# Patient Record
Sex: FEMALE | Race: BLACK OR AFRICAN AMERICAN | NOT HISPANIC OR LATINO | Employment: FULL TIME | URBAN - METROPOLITAN AREA
[De-identification: names, ages, dates, MRNs, and addresses within clinical notes are randomized per-mention and may not be internally consistent; named-entity substitution may affect disease eponyms.]

---

## 2018-06-05 ENCOUNTER — HOSPITAL ENCOUNTER (EMERGENCY)
Facility: HOSPITAL | Age: 43
Discharge: HOME | End: 2018-06-05
Attending: EMERGENCY MEDICINE
Payer: COMMERCIAL

## 2018-06-05 ENCOUNTER — APPOINTMENT (EMERGENCY)
Dept: RADIOLOGY | Facility: HOSPITAL | Age: 43
End: 2018-06-05
Payer: COMMERCIAL

## 2018-06-05 VITALS
SYSTOLIC BLOOD PRESSURE: 110 MMHG | TEMPERATURE: 98 F | BODY MASS INDEX: 47.09 KG/M2 | DIASTOLIC BLOOD PRESSURE: 65 MMHG | HEART RATE: 67 BPM | OXYGEN SATURATION: 96 % | WEIGHT: 293 LBS | RESPIRATION RATE: 20 BRPM | HEIGHT: 66 IN

## 2018-06-05 DIAGNOSIS — N93.9 VAGINAL BLEEDING: Primary | ICD-10-CM

## 2018-06-05 LAB
ABO GROUP BLD: NORMAL
ALBUMIN SERPL BCP-MCNC: 3.3 G/DL (ref 3.5–5)
ALP SERPL-CCNC: 55 U/L (ref 46–116)
ALT SERPL W P-5'-P-CCNC: 24 U/L (ref 12–78)
ANION GAP SERPL CALCULATED.3IONS-SCNC: 7 MMOL/L (ref 4–13)
APTT PPP: 32 SECONDS (ref 24–36)
AST SERPL W P-5'-P-CCNC: 14 U/L (ref 5–45)
BACTERIA UR QL AUTO: ABNORMAL /HPF
BASOPHILS # BLD AUTO: 0.03 THOUSANDS/ΜL (ref 0–0.1)
BASOPHILS NFR BLD AUTO: 0 % (ref 0–1)
BILIRUB SERPL-MCNC: 0.3 MG/DL (ref 0.2–1)
BILIRUB UR QL STRIP: NEGATIVE
BLD GP AB SCN SERPL QL: NEGATIVE
BUN SERPL-MCNC: 10 MG/DL (ref 5–25)
CALCIUM SERPL-MCNC: 8.5 MG/DL (ref 8.3–10.1)
CHLORIDE SERPL-SCNC: 103 MMOL/L (ref 100–108)
CLARITY UR: ABNORMAL
CO2 SERPL-SCNC: 29 MMOL/L (ref 21–32)
COLOR UR: ABNORMAL
CREAT SERPL-MCNC: 0.78 MG/DL (ref 0.6–1.3)
DEPRECATED D DIMER PPP: 282 NG/ML (FEU) (ref 0–424)
EOSINOPHIL # BLD AUTO: 0.1 THOUSAND/ΜL (ref 0–0.61)
EOSINOPHIL NFR BLD AUTO: 1 % (ref 0–6)
ERYTHROCYTE [DISTWIDTH] IN BLOOD BY AUTOMATED COUNT: 13.1 % (ref 11.6–15.1)
EXT PREG TEST URINE: NEGATIVE
GFR SERPL CREATININE-BSD FRML MDRD: 109 ML/MIN/1.73SQ M
GLUCOSE SERPL-MCNC: 125 MG/DL (ref 65–140)
GLUCOSE UR STRIP-MCNC: NEGATIVE MG/DL
HCT VFR BLD AUTO: 39.2 % (ref 34.8–46.1)
HGB BLD-MCNC: 12.4 G/DL (ref 11.5–15.4)
HGB UR QL STRIP.AUTO: ABNORMAL
IMM GRANULOCYTES # BLD AUTO: 0.02 THOUSAND/UL (ref 0–0.2)
IMM GRANULOCYTES NFR BLD AUTO: 0 % (ref 0–2)
INR PPP: 1.11 (ref 0.86–1.17)
KETONES UR STRIP-MCNC: NEGATIVE MG/DL
LEUKOCYTE ESTERASE UR QL STRIP: ABNORMAL
LYMPHOCYTES # BLD AUTO: 2.63 THOUSANDS/ΜL (ref 0.6–4.47)
LYMPHOCYTES NFR BLD AUTO: 35 % (ref 14–44)
MCH RBC QN AUTO: 26.2 PG (ref 26.8–34.3)
MCHC RBC AUTO-ENTMCNC: 31.6 G/DL (ref 31.4–37.4)
MCV RBC AUTO: 83 FL (ref 82–98)
MONOCYTES # BLD AUTO: 0.84 THOUSAND/ΜL (ref 0.17–1.22)
MONOCYTES NFR BLD AUTO: 11 % (ref 4–12)
NEUTROPHILS # BLD AUTO: 3.93 THOUSANDS/ΜL (ref 1.85–7.62)
NEUTS SEG NFR BLD AUTO: 53 % (ref 43–75)
NITRITE UR QL STRIP: NEGATIVE
NON-SQ EPI CELLS URNS QL MICRO: ABNORMAL /HPF
NRBC BLD AUTO-RTO: 0 /100 WBCS
PH UR STRIP.AUTO: 6 [PH] (ref 4.5–8)
PLATELET # BLD AUTO: 252 THOUSANDS/UL (ref 149–390)
PMV BLD AUTO: 9.2 FL (ref 8.9–12.7)
POTASSIUM SERPL-SCNC: 3.8 MMOL/L (ref 3.5–5.3)
PROT SERPL-MCNC: 7.3 G/DL (ref 6.4–8.2)
PROT UR STRIP-MCNC: ABNORMAL MG/DL
PROTHROMBIN TIME: 14.2 SECONDS (ref 11.8–14.2)
RBC # BLD AUTO: 4.74 MILLION/UL (ref 3.81–5.12)
RBC #/AREA URNS AUTO: ABNORMAL /HPF
RH BLD: POSITIVE
SODIUM SERPL-SCNC: 139 MMOL/L (ref 136–145)
SP GR UR STRIP.AUTO: 1.01 (ref 1–1.03)
SPECIMEN EXPIRATION DATE: NORMAL
UROBILINOGEN UR QL STRIP.AUTO: 0.2 E.U./DL
WBC # BLD AUTO: 7.55 THOUSAND/UL (ref 4.31–10.16)
WBC #/AREA URNS AUTO: ABNORMAL /HPF

## 2018-06-05 PROCEDURE — 96361 HYDRATE IV INFUSION ADD-ON: CPT

## 2018-06-05 PROCEDURE — 86850 RBC ANTIBODY SCREEN: CPT | Performed by: EMERGENCY MEDICINE

## 2018-06-05 PROCEDURE — 86901 BLOOD TYPING SEROLOGIC RH(D): CPT | Performed by: EMERGENCY MEDICINE

## 2018-06-05 PROCEDURE — 85025 COMPLETE CBC W/AUTO DIFF WBC: CPT | Performed by: EMERGENCY MEDICINE

## 2018-06-05 PROCEDURE — 81025 URINE PREGNANCY TEST: CPT | Performed by: EMERGENCY MEDICINE

## 2018-06-05 PROCEDURE — 85379 FIBRIN DEGRADATION QUANT: CPT | Performed by: EMERGENCY MEDICINE

## 2018-06-05 PROCEDURE — 85730 THROMBOPLASTIN TIME PARTIAL: CPT | Performed by: EMERGENCY MEDICINE

## 2018-06-05 PROCEDURE — 99284 EMERGENCY DEPT VISIT MOD MDM: CPT

## 2018-06-05 PROCEDURE — 96360 HYDRATION IV INFUSION INIT: CPT

## 2018-06-05 PROCEDURE — 93005 ELECTROCARDIOGRAM TRACING: CPT

## 2018-06-05 PROCEDURE — 85610 PROTHROMBIN TIME: CPT | Performed by: EMERGENCY MEDICINE

## 2018-06-05 PROCEDURE — 36415 COLL VENOUS BLD VENIPUNCTURE: CPT | Performed by: EMERGENCY MEDICINE

## 2018-06-05 PROCEDURE — 86900 BLOOD TYPING SEROLOGIC ABO: CPT | Performed by: EMERGENCY MEDICINE

## 2018-06-05 PROCEDURE — 80053 COMPREHEN METABOLIC PANEL: CPT | Performed by: EMERGENCY MEDICINE

## 2018-06-05 PROCEDURE — 71046 X-RAY EXAM CHEST 2 VIEWS: CPT

## 2018-06-05 PROCEDURE — 81001 URINALYSIS AUTO W/SCOPE: CPT | Performed by: EMERGENCY MEDICINE

## 2018-06-05 RX ORDER — MEDROXYPROGESTERONE ACETATE 10 MG/1
10 TABLET ORAL DAILY
Qty: 10 TABLET | Refills: 0 | Status: SHIPPED | OUTPATIENT
Start: 2018-06-05 | End: 2020-04-10 | Stop reason: ALTCHOICE

## 2018-06-05 RX ADMIN — SODIUM CHLORIDE 1000 ML: 0.9 INJECTION, SOLUTION INTRAVENOUS at 09:37

## 2018-06-05 NOTE — ED PROVIDER NOTES
History  Chief Complaint   Patient presents with    Vaginal Bleeding     cystoscopy on friday, increasing bleeding since     39 y/o female presents to the ED for vaginal bleeding x 5 days  Patient states that she had a hysteroscopy done 5 days ago by Dr Chele Dudley for a uterine fibroid  She states that the fibroid was in the muscle and not removed  However, a biopsy was taken  She states that  was there were not any complications with the procedure and she was sent home later that day  She states that later that night she developed mild vaginal bleeding that has been progressively worsening over the last 5 days  She states that yesterday she went through 8- large, 10 our pads and has gone through an additional 3 since 5am this morning  Has also passed multiple dime sized clots today  She states that she also feels fatigued, lightheaded, and slightly SOB  Denies any CP, f/c, N/V, abd pain, or urinary symptoms  She states that she does menstrual cycles still that are irregular and typically heavy  However, she states that this is heavier than a typical menstrual cycle  No other complaints  History provided by:  Patient  Vaginal Bleeding   Quality:  Bright red  Severity:  Moderate  Onset quality:  Gradual  Duration:  5 days  Timing:  Constant  Progression:  Worsening  Chronicity:  New  Menstrual history:  Irregular  Number of pads used:  8 yesterday,3 today   Context: spontaneously    Relieved by:  None tried  Worsened by:  Nothing  Ineffective treatments:  None tried  Associated symptoms: dizziness    Associated symptoms: no abdominal pain, no dysuria, no fever and no nausea    Risk factors: gynecological surgery and ovarian cysts    Risk factors: no bleeding disorder        None       History reviewed  No pertinent past medical history  Past Surgical History:   Procedure Laterality Date    ECTOPIC PREGNANCY SURGERY         History reviewed  No pertinent family history    I have reviewed and agree with the history as documented  Social History   Substance Use Topics    Smoking status: Never Smoker    Smokeless tobacco: Never Used    Alcohol use No        Review of Systems   Constitutional: Negative for chills and fever  HENT: Negative for congestion, ear pain and sore throat  Eyes: Negative for pain and visual disturbance  Respiratory: Negative for cough, shortness of breath and wheezing  Cardiovascular: Negative for chest pain and leg swelling  Gastrointestinal: Negative for abdominal pain, diarrhea, nausea and vomiting  Genitourinary: Positive for vaginal bleeding  Negative for dysuria, frequency, hematuria and urgency  Musculoskeletal: Negative for neck pain and neck stiffness  Skin: Negative for rash and wound  Neurological: Positive for dizziness  Negative for weakness, numbness and headaches  Psychiatric/Behavioral: Negative for agitation and confusion  All other systems reviewed and are negative  Physical Exam  Physical Exam   Constitutional: She is oriented to person, place, and time  She appears well-developed and well-nourished  HENT:   Head: Normocephalic and atraumatic  Mouth/Throat: Oropharynx is clear and moist    Eyes: EOM are normal  Pupils are equal, round, and reactive to light  Neck: Normal range of motion  Neck supple  Cardiovascular: Normal rate and regular rhythm  Pulmonary/Chest: Effort normal and breath sounds normal  No respiratory distress  She has no wheezes  She has no rales  She exhibits no tenderness  Abdominal: Soft  Bowel sounds are normal  She exhibits no distension  There is no tenderness  Genitourinary: There is bleeding in the vagina  Genitourinary Comments: No blood clots  Mild bleeding in the vaginal vault- bleeding from the cervix  Musculoskeletal: Normal range of motion  Neurological: She is alert and oriented to person, place, and time  No focal deficits   Skin: Skin is warm and dry     Nursing note and vitals reviewed        Vital Signs  ED Triage Vitals   Temperature Pulse Respirations Blood Pressure SpO2   06/05/18 0909 06/05/18 0909 06/05/18 0909 06/05/18 0909 06/05/18 0909   98 °F (36 7 °C) 58 18 139/81 96 %      Temp src Heart Rate Source Patient Position - Orthostatic VS BP Location FiO2 (%)   -- 06/05/18 1113 06/05/18 1113 06/05/18 1113 --    Monitor Lying Right arm       Pain Score       06/05/18 0909       No Pain           Vitals:    06/05/18 0909 06/05/18 1113   BP: 139/81 110/65   Pulse: 58 67   Patient Position - Orthostatic VS:  Lying       Visual Acuity      ED Medications  Medications   sodium chloride 0 9 % bolus 1,000 mL (0 mL Intravenous Stopped 6/5/18 1301)       Diagnostic Studies  Results Reviewed     Procedure Component Value Units Date/Time    Urine Microscopic [[id number]]  (Abnormal) Collected:  06/05/18 1030    Lab Status:  Final result Specimen:  Urine from Urine, Clean Catch Updated:  06/05/18 1101     RBC, UA Innumerable (A) /hpf      WBC, UA 0-1 (A) /hpf      Epithelial Cells Occasional /hpf      Bacteria, UA None Seen /hpf     UA (URINE) with reflex to Microscopic [[id number]]  (Abnormal) Collected:  06/05/18 1030    Lab Status:  Final result Specimen:  Urine from Urine, Clean Catch Updated:  06/05/18 1051     Color, UA Red     Clarity, UA Cloudy     Specific Gravity, UA 1 015     pH, UA 6 0     Leukocytes, UA Trace (A)     Nitrite, UA Negative     Protein, UA 30 (1+) (A) mg/dl      Glucose, UA Negative mg/dl      Ketones, UA Negative mg/dl      Urobilinogen, UA 0 2 E U /dl      Bilirubin, UA Negative     Blood, UA Large (A)    POCT pregnancy, urine [[id number]]  (Normal) Resulted:  06/05/18 1039    Lab Status:  Final result Updated:  06/05/18 1039     EXT PREG TEST UR (Ref: Negative) negative    D-dimer, quantitative [[id number]]  (Normal) Collected:  06/05/18 0936    Lab Status:  Final result Specimen:  Blood from Arm, Right Updated:  06/05/18 1037     D-Dimer, Quant 282 ng/ml (FEU) Comprehensive metabolic panel [[id number]]  (Abnormal) Collected:  06/05/18 0936    Lab Status:  Final result Specimen:  Blood from Arm, Right Updated:  06/05/18 0959     Sodium 139 mmol/L      Potassium 3 8 mmol/L      Chloride 103 mmol/L      CO2 29 mmol/L      Anion Gap 7 mmol/L      BUN 10 mg/dL      Creatinine 0 78 mg/dL      Glucose 125 mg/dL      Calcium 8 5 mg/dL      AST 14 U/L      ALT 24 U/L      Alkaline Phosphatase 55 U/L      Total Protein 7 3 g/dL      Albumin 3 3 (L) g/dL      Total Bilirubin 0 30 mg/dL      eGFR 109 ml/min/1 73sq m     Narrative:         National Kidney Disease Education Program recommendations are as follows:  GFR calculation is accurate only with a steady state creatinine  Chronic Kidney disease less than 60 ml/min/1 73 sq  meters  Kidney failure less than 15 ml/min/1 73 sq  meters      Protime-INR [[id number]]  (Normal) Collected:  06/05/18 0936    Lab Status:  Final result Specimen:  Blood from Arm, Right Updated:  06/05/18 0954     Protime 14 2 seconds      INR 1 11    APTT [[id number]]  (Normal) Collected:  06/05/18 0936    Lab Status:  Final result Specimen:  Blood from Arm, Right Updated:  06/05/18 0954     PTT 32 seconds     CBC and differential [[id number]]  (Abnormal) Collected:  06/05/18 0936    Lab Status:  Final result Specimen:  Blood from Arm, Right Updated:  06/05/18 0943     WBC 7 55 Thousand/uL      RBC 4 74 Million/uL      Hemoglobin 12 4 g/dL      Hematocrit 39 2 %      MCV 83 fL      MCH 26 2 (L) pg      MCHC 31 6 g/dL      RDW 13 1 %      MPV 9 2 fL      Platelets 489 Thousands/uL      nRBC 0 /100 WBCs      Neutrophils Relative 53 %      Immat GRANS % 0 %      Lymphocytes Relative 35 %      Monocytes Relative 11 %      Eosinophils Relative 1 %      Basophils Relative 0 %      Neutrophils Absolute 3 93 Thousands/µL      Immature Grans Absolute 0 02 Thousand/uL      Lymphocytes Absolute 2 63 Thousands/µL      Monocytes Absolute 0 84 Thousand/µL      Eosinophils Absolute 0 10 Thousand/µL      Basophils Absolute 0 03 Thousands/µL                  XR chest 2 views   ED Interpretation by John Bentley DO (06/05 1236)   NAP      Final Result by Alexandrea Mckeon MD (06/05 1226)      No acute cardiopulmonary disease  Workstation performed: [id number]                    Procedures  ECG 12 Lead Documentation  Date/Time: 6/5/2018 9:56 AM  Performed by: Maurisio Rodriguez  Authorized by: Maurisio Rodriguez     Indications / Diagnosis:  Lightheadedness  Patient location:  ED  Previous ECG:     Previous ECG:  Unavailable  Rate:     ECG rate:  64    ECG rate assessment: normal    Rhythm:     Rhythm: sinus rhythm    Ectopy:     Ectopy: none    QRS:     QRS axis:  Normal    QRS intervals:  Normal  ST segments:     ST segments:  Normal  T waves:     T waves: normal               Phone Contacts  ED Phone Contact    ED Course  ED Course as of Jun 06 2205 Tue Jun 05, 2018   1009 Hemoglobin: 12 4   1047 D-DIMER QUANTITATIVE: 282   1219 Spoke with Dr Mee Larson from [facility] [place], who is covering for Dr Anjelica Valdez  He recommends placing patient on Provera 10mg QD- if vaginal bleeding is heavy than instruct patient to take BID  Also recommends following up with Dr Anjelica Valdez in 3 days  [phone] Patient re-evaluated and feels improved  She was updated on conversation with Dr Mee Larson and agrees with plan  She was instructed to call Dr Anjelica Valdez office today to schedule appointment in 3 days  MDM  Number of Diagnoses or Management Options  Vaginal bleeding: new and requires workup  Diagnosis management comments: Patient with vaginal bleeding s/p hysteroscopy- will get labs, ekg, cxr, UA/ urine preg and consult patient's obgyn for further recommendations  Patient reevaluated and feels improved  Patient updated on results of tests  Discharge instructions given including medications, follow-up, and return precautions   Patient demonstrates verbal understanding and agrees with plan  Amount and/or Complexity of Data Reviewed  Clinical lab tests: ordered and reviewed  Tests in the radiology section of CPT®: ordered and reviewed  Tests in the medicine section of CPT®: ordered and reviewed  Discussion of test results with the performing providers: yes  Decide to obtain previous medical records or to obtain history from someone other than the patient: yes  Obtain history from someone other than the patient: yes  Review and summarize past medical records: yes  Discuss the patient with other providers: yes  Independent visualization of images, tracings, or specimens: yes    Patient Progress  Patient progress: improved    CritCare Time    Disposition  Final diagnoses:   Vaginal bleeding     Time reflects when diagnosis was documented in both MDM as applicable and the Disposition within this note     Time User Action Codes Description Comment    6/5/2018 12:33 PM Israel Mahmood Add [N93 9] Vaginal bleeding       ED Disposition     ED Disposition Condition Comment    Discharge  Roshni Paula discharge to home/self care  Condition at discharge: Good        Follow-up Information     Follow up With Specialties Details Why Contact Info Additional Adela Benavides MD Obstetrics and Gynecology Call today To schedule an appointment in 3 days  [id number] Emergency Department Emergency Medicine Go to Immediately for any new or worsening symptoms   [facility] [address]  [address] [place] [address] ED, [address] [place] [address], [id number]          Discharge Medication List as of 6/5/2018 12:36 PM      START taking these medications    Details   medroxyPROGESTERone (PROVERA) 10 mg tablet Take 1 tablet (10 mg total) by mouth daily for 5 days Take BID for heavy vaginal bleeding, Starting Tue 6/5/2018, Until Sun 6/10/2018, Print           No discharge procedures on file      ED Provider  Electronically Signed by           Antonio Hill DO  06/06/18 9490

## 2018-06-06 LAB
ATRIAL RATE: 64 BPM
P AXIS: 24 DEGREES
PR INTERVAL: 110 MS
QRS AXIS: 56 DEGREES
QRSD INTERVAL: 86 MS
QT INTERVAL: 418 MS
QTC INTERVAL: 431 MS
T WAVE AXIS: 40 DEGREES
VENTRICULAR RATE: 64 BPM

## 2018-06-06 PROCEDURE — 93010 ELECTROCARDIOGRAM REPORT: CPT | Performed by: INTERNAL MEDICINE

## 2020-04-10 ENCOUNTER — TELEMEDICINE (OUTPATIENT)
Dept: FAMILY MEDICINE CLINIC | Facility: CLINIC | Age: 45
End: 2020-04-10
Payer: COMMERCIAL

## 2020-04-10 DIAGNOSIS — N83.209 CYST OF OVARY, UNSPECIFIED LATERALITY: Primary | ICD-10-CM

## 2020-04-10 DIAGNOSIS — J01.10 ACUTE NON-RECURRENT FRONTAL SINUSITIS: ICD-10-CM

## 2020-04-10 DIAGNOSIS — D17.1 LIPOMA OF TORSO: ICD-10-CM

## 2020-04-10 DIAGNOSIS — E28.2 PCOS (POLYCYSTIC OVARIAN SYNDROME): ICD-10-CM

## 2020-04-10 DIAGNOSIS — R19.02 LEFT UPPER QUADRANT ABDOMINAL MASS: ICD-10-CM

## 2020-04-10 PROBLEM — E55.9 VITAMIN D DEFICIENCY: Status: ACTIVE | Noted: 2017-11-27

## 2020-04-10 PROBLEM — Z98.84 S/P LAPAROSCOPIC SLEEVE GASTRECTOMY: Status: ACTIVE | Noted: 2018-10-17

## 2020-04-10 PROBLEM — E78.5 HYPERLIPIDEMIA: Status: ACTIVE | Noted: 2017-11-27

## 2020-04-10 PROBLEM — N92.6 IRREGULAR BLEEDING: Status: ACTIVE | Noted: 2017-11-27

## 2020-04-10 PROBLEM — Z30.41 ENCOUNTER FOR SURVEILLANCE OF CONTRACEPTIVE PILLS: Status: ACTIVE | Noted: 2019-09-23

## 2020-04-10 PROBLEM — Z87.42 HISTORY OF PCOS: Status: ACTIVE | Noted: 2020-04-10

## 2020-04-10 PROCEDURE — 99214 OFFICE O/P EST MOD 30 MIN: CPT | Performed by: NURSE PRACTITIONER

## 2020-04-10 RX ORDER — POLYETHYLENE GLYCOL 3350 17 G/17G
POWDER, FOR SOLUTION ORAL
COMMUNITY
Start: 2012-08-29 | End: 2021-05-04 | Stop reason: ALTCHOICE

## 2020-04-10 RX ORDER — CHOLECALCIFEROL (VITAMIN D3) 125 MCG
CAPSULE ORAL
COMMUNITY
End: 2021-05-09 | Stop reason: SDUPTHER

## 2020-04-10 RX ORDER — NORGESTIMATE AND ETHINYL ESTRADIOL 7DAYSX3 LO
1 KIT ORAL DAILY
COMMUNITY
Start: 2020-03-03 | End: 2021-05-04 | Stop reason: ALTCHOICE

## 2020-04-10 RX ORDER — BACILLUS COAGULANS/INULIN 1B-250 MG
1 CAPSULE ORAL
COMMUNITY

## 2020-04-10 RX ORDER — DOXYCYCLINE HYCLATE 100 MG
100 TABLET ORAL 2 TIMES DAILY
Qty: 14 TABLET | Refills: 0 | Status: SHIPPED | OUTPATIENT
Start: 2020-04-10 | End: 2020-04-17

## 2020-04-10 RX ORDER — NORETHINDRONE AND ETHINYL ESTRADIOL 0.4-0.035
1 KIT ORAL DAILY
COMMUNITY
Start: 2020-01-13 | End: 2020-04-10 | Stop reason: ALTCHOICE

## 2020-04-10 RX ORDER — PANTOPRAZOLE SODIUM 40 MG/1
40 TABLET, DELAYED RELEASE ORAL DAILY
COMMUNITY
End: 2020-04-14 | Stop reason: SDUPTHER

## 2020-04-10 RX ORDER — LEVONORGESTREL AND ETHINYL ESTRADIOL 0.1-0.02MG
1 KIT ORAL DAILY
COMMUNITY
End: 2020-04-10 | Stop reason: ALTCHOICE

## 2020-04-14 DIAGNOSIS — E78.49 OTHER HYPERLIPIDEMIA: Primary | ICD-10-CM

## 2020-04-14 RX ORDER — PANTOPRAZOLE SODIUM 40 MG/1
40 TABLET, DELAYED RELEASE ORAL DAILY
Qty: 90 TABLET | Refills: 3 | Status: SHIPPED | OUTPATIENT
Start: 2020-04-14 | End: 2021-01-25

## 2020-06-12 ENCOUNTER — TELEPHONE (OUTPATIENT)
Dept: FAMILY MEDICINE CLINIC | Facility: CLINIC | Age: 45
End: 2020-06-12

## 2020-06-12 ENCOUNTER — OFFICE VISIT (OUTPATIENT)
Dept: FAMILY MEDICINE CLINIC | Facility: CLINIC | Age: 45
End: 2020-06-12
Payer: COMMERCIAL

## 2020-06-12 VITALS
WEIGHT: 219 LBS | HEART RATE: 80 BPM | DIASTOLIC BLOOD PRESSURE: 80 MMHG | SYSTOLIC BLOOD PRESSURE: 134 MMHG | BODY MASS INDEX: 35.2 KG/M2 | HEIGHT: 66 IN | TEMPERATURE: 99.2 F

## 2020-06-12 DIAGNOSIS — M54.42 ACUTE BILATERAL LOW BACK PAIN WITH BILATERAL SCIATICA: Primary | ICD-10-CM

## 2020-06-12 DIAGNOSIS — R73.01 ELEVATED FASTING GLUCOSE: ICD-10-CM

## 2020-06-12 DIAGNOSIS — M54.41 ACUTE BILATERAL LOW BACK PAIN WITH BILATERAL SCIATICA: Primary | ICD-10-CM

## 2020-06-12 DIAGNOSIS — N83.209 CYST OF OVARY, UNSPECIFIED LATERALITY: ICD-10-CM

## 2020-06-12 DIAGNOSIS — R10.2 PELVIC PAIN: ICD-10-CM

## 2020-06-12 DIAGNOSIS — E55.9 VITAMIN D DEFICIENCY: ICD-10-CM

## 2020-06-12 DIAGNOSIS — Z98.84 S/P LAPAROSCOPIC SLEEVE GASTRECTOMY: ICD-10-CM

## 2020-06-12 DIAGNOSIS — E78.49 OTHER HYPERLIPIDEMIA: ICD-10-CM

## 2020-06-12 DIAGNOSIS — N92.6 IRREGULAR BLEEDING: ICD-10-CM

## 2020-06-12 DIAGNOSIS — J30.2 SEASONAL ALLERGIES: ICD-10-CM

## 2020-06-12 DIAGNOSIS — J45.20 MILD INTERMITTENT ASTHMATIC BRONCHITIS WITHOUT COMPLICATION: ICD-10-CM

## 2020-06-12 PROCEDURE — 3008F BODY MASS INDEX DOCD: CPT | Performed by: NURSE PRACTITIONER

## 2020-06-12 PROCEDURE — 99214 OFFICE O/P EST MOD 30 MIN: CPT | Performed by: NURSE PRACTITIONER

## 2020-06-12 PROCEDURE — 1036F TOBACCO NON-USER: CPT | Performed by: NURSE PRACTITIONER

## 2020-06-12 RX ORDER — TIZANIDINE HYDROCHLORIDE 4 MG/1
4 CAPSULE, GELATIN COATED ORAL 3 TIMES DAILY PRN
Qty: 30 CAPSULE | Refills: 0 | Status: SHIPPED | OUTPATIENT
Start: 2020-06-12 | End: 2020-09-11 | Stop reason: ALTCHOICE

## 2020-06-12 RX ORDER — PANTOPRAZOLE SODIUM 40 MG/1
TABLET, DELAYED RELEASE ORAL
COMMUNITY
Start: 2020-04-14 | End: 2020-06-12 | Stop reason: SDUPTHER

## 2020-06-12 RX ORDER — ALBUTEROL SULFATE 90 UG/1
2 AEROSOL, METERED RESPIRATORY (INHALATION) EVERY 6 HOURS PRN
COMMUNITY
End: 2020-06-12 | Stop reason: SDUPTHER

## 2020-06-12 RX ORDER — MONTELUKAST SODIUM 10 MG/1
10 TABLET ORAL
COMMUNITY
End: 2020-06-12 | Stop reason: SDUPTHER

## 2020-06-12 RX ORDER — ALBUTEROL SULFATE 90 UG/1
2 AEROSOL, METERED RESPIRATORY (INHALATION) EVERY 6 HOURS PRN
Qty: 1 INHALER | Refills: 3 | Status: SHIPPED | OUTPATIENT
Start: 2020-06-12 | End: 2020-07-12

## 2020-06-12 RX ORDER — NORGESTIMATE AND ETHINYL ESTRADIOL 7DAYSX3 LO
KIT ORAL
COMMUNITY
Start: 2020-05-26 | End: 2020-06-12 | Stop reason: ALTCHOICE

## 2020-06-12 RX ORDER — MONTELUKAST SODIUM 10 MG/1
10 TABLET ORAL
Qty: 30 TABLET | Refills: 1 | Status: SHIPPED | OUTPATIENT
Start: 2020-06-12 | End: 2021-04-28 | Stop reason: ALTCHOICE

## 2020-09-01 ENCOUNTER — TELEMEDICINE (OUTPATIENT)
Dept: FAMILY MEDICINE CLINIC | Facility: CLINIC | Age: 45
End: 2020-09-01
Payer: COMMERCIAL

## 2020-09-01 DIAGNOSIS — R30.0 DYSURIA: Primary | ICD-10-CM

## 2020-09-01 PROCEDURE — 99214 OFFICE O/P EST MOD 30 MIN: CPT | Performed by: NURSE PRACTITIONER

## 2020-09-01 NOTE — PROGRESS NOTES
I will order urine test   Follow up after results to treat       Virtual Brief Visit    Assessment/Plan:    Patient contacted me on call   C/o dysuria and frequency and pelvic pain   She has tried OT stuff no improvement   I ordered a urinalysis initially via phone   Positive for UTI   Called back and discussed results and supportive measures and ordered Macrobid  Twice a day   Adequate hydration and proper hygiene and do not hold urine   Avoid sweets and soda please     Problem List Items Addressed This Visit     None      Visit Diagnoses     Dysuria    -  Primary                Reason for visit is   Chief Complaint   Patient presents with    Difficulty Urinating     for few days  OTC stuff didnt work     Virtual Brief Visit        Encounter provider Argelia Nelson, [address]    Provider located at [address] [place] [address] [facility]  1200 [facility] 1100 [facility] [address] [phone]    Recent Visits  Date Type Provider Dept   09/01/20 3200 [facility] 112 recent visits within past 7 days and meeting all other requirements     Future Appointments  No visits were found meeting these conditions  Showing future appointments within next 150 days and meeting all other requirements        After connecting through telephone, the patient was identified by name and date of birth  Marcelo Morales was informed that this is a telemedicine visit and that the visit is being conducted through telephone  My office door was closed  No one else was in the room  She acknowledged consent and understanding of privacy and security of the platform  The patient has agreed to participate and understands she can discontinue the visit at any time  Patient is aware this is a billable service       Subjective    Marcelo Morales is a 38 y o  female   Patient Active Problem List   Diagnosis    Cyst of ovary    Hyperlipidemia    Encounter for surveillance of contraceptive pills    Irregular bleeding    S/P laparoscopic sleeve gastrectomy    Vitamin D deficiency    History of PCOS     C/o urinary issues burning frequency and pelvic pressure for few days   Called to discuss via phone initially   Urine was ordered to be tested   Urinalysis positive for UTI - will treat        Past Medical History:   Diagnosis Date    Diabetes mellitus ([address] )     Hypercholesterolemia     Sleep apnea     Vitamin deficiency        Past Surgical History:   Procedure Laterality Date    ECTOPIC PREGNANCY SURGERY      EYE SURGERY      GASTRECTOMY SLEEVE LAPAROSCOPIC         Current Outpatient Medications   Medication Sig Dispense Refill    Bacillus Coagulans-Inulin (PROBIOTIC) 1-250 BILLION-MG CAPS Take 1 capsule by mouth      Cholecalciferol (VITAMIN D) 125 MCG (5000 UT) CAPS Take by mouth      montelukast (SINGULAIR) 10 mg tablet Take 1 tablet (10 mg total) by mouth daily at bedtime 30 tablet 1    nitrofurantoin (MACROBID) 100 mg capsule Take 1 capsule (100 mg total) by mouth 2 (two) times a day for 5 days 10 capsule 0    norgestimate-ethinyl estradiol (ORTHO TRI-CYCLEN LO) 0 18/0 215/0 25 MG-25 MCG per tablet Take 1 tablet by mouth daily      pantoprazole (PROTONIX) 40 mg tablet Take 1 tablet (40 mg total) by mouth daily 90 tablet 3    polyethylene glycol (MiraLax) powder Take by mouth      TiZANidine (ZANAFLEX) 4 MG capsule Take 1 capsule (4 mg total) by mouth 3 (three) times a day as needed for muscle spasms for up to 10 days 30 capsule 0     No current facility-administered medications for this visit  Allergies   Allergen Reactions    Penicillin G Other (See Comments)    Pollen Extract Sneezing       Review of Systems   Constitutional: Negative for chills, fatigue and fever  Respiratory: Negative for cough and shortness of breath  Genitourinary: Positive for difficulty urinating, dysuria and frequency  There were no vitals filed for this visit        I spent 15 minutes directly with the patient during this visit    VIRTUAL VISIT DISCLAIMER    Frank Kaitlynn acknowledges that she has consented to an online visit or consultation  She understands that the online visit is based solely on information provided by her, and that, in the absence of a face-to-face physical evaluation by the physician, the diagnosis she receives is both limited and provisional in terms of accuracy and completeness  This is not intended to replace a full medical face-to-face evaluation by the physician  Frank Calderón understands and accepts these terms

## 2020-09-03 ENCOUNTER — APPOINTMENT (OUTPATIENT)
Dept: LAB | Facility: HOSPITAL | Age: 45
End: 2020-09-03
Payer: COMMERCIAL

## 2020-09-03 DIAGNOSIS — N30.01 ACUTE CYSTITIS WITH HEMATURIA: Primary | ICD-10-CM

## 2020-09-03 LAB
AMORPH PHOS CRY URNS QL MICRO: ABNORMAL /HPF
BACTERIA UR QL AUTO: ABNORMAL /HPF
BILIRUB UR QL STRIP: NEGATIVE
CLARITY UR: ABNORMAL
COLOR UR: YELLOW
GLUCOSE UR STRIP-MCNC: NEGATIVE MG/DL
HGB UR QL STRIP.AUTO: ABNORMAL
KETONES UR STRIP-MCNC: NEGATIVE MG/DL
LEUKOCYTE ESTERASE UR QL STRIP: ABNORMAL
MUCOUS THREADS UR QL AUTO: ABNORMAL
NITRITE UR QL STRIP: NEGATIVE
NON-SQ EPI CELLS URNS QL MICRO: ABNORMAL /HPF
PH UR STRIP.AUTO: 7.5 [PH]
PROT UR STRIP-MCNC: ABNORMAL MG/DL
RBC #/AREA URNS AUTO: ABNORMAL /HPF
SP GR UR STRIP.AUTO: 1.02 (ref 1–1.03)
UROBILINOGEN UR QL STRIP.AUTO: 0.2 E.U./DL
WBC #/AREA URNS AUTO: ABNORMAL /HPF

## 2020-09-03 PROCEDURE — 81001 URINALYSIS AUTO W/SCOPE: CPT | Performed by: NURSE PRACTITIONER

## 2020-09-03 RX ORDER — NITROFURANTOIN 25; 75 MG/1; MG/1
100 CAPSULE ORAL 2 TIMES DAILY
Qty: 10 CAPSULE | Refills: 0 | Status: SHIPPED | OUTPATIENT
Start: 2020-09-03 | End: 2020-09-08

## 2020-09-06 NOTE — PATIENT INSTRUCTIONS

## 2020-09-11 ENCOUNTER — TELEMEDICINE (OUTPATIENT)
Dept: FAMILY MEDICINE CLINIC | Facility: CLINIC | Age: 45
End: 2020-09-11
Payer: COMMERCIAL

## 2020-09-11 DIAGNOSIS — R30.0 DYSURIA: Primary | ICD-10-CM

## 2020-09-11 DIAGNOSIS — N39.0 RECURRENT UTI: ICD-10-CM

## 2020-09-11 PROCEDURE — 1036F TOBACCO NON-USER: CPT | Performed by: NURSE PRACTITIONER

## 2020-09-11 PROCEDURE — 99214 OFFICE O/P EST MOD 30 MIN: CPT | Performed by: NURSE PRACTITIONER

## 2020-09-11 RX ORDER — CIPROFLOXACIN 500 MG/1
500 TABLET, FILM COATED ORAL EVERY 12 HOURS SCHEDULED
Qty: 10 TABLET | Refills: 0 | Status: SHIPPED | OUTPATIENT
Start: 2020-09-11 | End: 2020-09-16

## 2020-09-11 NOTE — PROGRESS NOTES
Virtual Regular Visit    Patient was recently treated for UTI   Finished antibiotics was feeling better however symptoms are reoccurring again as soon as she was done   She continues to hold her urine - after discussed multiple times not   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   Drives a lot and not always to use restroom in timely fashion   We will re-treat and discussed follow up with urology if issue continues   I will order US of kidneys and bladder to make sure she is not retaining urine   She must drink lots of fluids avoid sweets avoif sodas and caffinated drinks       Assessment/Plan:    Problem List Items Addressed This Visit     None      Visit Diagnoses     Dysuria    -  Primary    Relevant Medications    ciprofloxacin (CIPRO) 500 mg tablet    Other Relevant Orders    US kidney and bladder    Recurrent UTI        Relevant Medications    ciprofloxacin (CIPRO) 500 mg tablet               Reason for visit is   Chief Complaint   Patient presents with    Virtual Regular Visit        Encounter provider DAVE Moralez    Provider located at [address] [place] [id number] [phone]      Recent Visits  No visits were found meeting these conditions  Showing recent visits within past 7 days and meeting all other requirements     Future Appointments  No visits were found meeting these conditions  Showing future appointments within next 150 days and meeting all other requirements        The patient was identified by name and date of birth  Marcelo Morales was informed that this is a telemedicine visit and that the visit is being conducted through [facility] and patient was informed that this is not a secure, HIPAA-complaint platform  She agrees to proceed     My office door was closed  No one else was in the room  She acknowledged consent and understanding of privacy and security of the video platform   The patient has agreed to participate and understands they can discontinue the visit at any time  Patient is aware this is a billable service  Subjective  Kevin Yeager is a 38 y o  female   Patient Active Problem List   Diagnosis    Cyst of ovary    Hyperlipidemia    Encounter for surveillance of contraceptive pills    Irregular bleeding    S/P laparoscopic sleeve gastrectomy    Vitamin D deficiency    History of PCOS         Patient was recently treated for UTI   Finished antibiotics was feeling better however symptoms are reoccurring again as soon as she was done   She continues to hold her urine - after discussed multiple times not   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[employer]   Drives a lot and not always to use restroom in timely fashion          Past Medical History:   Diagnosis Date    Diabetes mellitus ([address] )     Hypercholesterolemia     Sleep apnea     Vitamin deficiency        Past Surgical History:   Procedure Laterality Date    ECTOPIC PREGNANCY SURGERY      EYE SURGERY      GASTRECTOMY SLEEVE LAPAROSCOPIC         Current Outpatient Medications   Medication Sig Dispense Refill    Bacillus Coagulans-Inulin (PROBIOTIC) 1-250 BILLION-MG CAPS Take 1 capsule by mouth      Cholecalciferol (VITAMIN D) 125 MCG (5000 UT) CAPS Take by mouth      ciprofloxacin (CIPRO) 500 mg tablet Take 1 tablet (500 mg total) by mouth every 12 (twelve) hours for 5 days 10 tablet 0    montelukast (SINGULAIR) 10 mg tablet Take 1 tablet (10 mg total) by mouth daily at bedtime 30 tablet 1    norgestimate-ethinyl estradiol (ORTHO TRI-CYCLEN LO) 0 18/0 215/0 25 MG-25 MCG per tablet Take 1 tablet by mouth daily      pantoprazole (PROTONIX) 40 mg tablet Take 1 tablet (40 mg total) by mouth daily 90 tablet 3    polyethylene glycol (MiraLax) powder Take by mouth       No current facility-administered medications for this visit           Allergies   Allergen Reactions    Penicillin G Other (See Comments)    Pollen Extract Sneezing       Review of Systems Constitutional: Negative for chills, fatigue and fever  HENT: Negative for congestion, sinus pain and sore throat  Respiratory: Negative for cough and shortness of breath  Gastrointestinal: Positive for abdominal pain (pelvic pressure )  Negative for abdominal distention  Genitourinary: Positive for difficulty urinating, dysuria and frequency  Allergic/Immunologic: Positive for environmental allergies  Neurological: Negative for dizziness and headaches  Hematological: Negative for adenopathy  Psychiatric/Behavioral: Negative for sleep disturbance and suicidal ideas  The patient is not nervous/anxious  Video Exam    There were no vitals filed for this visit  Physical Exam  Vitals signs and nursing note reviewed  Constitutional:       Appearance: Normal appearance  HENT:      Head: Atraumatic  Eyes:      Extraocular Movements: Extraocular movements intact  Neck:      Musculoskeletal: Normal range of motion  Pulmonary:      Effort: Pulmonary effort is normal    Musculoskeletal: Normal range of motion  Neurological:      Mental Status: She is alert and oriented to person, place, and time  Psychiatric:         Mood and Affect: Mood normal          Behavior: Behavior normal           I spent 20 minutes directly with the patient during this visit      VIRTUAL VISIT DISCLAIMER    Mili Yu acknowledges that she has consented to an online visit or consultation  She understands that the online visit is based solely on information provided by her, and that, in the absence of a face-to-face physical evaluation by the physician, the diagnosis she receives is both limited and provisional in terms of accuracy and completeness  This is not intended to replace a full medical face-to-face evaluation by the physician  Mili Yu understands and accepts these terms

## 2020-09-15 NOTE — PATIENT INSTRUCTIONS
Urinary Traction Infection in Older Adults   WHAT YOU NEED TO KNOW:   A urinary tract infection (UTI) is caused by bacteria that get inside your urinary tract  Your urinary tract includes your kidneys, ureters, bladder, and urethra  Urine is made in your kidneys, and it flows from the ureters to the bladder  Urine leaves the bladder through the urethra  A UTI is more common in your lower urinary tract, which includes your bladder and urethra  DISCHARGE INSTRUCTIONS:   Return to the emergency department if:   · You are urinating very little or not at all  · You are vomiting  · You have a high fever with shaking chills  · You have side or back pain that gets worse  Contact your healthcare provider if:   · You have a fever  · You are a woman and you have increased white or yellow discharge from your vagina  · You do not feel better after 2 days of taking antibiotics  · You have questions or concerns about your condition or care  Medicines:   · Medicines  help treat the bacterial infection or decrease pain and burning when you urinate  You may also need medicines to decrease the urge to urinate often  Your healthcare provider may recommend cranberry juice or cranberry supplements to help decrease your symptoms  · Take your medicine as directed  Contact your healthcare provider if you think your medicine is not helping or if you have side effects  Tell him or her if you are allergic to any medicine  Keep a list of the medicines, vitamins, and herbs you take  Include the amounts, and when and why you take them  Bring the list or the pill bottles to follow-up visits  Carry your medicine list with you in case of an emergency  Self-care:   · Urinate when you feel the urge  Do not hold your urine because bacteria can grow in the bladder if urine stays in the bladder too long  It may be helpful to urinate at least every 3 to 4 hours  · Drink liquids as directed    Liquids can help flush bacteria from your urinary tract  Ask how much liquid to drink each day and which liquids are best for you  You may need to drink more liquids than usual to help flush out the bacteria  Do not drink alcohol, caffeine, and citrus juices  These can irritate your bladder and increase your symptoms  · Apply heat  on your abdomen for 20 to 30 minutes every 2 hours for as many days as directed  Heat helps decrease discomfort and pressure in your bladder  Prevent a UTI:   · Women should wipe front to back  after urinating or having a bowel movement  This may prevent germs from getting into the urinary tract  · Urinate after you have sex  to flush away bacteria that can enter your urinary tract during sex  · Wear cotton underwear and clothes that fit loose  Tight pants and nylon underwear can trap moisture and cause bacteria to grow  Follow up with your healthcare provider as directed:  Write down your questions so you remember to ask them during your visits  © 2017 [address] Information is for End User's use only and may not be sold, redistributed or otherwise used for commercial purposes  All illustrations and images included in CareNotes® are the copyrighted property of [employer] D [employer] M , Inc  or [employer]  The above information is an  only  It is not intended as medical advice for individual conditions or treatments  Talk to your doctor, nurse or pharmacist before following any medical regimen to see if it is safe and effective for you

## 2021-01-24 DIAGNOSIS — E78.49 OTHER HYPERLIPIDEMIA: ICD-10-CM

## 2021-01-25 RX ORDER — PANTOPRAZOLE SODIUM 40 MG/1
TABLET, DELAYED RELEASE ORAL
Qty: 90 TABLET | Refills: 3 | Status: SHIPPED | OUTPATIENT
Start: 2021-01-25 | End: 2021-04-28 | Stop reason: ALTCHOICE

## 2021-02-10 ENCOUNTER — LAB (OUTPATIENT)
Dept: LAB | Facility: HOSPITAL | Age: 46
End: 2021-02-10
Payer: COMMERCIAL

## 2021-02-10 ENCOUNTER — TELEMEDICINE (OUTPATIENT)
Dept: FAMILY MEDICINE CLINIC | Facility: CLINIC | Age: 46
End: 2021-02-10
Payer: COMMERCIAL

## 2021-02-10 DIAGNOSIS — R30.0 DYSURIA: Primary | ICD-10-CM

## 2021-02-10 DIAGNOSIS — R35.0 URINARY FREQUENCY: ICD-10-CM

## 2021-02-10 DIAGNOSIS — Z87.440 HISTORY OF RECURRENT UTIS: ICD-10-CM

## 2021-02-10 LAB
BILIRUB UR QL STRIP: NEGATIVE
CLARITY UR: CLEAR
COLOR UR: YELLOW
GLUCOSE UR STRIP-MCNC: NEGATIVE MG/DL
HGB UR QL STRIP.AUTO: NEGATIVE
KETONES UR STRIP-MCNC: NEGATIVE MG/DL
LEUKOCYTE ESTERASE UR QL STRIP: NEGATIVE
NITRITE UR QL STRIP: NEGATIVE
PH UR STRIP.AUTO: 7 [PH]
PROT UR STRIP-MCNC: NEGATIVE MG/DL
SP GR UR STRIP.AUTO: 1.02 (ref 1–1.03)
UROBILINOGEN UR QL STRIP.AUTO: 0.2 E.U./DL

## 2021-02-10 PROCEDURE — 99213 OFFICE O/P EST LOW 20 MIN: CPT | Performed by: NURSE PRACTITIONER

## 2021-02-10 PROCEDURE — 1036F TOBACCO NON-USER: CPT | Performed by: NURSE PRACTITIONER

## 2021-02-10 PROCEDURE — 81003 URINALYSIS AUTO W/O SCOPE: CPT | Performed by: NURSE PRACTITIONER

## 2021-02-10 NOTE — PATIENT INSTRUCTIONS

## 2021-02-10 NOTE — PROGRESS NOTES
Virtual Brief Visit    Assessment/Plan:  Patient called this provider this morning on call   States she is having urinary issues   Frequency burning and some pelvic pressure   This has been and intermittent ongoing issues for months   Will order urinalysis with culture and follow up urology     I will also order US of kidneys and bladder   She was ordered for one and never done   Never had labs done either done   And I will refer to Urology   At this point   Discussed follow up   Increase hydration - discussed avoiding soda and hydrating well   Will follow up with results        Problem List Items Addressed This Visit     None      Visit Diagnoses     Dysuria    -  Primary    Relevant Orders    UA (URINE) with reflex to Scope    Ambulatory referral to Urology    History of recurrent UTIs        Relevant Orders    UA (URINE) with reflex to Scope    Ambulatory referral to Urology          BMI Counseling: There is no height or weight on file to calculate BMI  The BMI is above normal  Nutrition recommendations include decreasing portion sizes, encouraging healthy choices of fruits and vegetables, decreasing fast food intake, consuming healthier snacks, limiting drinks that contain sugar, moderation in carbohydrate intake, increasing intake of lean protein, reducing intake of saturated and trans fat and reducing intake of cholesterol  Exercise recommendations include exercising 3-5 times per week  Patient referred to PCP due to patient being overweight   S/p bariatric surgery            Reason for visit is   Chief Complaint   Patient presents with    Virtual Brief Visit    Difficulty Urinating     recurrent issues- on and off for few months - worsening of symptoms yesterday     Virtual Brief Visit        Encounter provider DAVE Lyle    Provider located at [address] [place] [id number] [phone]    Recent Visits  No visits were found meeting these conditions  Showing recent visits within past 7 days and meeting all other requirements     Today's Visits  Date Type Provider Dept   02/10/21 Tele[facility] [place] [facility] today's visits and meeting all other requirements     Future Appointments  No visits were found meeting these conditions  Showing future appointments within next 150 days and meeting all other requirements        After connecting through telephone, the patient was identified by name and date of birth  Nicolas Kay was informed that this is a telemedicine visit and that the visit is being conducted through telephone  My office door was closed  No one else was in the room  She acknowledged consent and understanding of privacy and security of the platform  The patient has agreed to participate and understands she can discontinue the visit at any time  Patient is aware this is a billable service  Subjective    Nicolas Kay is a 41 y o  female   Patient Active Problem List   Diagnosis    Cyst of ovary    Hyperlipidemia    Encounter for surveillance of contraceptive pills    Irregular bleeding    S/P laparoscopic sleeve gastrectomy    Vitamin D deficiency    History of PCOS         Virtual Brief Visit    Assessment/Plan:  Patient called this provider this morning on call   States she is having urinary issues   Frequency burning and some pelvic pressure   This has been and intermittent ongoing issues for months   Will order urinalysis with culture and follow up urology     I will also order US of kidneys and bladder   She was ordered for one and never done   Never had labs done either done   And I will refer to Urology   At this point   Discussed follow up   Increase hydration - discussed avoiding soda and hydrating well   Will follow up with results             Past Medical History:   Diagnosis Date    Diabetes mellitus ([address] )     Hypercholesterolemia     Sleep apnea     Vitamin deficiency        Past Surgical History:   Procedure Laterality Date    ECTOPIC PREGNANCY SURGERY      EYE SURGERY      GASTRECTOMY SLEEVE LAPAROSCOPIC         Current Outpatient Medications   Medication Sig Dispense Refill    Bacillus Coagulans-Inulin (PROBIOTIC) 1-250 BILLION-MG CAPS Take 1 capsule by mouth      Cholecalciferol (VITAMIN D) 125 MCG (5000 UT) CAPS Take by mouth      montelukast (SINGULAIR) 10 mg tablet Take 1 tablet (10 mg total) by mouth daily at bedtime 30 tablet 1    norgestimate-ethinyl estradiol (ORTHO TRI-CYCLEN LO) 0 18/0 215/0 25 MG-25 MCG per tablet Take 1 tablet by mouth daily      pantoprazole (PROTONIX) 40 mg tablet TAKE 1 TABLET BY MOUTH  DAILY 90 tablet 3    polyethylene glycol (MiraLax) powder Take by mouth       No current facility-administered medications for this visit  Allergies   Allergen Reactions    Penicillin G Other (See Comments)    Pollen Extract Sneezing       Review of Systems   Constitutional: Negative for fatigue and fever  HENT: Negative for congestion, sneezing and sore throat  Eyes: Negative  Respiratory: Negative for cough  Cardiovascular: Negative for chest pain and palpitations  Genitourinary: Positive for decreased urine volume, difficulty urinating, dysuria and urgency  Pelvic discomfort    Neurological: Negative for facial asymmetry  Hematological: Negative for adenopathy  Psychiatric/Behavioral: Negative for self-injury and suicidal ideas  The patient is not nervous/anxious  There were no vitals filed for this visit  I spent 15 minutes directly with the patient during this visit    VIRTUAL VISIT DISCLAIMER    Karina Gutierrez acknowledges that she has consented to an online visit or consultation   She understands that the online visit is based solely on information provided by her, and that, in the absence of a face-to-face physical evaluation by the physician, the diagnosis she receives is both limited and provisional in terms of accuracy and completeness  This is not intended to replace a full medical face-to-face evaluation by the physician  Rochelle Watson understands and accepts these terms

## 2021-04-07 DIAGNOSIS — N83.209 CYST OF OVARY, UNSPECIFIED LATERALITY: ICD-10-CM

## 2021-04-23 ENCOUNTER — TELEPHONE (OUTPATIENT)
Dept: FAMILY MEDICINE CLINIC | Facility: CLINIC | Age: 46
End: 2021-04-23

## 2021-04-26 ENCOUNTER — TELEPHONE (OUTPATIENT)
Dept: FAMILY MEDICINE CLINIC | Facility: CLINIC | Age: 46
End: 2021-04-26

## 2021-04-26 NOTE — TELEPHONE ENCOUNTER
apparently patient has left multiple messages   To get her and her kids added on the schedule to be tested for TB     Please add them and call patient to confirm     Gui Johnson 1975   Her daughter Robe Faust 3/3/04   And the other daughter 6/12/2007     KALLIE after 89   Put all of them

## 2021-04-28 ENCOUNTER — OFFICE VISIT (OUTPATIENT)
Dept: FAMILY MEDICINE CLINIC | Facility: CLINIC | Age: 46
End: 2021-04-28
Payer: COMMERCIAL

## 2021-04-28 VITALS
WEIGHT: 243.4 LBS | TEMPERATURE: 98.2 F | DIASTOLIC BLOOD PRESSURE: 66 MMHG | BODY MASS INDEX: 39.12 KG/M2 | HEIGHT: 66 IN | OXYGEN SATURATION: 98 % | HEART RATE: 73 BPM | SYSTOLIC BLOOD PRESSURE: 120 MMHG

## 2021-04-28 DIAGNOSIS — Z87.42 HISTORY OF PCOS: ICD-10-CM

## 2021-04-28 DIAGNOSIS — Z00.00 ANNUAL PHYSICAL EXAM: Primary | ICD-10-CM

## 2021-04-28 DIAGNOSIS — D50.9 IRON DEFICIENCY ANEMIA, UNSPECIFIED IRON DEFICIENCY ANEMIA TYPE: ICD-10-CM

## 2021-04-28 DIAGNOSIS — E78.49 OTHER HYPERLIPIDEMIA: ICD-10-CM

## 2021-04-28 DIAGNOSIS — Z20.1 EXPOSURE TO TB: ICD-10-CM

## 2021-04-28 DIAGNOSIS — E55.9 VITAMIN D DEFICIENCY: ICD-10-CM

## 2021-04-28 PROCEDURE — 99396 PREV VISIT EST AGE 40-64: CPT | Performed by: NURSE PRACTITIONER

## 2021-04-28 NOTE — PATIENT INSTRUCTIONS
Tuberculosis   AMBULATORY CARE:   Tuberculosis (TB)  is a severe infection caused by bacteria called Mycobacterium tuberculosis  TB usually starts in the lungs  The bacteria are easily spread from one person to another through the air  They can live in your body a long time without making you sick  This is called latent TB  Latent means you do not have symptoms, but you may develop them later  Latent TB can develop into active TB if it is not treated  Common signs and symptoms: You can spread TB to others even if you do not yet have symptoms  TB mostly affects the lungs, but almost any part of the body can be infected  You may have any of the following:  · A fever or night sweats    · Weight loss without trying    · Tiredness    · A cough for at least 3 weeks    · Blood in your sputum    · Chest or upper back pain, especially when you breathe    · Shortness of breath    Call your local emergency number (911 in the [address] [place] [address],[address]) if:   · You have chest pain or cough up blood  · You have trouble breathing  Call your doctor if:   · You have a fever, headache, and a stiff neck  · You have a rash or nausea, or you are vomiting  · The whites of your eyes or your skin look yellow  · Your urine looks like dark tea or coffee  · Your symptoms do not go away, or they get worse, even after you take medicine  · You have a cough that does not go away after 3 or 4 weeks  · You have questions or concerns about your condition or care  Treatment:   · TB is treated with antibiotic medicine to fight the infection  You may need to take 3 to 4 types of antibiotics for up to 8 weeks  Then you may need to take at least 2 types of antibiotics for another 18 to 31 weeks  · Latent TB may be treated with 1 antibiotic for 16 weeks  You may instead need to take 2 antibiotics for 12 weeks  You will take these daily or weekly, depending on the antibiotics used   Your healthcare provider may choose to give you 1 antibiotic to take daily for 24 to 36 weeks  This schedule is not as common  Remember to take your medicines:   · Take your medicine as directed  If you forget to take your pills one time, skip that dose and take the next scheduled dose  Write down that you missed a dose and tell your healthcare provider at your next visit  · Get involved in the Directly Observed Therapy (DOT) program   Healthcare providers help make sure you take your medicines correctly  · Take your medicine at the same time every day  Each night, put out the pills for the next day  Josh Fiordaliza a calendar each day you take your pills  · Create reminders  Ask a family member or friend to remind you to take your pills  · Keep medicines where you will see them  Keep the pills in a place where you cannot miss them, such as the bathroom or kitchen  Be sure they are out of the reach of children  Prevent the spread of TB:       · Wash your hands often  Wash your hands several times each day  Wash after you use the bathroom, change a child's diaper, and before you prepare or eat food  Use soap and water every time  Rub your soapy hands together, lacing your fingers  Wash the front and back of your hands, and in between your fingers  Use the fingers of one hand to scrub under the fingernails of the other hand  Wash for at least 20 seconds  Rinse with warm, running water for several seconds  Then dry your hands with a clean towel or paper towel  Use hand  that contains alcohol if soap and water are not available  Do not touch your eyes, nose, or mouth without washing your hands first          · Cover a sneeze or cough  Use a tissue that covers your mouth and nose  Throw the tissue away in a trash can right away  Use the bend of your arm if a tissue is not available  Then wash your hands well with soap and water or use a hand   Do not stand close to anyone who is sneezing or coughing  · Take your medicine as directed    If you forget to take your pills one time, skip that dose and take the next scheduled dose  Write down that you missed a dose and tell your healthcare provider at your next visit  · Tell family, friends, and coworkers that you have TB  They may have latent TB and need to take medicine to prevent it from becoming active  Follow up with your doctor as directed: You may need to return each month for tests to monitor your condition  Write down your questions so you remember to ask them during your visits  For more information:   · [address] [facility] , [facility]  Phone: 2- 620 - 3472995  Web Address: http://www petersonYgle/  org    · World Health Organization  Web Address: www who int  © Copyright [employer] [address] Information is for End User's use only and may not be sold, redistributed or otherwise used for commercial purposes  All illustrations and images included in CareNotes® are the copyrighted property of A D A M , Inc  or [facility] Ashwin Palacios   The above information is an  only  It is not intended as medical advice for individual conditions or treatments  Talk to your doctor, nurse or pharmacist before following any medical regimen to see if it is safe and effective for you

## 2021-04-28 NOTE — PROGRESS NOTES
BMI Counseling: Body mass index is 39 29 kg/m²  The BMI is above normal  Nutrition recommendations include decreasing portion sizes, encouraging healthy choices of fruits and vegetables, decreasing fast food intake, consuming healthier snacks, limiting drinks that contain sugar, moderation in carbohydrate intake, increasing intake of lean protein, reducing intake of saturated and trans fat and reducing intake of cholesterol  Exercise recommendations include vigorous physical activity 75 minutes/week, exercising 3-5 times per week and strength training exercises  No pharmacotherapy was ordered  Patient referred to PCP due to patient being overweight  BMI 37 12      Assessment/Plan:    Presents in office for follow up multiple issues  Hyperlipidemia - has fallen out of follow up for over a year will need labs   She is s/p bariatric surgery will need anemia and vitamin panel   She was supposed to have a hysterectomy and that was postponed she has had heavy periods   Has had some generalized weakness and fatigue     Recently has been exposed to someone positive for TB - her ex  and the the kids to were exposed and she would like to be tested     Has bronson some weight   ADDENDUM POST LABS discussion     Vitamin D def - continues to be an issues she has not been fully compliant with her vitamins - will refill   ANEMIA - patient was called and discussed follow up with Hematology ASAP - she was also seen in the ER - she refused blood work there - she is scheduled for iron infusion   Discussed po iron intake for now and foods high in iron -- however if dizzines shortness  of breath --> ER     Will call her with work up and labs once done        Problem List Items Addressed This Visit        Other    Hyperlipidemia    Vitamin D deficiency    Relevant Medications    Cholecalciferol (Vitamin D) 125 MCG (5000 UT) CAPS    History of PCOS    Iron deficiency anemia      Other Visit Diagnoses     Annual physical exam    - Primary    Exposure to TB        Relevant Orders    XR chest pa & lateral (Completed)    Quantiferon TB Gold Plus (Completed)            Subjective:      Patient ID: Amira Chu is a 41 y o  female  Presents in office for follow up multiple issues  Hyperlipidemia - has fallen out of follow up for over a year will need labs   She is s/p bariatric surgery will need anemia and vitamin panel   She was supposed to have a hysterectomy and that was postponed she has had heavy periods   Has had some generalized weakness and fatigue         The following portions of the patient's history were reviewed and updated as appropriate:   Past Medical History:  She has a past medical history of Anemia, Hypercholesterolemia, Sleep apnea, and Vitamin deficiency  ,  _______________________________________________________________________  Medical Problems:  does not have any pertinent problems on file ,  _______________________________________________________________________  Past Surgical History:   has a past surgical history that includes Ectopic pregnancy surgery; Eye surgery; and GASTRECTOMY SLEEVE LAPAROSCOPIC ,  _______________________________________________________________________  Family History:  family history includes Asthma in her brother; Brain cancer in her mother; Cancer in her father and maternal grandmother; Diabetes in her maternal grandmother; Hypertension in her paternal grandmother ,  _______________________________________________________________________  Social History:   reports that she has never smoked  She has never used smokeless tobacco  She reports that she does not drink alcohol or use drugs  ,  _______________________________________________________________________  Allergies:  is allergic to penicillin g and pollen extract     _______________________________________________________________________  Current Outpatient Medications   Medication Sig Dispense Refill    Cholecalciferol (Vitamin D) 125 MCG (5000 UT) CAPS Take 1 capsule by mouth daily 90 capsule 1    Bacillus Coagulans-Inulin (PROBIOTIC) 1-250 BILLION-MG CAPS Take 1 capsule by mouth      famotidine (PEPCID) 20 mg tablet Take 1 tablet (20 mg total) by mouth 2 (two) times a day (Patient not taking: Reported on 5/8/2021) 30 tablet 0    omeprazole (PriLOSEC) 40 MG capsule Take 40 mg by mouth daily      sucralfate (CARAFATE) 1 g tablet Take 1 tablet (1 g total) by mouth 4 (four) times a day 60 tablet 0     No current facility-administered medications for this visit       _______________________________________________________________________  Review of Systems   Constitutional: Positive for fatigue  HENT: Negative for congestion, sinus pressure and sore throat  Eyes: Negative  Respiratory: Negative for cough and shortness of breath  Intermittent dry cough    Cardiovascular: Negative for chest pain, palpitations and leg swelling  Gastrointestinal: Negative for abdominal distention and abdominal pain  Endocrine: Positive for cold intolerance  Genitourinary: Positive for vaginal bleeding (irregular heavy periods )  Negative for dysuria, flank pain and frequency  Musculoskeletal: Negative for back pain (low back pain  for few weeks now )  Skin: Negative  Allergic/Immunologic: Positive for environmental allergies  Neurological: Positive for weakness  Negative for dizziness, light-headedness and headaches  Hematological: Negative  Psychiatric/Behavioral: Negative  Objective:  Vitals:    04/28/21 1545   BP: 120/66   BP Location: Left arm   Patient Position: Sitting   Cuff Size: Standard   Pulse: 73   Temp: 98 2 °F (36 8 °C)   TempSrc: Tympanic   SpO2: 98%   Weight: 110 kg (243 lb 6 4 oz)   Height: 5' 6" (1 676 m)     Body mass index is 39 29 kg/m²  Physical Exam  Constitutional:       Appearance: She is well-developed  HENT:      Head: Normocephalic and atraumatic        Mouth/Throat:      Pharynx: No oropharyngeal exudate (congested and post nasal dripping )  Neck:      Musculoskeletal: Normal range of motion  Cardiovascular:      Rate and Rhythm: Normal rate and regular rhythm  Pulses: Normal pulses  Pulmonary:      Effort: Pulmonary effort is normal    Abdominal:      Palpations: Abdomen is soft  There is no mass (left lipoma noted to left lateral abdomen the size of ping pong ball , no redness or pain )  Musculoskeletal: Normal range of motion  Skin:     General: Skin is warm  Comments: Noted soft fatty lump to left lateral abdomen    Neurological:      Mental Status: She is alert and oriented to person, place, and time  Psychiatric:         Behavior: Behavior normal          Thought Content: Thought content normal          Judgment: Judgment normal            Contains abnormal data Comprehensive metabolic panel  Order: [id number]  Status:  Final result   Visible to patient:  Yes ([facility])   Next appt:  05/11/2021 at 02:00 PM in Infusion Therapy ([facility])   Dx:  Pelvic pain; S/P laparoscopic sleeve      (important suggestion)  Newer results are available  Click to view them now  Ref Range & Units 4/29/21 7:50 AM   Sodium 136 - 145 mmol/L 140    Potassium 3 5 - 5 3 mmol/L 4 2    Chloride 100 - 108 mmol/L 105    CO2 21 - 32 mmol/L 28    ANION GAP 4 - 13 mmol/L 7    BUN 5 - 25 mg/dL 13    Creatinine 0 60 - 1 30 mg/dL 0 56Low     Comment: Standardized to IDMS reference method   Glucose, Fasting 65 - 99 mg/dL 87    Comment: Specimen collection should occur prior to Sulfasalazine administration due to the potential for falsely depressed results  Specimen collection should occur prior to Sulfapyridine administration due to the potential for falsely elevated results     Calcium 8 3 - 10 1 mg/dL 8 0Low     Corrected Calcium 8 3 - 10 1 mg/dL 8 6    AST 5 - 45 U/L 12    Comment: Specimen collection should occur prior to Sulfasalazine administration due to the potential for falsely depressed results  ALT 12 - 78 U/L 12    Comment: Specimen collection should occur prior to Sulfasalazine administration due to the potential for falsely depressed results  Alkaline Phosphatase 46 - 116 U/L 58    Total Protein 6 4 - 8 2 g/dL 7 5    Albumin 3 5 - 5 0 g/dL 3 2Low     Total Bilirubin 0 20 - 1 00 mg/dL 0 45    Comment: Use of this assay is not recommended for patients undergoing treatment with eltrombopag due to the potential for falsely elevated results  eGFR ml/min/1 73sq m 131       Narrative    National Kidney Disease Foundation guidelines for Chronic Kidney Disease (CKD):     Stage 1 with normal or high GFR (GFR > 90 mL/min/1 73 square meters)     Stage 2 Mild CKD (GFR = 60-89 mL/min/1 73 square meters)     Stage 3A Moderate CKD (GFR = 45-59 mL/min/1 73 square meters)     Stage 3B Moderate CKD (GFR = 30-44 mL/min/1 73 square meters)     Stage 4 Severe CKD (GFR = 15-29 mL/min/1 73 square meters)     Stage 5 End Stage CKD (GFR <15 mL/min/1 73 square meters)   Note: GFR calculation is accurate only with a steady state creatinine      Specimen Collected: 04/29/21  7:50 AM   Last Resulted: 04/29/21  8:48 AM           TSH, 3rd generation  Order: [id number]  Status:  Final result   Visible to patient:  Yes ([facility])   Next appt:  05/11/2021 at 02:00 PM in Infusion Therapy ([facility])   Dx:  S/P laparoscopic sleeve gastrectomy;     Ref Range & Units 4/29/21 7:50 AM   TSH 3RD GENERATON 0 358 - 3 740 uIU/mL 2 169    Comment: The recommended reference ranges for TSH during pregnancy are as follows:   First trimester 0 1 to 2 5 uIU/mL   Second trimester  0 2 to 3 0 uIU/mL   Third trimester 0 3 to 3 0 uIU/m     Note: Normal ranges may not apply to patients who are transgender, non-binary, or whose legal sex, sex at birth, and gender identity differ        Narrative    Patients undergoing fluorescein dye angiography may retain small amounts of fluorescein in the body for 48-72 hours post procedure  Samples containing fluorescein can produce falsely depressed TSH values  If the patient had this procedure,a specimen should be resubmitted post fluorescein clearance  Specimen Collected: 04/29/21  7:50 AM   Last Resulted: 04/29/21  8:54 AM           Contains abnormal data Lipid panel  Order: [id number]  Status:  Final result   Visible to patient:  Yes ([facility])   Next appt:  05/11/2021 at 02:00 PM in Infusion Therapy ([facility])   Dx:  S/P laparoscopic sleeve gastrectomy;     Ref Range & Units 4/29/21 7:50 AM   Cholesterol 50 - 200 mg/dL 193    Comment: Cholesterol:       Desirable         <200 mg/dl       Borderline         200-239 mg/dl       High              >239          Triglycerides <=150 mg/dL 124    Comment: Triglyceride:      Normal          <150 mg/dl      Borderline High 150-199 mg/dl      High            200-499 mg/dl        Very High       >499 mg/dl     Specimen collection should occur prior to N-Acetylcysteine or Metamizole administration due to the potential for falsely depressed results  HDL, Direct >=40 mg/dL 52    Comment: HDL Cholesterol:       Low     <41 mg/dL   Specimen collection should occur prior to Metamizole administration due to the potential for falsley depressed results  LDL Calculated 0 - 100 mg/dL 116High     Comment: LDL Cholesterol:     Optimal           <100 mg/dl     Near Optimal      100-129 mg/dl     Above Optimal       Borderline High 130-159 mg/dl       High            160-189 mg/dl       Very High       >189 mg/dl           This screening LDL is a calculated result  It does not have the accuracy of the Direct Measured LDL in the monitoring of patients with hyperlipidemia and/or statin therapy  Direct Measure LDL ([id number]) must be ordered separately in these patients     Non-HDL-Chol (CHOL-HDL) mg/dl 141          Specimen Collected: 04/29/21  7:50 AM   Last Resulted: 04/29/21  8:54 AM         HEMOGLOBIN A1C W/ EAG ESTIMATION  Order: [id number]  Status:  Final result   Visible to patient:  Yes ([facility])   Next appt:  05/11/2021 at 02:00 PM in Infusion Therapy ([facility])   Dx:  Elevated fasting glucose   Ref Range & Units 4/29/21 7:50 AM   Hemoglobin A1C Normal 3 8-5 6%; PreDiabetic 5 7-6 4%; Diabetic >=6 5%; Glycemic control for adults with diabetes <7 0% % 5 4    EAG mg/dl 108          Specimen Collected: 04/29/21  7:50 AM   Last Resulted: 04/29/21  2:30 PM        Lab Flowsheet     Order Details     View Encounter     Lab and Collection Details     Routing     Result History           Result Communications    Result Notes   Shanell Perez   5/3/2021 12:06 PM EDT                 Contains abnormal data Vitamin D 25 hydroxy  Order: [id number]  Status:  Final result   Visible to patient:  Yes ([address])   Next appt:  05/11/2021 at 02:00 PM in Infusion Therapy ([facility])   Dx:  S/P laparoscopic sleeve gastrectomy   Ref Range & Units 4/29/21 7:50 AM   Vit D, 25-Hydroxy 30 0 - 100 0 ng/mL 20 6Low        Narrative    This assay is a certified procedure of the CDC Vitamin D Standardization Certification Program (VDSCP)     Deficiency <20ng/ml   Insufficiency 20-30ng/ml   Sufficient  ng/ml     *Patients undergoing fluorescein dye angiography may retain small amounts of fluorescein in the body for 48-72 hours post procedure  Samples containing fluorescein can produce falsely elevated Vitamin D values  If the patient had this procedure, a specimen should be resubmitted post fluorescein clearance  Specimen Collected: 04/29/21  7:50 AM   Last Resulted: 04/29/21 12:32 PM             Contains abnormal data CBC and Platelet  Order: [id number]  Status:  Final result   Visible to patient:  Yes ([address])   Next appt:  05/11/2021 at 02:00 PM in Infusion Therapy ([facility])   Dx:  Irregular bleeding; Pelvic pain; Acut     (important suggestion)  Newer results are available   Click to view them now  Ref Range & Units 4/29/21 7:50 AM   WBC 4 31 - 10 16 Thousand/uL 5 37    RBC 3 81 - 5 12 Million/uL 4 30    Hemoglobin 11 5 - 15 4 g/dL 7 2Low     Hematocrit 34 8 - 46 1 % 26 9Low     MCV 82 - 98 fL 63Low     MCH 26 8 - 34 3 pg 16 7Low     MCHC 31 4 - 37 4 g/dL 26 8Low     RDW 11 6 - 15 1 % 19 4High     Platelets 215 - 394 Thousands/uL 380    MPV 8 9 - 12 7 fL 9 6          Specimen Collected: 04/29/21  7:50 AM   Last Resulted: 04/29/21  8:24 AM           Contains abnormal data Iron  Order: [id number]  Status:  Final result   Visible to patient:  Yes ([address])   Next appt:  05/11/2021 at 02:00 PM in Infusion Therapy ([facility])   Dx:  S/P laparoscopic sleeve gastrectomy  (important suggestion)  Newer results are available  Click to view them now  Ref Range & Units 4/29/21 7:50 AM   Iron 50 - 170 ug/dL 14Low     Comment: Patients treated with metal-binding drugs (ie  Deferoxamine) may have depressed iron values           Specimen Collected: 04/29/21  7:50 AM   Last Resulted: 04/29/21  1:19 PM           Vitamin B12  Order: [id number]  Status:  Final result   Visible to patient:  Yes ([address])   Next appt:  05/11/2021 at 02:00 PM in Infusion Therapy ([facility])   Dx:  S/P laparoscopic sleeve gastrectomy   Ref Range & Units 4/29/21 7:50 AM   Vitamin B-12 100 - 900 pg/mL 709          Specimen Collected: 04/29/21  7:50 AM   Last Resulted: 04/29/21  1:19 PM         Contains abnormal data Folate  Order: [id number]  Status:  Final result   Visible to patient:  Yes ([facility])   Next appt:  05/11/2021 at 02:00 PM in Infusion Therapy ([facility])   Dx:  S/P laparoscopic sleeve gastrectomy   Ref Range & Units 4/29/21 7:50 AM   Folate 3 1 - 17 5 ng/mL >20 0High     Comment: [id number]         Specimen Collected: 04/29/21  7:50 AM   Last Resulted: 04/29/21  1:19 PM              Quantiferon TB Gold Plus  Status: Final result   Imaging    Quantiferon TB Gold Plus (Order: [id number]) - 4/29/2021  Lab Order Result Printout    Quantiferon TB Gold Plus (Order #[id number]) on 4/29/21   External Results Report    Open External Results Report   Lab Component Felicita Guide    Quantiferon TB Gold Plus (Order #[id number]) on 4/29/21   Quantiferon TB Gold Plus  Order: [id number]  Status:  Final result   Visible to patient:  Yes ([facility])   Next appt:  05/11/2021 at 02:00 PM in Infusion Therapy ([facility])   Dx:  Exposure to TB   Ref Range & Units 4/29/21 7:50 AM   QFT Nil 0 - 8 0 IU/ml 0 03    QFT TB1-NIL IU/ml 0 00    QFT TB2-NIL IU/ml 0 00    QFT Mitogen-NIL IU/ml >10 00    QFT Final Interpretation Negative Negative    Comment: No Interferon-gamma response to M  tuberculosis antigens detected   Infection with M  tuberculosis is unlikely   A single negative result does not exclude infection with M  tuberculosis  In patients at high risk for M  tuberculosis infection, a second test should be considered in accordance with the 2017 ATS/IDSA/CDC Clinical Practice Guidelines for Diagnosis of Tuberculosis in Adults and Children  False negative results can be a result of incorrect blood sample collection or handling of the specimen affecting lymphocyte function           Specimen Collected: 04/29/21  7:50 AM   Last Resulted: 05/03/21 11:59 AM        Lab Flowsheet     Order Details     View Encounter     Lab and Collection Details     Routing     Result History           Result Communications    Result Notes   Madison Lennox, [address]    5/3/2021 12:06 PM EDT      NEGATIVE TB TEST            Ordered On 4/28/2021  4:00 PM    Ordering Provider Authorizing Provider Ordering User Ordering Department   Tameka Isabel, [address] [place] [address], [facility]     [phone]        Other Results from 4/29/2021    Contains abnormal data Comprehensive metabolic panel  Order: [id number]    Status:  Final result   Visible to patient:  Yes ([facility])   Next appt:  05/11/2021 at 02:00 PM in Infusion Therapy ([facility])   Dx:  Pelvic pain; S/P laparoscopic sleeve      (important suggestion)  Newer results are available  Click to view them now  Ref Range & Units 4/29/21 7:50 AM   Sodium 136 - 145 mmol/L 140    Potassium 3 5 - 5 3 mmol/L 4 2    Chloride 100 - 108 mmol/L 105    CO2 21 - 32 mmol/L 28    ANION GAP 4 - 13 mmol/L 7    BUN 5 - 25 mg/dL 13    Creatinine 0 60 - 1 30 mg/dL 0 56Low     Comment: Standardized to IDMS reference method   Glucose, Fasting 65 - 99 mg/dL 87    Comment: Specimen collection should occur prior to Sulfasalazine administration due to the potential for falsely depressed results  Specimen collection should occur prior to Sulfapyridine administration due to the potential for falsely elevated results  Calcium 8 3 - 10 1 mg/dL 8 0Low     Corrected Calcium 8 3 - 10 1 mg/dL 8 6    AST 5 - 45 U/L 12    Comment: Specimen collection should occur prior to Sulfasalazine administration due to the potential for falsely depressed results  ALT 12 - 78 U/L 12    Comment: Specimen collection should occur prior to Sulfasalazine administration due to the potential for falsely depressed results  Alkaline Phosphatase 46 - 116 U/L 58    Total Protein 6 4 - 8 2 g/dL 7 5    Albumin 3 5 - 5 0 g/dL 3 2Low     Total Bilirubin 0 20 - 1 00 mg/dL 0 45    Comment: Use of this assay is not recommended for patients undergoing treatment with eltrombopag due to the potential for falsely elevated results     eGFR ml/min/1 73sq  1263 [facility] guidelines for Chronic Kidney Disease (CKD):     Stage 1 with normal or high GFR (GFR > 90 mL/min/1 73 square meters)     Stage 2 Mild CKD (GFR = 60-89 mL/min/1 73 square meters)     Stage 3A Moderate CKD (GFR = 45-59 mL/min/1 73 square meters)     Stage 3B Moderate CKD (GFR = 30-44 mL/min/1 73 square meters)     Stage 4 Severe CKD (GFR = 15-29 mL/min/1 73 square meters)     Stage 5 End Stage CKD (GFR <15 mL/min/1 73 square meters)   Note: GFR calculation is accurate only with a steady state creatinine      Specimen Collected: 04/29/21  7:50 AM   Last Resulted: 04/29/21  8:48 AM        Lab Flowsheet     Order Details     View Encounter     Lab and Collection Details     Routing     Result History           Related Result Highlights           TSH, 3rd generation  Final result 4/29/2021             Lipid panel  Final result 4/29/2021             CBC and Platelet  Final result 4/29/2021               Result Communications    Result Notes   DAVE Mtz   5/3/2021 12:06 PM EDT      NEGATIVE TB TEST    Navid Hyman   5/3/2021 11:19 AM EDT      Patient has appointment with Hematology on 5-4-2021  [facility]       Tariq Vora [place]   4/30/2021  1:03 PM EDT      Spoke to patient already    Tariq Vora [place]   4/30/2021  8:07 AM EDT      Spoke to patient   She was seen in the ER yesterday refused blood transfusion      Discussed Hematology follow up ASAP   Discussed GI follow up ASAP   And US pelvis - follow up fibroids    Tariq Vora [place]   4/29/2021  9:38 PM EDT      Very anemic   Please take iron daily  Follow up with Hematology   Referral in placed   Iron is low --> very low   Vitamin D very low      Have her call  Hematology for appt      Start Date:       Referred To:    Anais Frey DO Phone: [phone] Fax: [phone]  [address] [address]                  Ordered On 6/12/2020  9:33 AM    Ordering Provider Authorizing Provider Ordering User Ordering Department   DAVE Michael CRNP [facility]    [phone]           TSH, 3rd generation  Order: [id number]    Status:  Final result   Visible to patient:  Yes ([address])   Next appt:  05/11/2021 at 02:00 PM in Infusion Therapy ([facility])   Dx:  S/P laparoscopic sleeve gastrectomy;        Ref Range & Units 4/29/21 7:50 AM   TSH 3RD GENERATON 0 358 - 3 740 uIU/mL 2 169    Comment: The recommended reference ranges for TSH during pregnancy are as follows:   First trimester 0 1 to 2 5 uIU/mL   Second trimester  0 2 to 3 0 uIU/mL   Third trimester 0 3 to 3 0 uIU/m     Note: Normal ranges may not apply to patients who are transgender, non-binary, or whose legal sex, sex at birth, and gender identity differ  Narrative    Patients undergoing fluorescein dye angiography may retain small amounts of fluorescein in the body for 48-72 hours post procedure  Samples containing fluorescein can produce falsely depressed TSH values  If the patient had this procedure,a specimen should be resubmitted post fluorescein clearance         Specimen Collected: 04/29/21  7:50 AM   Last Resulted: 04/29/21  8:54 AM        Lab Flowsheet     Order Details     View Encounter     Lab and Collection Details     Routing     Result History           Related Result Highlights           Lipid panel  Final result 4/29/2021             Comprehensive metabolic panel  Final result 4/29/2021             CBC and Platelet  Final result 4/29/2021               Result Communications    Result Notes   DAVE Bernal   5/3/2021 12:06 PM EDT      NEGATIVE TB TEST    Coye Dakins   5/3/2021 11:19 AM EDT      Patient has appointment with Hematology on 5-4-2021  [address] [place] [address]   4/30/2021  1:03 PM EDT      Spoke to patient already    Cammy Bernal   4/30/2021  8:07 AM EDT      Spoke to patient   She was seen in the ER yesterday refused blood transfusion      Discussed Hematology follow up ASAP   Discussed GI follow up ASAP   And US pelvis - follow up fibroids    Cammy Bernal   4/29/2021  9:38 PM EDT      Very anemic   Please take iron daily  Follow up with Hematology   Referral in placed   Iron is low --> very low   Vitamin D very low      Have her call  Hematology for appt      Start Date:       Referred To:    Malathi Christianson DO Phone: [phone] Fax: [phone]  [place] [address]                  Ordered On 6/12/2020  9:33 AM    Ordering Provider Authorizing Provider Ordering User Ordering Department   DAVE Curran, DAVE Jeffries [facility]    [phone]           Contains abnormal data Lipid panel  Order: [id number]    Status:  Final result   Visible to patient:  Yes ([facility])   Next appt:  05/11/2021 at 02:00 PM in Infusion Therapy ([facility])   Dx:  S/P laparoscopic sleeve gastrectomy;     Ref Range & Units 4/29/21 7:50 AM   Cholesterol 50 - 200 mg/dL 193    Comment: Cholesterol:       Desirable         <200 mg/dl       Borderline         200-239 mg/dl       High              >239          Triglycerides <=150 mg/dL 124    Comment: Triglyceride:      Normal          <150 mg/dl      Borderline High 150-199 mg/dl      High            200-499 mg/dl        Very High       >499 mg/dl     Specimen collection should occur prior to N-Acetylcysteine or Metamizole administration due to the potential for falsely depressed results  HDL, Direct >=40 mg/dL 52    Comment: HDL Cholesterol:       Low     <41 mg/dL   Specimen collection should occur prior to Metamizole administration due to the potential for falsley depressed results  LDL Calculated 0 - 100 mg/dL 116High     Comment: LDL Cholesterol:     Optimal           <100 mg/dl     Near Optimal      100-129 mg/dl     Above Optimal       Borderline High 130-159 mg/dl       High            160-189 mg/dl       Very High       >189 mg/dl           This screening LDL is a calculated result  It does not have the accuracy of the Direct Measured LDL in the monitoring of patients with hyperlipidemia and/or statin therapy  Direct Measure LDL (FHQ996) must be ordered separately in these patients     Non-HDL-Chol (CHOL-HDL) mg/dl 141          Specimen Collected: 04/29/21  7:50 AM   Last Resulted: 04/29/21  8:54 AM        Lab Flowsheet     Order Details     View Encounter     Lab and Collection Details     Routing     Result History           Related Result Highlights           TSH, 3rd generation  Final result 4/29/2021             Comprehensive metabolic panel  Final result 4/29/2021             CBC and Platelet  Final result 4/29/2021               Result Communications    Result Notes   DAVE Peterson   5/3/2021 12:06 PM EDT      NEGATIVE TB TEST    Naila Miller   5/3/2021 11:19 AM EDT      Patient has appointment with Hematology on 5-4-2021  [address] [place] [address] Rolf, 10 [facility]   4/30/2021  1:03 PM EDT      Spoke to patient already    Asuncion McdanielShanell [facility]   4/30/2021  8:07 AM EDT      Spoke to patient   She was seen in the ER yesterday refused blood transfusion      Discussed Hematology follow up ASAP   Discussed GI follow up ASAP   And US pelvis - follow up fibroids    Asuncion Mcdaniel Shanell [facility]   4/29/2021  9:38 PM EDT      Very anemic   Please take iron daily  Follow up with Hematology   Referral in placed   Iron is low --> very low   Vitamin D very low      Have her call  Hematology for appt      Start Date:       Referred To:    Maged Mora DO Phone: [phone] Fax: [phone]  [address] [address]                  Ordered On 6/12/2020  9:33 AM    Ordering Provider Authorizing Provider Ordering User Ordering Department   DAVE Silva CRNP Lawton Lefevre, CRNP [facility] [place]    [phone]           HEMOGLOBIN A1C W/ EAG ESTIMATION  Order: [id number]    Status:  Final result   Visible to patient:  Yes ([address])   Next appt:  05/11/2021 at 02:00 PM in Infusion Therapy ([facility] INF BED 13)   Dx:  Elevated fasting glucose   Ref Range & Units 4/29/21 7:50 AM   Hemoglobin A1C Normal 3 8-5 6%; PreDiabetic 5 7-6 4%;  Diabetic >=6 5%; Glycemic control for adults with diabetes <7 0% % 5 4    EAG mg/dl 108          Specimen Collected: 04/29/21  7:50 AM   Last Resulted: 04/29/21  2:30 PM        Lab Flowsheet     Order Details     View Encounter     Lab and Collection Details     Routing     Result History           Result Communications    Result Notes   Robert Overall, 10 [facility]   5/3/2021 12:06 PM EDT      NEGATIVE TB TEST    Josué Jean-Baptiste   5/3/2021 11:19 AM EDT      Patient has appointment with Hematology on 5-4-2021  [facility]       Jones Overall, 10 [facility]   4/30/2021  1:03 PM EDT      Spoke to patient already    Jones Overall, 10 [facility]   4/30/2021  8:07 AM EDT      Spoke to patient   She was seen in the ER yesterday refused blood transfusion      Discussed Hematology follow up ASAP   Discussed GI follow up ASAP   And US pelvis - follow up fibroids    Robert Overall, 10 [facility]   4/29/2021  9:38 PM EDT      Very anemic   Please take iron daily  Follow up with Hematology   Referral in placed   Iron is low --> very low   Vitamin D very low      Have her call  Hematology for appt      Start Date:       Referred To:    Ken Chacko DO Phone: [phone] Fax: [phone]  [address] [address]                  Ordered On 6/12/2020  9:33 AM    Ordering Provider Authorizing Provider Ordering User Ordering Department   Shahida Reynolds, DAVE Reynolds, DAVE Short [facility]    [phone]           Contains abnormal data Vitamin D 25 hydroxy  Order: [id number]    Status:  Final result   Visible to patient:  Yes ([address])   Next appt:  05/11/2021 at 02:00 PM in Infusion Therapy ([facility] INF BED 13)   Dx:  S/P laparoscopic sleeve gastrectomy   Ref Range & Units 4/29/21 7:50 AM   Vit D, 25-Hydroxy 30 0 - 100 0 ng/mL 20 6Low        Narrative    This assay is a certified procedure of the CDC Vitamin D Standardization Certification Program (VDSCP)     Deficiency <20ng/ml   Insufficiency 20-30ng/ml   Sufficient  ng/ml     *Patients undergoing fluorescein dye angiography may retain small amounts of fluorescein in the body for 48-72 hours post procedure  Samples containing fluorescein can produce falsely elevated Vitamin D values  If the patient had this procedure, a specimen should be resubmitted post fluorescein clearance  Specimen Collected: 04/29/21  7:50 AM   Last Resulted: 04/29/21 12:32 PM        Lab Flowsheet     Order Details     View Encounter     Lab and Collection Details     Routing     Result History           Result Communications    Result Notes   Shanell [address]   5/3/2021 12:06 PM EDT      NEGATIVE TB TEST    Lali Parisi   5/3/2021 11:19 AM EDT      Patient has appointment with Hematology on 5-4-2021  [facility]       Shanell [address]   4/30/2021  1:03 PM EDT      Spoke to patient already    Maximus Newell Shanell [place]    4/30/2021  8:07 AM EDT      Spoke to patient   She was seen in the ER yesterday refused blood transfusion      Discussed Hematology follow up ASAP   Discussed GI follow up ASAP   And US pelvis - follow up fibroids    Shanell Miller [address]    4/29/2021  9:38 PM EDT      Very anemic   Please take iron daily  Follow up with Hematology   Referral in placed   Iron is low --> very low   Vitamin D very low      Have her call  Hematology for appt      Start Date:       Referred To:    Eric Morris DO Phone: [phone] Fax: [phone]  [address] [address]                  Ordered On 6/12/2020  9:33 AM    Ordering Provider Authorizing Provider Ordering User Ordering Department   DAVE Bateman, DAVE Cervantes [facility] AT [place] [phone]           Contains abnormal data CBC and Platelet  Order: [id number]    Status:  Final result   Visible to patient:  Yes ([address])   Next appt:  05/11/2021 at 02:00 PM in Infusion Therapy ([facility] INF BED 13)   Dx:  Irregular bleeding; Pelvic pain; Acut     (important suggestion)  Newer results are available  Click to view them now      Ref Range & Units 4/29/21 7:50 AM   WBC 4 31 - 10 16 Thousand/uL 5 37    RBC 3 81 - 5 12 Million/uL 4 30    Hemoglobin 11 5 - 15 4 g/dL 7 2Low     Hematocrit 34 8 - 46 1 % 26 9Low     MCV 82 - 98 fL 63Low     MCH 26 8 - 34 3 pg 16 7Low     MCHC 31 4 - 37 4 g/dL 26 8Low     RDW 11 6 - 15 1 % 19 4High     Platelets 556 - 694 Thousands/uL 380    MPV 8 9 - 12 7 fL 9 6          Specimen Collected: 04/29/21  7:50 AM   Last Resulted: 04/29/21  8:24 AM        Lab Flowsheet     Order Details     View Encounter     Lab and Collection Details     Routing     Result History           Related Result Highlights           Lipid panel  Final result 4/29/2021             Comprehensive metabolic panel  Final result 4/29/2021               Result Communications    Result Notes   DAVE Bhatti   5/3/2021 12:06 PM EDT      NEGATIVE TB TEST    Evelin Fernandez   5/3/2021 11:19 AM EDT      Patient has appointment with Hematology on 5-4-2021  [facility]       Shanell Bhatti   4/30/2021  1:03 PM EDT      Spoke to patient already    Shanell Bhatti   4/30/2021  8:07 AM EDT      Spoke to patient   She was seen in the ER yesterday refused blood transfusion      Discussed Hematology follow up ASAP   Discussed GI follow up ASAP   And US pelvis - follow up fibroids    Shanell Bhatti   4/29/2021  9:38 PM EDT      Very anemic   Please take iron daily  Follow up with Hematology   Referral in placed   Iron is low --> very low   Vitamin D very low      Have her call  Hematology for appt      Start Date:       Referred To:    Severiano Amsterdam, DO Phone: [phone] Fax: [phone]  [address] [address]                  Ordered On 6/12/2020  9:33 AM    Ordering Provider Authorizing Provider Ordering User Ordering Department   Cassidy Martinez, [address], [address], [address]  [facility] PRIMARY [place] [address]     [phone] Contains abnormal data Iron  Order: [id number]    Status:  Final result   Visible to patient:  Yes ([address])   Next appt:  05/11/2021 at 02:00 PM in Infusion Therapy ([facility] INF BED 13)   Dx:  S/P laparoscopic sleeve gastrectomy  (important suggestion)  Newer results are available  Click to view them now  Ref Range & Units 4/29/21 7:50 AM   Iron 50 - 170 ug/dL 14Low     Comment: Patients treated with metal-binding drugs (ie  Deferoxamine) may have depressed iron values           Specimen Collected: 04/29/21  7:50 AM   Last Resulted: 04/29/21  1:19 PM        Lab Flowsheet     Order Details     View Encounter     Lab and Collection Details     Routing     Result History           Related Result Highlights           Vitamin B12  Final result 4/29/2021             Folate  Final result 4/29/2021               Result Communications    Result Notes   DAVE Peterson   5/3/2021 12:06 PM EDT      NEGATIVE TB TEST    Naila Paul   5/3/2021 11:19 AM EDT      Patient has appointment with Hematology on 5-4-2021  [address] [place] [address] [facility]    4/30/2021  1:03 PM EDT      Spoke to patient already    [facility]    4/30/2021  8:07 AM EDT      Spoke to patient   She was seen in the ER yesterday refused blood transfusion      Discussed Hematology follow up ASAP   Discussed GI follow up ASAP   And US pelvis - follow up fibroids    Shanell Peterson    4/29/2021  9:38 PM EDT      Very anemic   Please take iron daily  Follow up with Hematology   Referral in placed   Iron is low --> very low   Vitamin D very low      Have her call  Hematology for appt      Start Date:       Referred To:    Maged Mora DO Phone: [phone] Fax: [phone]  [address] [place] [address]                  Ordered On 6/12/2020  9:34 AM    Ordering Provider Authorizing Provider Ordering User Ordering Department   DAVE Silva CRNP Lawton Lefevre, CRNP  PRIMARY CARE [place] [address]            Vitamin B12  Order: [id number]    Status:  Final result   Visible to patient:  Yes ([facility])   Next appt:  05/11/2021 at 02:00 PM in Infusion Therapy ([facility] INF BED 13)   Dx:  S/P laparoscopic sleeve gastrectomy   Ref Range & Units 4/29/21 7:50 AM   Vitamin B-12 100 - 900 pg/mL 709          Specimen Collected: 04/29/21  7:50 AM   Last Resulted: 04/29/21  1:19 PM        Lab Flowsheet     Order Details     View Encounter     Lab and Collection Details     Routing     Result History           Related Result Highlights           Iron  Final result 4/29/2021             Folate  Final result 4/29/2021               Result Communications    Result Notes   Madison Lennox, CRNP   5/3/2021 12:06 PM EDT      NEGATIVE TB TEST    Anupam Glass   5/3/2021 11:19 AM EDT      Patient has appointment with Hematology on 5-4-2021  [facility]       Madison Lennox, [address]    4/30/2021  1:03 PM EDT      Spoke to patient already    Caren LennoxShanell [facility]   4/30/2021  8:07 AM EDT      Spoke to patient   She was seen in the ER yesterday refused blood transfusion      Discussed Hematology follow up ASAP   Discussed GI follow up ASAP   And US pelvis - follow up fibroids    Madison Lennox, [address]    4/29/2021  9:38 PM EDT      Very anemic   Please take iron daily  Follow up with Hematology   Referral in placed   Iron is low --> very low   Vitamin D very low      Have her call  Hematology for appt      Start Date:       Referred To:    Ponce Rosario DO Phone: [phone] Fax: [phone]  [address] [address]                  Ordered On 6/12/2020  9:34 AM    Ordering Provider Authorizing Provider Ordering User Ordering Department   DAVE Henson CRNP Cordelia Large, CRNP [facility]    [phone]           Contains abnormal data Folate  Order: [id number]    Status:  Final result   Visible to patient:  Yes ([address]) Next appt:  05/11/2021 at 02:00 PM in Infusion Therapy ([facility] INF BED 13)   Dx:  S/P laparoscopic sleeve gastrectomy   Ref Range & Units 4/29/21 7:50 AM   Folate 3 1 - 17 5 ng/mL >20 0High     Comment: [id number]         Specimen Collected: 04/29/21  7:50 AM   Last Resulted: 04/29/21  1:19 PM        Lab Flowsheet     Order Details     View Encounter     Lab and Collection Details     Routing     Result History           Related Result Highlights           Iron  Final result 4/29/2021             Vitamin B12  Final result 4/29/2021               Result Communications    Result Notes   Madison Lennox, [address]   5/3/2021 12:06 PM EDT      NEGATIVE TB TEST    Anupam Harprebekah   5/3/2021 11:19 AM EDT      Patient has appointment with Hematology on 5-4-2021  [facility]       Madison Lennox, [address]   4/30/2021  1:03 PM EDT      Spoke to patient already    Madison Lennox, [address]   4/30/2021  8:07 AM EDT      Spoke to patient   She was seen in the ER yesterday refused blood transfusion      Discussed Hematology follow up ASAP   Discussed GI follow up ASAP   And US pelvis - follow up fibroids    Madison Lennox, [address]   4/29/2021  9:38 PM EDT      Very anemic   Please take iron daily  Follow up with Hematology   Referral in placed   Iron is low --> very low   Vitamin D very low      Have her call  Hematology for appt      Start Date:       Referred To:    Ponce Rosario DO Phone: [phone] Fax: [phone]  [address] [address]                  Ordered On 6/12/2020  9:34 AM    Ordering Provider Authorizing Provider Ordering User Ordering Department   Tameka Isabel, DAVE Isabel, DAVE Tillman [facility]    [phone]        Specimen Description:      5/3/2021 11:59 AM    Component Value Flag Ref Range Units Status   QFT Nil 0 03   0 - 8 0 IU/ml Final   QFT TB1-NIL 0 00    IU/ml Final   QFT TB2-NIL 0 00    IU/ml Final   QFT Mitogen-NIL >10 00    IU/ml Final   QFT Final Interpretation Negative   Negative  Final   Comment:   No Interferon-gamma response to M  tuberculosis antigens detected   Infection with M  tuberculosis is unlikely   A single negative result does not exclude infection with M  tuberculosis  In patients at high risk for M  tuberculosis infection, a second test should be considered in accordance with the 2017 ATS/IDSA/CDC Clinical Practice Guidelines for Diagnosis of Tuberculosis in Adults and Children  False negative results can be a result of incorrect blood sample collection or handling of the specimen affecting lymphocyte function  All Reviewers List    DAVE Sampson on 5/3/2021 12:06 PM   Lab Information    Lab   BE [address] [place] [address]  [facility] [address]   Tel: [phone]   : Harshil Manuel MD-Lab Medical Director              Additional Information    Specimen ID Bill Type Client ID   [id number] Client           Specimen Date Taken Specimen Time Taken Specimen Received Date Specimen Received Time Result Date Result Time   Apr 29, 2021  7:50 AM Apr 29, 2021 11:22 AM May 3, 2021 11:59 AM   Routing History    Priority Sent On From To Message Type    4/30/2021  1:03 PM DAVE Sampson P [facility] Result Notes    4/29/2021  9:38 PM DAVE Sampson CRNP Result Notes    P [facility]     4/29/2021  8:24 AM Lab, Background User DAVE Sampson Results   Results Routing Details    Order ID: [id number]   Result contact date: 4/29/21   Result status: Final result   Outcome: Routed using routing scheme   Routing Scheme Used: SL SYSTEM DEFINITION RESULTS ROUTING [de-identified]   Routing Scheme Line: Default   Resulting User: Lab, Background User [facility]   Routing Instant:  Mon May 3, 2021 11:59 AM   Current Status: Routing Complete   Status History: Result contact created Thu Apr 29, 2021  7:51 AM     Routing Complete Thu Apr 29, 2021  7:51 AM     Routing started Srini Alejandre May 3, 2021 11:59 AM     Routing Complete Mon May 3, 2021 11:59 AM   In Basket Sent: Message ID: [id number]  Recipients:     DAVE Miller  [[id number]]              Responsible: Yes   Other Results from 4/29/2021     Comprehensive metabolic panel  Final result 4/29/2021    TSH, 3rd generation  Final result 4/29/2021    Lipid panel  Final result 4/29/2021    HEMOGLOBIN A1C W/ EAG ESTIMATION  Final result 4/29/2021    Vitamin D 25 hydroxy  Final result 4/29/2021    CBC and Platelet  Final result 4/29/2021    Iron  Final result 4/29/2021    Vitamin B12  Final result 4/29/2021    Folate  Final result 4/29/2021

## 2021-04-29 ENCOUNTER — HOSPITAL ENCOUNTER (OUTPATIENT)
Dept: RADIOLOGY | Facility: HOSPITAL | Age: 46
Discharge: HOME | End: 2021-04-29
Payer: COMMERCIAL

## 2021-04-29 ENCOUNTER — APPOINTMENT (OUTPATIENT)
Dept: LAB | Facility: HOSPITAL | Age: 46
End: 2021-04-29
Payer: COMMERCIAL

## 2021-04-29 DIAGNOSIS — Z20.1 EXPOSURE TO TB: ICD-10-CM

## 2021-04-29 DIAGNOSIS — R73.01 ELEVATED FASTING GLUCOSE: ICD-10-CM

## 2021-04-29 DIAGNOSIS — R10.2 PELVIC PAIN: ICD-10-CM

## 2021-04-29 DIAGNOSIS — E78.49 OTHER HYPERLIPIDEMIA: ICD-10-CM

## 2021-04-29 DIAGNOSIS — M54.42 ACUTE BILATERAL LOW BACK PAIN WITH BILATERAL SCIATICA: ICD-10-CM

## 2021-04-29 DIAGNOSIS — M54.41 ACUTE BILATERAL LOW BACK PAIN WITH BILATERAL SCIATICA: ICD-10-CM

## 2021-04-29 DIAGNOSIS — D50.9 IRON DEFICIENCY ANEMIA, UNSPECIFIED IRON DEFICIENCY ANEMIA TYPE: Primary | ICD-10-CM

## 2021-04-29 DIAGNOSIS — N92.6 IRREGULAR BLEEDING: ICD-10-CM

## 2021-04-29 DIAGNOSIS — Z98.84 S/P LAPAROSCOPIC SLEEVE GASTRECTOMY: ICD-10-CM

## 2021-04-29 LAB
25(OH)D3 SERPL-MCNC: 20.6 NG/ML (ref 30–100)
ALBUMIN SERPL BCP-MCNC: 3.2 G/DL (ref 3.5–5)
ALP SERPL-CCNC: 58 U/L (ref 46–116)
ALT SERPL W P-5'-P-CCNC: 12 U/L (ref 12–78)
ANION GAP SERPL CALCULATED.3IONS-SCNC: 7 MMOL/L (ref 4–13)
AST SERPL W P-5'-P-CCNC: 12 U/L (ref 5–45)
BILIRUB SERPL-MCNC: 0.45 MG/DL (ref 0.2–1)
BUN SERPL-MCNC: 13 MG/DL (ref 5–25)
CALCIUM ALBUM COR SERPL-MCNC: 8.6 MG/DL (ref 8.3–10.1)
CALCIUM SERPL-MCNC: 8 MG/DL (ref 8.3–10.1)
CHLORIDE SERPL-SCNC: 105 MMOL/L (ref 100–108)
CHOLEST SERPL-MCNC: 193 MG/DL (ref 50–200)
CO2 SERPL-SCNC: 28 MMOL/L (ref 21–32)
CREAT SERPL-MCNC: 0.56 MG/DL (ref 0.6–1.3)
ERYTHROCYTE [DISTWIDTH] IN BLOOD BY AUTOMATED COUNT: 19.4 % (ref 11.6–15.1)
EST. AVERAGE GLUCOSE BLD GHB EST-MCNC: 108 MG/DL
FOLATE SERPL-MCNC: >20 NG/ML (ref 3.1–17.5)
GFR SERPL CREATININE-BSD FRML MDRD: 131 ML/MIN/1.73SQ M
GLUCOSE P FAST SERPL-MCNC: 87 MG/DL (ref 65–99)
HBA1C MFR BLD: 5.4 %
HCT VFR BLD AUTO: 26.9 % (ref 34.8–46.1)
HDLC SERPL-MCNC: 52 MG/DL
HGB BLD-MCNC: 7.2 G/DL (ref 11.5–15.4)
IRON SERPL-MCNC: 14 UG/DL (ref 50–170)
LDLC SERPL CALC-MCNC: 116 MG/DL (ref 0–100)
MCH RBC QN AUTO: 16.7 PG (ref 26.8–34.3)
MCHC RBC AUTO-ENTMCNC: 26.8 G/DL (ref 31.4–37.4)
MCV RBC AUTO: 63 FL (ref 82–98)
NONHDLC SERPL-MCNC: 141 MG/DL
PLATELET # BLD AUTO: 380 THOUSANDS/UL (ref 149–390)
PMV BLD AUTO: 9.6 FL (ref 8.9–12.7)
POTASSIUM SERPL-SCNC: 4.2 MMOL/L (ref 3.5–5.3)
PROT SERPL-MCNC: 7.5 G/DL (ref 6.4–8.2)
RBC # BLD AUTO: 4.3 MILLION/UL (ref 3.81–5.12)
SODIUM SERPL-SCNC: 140 MMOL/L (ref 136–145)
TRIGL SERPL-MCNC: 124 MG/DL
TSH SERPL DL<=0.05 MIU/L-ACNC: 2.17 UIU/ML (ref 0.36–3.74)
VIT B12 SERPL-MCNC: 709 PG/ML (ref 100–900)
WBC # BLD AUTO: 5.37 THOUSAND/UL (ref 4.31–10.16)

## 2021-04-29 PROCEDURE — 71046 X-RAY EXAM CHEST 2 VIEWS: CPT

## 2021-04-29 PROCEDURE — 85027 COMPLETE CBC AUTOMATED: CPT

## 2021-04-29 PROCEDURE — 80061 LIPID PANEL: CPT

## 2021-04-29 PROCEDURE — 83036 HEMOGLOBIN GLYCOSYLATED A1C: CPT

## 2021-04-29 PROCEDURE — 83540 ASSAY OF IRON: CPT

## 2021-04-29 PROCEDURE — 80053 COMPREHEN METABOLIC PANEL: CPT

## 2021-04-29 PROCEDURE — 36415 COLL VENOUS BLD VENIPUNCTURE: CPT

## 2021-04-29 PROCEDURE — 82746 ASSAY OF FOLIC ACID SERUM: CPT

## 2021-04-29 PROCEDURE — 84443 ASSAY THYROID STIM HORMONE: CPT

## 2021-04-29 PROCEDURE — 86480 TB TEST CELL IMMUN MEASURE: CPT

## 2021-04-29 PROCEDURE — 82306 VITAMIN D 25 HYDROXY: CPT

## 2021-04-29 PROCEDURE — 82607 VITAMIN B-12: CPT

## 2021-04-29 PROCEDURE — 99284 EMERGENCY DEPT VISIT MOD MDM: CPT

## 2021-04-30 ENCOUNTER — TELEPHONE (OUTPATIENT)
Dept: SURGICAL ONCOLOGY | Facility: CLINIC | Age: 46
End: 2021-04-30

## 2021-04-30 ENCOUNTER — APPOINTMENT (EMERGENCY)
Dept: CT IMAGING | Facility: HOSPITAL | Age: 46
End: 2021-04-30
Payer: COMMERCIAL

## 2021-04-30 ENCOUNTER — HOSPITAL ENCOUNTER (EMERGENCY)
Facility: HOSPITAL | Age: 46
Discharge: HOME | End: 2021-04-30
Attending: EMERGENCY MEDICINE | Admitting: EMERGENCY MEDICINE
Payer: COMMERCIAL

## 2021-04-30 VITALS
DIASTOLIC BLOOD PRESSURE: 59 MMHG | OXYGEN SATURATION: 94 % | SYSTOLIC BLOOD PRESSURE: 117 MMHG | RESPIRATION RATE: 18 BRPM | TEMPERATURE: 97.7 F | HEART RATE: 57 BPM

## 2021-04-30 DIAGNOSIS — D21.9 FIBROID: ICD-10-CM

## 2021-04-30 DIAGNOSIS — D64.9 ANEMIA: ICD-10-CM

## 2021-04-30 DIAGNOSIS — D50.9 IRON DEFICIENCY ANEMIA, UNSPECIFIED IRON DEFICIENCY ANEMIA TYPE: Primary | ICD-10-CM

## 2021-04-30 DIAGNOSIS — R10.9 ABDOMINAL PAIN: Primary | ICD-10-CM

## 2021-04-30 DIAGNOSIS — D21.9 FIBROIDS: ICD-10-CM

## 2021-04-30 LAB
ALBUMIN SERPL BCP-MCNC: 3.3 G/DL (ref 3.5–5)
ALP SERPL-CCNC: 56 U/L (ref 46–116)
ALT SERPL W P-5'-P-CCNC: 13 U/L (ref 12–78)
ANION GAP SERPL CALCULATED.3IONS-SCNC: 6 MMOL/L (ref 4–13)
AST SERPL W P-5'-P-CCNC: 12 U/L (ref 5–45)
BASOPHILS # BLD AUTO: 0.06 THOUSANDS/ΜL (ref 0–0.1)
BASOPHILS NFR BLD AUTO: 1 % (ref 0–1)
BILIRUB SERPL-MCNC: 0.5 MG/DL (ref 0.2–1)
BUN SERPL-MCNC: 12 MG/DL (ref 5–25)
CALCIUM ALBUM COR SERPL-MCNC: 8.6 MG/DL (ref 8.3–10.1)
CALCIUM SERPL-MCNC: 8 MG/DL (ref 8.3–10.1)
CHLORIDE SERPL-SCNC: 104 MMOL/L (ref 100–108)
CO2 SERPL-SCNC: 28 MMOL/L (ref 21–32)
CREAT SERPL-MCNC: 0.7 MG/DL (ref 0.6–1.3)
EOSINOPHIL # BLD AUTO: 0.2 THOUSAND/ΜL (ref 0–0.61)
EOSINOPHIL NFR BLD AUTO: 3 % (ref 0–6)
ERYTHROCYTE [DISTWIDTH] IN BLOOD BY AUTOMATED COUNT: 19.1 % (ref 11.6–15.1)
GFR SERPL CREATININE-BSD FRML MDRD: 122 ML/MIN/1.73SQ M
GLUCOSE SERPL-MCNC: 104 MG/DL (ref 65–140)
HCT VFR BLD AUTO: 26.1 % (ref 34.8–46.1)
HGB BLD-MCNC: 7.1 G/DL (ref 11.5–15.4)
IMM GRANULOCYTES # BLD AUTO: 0.02 THOUSAND/UL (ref 0–0.2)
IMM GRANULOCYTES NFR BLD AUTO: 0 % (ref 0–2)
LIPASE SERPL-CCNC: 78 U/L (ref 73–393)
LYMPHOCYTES # BLD AUTO: 2.32 THOUSANDS/ΜL (ref 0.6–4.47)
LYMPHOCYTES NFR BLD AUTO: 34 % (ref 14–44)
MCH RBC QN AUTO: 16.9 PG (ref 26.8–34.3)
MCHC RBC AUTO-ENTMCNC: 27.2 G/DL (ref 31.4–37.4)
MCV RBC AUTO: 62 FL (ref 82–98)
MONOCYTES # BLD AUTO: 0.94 THOUSAND/ΜL (ref 0.17–1.22)
MONOCYTES NFR BLD AUTO: 14 % (ref 4–12)
NEUTROPHILS # BLD AUTO: 3.33 THOUSANDS/ΜL (ref 1.85–7.62)
NEUTS SEG NFR BLD AUTO: 48 % (ref 43–75)
NRBC BLD AUTO-RTO: 0 /100 WBCS
PLATELET # BLD AUTO: 370 THOUSANDS/UL (ref 149–390)
PMV BLD AUTO: 9.2 FL (ref 8.9–12.7)
POTASSIUM SERPL-SCNC: 3.4 MMOL/L (ref 3.5–5.3)
PROT SERPL-MCNC: 7.5 G/DL (ref 6.4–8.2)
RBC # BLD AUTO: 4.19 MILLION/UL (ref 3.81–5.12)
SODIUM SERPL-SCNC: 138 MMOL/L (ref 136–145)
WBC # BLD AUTO: 6.87 THOUSAND/UL (ref 4.31–10.16)

## 2021-04-30 PROCEDURE — 74177 CT ABD & PELVIS W/CONTRAST: CPT

## 2021-04-30 PROCEDURE — 85025 COMPLETE CBC W/AUTO DIFF WBC: CPT | Performed by: EMERGENCY MEDICINE

## 2021-04-30 PROCEDURE — 83690 ASSAY OF LIPASE: CPT | Performed by: EMERGENCY MEDICINE

## 2021-04-30 PROCEDURE — 96375 TX/PRO/DX INJ NEW DRUG ADDON: CPT

## 2021-04-30 PROCEDURE — 96374 THER/PROPH/DIAG INJ IV PUSH: CPT

## 2021-04-30 PROCEDURE — 99284 EMERGENCY DEPT VISIT MOD MDM: CPT | Performed by: EMERGENCY MEDICINE

## 2021-04-30 PROCEDURE — 80053 COMPREHEN METABOLIC PANEL: CPT | Performed by: EMERGENCY MEDICINE

## 2021-04-30 PROCEDURE — 36415 COLL VENOUS BLD VENIPUNCTURE: CPT | Performed by: EMERGENCY MEDICINE

## 2021-04-30 RX ORDER — HYDROMORPHONE HCL/PF 1 MG/ML
0.5 SYRINGE (ML) INJECTION ONCE
Status: COMPLETED | OUTPATIENT
Start: 2021-04-30 | End: 2021-04-30

## 2021-04-30 RX ORDER — FAMOTIDINE 20 MG/1
20 TABLET, FILM COATED ORAL 2 TIMES DAILY
Qty: 30 TABLET | Refills: 0 | Status: SHIPPED | OUTPATIENT
Start: 2021-04-30 | End: 2021-06-04 | Stop reason: ALTCHOICE

## 2021-04-30 RX ORDER — OMEPRAZOLE 40 MG/1
40 CAPSULE, DELAYED RELEASE ORAL DAILY
COMMUNITY
End: 2021-07-07 | Stop reason: SDUPTHER

## 2021-04-30 RX ORDER — ONDANSETRON 2 MG/ML
4 INJECTION INTRAMUSCULAR; INTRAVENOUS ONCE
Status: COMPLETED | OUTPATIENT
Start: 2021-04-30 | End: 2021-04-30

## 2021-04-30 RX ORDER — SUCRALFATE 1 G/1
1 TABLET ORAL 4 TIMES DAILY
Qty: 60 TABLET | Refills: 0 | Status: SHIPPED | OUTPATIENT
Start: 2021-04-30

## 2021-04-30 RX ADMIN — IOHEXOL 25 ML: 240 INJECTION, SOLUTION INTRATHECAL; INTRAVASCULAR; INTRAVENOUS; ORAL at 01:56

## 2021-04-30 RX ADMIN — ONDANSETRON 4 MG: 2 INJECTION INTRAMUSCULAR; INTRAVENOUS at 01:52

## 2021-04-30 RX ADMIN — IOHEXOL 100 ML: 350 INJECTION, SOLUTION INTRAVENOUS at 02:17

## 2021-04-30 RX ADMIN — IOHEXOL 25 ML: 240 INJECTION, SOLUTION INTRATHECAL; INTRAVASCULAR; INTRAVENOUS; ORAL at 02:17

## 2021-04-30 RX ADMIN — FAMOTIDINE 20 MG: 10 INJECTION, SOLUTION INTRAVENOUS at 01:54

## 2021-04-30 RX ADMIN — HYDROMORPHONE HYDROCHLORIDE 0.5 MG: 1 INJECTION, SOLUTION INTRAMUSCULAR; INTRAVENOUS; SUBCUTANEOUS at 01:59

## 2021-04-30 NOTE — Clinical Note
Luann Samll was seen and treated in our emergency department on 4/29/2021  Diagnosis: abdominal pain    Corinne Dorantes  may return to work on return date  She may return on this date: 05/03/2021         If you have any questions or concerns, please don't hesitate to call        Pool Fu MD    ______________________________           _______________          _______________  Hospital Representative                              Date                                Time

## 2021-04-30 NOTE — Clinical Note
Shaniqua Spenecr was seen and treated in our emergency department on 4/29/2021  Diagnosis: abdominal pain    Maryann Engle  may return to work on return date  She may return on this date: 05/03/2021         If you have any questions or concerns, please don't hesitate to call        Mariposa Ortega MD    ______________________________           _______________          _______________  Hospital Representative                              Date                                Time

## 2021-04-30 NOTE — TELEPHONE ENCOUNTER
New Patient Encounter    New Patient Intake Form   Patient Details:  Luann Session  1975  [id number]    Background Information:  [address] starts by opening a telephone encounter and gathering the following information   Who is calling to schedule? If not self, relationship to patient? self   Referring Provider Dr Casey Donaldson   What is the diagnosis? anemia   Is this diagnosis confirmed? Yes   When was the diagnosis? 4/2021   Is there a confirmed diagnosis from a biopsy/tissue reviewed by pathology? Were outside slides requested? NA   Is patient aware of diagnosis? Yes   Is there a personal history and what kind? no   Is there a family history and what kind? No   Reason for visit? New Diagnosis   Have you had any imaging or labs done? If so: when, where? yes  [facility]   Are records in [place]? yes   If patient has a prior history of breast cancer were old records obtained? NA   Was the patient told to bring a disk? No   Does the patient smoke or Vape? No   If yes, how many packs or cartridges per day? Scheduling Information:   Preferred [place]: [place]     Are there any dates/time the patient cannot be seen? Miscellaneous:    After completing the above information, please route to Financial Counselor and the appropriate Nurse Navigator for review

## 2021-04-30 NOTE — DISCHARGE INSTRUCTIONS
As we discussed, keep  close eye out  for further symptoms of anemia including chest pain or shortness of breath  If you change your mind about a blood transfusion, please return to the emergency department immediately, we are always open

## 2021-04-30 NOTE — ED PROVIDER NOTES
Pt Name: Echo Valdez  MRN: [id number]  [place] 1975  Age/Sex: 41 y o  female  Date of evaluation: 4/29/2021  PCP: Cassidy Martinez, [address] [place] [address]    Chief Complaint   Patient presents with    Abdominal Pain     Pt reports abd burning when eating for the past month, worsening today  Pt reports hx of gastric sleeve 2 years ago  Pt also reports n/v and headache today  HPI    41 y o  female presenting with nausea, vomiting, headache, abdominal pain per patient states she has had burning pain in her abdomen over the past month , moderate intensity, radiating throughout the abdomen, worse with eating or drinking and better at rest   Today, the pain became worse and she also developed nausea, vomiting, and a dull headache  She states that her pain is triggered by some food sometimes another things at other times without a clear pattern  Patient had a gastric sleeve 2 years ago, states she has been having difficulty taking her vitamins and supplements due to the stomach issues over the past month  She denies fever, chest pain, shortness of breath, trauma, other symptoms        HPI      Past Medical and Surgical History    Past Medical History:   Diagnosis Date    Hypercholesterolemia     Sleep apnea     Vitamin deficiency        Past Surgical History:   Procedure Laterality Date    ECTOPIC PREGNANCY SURGERY      EYE SURGERY      GASTRECTOMY SLEEVE LAPAROSCOPIC         Family History   Problem Relation Age of Onset    Brain cancer Mother     Cancer Father     Asthma Brother     Diabetes Maternal Grandmother     Cancer Maternal Grandmother     Hypertension Paternal Grandmother        Social History     Tobacco Use    Smoking status: Never Smoker    Smokeless tobacco: Never Used   Substance Use Topics    Alcohol use: No    Drug use: No           Allergies    Allergies   Allergen Reactions    Penicillin G Other (See Comments)    Pollen Extract Sneezing       Home Medications    Prior to Admission medications    Medication Sig Start Date End Date Taking? Authorizing Provider   Bacillus Coagulans-Inulin (PROBIOTIC) 1-250 BILLION-MG CAPS Take 1 capsule by mouth    Historical Provider, MD   Cholecalciferol (VITAMIN D) 125 MCG (5000 UT) CAPS Take by mouth    Historical Provider, MD   norgestimate-ethinyl estradiol (ORTHO TRI-CYCLEN LO) 0 18/0 215/0 25 MG-25 MCG per tablet Take 1 tablet by mouth daily 3/3/20 3/3/21  Historical Provider, MD   polyethylene glycol (MiraLax) powder Take by mouth 8/29/12   Historical Provider, MD           Review of Systems    Review of Systems   Constitutional: Negative for activity change, chills and fever  HENT: Negative for drooling and facial swelling  Eyes: Negative for pain, discharge and visual disturbance  Respiratory: Negative for apnea, cough, chest tightness, shortness of breath and wheezing  Cardiovascular: Negative for chest pain and leg swelling  Gastrointestinal: Positive for abdominal pain, nausea and vomiting  Negative for constipation and diarrhea  Genitourinary: Negative for difficulty urinating, dysuria and urgency  Musculoskeletal: Negative for arthralgias, back pain and gait problem  Skin: Negative for color change and rash  Neurological: Positive for headaches  Negative for dizziness, speech difficulty and weakness  Psychiatric/Behavioral: Negative for agitation, behavioral problems and confusion  All other systems reviewed and negative      Physical Exam      ED Triage Vitals   Temperature Pulse Respirations Blood Pressure SpO2   04/30/21 0012 04/30/21 0012 04/30/21 0012 04/30/21 0012 04/30/21 0012   97 7 °F (36 5 °C) 66 18 140/61 98 %      Temp Source Heart Rate Source Patient Position - Orthostatic VS BP Location FiO2 (%)   04/30/21 0012 04/30/21 0012 04/30/21 0012 04/30/21 0012 --   Oral Monitor Sitting Left arm       Pain Score       04/30/21 0159       6               Physical Exam  Vitals signs and nursing note reviewed  Constitutional:       Appearance: She is well-developed  HENT:      Head: Normocephalic and atraumatic  Eyes:      Conjunctiva/sclera: Conjunctivae normal       Pupils: Pupils are equal, round, and reactive to light  Neck:      Musculoskeletal: Normal range of motion and neck supple  Cardiovascular:      Rate and Rhythm: Normal rate and regular rhythm  Heart sounds: Normal heart sounds  Pulmonary:      Effort: Pulmonary effort is normal  No respiratory distress  Breath sounds: Normal breath sounds  No wheezing or rales  Abdominal:      General: There is no distension  Palpations: Abdomen is soft  Tenderness: There is abdominal tenderness  There is no guarding or rebound  Comments: Mild epigastric tenderness to palpation, no rebound or guarding  Musculoskeletal: Normal range of motion  General: No deformity  Skin:     General: Skin is warm and dry  Findings: No erythema or rash  Neurological:      Mental Status: She is alert and oriented to person, place, and time  Psychiatric:         Behavior: Behavior normal          Thought Content:  Thought content normal          Judgment: Judgment normal               Diagnostic Results      Labs:    Results Reviewed     Procedure Component Value Units Date/Time    Lipase [[id number]]  (Normal) Collected: 04/30/21 0128    Lab Status: Final result Specimen: Blood from Arm, Right Updated: 04/30/21 0153     Lipase 78 u/L     Comprehensive metabolic panel [[id number]]  (Abnormal) Collected: 04/30/21 0128    Lab Status: Final result Specimen: Blood from Arm, Right Updated: 04/30/21 0153     Sodium 138 mmol/L      Potassium 3 4 mmol/L      Chloride 104 mmol/L      CO2 28 mmol/L      ANION GAP 6 mmol/L      BUN 12 mg/dL      Creatinine 0 70 mg/dL      Glucose 104 mg/dL      Calcium 8 0 mg/dL      Corrected Calcium 8 6 mg/dL      AST 12 U/L      ALT 13 U/L      Alkaline Phosphatase 56 U/L Total Protein 7 5 g/dL      Albumin 3 3 g/dL      Total Bilirubin 0 50 mg/dL      eGFR 122 ml/min/1 73sq m     Narrative:      [place] guidelines for Chronic Kidney Disease (CKD):     Stage 1 with normal or high GFR (GFR > 90 mL/min/1 73 square meters)    Stage 2 Mild CKD (GFR = 60-89 mL/min/1 73 square meters)    Stage 3A Moderate CKD (GFR = 45-59 mL/min/1 73 square meters)    Stage 3B Moderate CKD (GFR = 30-44 mL/min/1 73 square meters)    Stage 4 Severe CKD (GFR = 15-29 mL/min/1 73 square meters)    Stage 5 End Stage CKD (GFR <15 mL/min/1 73 square meters)  Note: GFR calculation is accurate only with a steady state creatinine    CBC and differential [[id number]]  (Abnormal) Collected: 04/30/21 0128    Lab Status: Final result Specimen: Blood from Arm, Right Updated: 04/30/21 0136     WBC 6 87 Thousand/uL      RBC 4 19 Million/uL      Hemoglobin 7 1 g/dL      Hematocrit 26 1 %      MCV 62 fL      MCH 16 9 pg      MCHC 27 2 g/dL      RDW 19 1 %      MPV 9 2 fL      Platelets 055 Thousands/uL      nRBC 0 /100 WBCs      Neutrophils Relative 48 %      Immat GRANS % 0 %      Lymphocytes Relative 34 %      Monocytes Relative 14 %      Eosinophils Relative 3 %      Basophils Relative 1 %      Neutrophils Absolute 3 33 Thousands/µL      Immature Grans Absolute 0 02 Thousand/uL      Lymphocytes Absolute 2 32 Thousands/µL      Monocytes Absolute 0 94 Thousand/µL      Eosinophils Absolute 0 20 Thousand/µL      Basophils Absolute 0 06 Thousands/µL           All labs reviewed and utilized in the medical decision making process    Radiology:    CT abdomen pelvis with contrast   Final Result      No acute intra-abdominal abnormality  No free air or free fluid  Postoperative changes of prior gastric bypass surgery  No evidence of large or small bowel obstruction  Fibroid uterus        Tiny amount of oral contrast material noted within the visualized distal esophagus suggestive of gastroesophageal reflux  Workstation performed: [id number]             All radiology studies independently viewed by me and interpreted by the radiologist     Procedure    Procedures        ED Course of Care and Re-Assessments      Symptoms improved substantially with treatment as below  After hemoglobin returned low, discussed with patient, she notes not taking her supplements regularly due to stomach issues  She also notes frequent and heavy periods, possibly correlated with her fibroid noted on CT scan  I offered the patient of blood transfusion at this time but after thorough discussion of risks and benefits, she declined transfusion in favor plan of resuming supplementation and closely following her bariatric surgeon  She understands the risk of need to return or having worsening symptoms as well as increased risk with less reserved blood if she were to have further bleeding  Medications   famotidine (PEPCID) injection 20 mg (20 mg Intravenous Given 4/30/21 0154)   HYDROmorphone (DILAUDID) injection 0 5 mg (0 5 mg Intravenous Given 4/30/21 0159)   ondansetron (ZOFRAN) injection 4 mg (4 mg Intravenous Given 4/30/21 0152)   iohexol (OMNIPAQUE) 240 MG/ML solution 25 mL (25 mL Oral Given 4/30/21 0156)   iohexol (OMNIPAQUE) 350 MG/ML injection (SINGLE-DOSE) 100 mL (100 mL Intravenous Given 4/30/21 0217)   iohexol (OMNIPAQUE) 240 MG/ML solution 25 mL (25 mL Oral Given 4/30/21 0217)           FINAL IMPRESSION    Final diagnoses:   Abdominal pain   Anemia   Fibroid         DISPOSITION/PLAN    Abdominal pain felt likely secondary to reflux or gastritis based on chronicity in character of pain as well as CT findings  Anemia noted incidentally, with no significant symptoms of anemia time of this visit  Overall, I suspect that her microcytic anemia is due to blood loss from menometrorrhagia complicated by lack of substrate from noncompliance with supplements in the setting of a gastric bypass  Patient offered transfusion ER but declined as above, favoring close outpatient follow up with bariatric surgeon and resumption of supplementation, possibly with B12 and iron infusions  Responded well to symptomatic treatment emergency department, started on antacid regimen, discharged strict return precautions and follow-up with primary care doctor and bariatric surgeon  Time reflects when diagnosis was documented in both MDM as applicable and the Disposition within this note     Time User Action Codes Description Comment    4/30/2021  3:08 AM Jay Frisk T Add [R10 9] Abdominal pain     4/30/2021  3:08 AM Jay Frisk T Add [D64 9] Anemia     4/30/2021  3:13 AM Camillia Flick Add [D21 9] Fibroid       ED Disposition     ED Disposition Condition Date/Time Comment    Discharge Stable Fri Apr 30, 2021  3:08 AM Frank Calderón discharge to home/self care  Follow-up Information     Follow up With Specialties Details Why Contact Info Additional [address] [facility] Emergency Department Emergency Medicine Go to  If symptoms worsen 34 [facility] [id number] [facility] Emergency Department, [address], [place], [address] [facility], [address] [place] [address] Nurse Practitioner, Family Medicine Call today To discuss this visit schedule close follow-up  Discuss vitamin supplementation for your anemia [phone]al[address] Villagomez PA-C Physician Assistant Call today To discuss this visit and schedule close follow-up  Discuss the concern for reflux as well as your anemia   [phone]               PATIENT REFERRED TO:    [facility] Emergency Department  [address] [facility] [address] [phone]  Go to   If symptoms worsen    DAVE Martinez  [phone] [facility] [address]  [phone]    Call today  To discuss this visit schedule close follow-up  Discuss vitamin supplementation for your anemia    Key Li PA-C  [address] [address] [address] [place]     Call today  To discuss this visit and schedule close follow-up  Discuss the concern for reflux as well as your anemia  DISCHARGE MEDICATIONS:    Discharge Medication List as of 4/30/2021  3:19 AM      START taking these medications    Details   famotidine (PEPCID) 20 mg tablet Take 1 tablet (20 mg total) by mouth 2 (two) times a day, Starting Fri 4/30/2021, Print      sucralfate (CARAFATE) 1 g tablet Take 1 tablet (1 g total) by mouth 4 (four) times a day, Starting Fri 4/30/2021, Print         CONTINUE these medications which have NOT CHANGED    Details   Bacillus Coagulans-Inulin (PROBIOTIC) 1-250 BILLION-MG CAPS Take 1 capsule by mouth, Historical Med      Cholecalciferol (VITAMIN D) 125 MCG (5000 UT) CAPS Take by mouth, Historical Med      norgestimate-ethinyl estradiol (ORTHO TRI-CYCLEN LO) 0 18/0 215/0 25 MG-25 MCG per tablet Take 1 tablet by mouth daily, Starting Tue 3/3/2020, Until Wed 3/3/2021, Historical Med      omeprazole (PriLOSEC) 40 MG capsule Take 40 mg by mouth daily, Historical Med      polyethylene glycol (MiraLax) powder Take by mouth, Starting Wed 8/29/2012, Historical Med             No discharge procedures on file           MD Pool Copeland MD  04/30/21 1579

## 2021-04-30 NOTE — ED NOTES
Patient transported to [address] [place] [address], [address] [place] [address]  04/30/21 0903

## 2021-05-03 LAB
GAMMA INTERFERON BACKGROUND BLD IA-ACNC: 0.03 IU/ML
M TB IFN-G BLD-IMP: NEGATIVE
M TB IFN-G CD4+ BCKGRND COR BLD-ACNC: 0 IU/ML
M TB IFN-G CD4+ BCKGRND COR BLD-ACNC: 0 IU/ML
MITOGEN IGNF BCKGRD COR BLD-ACNC: >10 IU/ML

## 2021-05-04 ENCOUNTER — CONSULT (OUTPATIENT)
Dept: HEMATOLOGY ONCOLOGY | Facility: CLINIC | Age: 46
End: 2021-05-04
Payer: COMMERCIAL

## 2021-05-04 ENCOUNTER — APPOINTMENT (OUTPATIENT)
Dept: LAB | Facility: CLINIC | Age: 46
End: 2021-05-04
Payer: COMMERCIAL

## 2021-05-04 VITALS
OXYGEN SATURATION: 98 % | SYSTOLIC BLOOD PRESSURE: 118 MMHG | RESPIRATION RATE: 14 BRPM | TEMPERATURE: 98.6 F | BODY MASS INDEX: 36.96 KG/M2 | WEIGHT: 230 LBS | HEIGHT: 66 IN | DIASTOLIC BLOOD PRESSURE: 76 MMHG | HEART RATE: 70 BPM

## 2021-05-04 DIAGNOSIS — D50.9 IRON DEFICIENCY ANEMIA, UNSPECIFIED IRON DEFICIENCY ANEMIA TYPE: ICD-10-CM

## 2021-05-04 DIAGNOSIS — N92.1 MENORRHAGIA WITH IRREGULAR CYCLE: ICD-10-CM

## 2021-05-04 DIAGNOSIS — D50.9 IRON DEFICIENCY ANEMIA, UNSPECIFIED IRON DEFICIENCY ANEMIA TYPE: Primary | ICD-10-CM

## 2021-05-04 LAB
ABO GROUP BLD: NORMAL
BASOPHILS # BLD AUTO: 0.05 THOUSANDS/ΜL (ref 0–0.1)
BASOPHILS NFR BLD AUTO: 1 % (ref 0–1)
EOSINOPHIL # BLD AUTO: 0.17 THOUSAND/ΜL (ref 0–0.61)
EOSINOPHIL NFR BLD AUTO: 3 % (ref 0–6)
ERYTHROCYTE [DISTWIDTH] IN BLOOD BY AUTOMATED COUNT: 19.1 % (ref 11.6–15.1)
FERRITIN SERPL-MCNC: 3 NG/ML (ref 8–388)
HCT VFR BLD AUTO: 27.8 % (ref 34.8–46.1)
HGB BLD-MCNC: 7.5 G/DL (ref 11.5–15.4)
IMM GRANULOCYTES # BLD AUTO: 0.03 THOUSAND/UL (ref 0–0.2)
IMM GRANULOCYTES NFR BLD AUTO: 1 % (ref 0–2)
IRON SATN MFR SERPL: 3 %
IRON SERPL-MCNC: 16 UG/DL (ref 50–170)
LYMPHOCYTES # BLD AUTO: 2.43 THOUSANDS/ΜL (ref 0.6–4.47)
LYMPHOCYTES NFR BLD AUTO: 43 % (ref 14–44)
MCH RBC QN AUTO: 16.9 PG (ref 26.8–34.3)
MCHC RBC AUTO-ENTMCNC: 27 G/DL (ref 31.4–37.4)
MCV RBC AUTO: 63 FL (ref 82–98)
MONOCYTES # BLD AUTO: 0.82 THOUSAND/ΜL (ref 0.17–1.22)
MONOCYTES NFR BLD AUTO: 15 % (ref 4–12)
NEUTROPHILS # BLD AUTO: 2.02 THOUSANDS/ΜL (ref 1.85–7.62)
NEUTS SEG NFR BLD AUTO: 37 % (ref 43–75)
NRBC BLD AUTO-RTO: 0 /100 WBCS
PLATELET # BLD AUTO: 468 THOUSANDS/UL (ref 149–390)
PMV BLD AUTO: 9.3 FL (ref 8.9–12.7)
RBC # BLD AUTO: 4.43 MILLION/UL (ref 3.81–5.12)
RH BLD: POSITIVE
TIBC SERPL-MCNC: 491 UG/DL (ref 250–450)
WBC # BLD AUTO: 5.52 THOUSAND/UL (ref 4.31–10.16)

## 2021-05-04 PROCEDURE — 99203 OFFICE O/P NEW LOW 30 MIN: CPT | Performed by: INTERNAL MEDICINE

## 2021-05-04 PROCEDURE — 83550 IRON BINDING TEST: CPT

## 2021-05-04 PROCEDURE — 86900 BLOOD TYPING SEROLOGIC ABO: CPT

## 2021-05-04 PROCEDURE — 83540 ASSAY OF IRON: CPT

## 2021-05-04 PROCEDURE — 82728 ASSAY OF FERRITIN: CPT

## 2021-05-04 PROCEDURE — 1036F TOBACCO NON-USER: CPT | Performed by: INTERNAL MEDICINE

## 2021-05-04 PROCEDURE — 86901 BLOOD TYPING SEROLOGIC RH(D): CPT

## 2021-05-04 PROCEDURE — 36415 COLL VENOUS BLD VENIPUNCTURE: CPT

## 2021-05-04 PROCEDURE — 3008F BODY MASS INDEX DOCD: CPT | Performed by: INTERNAL MEDICINE

## 2021-05-04 PROCEDURE — 85025 COMPLETE CBC W/AUTO DIFF WBC: CPT

## 2021-05-04 RX ORDER — SODIUM CHLORIDE 9 MG/ML
20 INJECTION, SOLUTION INTRAVENOUS ONCE
Status: CANCELLED | OUTPATIENT
Start: 2021-05-10

## 2021-05-04 NOTE — PROGRESS NOTES
Hematology/Oncology Outpatient Consult  Mirtha Guerra 41 y o  female MRN: @ Encounter: [id number]    Date:  5/3/2021      Assessment and Plan:    1  Iron deficiency anemia, unspecified iron deficiency anemia type  2  Menorrhagia with irregular cycle     She reported to ED on 04/30/2021 where hemoglobin of 7 1 grams/deciliter, MCV 69 was found  Serum iron was 14  Patient has heavy menstrual bleeding as below, primary cause of KRISSY  Also has history of gastric sleeve in 2019  Patient was offered a blood transfusion in ED, declined this due to possible side effects, infections from blood transfusion  She still would rather have natural approaches to the fixing anemia  The patient understands she should see OBGYN to treat menorrhagia  I will place referral  She would like to know her blood type, will place order  She will have CBC, full iron panel drawn today  Will schedule for Venofer 300mg x 4 starting early next week  Will recheck CBC, iron panel end of June, F/U then  - CBC and differential; Future  - Ambulatory referral to Hematology / Oncology  - ABO/Rh; Future  - Ambulatory referral to Gynecology; Future  - Iron Panel (Includes Ferritin, Iron Sat%, Iron, and TIBC); Future  - CBC and differential; Future  - Iron Panel (Includes Ferritin, Iron Sat%, Iron, and TIBC); Future       Patient voiced understanding and agreement to the above  The patient knows to call the office with any questions or concerns regarding the above  I have spent 40 minutes with Patient  today in which greater than 50% of this time was spent in counseling/coordination of care regarding Diagnostic results, Risks and benefits of tx options, Intructions for management, Patient and family education, Importance of tx compliance, Risk factor reductions and Impressions      HPI:   This is a 41-year-old female with a past medical history of ovarian cyst, hyperlipidemia, vitamin-D deficiency, PCOS, status post laparoscopic sleeve gastrectomy presenting for consultation regarding anemia  1  Iron Deficiency Anemia; Menorrhagia; PICA  - 04/30/2021:  She was seen in the ED due to GI distress which was thought to be secondary to gastric reflux, gastritis  - found to have a hemoglobin of 7 1 grams/deciliter, MCV 69  She declined blood transfusion as she would like to try more natural approaches 1st, she had concern of side effects   - CT a/p showed uterine fibroid  Patient reports feeling fatigued, slight bilateral posterior headache, mild dizziness  She reports PICA for ice  Has never received IV iron, blood transfusion before  Started taking once daily oral iron few days ago  Not on blood thinners  She reports her periods last 7-9 days with midcycle being the heaviest for 3 days  She changes had every hour during these days  Her periods are once a month, sometimes twice a month  Due for period next week  Patient consuming well-balanced diet, some difficulty consuming meat due to gastritis  Patient denies any bleeding in to stool, urine  No history of malabsorption disorders; However, does have history of gastric sleeve  2  History Gastric Sleeve   - approximately 2019 per pt  She does not smoke or drink  Works in post office  Family history of brain cancer in mother, cancer in father but unsure which type  Last mammogram in 2019, she will have OBGYN schedule this for her  ROS: Review of Systems   Constitutional: Positive for fatigue  Negative for activity change, appetite change, chills, diaphoresis, fever and unexpected weight change  Mild dizziness, lightheadedness   HENT: Negative for nosebleeds  Eyes: Negative for visual disturbance  Respiratory: Negative for apnea, cough, choking, chest tightness, shortness of breath, wheezing and stridor  Cardiovascular: Negative for chest pain, palpitations and leg swelling     Gastrointestinal: Negative for abdominal pain, anal bleeding, blood in stool, constipation, diarrhea, nausea and vomiting  Endocrine: Negative for cold intolerance  Genitourinary: Positive for menstrual problem (As written in HPI)  Negative for hematuria and vaginal bleeding  Musculoskeletal: Negative for arthralgias  Skin: Negative for color change, pallor and rash  Neurological: Negative for dizziness, syncope, light-headedness and headaches  Hematological: Negative for adenopathy  Does not bruise/bleed easily  Psychiatric/Behavioral: Negative for sleep disturbance         Past Medical History:   Diagnosis Date    Hypercholesterolemia     Sleep apnea     Vitamin deficiency        Past Surgical History:   Procedure Laterality Date    ECTOPIC PREGNANCY SURGERY      EYE SURGERY      GASTRECTOMY SLEEVE LAPAROSCOPIC         Social History     Socioeconomic History    Marital status: /Civil Union     Spouse name: Not on file    Number of children: Not on file    Years of education: Not on file    Highest education level: Not on file   Occupational History    Not on file   Social Needs    Financial resource strain: Not on file    Food insecurity     Worry: Not on file     Inability: Not on file   [employer] needs     Medical: Not on file     Non-medical: Not on file   Tobacco Use    Smoking status: Never Smoker    Smokeless tobacco: Never Used   Substance and Sexual Activity    Alcohol use: No    Drug use: No    Sexual activity: Not on file   Lifestyle    Physical activity     Days per week: Not on file     Minutes per session: Not on file    Stress: Not on file   Relationships    Social connections     Talks on phone: Not on file     Gets together: Not on file     Attends Scientologist service: Not on file     Active member of club or organization: Not on file     Attends meetings of clubs or organizations: Not on file     Relationship status: Not on file    Intimate partner violence     Fear of current or ex partner: Not on file     Emotionally abused: Not on file     Physically abused: Not on file     Forced sexual activity: Not on file   Other Topics Concern    Not on file   Social History Narrative    Most recent tobacco use screenin2019    Do you currently or have you served in the [employer] 58: No       Family History   Problem Relation Age of Onset    Brain cancer Mother     Cancer Father     Asthma Brother     Diabetes Maternal Grandmother     Cancer Maternal Grandmother     Hypertension Paternal Grandmother        Allergies   Allergen Reactions    Penicillin G Other (See Comments)    Pollen Extract Sneezing         Current Outpatient Medications:     Bacillus Coagulans-Inulin (PROBIOTIC) 1-250 BILLION-MG CAPS, Take 1 capsule by mouth, Disp: , Rfl:     Cholecalciferol (VITAMIN D) 125 MCG (5000 UT) CAPS, Take by mouth, Disp: , Rfl:     famotidine (PEPCID) 20 mg tablet, Take 1 tablet (20 mg total) by mouth 2 (two) times a day, Disp: 30 tablet, Rfl: 0    norgestimate-ethinyl estradiol (ORTHO TRI-CYCLEN LO) 0 18/0 215/0 25 MG-25 MCG per tablet, Take 1 tablet by mouth daily, Disp: , Rfl:     omeprazole (PriLOSEC) 40 MG capsule, Take 40 mg by mouth daily, Disp: , Rfl:     polyethylene glycol (MiraLax) powder, Take by mouth, Disp: , Rfl:     sucralfate (CARAFATE) 1 g tablet, Take 1 tablet (1 g total) by mouth 4 (four) times a day, Disp: 60 tablet, Rfl: 0    (Not in a hospital admission)        Physical Exam:  There were no vitals taken for this visit  Physical Exam  Constitutional:       General: She is not in acute distress  Appearance: Normal appearance  She is not ill-appearing, toxic-appearing or diaphoretic  HENT:      Head: Normocephalic and atraumatic  Eyes:      General: No scleral icterus  Extraocular Movements: Extraocular movements intact  Conjunctiva/sclera: Conjunctivae normal       Pupils: Pupils are equal, round, and reactive to light     Neck:      Musculoskeletal: Normal range of motion and neck supple  Cardiovascular:      Rate and Rhythm: Normal rate and regular rhythm  Heart sounds: Normal heart sounds  No murmur  Pulmonary:      Effort: Pulmonary effort is normal  No respiratory distress  Breath sounds: Normal breath sounds  No stridor  No wheezing, rhonchi or rales  Abdominal:      Palpations: Abdomen is soft  Tenderness: There is no abdominal tenderness  Musculoskeletal: Normal range of motion  General: No tenderness  Right lower leg: No edema  Left lower leg: No edema  Lymphadenopathy:      Cervical: No cervical adenopathy  Skin:     General: Skin is warm and dry  Coloration: Skin is not jaundiced or pale  Findings: No erythema or rash  Neurological:      General: No focal deficit present  Mental Status: She is alert and oriented to person, place, and time  Mental status is at baseline  Cranial Nerves: No cranial nerve deficit  Motor: No weakness  Psychiatric:         Mood and Affect: Mood normal          Behavior: Behavior normal        Labs:  Lab Results   Component Value Date    WBC 6 87 04/30/2021    HGB 7 1 (L) 04/30/2021    HCT 26 1 (L) 04/30/2021    MCV 62 (L) 04/30/2021     04/30/2021     Lab Results   Component Value Date    K 3 4 (L) 04/30/2021     04/30/2021    CO2 28 04/30/2021    BUN 12 04/30/2021    CREATININE 0 70 04/30/2021    GLUF 87 04/29/2021    CALCIUM 8 0 (L) 04/30/2021    CORRECTEDCA 8 6 04/30/2021    AST 12 04/30/2021    ALT 13 04/30/2021    ALKPHOS 56 04/30/2021    EGFR 122 04/30/2021       Patient voiced understanding and agreement in the above discussion  Aware to contact our office with questions/symptoms in the interim

## 2021-05-07 ENCOUNTER — TELEPHONE (OUTPATIENT)
Dept: FAMILY MEDICINE CLINIC | Facility: CLINIC | Age: 46
End: 2021-05-07

## 2021-05-07 ENCOUNTER — HOSPITAL ENCOUNTER (EMERGENCY)
Facility: HOSPITAL | Age: 46
Discharge: HOME | End: 2021-05-08
Attending: EMERGENCY MEDICINE
Payer: COMMERCIAL

## 2021-05-07 DIAGNOSIS — D64.9 SYMPTOMATIC ANEMIA: Primary | ICD-10-CM

## 2021-05-07 LAB
ABO GROUP BLD: NORMAL
ANION GAP SERPL CALCULATED.3IONS-SCNC: 8 MMOL/L (ref 4–13)
BASOPHILS # BLD AUTO: 0.03 THOUSANDS/ΜL (ref 0–0.1)
BASOPHILS NFR BLD AUTO: 0 % (ref 0–1)
BLD GP AB SCN SERPL QL: NEGATIVE
BUN SERPL-MCNC: 13 MG/DL (ref 5–25)
CALCIUM SERPL-MCNC: 8.2 MG/DL (ref 8.3–10.1)
CHLORIDE SERPL-SCNC: 106 MMOL/L (ref 100–108)
CO2 SERPL-SCNC: 28 MMOL/L (ref 21–32)
CREAT SERPL-MCNC: 0.68 MG/DL (ref 0.6–1.3)
EOSINOPHIL # BLD AUTO: 0.27 THOUSAND/ΜL (ref 0–0.61)
EOSINOPHIL NFR BLD AUTO: 4 % (ref 0–6)
ERYTHROCYTE [DISTWIDTH] IN BLOOD BY AUTOMATED COUNT: 20.2 % (ref 11.6–15.1)
EXT PREG TEST URINE: NEGATIVE
EXT. CONTROL ED NAV: NORMAL
GFR SERPL CREATININE-BSD FRML MDRD: 123 ML/MIN/1.73SQ M
GLUCOSE SERPL-MCNC: 90 MG/DL (ref 65–140)
HCT VFR BLD AUTO: 25.7 % (ref 34.8–46.1)
HGB BLD-MCNC: 7.1 G/DL (ref 11.5–15.4)
LYMPHOCYTES # BLD AUTO: 2.78 THOUSANDS/ΜL (ref 0.6–4.47)
LYMPHOCYTES NFR BLD AUTO: 41 % (ref 14–44)
MCH RBC QN AUTO: 16.8 PG (ref 26.8–34.3)
MCHC RBC AUTO-ENTMCNC: 27.6 G/DL (ref 31.4–37.4)
MCV RBC AUTO: 61 FL (ref 82–98)
MONOCYTES # BLD AUTO: 1.13 THOUSAND/ΜL (ref 0.17–1.22)
MONOCYTES NFR BLD AUTO: 17 % (ref 4–12)
NEUTROPHILS # BLD AUTO: 2.59 THOUSANDS/ΜL (ref 1.85–7.62)
NEUTS SEG NFR BLD AUTO: 38 % (ref 43–75)
PLATELET # BLD AUTO: 422 THOUSANDS/UL (ref 149–390)
PMV BLD AUTO: 8.9 FL (ref 8.9–12.7)
POTASSIUM SERPL-SCNC: 4 MMOL/L (ref 3.5–5.3)
RBC # BLD AUTO: 4.23 MILLION/UL (ref 3.81–5.12)
RH BLD: POSITIVE
SODIUM SERPL-SCNC: 142 MMOL/L (ref 136–145)
SPECIMEN EXPIRATION DATE: NORMAL
WBC # BLD AUTO: 6.8 THOUSAND/UL (ref 4.31–10.16)

## 2021-05-07 PROCEDURE — 86900 BLOOD TYPING SEROLOGIC ABO: CPT | Performed by: PHYSICIAN ASSISTANT

## 2021-05-07 PROCEDURE — 99284 EMERGENCY DEPT VISIT MOD MDM: CPT | Performed by: PHYSICIAN ASSISTANT

## 2021-05-07 PROCEDURE — 86920 COMPATIBILITY TEST SPIN: CPT

## 2021-05-07 PROCEDURE — 36430 TRANSFUSION BLD/BLD COMPNT: CPT

## 2021-05-07 PROCEDURE — P9016 RBC LEUKOCYTES REDUCED: HCPCS

## 2021-05-07 PROCEDURE — 86850 RBC ANTIBODY SCREEN: CPT | Performed by: PHYSICIAN ASSISTANT

## 2021-05-07 PROCEDURE — 36415 COLL VENOUS BLD VENIPUNCTURE: CPT | Performed by: PHYSICIAN ASSISTANT

## 2021-05-07 PROCEDURE — 85025 COMPLETE CBC W/AUTO DIFF WBC: CPT | Performed by: PHYSICIAN ASSISTANT

## 2021-05-07 PROCEDURE — 86901 BLOOD TYPING SEROLOGIC RH(D): CPT | Performed by: PHYSICIAN ASSISTANT

## 2021-05-07 PROCEDURE — 99283 EMERGENCY DEPT VISIT LOW MDM: CPT

## 2021-05-07 PROCEDURE — 81025 URINE PREGNANCY TEST: CPT | Performed by: PHYSICIAN ASSISTANT

## 2021-05-07 PROCEDURE — 80048 BASIC METABOLIC PNL TOTAL CA: CPT | Performed by: PHYSICIAN ASSISTANT

## 2021-05-08 VITALS
WEIGHT: 230 LBS | TEMPERATURE: 98.1 F | BODY MASS INDEX: 37.12 KG/M2 | HEART RATE: 63 BPM | OXYGEN SATURATION: 98 % | SYSTOLIC BLOOD PRESSURE: 114 MMHG | DIASTOLIC BLOOD PRESSURE: 74 MMHG | RESPIRATION RATE: 18 BRPM

## 2021-05-08 LAB
ABO GROUP BLD BPU: NORMAL
BPU ID: NORMAL
CROSSMATCH: NORMAL
UNIT DISPENSE STATUS: NORMAL
UNIT PRODUCT CODE: NORMAL
UNIT RH: NORMAL

## 2021-05-08 NOTE — DISCHARGE INSTRUCTIONS
Go to your scheduled iron infusion next week and follow-up with your family doctor and hematologist  Return to the ER with any worsening symptoms

## 2021-05-08 NOTE — ED PROVIDER NOTES
History  Chief Complaint   Patient presents with    Abnormal Lab     Patient reports her hemoglobin was low and physician suggested to come to hospital for transfusion  38yo female with a history of anemia presenting for evaluation of fatigue  Patient was seen in the ED on 4/30/21 for abdominal pain and nausea  Patient was found to have a hemoglobin of 7 1  Her anemia was thought to be multifactorial in setting of heavy menstrual periods and a previous gastric sleeve procedure  She was offered blood transfusion at that time and ultimately declined  Patient then followed up with hematology as an outpatient 3 days ago who diagnosed her with iron deficiency anemia  Patient is scheduled to start Venofer infusions in the next few days  She was also told to follow-up with OBGYN regarding her menometrorrhagia  Patient has no new complaints at this time but continues to feel very fatigued and lightheaded primarily with position changes  Patient states she would like to proceed with blood transfusion at this time  She denies any active bleeding, hematochezia, melena  No syncope, chest pain, shortness of breath  History provided by:  Patient and medical records   used: No    Medical Problem  Severity:  Moderate  Onset quality:  Gradual  Timing:  Constant  Progression:  Unchanged  Chronicity:  Chronic  Context:  History of iron deficiency anemia  Hemoglobin 7 1 last week and declined blood transfusion  Now requesting transfusion for ongoing fatigue  Relieved by:  Nothing  Worsened by:  Nothing  Ineffective treatments:  None tried  Associated symptoms: fatigue    Associated symptoms: no abdominal pain, no chest pain, no diarrhea, no fever, no loss of consciousness, no nausea, no rash, no shortness of breath, no sore throat, no vomiting and no wheezing        Prior to Admission Medications   Prescriptions Last Dose Informant Patient Reported? Taking?    Bacillus Coagulans-Inulin (PROBIOTIC) 4-396 BILLION-MG CAPS 5/8/2021 at Unknown time  Yes Yes   Sig: Take 1 capsule by mouth   famotidine (PEPCID) 20 mg tablet Not Taking at Unknown time  No No   Sig: Take 1 tablet (20 mg total) by mouth 2 (two) times a day   Patient not taking: Reported on 5/8/2021   omeprazole (PriLOSEC) 40 MG capsule 5/8/2021 at Unknown time  Yes Yes   Sig: Take 40 mg by mouth daily   sucralfate (CARAFATE) 1 g tablet 5/8/2021 at Unknown time  No Yes   Sig: Take 1 tablet (1 g total) by mouth 4 (four) times a day      Facility-Administered Medications: None       Past Medical History:   Diagnosis Date    Anemia     Hypercholesterolemia     Sleep apnea     Vitamin deficiency        Past Surgical History:   Procedure Laterality Date    ECTOPIC PREGNANCY SURGERY      EYE SURGERY      GASTRECTOMY SLEEVE LAPAROSCOPIC         Family History   Problem Relation Age of Onset    Brain cancer Mother     Cancer Father     Asthma Brother     Diabetes Maternal Grandmother     Cancer Maternal Grandmother     Hypertension Paternal Grandmother      I have reviewed and agree with the history as documented  E-Cigarette/Vaping    E-Cigarette Use Never User      E-Cigarette/Vaping Substances    Nicotine No     THC No     CBD No     Flavoring No     Other No     Unknown No      Social History     Tobacco Use    Smoking status: Never Smoker    Smokeless tobacco: Never Used   Substance Use Topics    Alcohol use: No    Drug use: No       Review of Systems   Constitutional: Positive for fatigue  Negative for chills and fever  HENT: Negative for drooling, sore throat and voice change  Eyes: Negative for discharge and redness  Respiratory: Negative for shortness of breath, wheezing and stridor  Cardiovascular: Negative for chest pain and leg swelling  Gastrointestinal: Negative for abdominal pain, blood in stool, diarrhea, nausea and vomiting  Genitourinary: Positive for menstrual problem  Negative for vaginal bleeding  Musculoskeletal: Negative for neck pain and neck stiffness  Skin: Negative for color change and rash  Neurological: Positive for light-headedness  Negative for seizures, loss of consciousness and syncope  Psychiatric/Behavioral: Negative for confusion  The patient is not nervous/anxious  All other systems reviewed and are negative  Physical Exam  Physical Exam  Vitals signs and nursing note reviewed  Constitutional:       General: She is not in acute distress  Appearance: She is well-developed  She is not diaphoretic  HENT:      Head: Normocephalic and atraumatic  Right Ear: External ear normal       Left Ear: External ear normal       Nose: Nose normal    Eyes:      General: No scleral icterus  Right eye: No discharge  Left eye: No discharge  Conjunctiva/sclera: Conjunctivae normal    Neck:      Musculoskeletal: Normal range of motion and neck supple  Cardiovascular:      Rate and Rhythm: Normal rate and regular rhythm  Heart sounds: Normal heart sounds  No murmur  Pulmonary:      Effort: Pulmonary effort is normal  No respiratory distress  Breath sounds: Normal breath sounds  No stridor  No wheezing or rales  Abdominal:      General: Bowel sounds are normal  There is no distension  Palpations: Abdomen is soft  Tenderness: There is no abdominal tenderness  There is no guarding  Musculoskeletal: Normal range of motion  General: No deformity  Lymphadenopathy:      Cervical: No cervical adenopathy  Skin:     General: Skin is warm and dry  Neurological:      Mental Status: She is alert  She is not disoriented  GCS: GCS eye subscore is 4  GCS verbal subscore is 5  GCS motor subscore is 6     Psychiatric:         Behavior: Behavior normal          Vital Signs  ED Triage Vitals [05/07/21 2204]   Temperature Pulse Respirations Blood Pressure SpO2   (!) 97 °F (36 1 °C) 64 18 146/69 100 %      Temp Source Heart Rate Source Patient Position - Orthostatic VS BP Location FiO2 (%)   Temporal Monitor Sitting Left arm --      Pain Score       3           Vitals:    05/08/21 0035 05/08/21 0045 05/08/21 0100 05/08/21 0245   BP: 105/62 113/74 113/58 114/74   Pulse: 62 73 75 63   Patient Position - Orthostatic VS:    Lying         Visual Acuity      ED Medications  Medications - No data to display    Diagnostic Studies  Results Reviewed     Procedure Component Value Units Date/Time    Basic metabolic panel [[id number]]  (Abnormal) Collected: 05/07/21 2245    Lab Status: Final result Specimen: Blood from Arm, Right Updated: 05/07/21 2317     Sodium 142 mmol/L      Potassium 4 0 mmol/L      Chloride 106 mmol/L      CO2 28 mmol/L      ANION GAP 8 mmol/L      BUN 13 mg/dL      Creatinine 0 68 mg/dL      Glucose 90 mg/dL      Calcium 8 2 mg/dL      eGFR 123 ml/min/1 73sq m     Narrative:      [place] guidelines for Chronic Kidney Disease (CKD):     Stage 1 with normal or high GFR (GFR > 90 mL/min/1 73 square meters)    Stage 2 Mild CKD (GFR = 60-89 mL/min/1 73 square meters)    Stage 3A Moderate CKD (GFR = 45-59 mL/min/1 73 square meters)    Stage 3B Moderate CKD (GFR = 30-44 mL/min/1 73 square meters)    Stage 4 Severe CKD (GFR = 15-29 mL/min/1 73 square meters)    Stage 5 End Stage CKD (GFR <15 mL/min/1 73 square meters)  Note: GFR calculation is accurate only with a steady state creatinine    CBC and differential [[id number]]  (Abnormal) Collected: 05/07/21 2244    Lab Status: Final result Specimen: Blood from Arm, Right Updated: 05/07/21 2304     WBC 6 80 Thousand/uL      RBC 4 23 Million/uL      Hemoglobin 7 1 g/dL      Hematocrit 25 7 %      MCV 61 fL      MCH 16 8 pg      MCHC 27 6 g/dL      RDW 20 2 %      MPV 8 9 fL      Platelets 645 Thousands/uL      Neutrophils Relative 38 %      Lymphocytes Relative 41 %      Monocytes Relative 17 %      Eosinophils Relative 4 %      Basophils Relative 0 %      Neutrophils Absolute 2 59 Thousands/µL      Lymphocytes Absolute 2 78 Thousands/µL      Monocytes Absolute 1 13 Thousand/µL      Eosinophils Absolute 0 27 Thousand/µL      Basophils Absolute 0 03 Thousands/µL     POCT pregnancy, urine [[id number]]  (Normal) Resulted: 05/07/21 2304    Lab Status: Final result Updated: 05/07/21 2304     EXT PREG TEST UR (Ref: Negative) negative     Control valid                 No orders to display              Procedures  Procedures         ED Course                         MDM  Number of Diagnoses or Management Options  Symptomatic anemia: new and requires workup  Diagnosis management comments: 40yo female presenting for anemia  She had a hemoglobin of 7 1 last week at her ED visit  Patient was offered blood transfusion but declined  She is following with hematology and has a Venofer infusion scheduled for next week  Patient is now requesting a blood transfusion for ongoing fatigue and lightheadedness  She is afebrile and hemodynamically stable  Exam is benign  Initial ED plan: Check CBC, BMP, and type and screen  Final assessment: Labs reveal a hemoglobin of 7 1 today which is stable from her previous level  Discussed risks vs benefits of blood transfusion with patient and discussed that there is no absolute indication for transfusion unless hemoglobin in <7  Patient wishes to proceed with transfusion at this time and consent was obtained  1 unit PRBC ordered for transfusion  Patient signed out to Alexi Villalba PA-C prior to blood transfusion finishing  Once transfusion is complete, will plan on discharge as anemia is chronic in nature and she has no active bleeding  Patient agreeable to plan and has iron transfusion scheduled for next week          Amount and/or Complexity of Data Reviewed  Clinical lab tests: ordered and reviewed  Decide to obtain previous medical records or to obtain history from someone other than the patient: yes  Review and summarize past medical records: yes    Risk of Complications, Morbidity, and/or Mortality  Presenting problems: moderate  Diagnostic procedures: moderate  Management options: moderate        Disposition  Final diagnoses:   Symptomatic anemia     Time reflects when diagnosis was documented in both MDM as applicable and the Disposition within this note     Time User Action Codes Description Comment    5/7/2021 11:36 PM [facility] Richardy, [place] [D64 [id number]] Symptomatic anemia       ED Disposition     ED Disposition Condition Date/Time Comment    Discharge Stable Fri May 7, 2021 11:36 PM Gabriela Tommy discharge to home/self care  Follow-up Information     Follow up With Specialties Details Why Contact Info Additional [address], CRNP Nurse Practitioner, Family Medicine Schedule an appointment as soon as possible for a visit   Ansuha Sanchez [address] Emergency Department Emergency Medicine  If symptoms worsen [address] Emergency Department, [address] [place] [address], [facility]          Discharge Medication List as of 5/7/2021 11:38 PM      CONTINUE these medications which have NOT CHANGED    Details   Cholecalciferol (VITAMIN D) 125 MCG (5000 UT) CAPS Take by mouth, Historical Med      Bacillus Coagulans-Inulin (PROBIOTIC) 1-250 BILLION-MG CAPS Take 1 capsule by mouth, Historical Med      famotidine (PEPCID) 20 mg tablet Take 1 tablet (20 mg total) by mouth 2 (two) times a day, Starting Fri 4/30/2021, Print      omeprazole (PriLOSEC) 40 MG capsule Take 40 mg by mouth daily, Historical Med      sucralfate (CARAFATE) 1 g tablet Take 1 tablet (1 g total) by mouth 4 (four) times a day, Starting Fri 4/30/2021, Print           No discharge procedures on file      PDMP Review     None          ED Provider  Electronically Signed by           Maggie Prince PA-C  05/09/21 0451

## 2021-05-09 RX ORDER — CHOLECALCIFEROL (VITAMIN D3) 125 MCG
1 CAPSULE ORAL DAILY
Qty: 90 CAPSULE | Refills: 1 | Status: SHIPPED | OUTPATIENT
Start: 2021-05-09 | End: 2021-08-07

## 2021-05-11 ENCOUNTER — HOSPITAL ENCOUNTER (OUTPATIENT)
Dept: INFUSION CENTER | Facility: CLINIC | Age: 46
Discharge: HOME | End: 2021-05-11
Payer: COMMERCIAL

## 2021-05-11 VITALS
TEMPERATURE: 96.5 F | RESPIRATION RATE: 16 BRPM | HEART RATE: 65 BPM | DIASTOLIC BLOOD PRESSURE: 71 MMHG | SYSTOLIC BLOOD PRESSURE: 123 MMHG

## 2021-05-11 DIAGNOSIS — D50.9 IRON DEFICIENCY ANEMIA, UNSPECIFIED IRON DEFICIENCY ANEMIA TYPE: Primary | ICD-10-CM

## 2021-05-11 PROCEDURE — 96365 THER/PROPH/DIAG IV INF INIT: CPT

## 2021-05-11 PROCEDURE — 96366 THER/PROPH/DIAG IV INF ADDON: CPT

## 2021-05-11 RX ORDER — SODIUM CHLORIDE 9 MG/ML
20 INJECTION, SOLUTION INTRAVENOUS ONCE
Status: COMPLETED | OUTPATIENT
Start: 2021-05-11 | End: 2021-05-11

## 2021-05-11 RX ORDER — SODIUM CHLORIDE 9 MG/ML
20 INJECTION, SOLUTION INTRAVENOUS ONCE
Status: CANCELLED | OUTPATIENT
Start: 2021-05-18

## 2021-05-11 RX ADMIN — SODIUM CHLORIDE 20 ML/HR: 9 INJECTION, SOLUTION INTRAVENOUS at 15:00

## 2021-05-11 RX ADMIN — IRON SUCROSE 300 MG: 20 INJECTION, SOLUTION INTRAVENOUS at 15:06

## 2021-05-11 NOTE — PROGRESS NOTES
Pt presents for IV Venofer  Offers no complaints  Tolerated tx well  Aware of next appt   AVS declined

## 2021-05-11 NOTE — PLAN OF CARE
Problem: SAFETY ADULT  Goal: Patient will remain free of falls  Description: INTERVENTIONS:  - Assess patient frequently for physical needs  -  Identify cognitive and physical deficits and behaviors that affect risk of falls    -  [place] fall precautions as indicated by assessment   - Educate patient/family on patient safety including physical limitations  - Instruct patient to call for assistance with activity based on assessment  - Modify environment to reduce risk of injury  - Consider OT/PT consult to assist with strengthening/mobility  Outcome: Progressing  Goal: Maintain or return to baseline ADL function  Description: INTERVENTIONS:  -  Assess patient's ability to carry out ADLs; assess patient's baseline for ADL function and identify physical deficits which impact ability to perform ADLs (bathing, care of mouth/teeth, toileting, grooming, dressing, etc )  - Assess/evaluate cause of self-care deficits   - Assess range of motion  - Assess patient's mobility; develop plan if impaired  - Assess patient's need for assistive devices and provide as appropriate  - Encourage maximum independence but intervene and supervise when necessary  - Involve family in performance of ADLs  - Assess for home care needs following discharge   - Consider OT consult to assist with ADL evaluation and planning for discharge  - Provide patient education as appropriate  Outcome: Progressing  Goal: Maintain or return mobility status to optimal level  Description: INTERVENTIONS:  - Assess patient's baseline mobility status (ambulation, transfers, stairs, etc )    - Identify cognitive and physical deficits and behaviors that affect mobility  - Identify mobility aids required to assist with transfers and/or ambulation (gait belt, sit-to-stand, lift, walker, cane, etc )  - [place] fall precautions as indicated by assessment  - Record patient progress and toleration of activity level on Mobility SBAR; progress patient to next Phase/Stage  - Instruct patient to call for assistance with activity based on assessment  - Consider rehabilitation consult to assist with strengthening/weightbearing, etc   Outcome: Progressing     Problem: Knowledge Deficit  Goal: Patient/family/caregiver demonstrates understanding of disease process, treatment plan, medications, and discharge instructions  Description: Complete learning assessment and assess knowledge base    Interventions:  - Provide teaching at level of understanding  - Provide teaching via preferred learning methods  Outcome: Progressing

## 2021-05-13 ENCOUNTER — NURSE TRIAGE (OUTPATIENT)
Dept: HEMATOLOGY ONCOLOGY | Facility: CLINIC | Age: 46
End: 2021-05-13

## 2021-05-13 NOTE — TELEPHONE ENCOUNTER
Patient is calling in indicating that her period is very heavy and is unsure of what to do, she can be reached back at [phone]

## 2021-05-13 NOTE — TELEPHONE ENCOUNTER
Patient called back to assess  Patient stated that she started with her menses 4 days ago  Patient stated that for the last 2-3 days her period has been very heavy  She stated that she is going through 1 pad per hour  Pad is saturated with blood and clots  Patient reports blood clots are a quarter size  + severe fatigue  Denies SOB, chest pain, dizziness, or lightheadedness  5/11/21 Venofer infusion  5/7/21 1 unit PRBC in ER  Patient stated that she has a consult with a new GYN on 5/20/21  Will forward to JAILYN Fabian RNs to review with PA or MD for further recommendations  Patient advised to call back or go to ER if symptoms worsen before triage RN calls back with recommendations  Patient verbalized understanding of above  OK to retask Triage Nurse to contact patient              Reason for Disposition   Nursing judgment or information in reference    Protocols used: NO GUIDELINE AVAILABLE-ADULT-AH

## 2021-05-13 NOTE — TELEPHONE ENCOUNTER
Patient called back and advised to go to ER for evaluation  Patient verbalized understanding of above and agrees with plan  Patient will be going to [facility] ER  Patient will also call GYN after she has been evaluated at ER to have them move up her appointment  [facility] ER notified through [address] [place] [address] Rd  Dr Marion Garcia RNs notified as an Marylou Farr      Reason for Disposition   Nursing judgment or information in reference    Protocols used: NO GUIDELINE AVAILABLE-ADULT-AH

## 2021-05-13 NOTE — TELEPHONE ENCOUNTER
Patient should proceed to ER to assess if blood transfusion is needed as weekend is approaching  Outpatient transfusion can be set up, but may not be till next week pending infusion center availability and obtaining type and screen  Patient needs to call GYN at this time and possibly get appointment moved up if possible

## 2021-05-18 ENCOUNTER — HOSPITAL ENCOUNTER (OUTPATIENT)
Dept: INFUSION CENTER | Facility: CLINIC | Age: 46
Discharge: HOME | End: 2021-05-18
Payer: COMMERCIAL

## 2021-05-18 ENCOUNTER — HOSPITAL ENCOUNTER (OUTPATIENT)
Dept: INFUSION CENTER | Facility: CLINIC | Age: 46
Discharge: HOME | End: 2021-05-18

## 2021-05-18 VITALS
RESPIRATION RATE: 18 BRPM | DIASTOLIC BLOOD PRESSURE: 67 MMHG | SYSTOLIC BLOOD PRESSURE: 135 MMHG | HEART RATE: 60 BPM | TEMPERATURE: 97.9 F

## 2021-05-18 DIAGNOSIS — D50.9 IRON DEFICIENCY ANEMIA, UNSPECIFIED IRON DEFICIENCY ANEMIA TYPE: Primary | ICD-10-CM

## 2021-05-18 PROCEDURE — 96365 THER/PROPH/DIAG IV INF INIT: CPT

## 2021-05-18 RX ORDER — SODIUM CHLORIDE 9 MG/ML
20 INJECTION, SOLUTION INTRAVENOUS ONCE
Status: COMPLETED | OUTPATIENT
Start: 2021-05-18 | End: 2021-05-18

## 2021-05-18 RX ORDER — SODIUM CHLORIDE 9 MG/ML
20 INJECTION, SOLUTION INTRAVENOUS ONCE
Status: CANCELLED | OUTPATIENT
Start: 2021-05-25

## 2021-05-18 RX ADMIN — SODIUM CHLORIDE 20 ML/HR: 9 INJECTION, SOLUTION INTRAVENOUS at 14:57

## 2021-05-18 RX ADMIN — IRON SUCROSE 300 MG: 20 INJECTION, SOLUTION INTRAVENOUS at 14:57

## 2021-05-18 NOTE — PROGRESS NOTES
Pt here for venofer infusion, offering no complaints at present time  Left hand IV placed with positive blood return noted  Tolerated infusion without incident  PIV removed  AVS declined  Walked out in stable condition

## 2021-05-25 ENCOUNTER — HOSPITAL ENCOUNTER (OUTPATIENT)
Dept: INFUSION CENTER | Facility: CLINIC | Age: 46
Discharge: HOME | End: 2021-05-25

## 2021-06-01 DIAGNOSIS — D50.9 IRON DEFICIENCY ANEMIA, UNSPECIFIED IRON DEFICIENCY ANEMIA TYPE: Primary | ICD-10-CM

## 2021-06-01 RX ORDER — SODIUM CHLORIDE 9 MG/ML
20 INJECTION, SOLUTION INTRAVENOUS ONCE
Status: CANCELLED | OUTPATIENT
Start: 2021-06-02

## 2021-06-02 ENCOUNTER — HOSPITAL ENCOUNTER (OUTPATIENT)
Dept: INFUSION CENTER | Facility: CLINIC | Age: 46
Discharge: HOME | End: 2021-06-02
Payer: COMMERCIAL

## 2021-06-02 VITALS
DIASTOLIC BLOOD PRESSURE: 53 MMHG | RESPIRATION RATE: 18 BRPM | TEMPERATURE: 97.5 F | HEART RATE: 71 BPM | SYSTOLIC BLOOD PRESSURE: 117 MMHG

## 2021-06-02 DIAGNOSIS — D50.9 IRON DEFICIENCY ANEMIA, UNSPECIFIED IRON DEFICIENCY ANEMIA TYPE: Primary | ICD-10-CM

## 2021-06-02 PROCEDURE — 96365 THER/PROPH/DIAG IV INF INIT: CPT

## 2021-06-02 RX ORDER — SODIUM CHLORIDE 9 MG/ML
20 INJECTION, SOLUTION INTRAVENOUS ONCE
Status: COMPLETED | OUTPATIENT
Start: 2021-06-02 | End: 2021-06-02

## 2021-06-02 RX ORDER — SODIUM CHLORIDE 9 MG/ML
20 INJECTION, SOLUTION INTRAVENOUS ONCE
Status: CANCELLED | OUTPATIENT
Start: 2021-06-08

## 2021-06-02 RX ADMIN — SODIUM CHLORIDE 20 ML/HR: 0.9 INJECTION, SOLUTION INTRAVENOUS at 14:20

## 2021-06-02 RX ADMIN — IRON SUCROSE 300 MG: 20 INJECTION, SOLUTION INTRAVENOUS at 14:22

## 2021-06-03 NOTE — PROGRESS NOTES
Pt presents for venofer infusion offering no complaints  Tx tolerated without incident  PIV removed  AVS declined  Pt discharged in stable condition

## 2021-06-04 ENCOUNTER — HOSPITAL ENCOUNTER (EMERGENCY)
Facility: HOSPITAL | Age: 46
Discharge: HOME | End: 2021-06-04
Attending: EMERGENCY MEDICINE | Admitting: EMERGENCY MEDICINE
Payer: COMMERCIAL

## 2021-06-04 VITALS
DIASTOLIC BLOOD PRESSURE: 65 MMHG | RESPIRATION RATE: 18 BRPM | HEART RATE: 52 BPM | WEIGHT: 239.86 LBS | SYSTOLIC BLOOD PRESSURE: 107 MMHG | TEMPERATURE: 98.3 F | OXYGEN SATURATION: 99 % | BODY MASS INDEX: 38.71 KG/M2

## 2021-06-04 DIAGNOSIS — R53.83 FATIGUE: Primary | ICD-10-CM

## 2021-06-04 LAB
ABO GROUP BLD: NORMAL
ALBUMIN SERPL BCP-MCNC: 3.4 G/DL (ref 3.5–5)
ALP SERPL-CCNC: 65 U/L (ref 46–116)
ALT SERPL W P-5'-P-CCNC: 17 U/L (ref 12–78)
ANION GAP SERPL CALCULATED.3IONS-SCNC: 6 MMOL/L (ref 4–13)
AST SERPL W P-5'-P-CCNC: 13 U/L (ref 5–45)
ATRIAL RATE: 57 BPM
BASOPHILS # BLD AUTO: 0.06 THOUSANDS/ΜL (ref 0–0.1)
BASOPHILS NFR BLD AUTO: 1 % (ref 0–1)
BILIRUB SERPL-MCNC: 0.43 MG/DL (ref 0.2–1)
BLD GP AB SCN SERPL QL: NEGATIVE
BUN SERPL-MCNC: 9 MG/DL (ref 5–25)
CALCIUM ALBUM COR SERPL-MCNC: 8.8 MG/DL (ref 8.3–10.1)
CALCIUM SERPL-MCNC: 8.3 MG/DL (ref 8.3–10.1)
CHLORIDE SERPL-SCNC: 107 MMOL/L (ref 100–108)
CO2 SERPL-SCNC: 29 MMOL/L (ref 21–32)
CREAT SERPL-MCNC: 0.65 MG/DL (ref 0.6–1.3)
EOSINOPHIL # BLD AUTO: 0.1 THOUSAND/ΜL (ref 0–0.61)
EOSINOPHIL NFR BLD AUTO: 2 % (ref 0–6)
ERYTHROCYTE [DISTWIDTH] IN BLOOD BY AUTOMATED COUNT: 28.6 % (ref 11.6–15.1)
GFR SERPL CREATININE-BSD FRML MDRD: 125 ML/MIN/1.73SQ M
GLUCOSE SERPL-MCNC: 99 MG/DL (ref 65–140)
HCT VFR BLD AUTO: 37.4 % (ref 34.8–46.1)
HGB BLD-MCNC: 10.5 G/DL (ref 11.5–15.4)
IMM GRANULOCYTES # BLD AUTO: 0.02 THOUSAND/UL (ref 0–0.2)
IMM GRANULOCYTES NFR BLD AUTO: 0 % (ref 0–2)
INR PPP: 1.17 (ref 0.84–1.19)
LYMPHOCYTES # BLD AUTO: 1.82 THOUSANDS/ΜL (ref 0.6–4.47)
LYMPHOCYTES NFR BLD AUTO: 38 % (ref 14–44)
MCH RBC QN AUTO: 19.9 PG (ref 26.8–34.3)
MCHC RBC AUTO-ENTMCNC: 28.1 G/DL (ref 31.4–37.4)
MCV RBC AUTO: 71 FL (ref 82–98)
MONOCYTES # BLD AUTO: 0.65 THOUSAND/ΜL (ref 0.17–1.22)
MONOCYTES NFR BLD AUTO: 14 % (ref 4–12)
NEUTROPHILS # BLD AUTO: 2.09 THOUSANDS/ΜL (ref 1.85–7.62)
NEUTS SEG NFR BLD AUTO: 45 % (ref 43–75)
NRBC BLD AUTO-RTO: 0 /100 WBCS
P AXIS: 21 DEGREES
PLATELET # BLD AUTO: 422 THOUSANDS/UL (ref 149–390)
PMV BLD AUTO: 9.5 FL (ref 8.9–12.7)
POTASSIUM SERPL-SCNC: 3.8 MMOL/L (ref 3.5–5.3)
PR INTERVAL: 100 MS
PROT SERPL-MCNC: 7.6 G/DL (ref 6.4–8.2)
PROTHROMBIN TIME: 14.3 SECONDS (ref 11.6–14.5)
QRS AXIS: 64 DEGREES
QRSD INTERVAL: 82 MS
QT INTERVAL: 444 MS
QTC INTERVAL: 432 MS
RBC # BLD AUTO: 5.28 MILLION/UL (ref 3.81–5.12)
RH BLD: POSITIVE
SODIUM SERPL-SCNC: 142 MMOL/L (ref 136–145)
SPECIMEN EXPIRATION DATE: NORMAL
T WAVE AXIS: 54 DEGREES
TROPONIN I SERPL-MCNC: <0.02 NG/ML
TSH SERPL DL<=0.05 MIU/L-ACNC: 1.6 UIU/ML (ref 0.36–3.74)
VENTRICULAR RATE: 57 BPM
WBC # BLD AUTO: 4.74 THOUSAND/UL (ref 4.31–10.16)

## 2021-06-04 PROCEDURE — 86900 BLOOD TYPING SEROLOGIC ABO: CPT | Performed by: EMERGENCY MEDICINE

## 2021-06-04 PROCEDURE — 80053 COMPREHEN METABOLIC PANEL: CPT | Performed by: EMERGENCY MEDICINE

## 2021-06-04 PROCEDURE — 96360 HYDRATION IV INFUSION INIT: CPT

## 2021-06-04 PROCEDURE — 99285 EMERGENCY DEPT VISIT HI MDM: CPT

## 2021-06-04 PROCEDURE — 93005 ELECTROCARDIOGRAM TRACING: CPT

## 2021-06-04 PROCEDURE — 85025 COMPLETE CBC W/AUTO DIFF WBC: CPT | Performed by: EMERGENCY MEDICINE

## 2021-06-04 PROCEDURE — 93010 ELECTROCARDIOGRAM REPORT: CPT | Performed by: INTERNAL MEDICINE

## 2021-06-04 PROCEDURE — 84484 ASSAY OF TROPONIN QUANT: CPT | Performed by: EMERGENCY MEDICINE

## 2021-06-04 PROCEDURE — 36415 COLL VENOUS BLD VENIPUNCTURE: CPT | Performed by: EMERGENCY MEDICINE

## 2021-06-04 PROCEDURE — 86850 RBC ANTIBODY SCREEN: CPT | Performed by: EMERGENCY MEDICINE

## 2021-06-04 PROCEDURE — 85610 PROTHROMBIN TIME: CPT | Performed by: EMERGENCY MEDICINE

## 2021-06-04 PROCEDURE — 86901 BLOOD TYPING SEROLOGIC RH(D): CPT | Performed by: EMERGENCY MEDICINE

## 2021-06-04 PROCEDURE — 84443 ASSAY THYROID STIM HORMONE: CPT | Performed by: EMERGENCY MEDICINE

## 2021-06-04 PROCEDURE — 96361 HYDRATE IV INFUSION ADD-ON: CPT

## 2021-06-04 PROCEDURE — 99284 EMERGENCY DEPT VISIT MOD MDM: CPT | Performed by: EMERGENCY MEDICINE

## 2021-06-04 RX ADMIN — SODIUM CHLORIDE 1000 ML: 0.9 INJECTION, SOLUTION INTRAVENOUS at 19:56

## 2021-06-04 NOTE — ED PROVIDER NOTES
History  Chief Complaint   Patient presents with    Weakness - Generalized     Patient has history of anemia, recent iron transfusion and has been feeling weak and tired  Patient had a blood transfusion in May with last menses  41year old female patient presents emergency department for evaluation of dysfunctional uterine bleeding from unknown uterine fibroid  The patient has been anemic to the point where she has required blood transfusion, the most recent blood transfusion being this past May  Patient is due to discuss her condition with a new OBGYN next week  Currently the patient's overall complains of generalized malaise and fatigue, the feeling that she cannot get up and go, and general lack of energy  Plan will be for evaluation for possible anemia secondary to uterine fibroid  History provided by:  Patient   used: No    Fatigue  Severity:  Mild  Onset quality:  Gradual  Timing:  Constant  Progression:  Worsening  Chronicity:  New  Relieved by:  Nothing  Worsened by:  Nothing  Ineffective treatments:  None tried  Associated symptoms: no chest pain, no dysphagia, no numbness in extremities and no loss of consciousness        Prior to Admission Medications   Prescriptions Last Dose Informant Patient Reported? Taking?    Bacillus Coagulans-Inulin (PROBIOTIC) 1-250 BILLION-MG CAPS Past Week at Unknown time  Yes Yes   Sig: Take 1 capsule by mouth   Cholecalciferol (Vitamin D) 125 MCG (5000 UT) CAPS Past Month at Unknown time  No Yes   Sig: Take 1 capsule by mouth daily   omeprazole (PriLOSEC) 40 MG capsule   Yes No   Sig: Take 40 mg by mouth daily   sucralfate (CARAFATE) 1 g tablet Not Taking at Unknown time  No No   Sig: Take 1 tablet (1 g total) by mouth 4 (four) times a day   Patient not taking: Reported on 6/4/2021      Facility-Administered Medications: None       Past Medical History:   Diagnosis Date    Anemia     Hypercholesterolemia     Sleep apnea     Vitamin deficiency        Past Surgical History:   Procedure Laterality Date    ECTOPIC PREGNANCY SURGERY      EYE SURGERY      GASTRECTOMY SLEEVE LAPAROSCOPIC         Family History   Problem Relation Age of Onset    Brain cancer Mother     Cancer Father     Asthma Brother     Diabetes Maternal Grandmother     Cancer Maternal Grandmother     Hypertension Paternal Grandmother      I have reviewed and agree with the history as documented  E-Cigarette/Vaping    E-Cigarette Use Never User      E-Cigarette/Vaping Substances    Nicotine No     THC No     CBD No     Flavoring No     Other No     Unknown No      Social History     Tobacco Use    Smoking status: Never Smoker    Smokeless tobacco: Never Used   Substance Use Topics    Alcohol use: No    Drug use: No       Review of Systems   Constitutional: Positive for fatigue  Cardiovascular: Negative for chest pain  Gastrointestinal: Negative for dysphagia  Neurological: Negative for loss of consciousness  All other systems reviewed and are negative  Physical Exam  Physical Exam  Vitals signs and nursing note reviewed  Constitutional:       Appearance: She is well-developed  HENT:      Head: Normocephalic and atraumatic  Right Ear: External ear normal       Left Ear: External ear normal    Eyes:      Conjunctiva/sclera: Conjunctivae normal    Neck:      Thyroid: No thyromegaly  Vascular: No JVD  Trachea: No tracheal deviation  Cardiovascular:      Rate and Rhythm: Normal rate  Pulmonary:      Effort: Pulmonary effort is normal       Breath sounds: Normal breath sounds  No stridor  Abdominal:      General: There is no distension  Palpations: Abdomen is soft  There is no mass  Tenderness: There is no abdominal tenderness  There is no guarding  Hernia: No hernia is present  Musculoskeletal: Normal range of motion  General: No tenderness or deformity     Lymphadenopathy:      Cervical: No cervical adenopathy  Skin:     General: Skin is warm  Coloration: Skin is not pale  Findings: No erythema or rash  Neurological:      Mental Status: She is alert and oriented to person, place, and time  Psychiatric:         Behavior: Behavior normal          Vital Signs  ED Triage Vitals   Temperature Pulse Respirations Blood Pressure SpO2   06/04/21 1929 06/04/21 1929 06/04/21 1929 06/04/21 1929 06/04/21 2133   98 3 °F (36 8 °C) 58 18 118/74 98 %      Temp Source Heart Rate Source Patient Position - Orthostatic VS BP Location FiO2 (%)   06/04/21 1929 06/04/21 1929 06/04/21 1929 06/04/21 1929 --   Oral Monitor Sitting Right arm       Pain Score       06/04/21 1929       No Pain           Vitals:    06/04/21 1929 06/04/21 2133 06/04/21 2145   BP: 118/74 108/67 107/65   Pulse: 58 (!) 53 (!) 52   Patient Position - Orthostatic VS: Sitting Lying Lying         Visual Acuity      ED Medications  Medications   sodium chloride 0 9 % bolus 1,000 mL (0 mL Intravenous Stopped 6/4/21 2137)       Diagnostic Studies  Results Reviewed     Procedure Component Value Units Date/Time    TSH, 3rd generation with Free T4 reflex [[id number]]  (Normal) Collected: 06/04/21 1952    Lab Status: Final result Specimen: Blood from Arm, Right Updated: 06/04/21 2035     TSH 3RD GENERATON 1 605 uIU/mL     Narrative:      Patients undergoing fluorescein dye angiography may retain small amounts of fluorescein in the body for 48-72 hours post procedure  Samples containing fluorescein can produce falsely depressed TSH values  If the patient had this procedure,a specimen should be resubmitted post fluorescein clearance        Comprehensive metabolic panel [[id number]]  (Abnormal) Collected: 06/04/21 1952    Lab Status: Final result Specimen: Blood from Arm, Right Updated: 06/04/21 2026     Sodium 142 mmol/L      Potassium 3 8 mmol/L      Chloride 107 mmol/L      CO2 29 mmol/L      ANION GAP 6 mmol/L      BUN 9 mg/dL      Creatinine 0 65 mg/dL Glucose 99 mg/dL      Calcium 8 3 mg/dL      Corrected Calcium 8 8 mg/dL      AST 13 U/L      ALT 17 U/L      Alkaline Phosphatase 65 U/L      Total Protein 7 6 g/dL      Albumin 3 4 g/dL      Total Bilirubin 0 43 mg/dL      eGFR 125 ml/min/1 73sq m     Narrative:      National Kidney Disease Foundation guidelines for Chronic Kidney Disease (CKD):     Stage 1 with normal or high GFR (GFR > 90 mL/min/1 73 square meters)    Stage 2 Mild CKD (GFR = 60-89 mL/min/1 73 square meters)    Stage 3A Moderate CKD (GFR = 45-59 mL/min/1 73 square meters)    Stage 3B Moderate CKD (GFR = 30-44 mL/min/1 73 square meters)    Stage 4 Severe CKD (GFR = 15-29 mL/min/1 73 square meters)    Stage 5 End Stage CKD (GFR <15 mL/min/1 73 square meters)  Note: GFR calculation is accurate only with a steady state creatinine    Troponin I [[id number]]  (Normal) Collected: 06/04/21 1952    Lab Status: Final result Specimen: Blood from Arm, Right Updated: 06/04/21 2025     Troponin I <0 02 ng/mL     Protime-INR [[id number]]  (Normal) Collected: 06/04/21 1952    Lab Status: Final result Specimen: Blood from Arm, Right Updated: 06/04/21 2017     Protime 14 3 seconds      INR 1 17    CBC and differential [[id number]]  (Abnormal) Collected: 06/04/21 1952    Lab Status: Final result Specimen: Blood from Arm, Right Updated: 06/04/21 2007     WBC 4 74 Thousand/uL      RBC 5 28 Million/uL      Hemoglobin 10 5 g/dL      Hematocrit 37 4 %      MCV 71 fL      MCH 19 9 pg      MCHC 28 1 g/dL      RDW 28 6 %      MPV 9 5 fL      Platelets 754 Thousands/uL      nRBC 0 /100 WBCs      Neutrophils Relative 45 %      Immat GRANS % 0 %      Lymphocytes Relative 38 %      Monocytes Relative 14 %      Eosinophils Relative 2 %      Basophils Relative 1 %      Neutrophils Absolute 2 09 Thousands/µL      Immature Grans Absolute 0 02 Thousand/uL      Lymphocytes Absolute 1 82 Thousands/µL      Monocytes Absolute 0 65 Thousand/µL      Eosinophils Absolute 0 10 Thousand/µL      Basophils Absolute 0 06 Thousands/µL                  No orders to display              Procedures  Procedures         ED Course                             SBIRT 22yo+      Most Recent Value   SBIRT (22 yo +)   In order to provide better care to our patients, we are screening all of our patients for alcohol and drug use  Would it be okay to ask you these screening questions? Yes Filed at: 06/04/2021 2030   Initial Alcohol Screen: US AUDIT-C    1  How often do you have a drink containing alcohol?  0 Filed at: 06/04/2021 2030   2  How many drinks containing alcohol do you have on a typical day you are drinking? 0 Filed at: 06/04/2021 2030   3b  FEMALE Any Age, or MALE 65+: How often do you have 4 or more drinks on one occassion? 0 Filed at: 06/04/2021 2030   Audit-C Score  0 Filed at: 06/04/2021 2030   JERMAINE: How many times in the past year have you    Used an illegal drug or used a prescription medication for non-medical reasons? Never Filed at: 06/04/2021 2030                    MDM  Number of Diagnoses or Management Options  Fatigue: new and requires workup     Amount and/or Complexity of Data Reviewed  Clinical lab tests: ordered and reviewed  Decide to obtain previous medical records or to obtain history from someone other than the patient: yes  Review and summarize past medical records: yes    Patient Progress  Patient progress: stable      Disposition  Final diagnoses:   Fatigue     Time reflects when diagnosis was documented in both MDM as applicable and the Disposition within this note     Time User Action Codes Description Comment    6/4/2021  9:51 PM Tila Pearce Add [R53 83] Fatigue       ED Disposition     ED Disposition Condition Date/Time Comment    Discharge Stable Fri Jun 4, 2021  9:51 PM Damon Pinto discharge to home/self care              Follow-up Information     Follow up With Specialties Details Why Contact Info    Ariana Mora, Shanell Etienne Nurse Practitioner, Family Medicine [address] [facility]            Discharge Medication List as of 6/4/2021  9:51 PM      CONTINUE these medications which have NOT CHANGED    Details   Bacillus Coagulans-Inulin (PROBIOTIC) 1-250 BILLION-MG CAPS Take 1 capsule by mouth, Historical Med      Cholecalciferol (Vitamin D) 125 MCG (5000 UT) CAPS Take 1 capsule by mouth daily, Starting Sun 5/9/2021, Until Sat 8/7/2021, Normal      omeprazole (PriLOSEC) 40 MG capsule Take 40 mg by mouth daily, Historical Med      sucralfate (CARAFATE) 1 g tablet Take 1 tablet (1 g total) by mouth 4 (four) times a day, Starting Fri 4/30/2021, Print           No discharge procedures on file      PDMP Review     None          ED Provider  Electronically Signed by           Brandin Hoang DO  06/06/21 [id number]

## 2021-06-04 NOTE — Clinical Note
Shaniqua Spencer was seen and treated in our emergency department on 6/4/2021  Diagnosis:     Maryann Engle  may return to work on return date  She may return on this date: 06/07/2021         If you have any questions or concerns, please don't hesitate to call        Curt Manley DO    ______________________________           _______________          _______________  Hospital Representative                              Date                                Time

## 2021-06-08 ENCOUNTER — HOSPITAL ENCOUNTER (OUTPATIENT)
Dept: INFUSION CENTER | Facility: CLINIC | Age: 46
Discharge: HOME | End: 2021-06-08
Payer: COMMERCIAL

## 2021-06-08 VITALS
SYSTOLIC BLOOD PRESSURE: 131 MMHG | TEMPERATURE: 97.5 F | DIASTOLIC BLOOD PRESSURE: 62 MMHG | HEART RATE: 60 BPM | RESPIRATION RATE: 20 BRPM

## 2021-06-08 DIAGNOSIS — D50.9 IRON DEFICIENCY ANEMIA, UNSPECIFIED IRON DEFICIENCY ANEMIA TYPE: Primary | ICD-10-CM

## 2021-06-08 PROCEDURE — 96365 THER/PROPH/DIAG IV INF INIT: CPT

## 2021-06-08 RX ORDER — SODIUM CHLORIDE 9 MG/ML
20 INJECTION, SOLUTION INTRAVENOUS ONCE
Status: COMPLETED | OUTPATIENT
Start: 2021-06-08 | End: 2021-06-08

## 2021-06-08 RX ORDER — SODIUM CHLORIDE 9 MG/ML
20 INJECTION, SOLUTION INTRAVENOUS ONCE
Status: CANCELLED | OUTPATIENT
Start: 2021-06-09

## 2021-06-08 RX ADMIN — SODIUM CHLORIDE 20 ML/HR: 0.9 INJECTION, SOLUTION INTRAVENOUS at 14:29

## 2021-06-08 RX ADMIN — IRON SUCROSE 300 MG: 20 INJECTION, SOLUTION INTRAVENOUS at 14:30

## 2021-06-08 NOTE — PROGRESS NOTES
Pt presents for venofer infusion offering no complaints  Pt tolerated treatment without incident  AVS declined, therapy plan completed, no future appointment scheduled

## 2021-06-14 ENCOUNTER — APPOINTMENT (OUTPATIENT)
Dept: LAB | Facility: HOSPITAL | Age: 46
End: 2021-06-14
Payer: COMMERCIAL

## 2021-06-14 DIAGNOSIS — D50.9 IRON DEFICIENCY ANEMIA, UNSPECIFIED IRON DEFICIENCY ANEMIA TYPE: ICD-10-CM

## 2021-06-14 LAB
BASOPHILS # BLD AUTO: 0.04 THOUSANDS/ΜL (ref 0–0.1)
BASOPHILS NFR BLD AUTO: 1 % (ref 0–1)
EOSINOPHIL # BLD AUTO: 0.21 THOUSAND/ΜL (ref 0–0.61)
EOSINOPHIL NFR BLD AUTO: 4 % (ref 0–6)
ERYTHROCYTE [DISTWIDTH] IN BLOOD BY AUTOMATED COUNT: 29.6 % (ref 11.6–15.1)
FERRITIN SERPL-MCNC: 141 NG/ML (ref 8–388)
HCT VFR BLD AUTO: 34 % (ref 34.8–46.1)
HGB BLD-MCNC: 9.8 G/DL (ref 11.5–15.4)
IMM GRANULOCYTES # BLD AUTO: 0.01 THOUSAND/UL (ref 0–0.2)
IMM GRANULOCYTES NFR BLD AUTO: 0 % (ref 0–2)
IRON SATN MFR SERPL: 13 %
IRON SERPL-MCNC: 43 UG/DL (ref 50–170)
LYMPHOCYTES # BLD AUTO: 1.98 THOUSANDS/ΜL (ref 0.6–4.47)
LYMPHOCYTES NFR BLD AUTO: 36 % (ref 14–44)
MCH RBC QN AUTO: 21.4 PG (ref 26.8–34.3)
MCHC RBC AUTO-ENTMCNC: 28.8 G/DL (ref 31.4–37.4)
MCV RBC AUTO: 74 FL (ref 82–98)
MONOCYTES # BLD AUTO: 0.65 THOUSAND/ΜL (ref 0.17–1.22)
MONOCYTES NFR BLD AUTO: 12 % (ref 4–12)
NEUTROPHILS # BLD AUTO: 2.63 THOUSANDS/ΜL (ref 1.85–7.62)
NEUTS SEG NFR BLD AUTO: 47 % (ref 43–75)
NRBC BLD AUTO-RTO: 0 /100 WBCS
PLATELET # BLD AUTO: 281 THOUSANDS/UL (ref 149–390)
PMV BLD AUTO: 10.4 FL (ref 8.9–12.7)
RBC # BLD AUTO: 4.58 MILLION/UL (ref 3.81–5.12)
TIBC SERPL-MCNC: 339 UG/DL (ref 250–450)
WBC # BLD AUTO: 5.52 THOUSAND/UL (ref 4.31–10.16)

## 2021-06-14 PROCEDURE — 36415 COLL VENOUS BLD VENIPUNCTURE: CPT

## 2021-06-14 PROCEDURE — 83550 IRON BINDING TEST: CPT

## 2021-06-14 PROCEDURE — 85025 COMPLETE CBC W/AUTO DIFF WBC: CPT

## 2021-06-14 PROCEDURE — 83540 ASSAY OF IRON: CPT

## 2021-06-14 PROCEDURE — 82728 ASSAY OF FERRITIN: CPT

## 2021-06-17 ENCOUNTER — TELEPHONE (OUTPATIENT)
Dept: OBGYN CLINIC | Facility: CLINIC | Age: 46
End: 2021-06-17

## 2021-06-17 NOTE — TELEPHONE ENCOUNTER
----- Message from Jason Mckeon sent at 6/17/2021  7:02 AM EDT -----  Regarding: Visit Follow-Up Question  Contact: [phone]  I need to reschedule   When is the next available date you have

## 2021-06-28 ENCOUNTER — OFFICE VISIT (OUTPATIENT)
Dept: HEMATOLOGY ONCOLOGY | Facility: CLINIC | Age: 46
End: 2021-06-28
Payer: COMMERCIAL

## 2021-06-28 ENCOUNTER — OFFICE VISIT (OUTPATIENT)
Dept: OBGYN CLINIC | Facility: CLINIC | Age: 46
End: 2021-06-28
Payer: COMMERCIAL

## 2021-06-28 VITALS
BODY MASS INDEX: 37.77 KG/M2 | DIASTOLIC BLOOD PRESSURE: 80 MMHG | WEIGHT: 235 LBS | HEIGHT: 66 IN | HEART RATE: 54 BPM | OXYGEN SATURATION: 99 % | RESPIRATION RATE: 16 BRPM | SYSTOLIC BLOOD PRESSURE: 112 MMHG | TEMPERATURE: 98.1 F

## 2021-06-28 VITALS
SYSTOLIC BLOOD PRESSURE: 120 MMHG | WEIGHT: 234 LBS | BODY MASS INDEX: 37.61 KG/M2 | DIASTOLIC BLOOD PRESSURE: 80 MMHG | HEIGHT: 66 IN

## 2021-06-28 DIAGNOSIS — N92.1 MENORRHAGIA WITH IRREGULAR CYCLE: Primary | ICD-10-CM

## 2021-06-28 DIAGNOSIS — D50.0 IRON DEFICIENCY ANEMIA DUE TO CHRONIC BLOOD LOSS: Primary | ICD-10-CM

## 2021-06-28 DIAGNOSIS — F50.89 PICA: ICD-10-CM

## 2021-06-28 DIAGNOSIS — D50.9 IRON DEFICIENCY ANEMIA, UNSPECIFIED IRON DEFICIENCY ANEMIA TYPE: ICD-10-CM

## 2021-06-28 DIAGNOSIS — D25.1 INTRAMURAL LEIOMYOMA OF UTERUS: ICD-10-CM

## 2021-06-28 DIAGNOSIS — N92.1 MENORRHAGIA WITH IRREGULAR CYCLE: ICD-10-CM

## 2021-06-28 DIAGNOSIS — Z98.84 S/P LAPAROSCOPIC SLEEVE GASTRECTOMY: ICD-10-CM

## 2021-06-28 DIAGNOSIS — D50.9 IRON DEFICIENCY ANEMIA, UNSPECIFIED IRON DEFICIENCY ANEMIA TYPE: Primary | ICD-10-CM

## 2021-06-28 PROCEDURE — 99205 OFFICE O/P NEW HI 60 MIN: CPT | Performed by: OBSTETRICS & GYNECOLOGY

## 2021-06-28 PROCEDURE — 1036F TOBACCO NON-USER: CPT | Performed by: OBSTETRICS & GYNECOLOGY

## 2021-06-28 PROCEDURE — 99214 OFFICE O/P EST MOD 30 MIN: CPT | Performed by: INTERNAL MEDICINE

## 2021-06-28 RX ORDER — SODIUM CHLORIDE 9 MG/ML
20 INJECTION, SOLUTION INTRAVENOUS ONCE
Status: CANCELLED | OUTPATIENT
Start: 2021-07-12

## 2021-06-28 RX ORDER — SODIUM CHLORIDE 9 MG/ML
20 INJECTION, SOLUTION INTRAVENOUS ONCE
Status: CANCELLED | OUTPATIENT
Start: 2021-07-05

## 2021-06-28 NOTE — PROGRESS NOTES
The patient is here because she has a problem  She is new to our office  The patient has heavy periods  Sometimes she has bleeding two-three times a month  She has a fibroid that they found on an U/S  The patient has had the fibroid for thirteen- fourteen years  The patient last went to [facility] on 2/13/20

## 2021-06-28 NOTE — PROGRESS NOTES
HEMATOLOGY CLINIC NOTE    Primary Care Provider: DAVE Doshi  Referring Provider:    MRN: [id number]  : 1975    Assessment / Plan:   1  Iron deficiency anemia due to chronic blood loss    Patient has iron deficiency anemia due to menorrhagia from uterine fibroid and gastric sleeve  She has PICA for ice which has not resolved yet  She is s/p 1 unit PRBC and Venofer 300 mg x 4 with last infusion 21  Labs 21: Hgb 9 8g/dL, MCV 74  Ferritin 141, Iron 43, TIBC 330, Iron sat 13%  Patient's labs were drawn too soon, it is likely ferritin falsely elevated  We will plan for venofer 300mg x2  After patient is done, she will take oral iron once daily  She has an OBGYN appt today to address menorrhagia, uterine fibroid  F/U end of September with repeat CBC, iron panel      - CBC and differential; Future  - Iron Panel (Includes Ferritin, Iron Sat%, Iron, and TIBC); Future    2  Menorrhagia with irregular cycle  - Has a uterine fibroid  Cycle is up to 10 days currently  Has appt with OBGYN today  3  S/P laparoscopic sleeve gastrectomy  - done in   Likely contributing to KRISSY  4  Pica  - has yet to resolve  Patient voiced understanding and agreement to the above  The patient knows to call the office with any questions or concerns regarding the above  I have spent 30 minutes with Patient  today in which greater than 50% of this time was spent in counseling/coordination of care regarding Diagnostic results, Risks and benefits of tx options, Intructions for management, Patient and family education, Importance of tx compliance, Risk factor reductions and Impressions  Reason for visit:       Chief Complaint   Patient presents with    Follow-up       History of Hematology Illness:     Victorino Segovia is a 41 y o  female who came in for follow up  1  Iron Deficiency Anemia  2  Menorrhagia with irregular cycle, Uterine fibroid  3   PICA - ice  - 2021: Hemoglobin of 7 1 grams/deciliter, MCV 69    - CT a/p showed uterine fibroid  - 5/2021: s/p 1 unit PRBC  S/p Venofer 300mg x4  Hgb 9 8g/dL, MCV 74  Ferritin 141, Iron 43, TIBC 330, Iron sat 13%  4  History Gastric Sleeve   - done in 2019 per pt  Interval History:   5/4/21: This is a 41year old female with PMH of ovarian cyst, hyperlipidemia, vitamin D deficiency, PCOS, s/p laparoscopic sleeve gastrectomy presenting for consult  Patient reports feeling fatigued, slight bilateral posterior headache, mild dizziness  She reports PICA for ice  Started taking once daily oral iron few days ago  Not on blood thinners  She reports her periods last 7-9 days with midcycle being the heaviest for 3 days  She changes had every hour during these days  Her periods are sometimes twice a month      Patient consuming well-balanced diet, some difficulty consuming meat due to gastritis  Patient denies any bleeding in to stool, urine  No history of malabsorption disorders; However, does have history of gastric sleeve  She does not smoke or drink  Works in post office  Family history of brain cancer in mother, cancer in father but unsure which type  Last mammogram in 2019, she will have OBGYN schedule this for her      6/28/2021:  Patient came in for follow-up  Patient s/p Venofer 300mg x4 & 1 unit PRBC in 5-6/2021 6/14/21 labs: Hgb 9 8g/dL, MCV 74  Ferritin 141, Iron 43, TIBC 330, Iron sat 13%  Patient notes that her menstrual bleeding remains heavy at 10 days  Patient has an OBGYN appointment today did discuss this  She tolerated IV iron as well as transfusion well  She does feel better; however, she remains fatigued  PICA did not resolve     Problem list:       Patient Active Problem List   Diagnosis    Cyst of ovary    Hyperlipidemia    Encounter for surveillance of contraceptive pills    Irregular bleeding    S/P laparoscopic sleeve gastrectomy    Vitamin D deficiency    History of PCOS    Iron deficiency anemia       REVIEW OF SYMPTOMS:   Review of Systems   Constitutional: Positive for fatigue  Negative for activity change, appetite change, chills, diaphoresis, fever and unexpected weight change  PICA still present   HENT: Negative for mouth sores and nosebleeds  Eyes: Negative for visual disturbance  Respiratory: Negative for apnea, cough, choking, chest tightness and shortness of breath  Cardiovascular: Negative for chest pain, palpitations and leg swelling  Gastrointestinal: Negative for abdominal pain, anal bleeding, blood in stool, constipation, diarrhea, nausea and vomiting  Endocrine: Negative for cold intolerance  Genitourinary: Negative for hematuria, menstrual problem and vaginal bleeding  Musculoskeletal: Negative for arthralgias  Skin: Negative for color change, pallor and rash  Neurological: Negative for dizziness, syncope, light-headedness and headaches  Hematological: Negative for adenopathy  Does not bruise/bleed easily  Psychiatric/Behavioral: Negative for sleep disturbance  PHYSICAL EXAMINATION:     Vital Signs:   /80 (BP Location: Left arm, Patient Position: Sitting, Cuff Size: Adult)   Pulse (!) 54   Temp 98 1 °F (36 7 °C)   Resp 16   Ht 5' 6" (1 676 m)   Wt 107 kg (235 lb)   LMP 06/03/2021   SpO2 99%   BMI 37 93 kg/m²   Body surface area is 2 15 meters squared  Ht Readings from Last 8 Encounters:   06/28/21 5' 6" (1 676 m)   05/04/21 5' 6" (1 676 m)   04/28/21 5' 6" (1 676 m)   06/12/20 5' 6" (1 676 m)   06/05/18 5' 6" (1 676 m)       Wt Readings from Last 8 Encounters:   06/28/21 107 kg (235 lb)   06/04/21 109 kg (239 lb 13 8 oz)   05/07/21 104 kg (230 lb)   05/04/21 104 kg (230 lb)   04/28/21 110 kg (243 lb 6 4 oz)   06/12/20 99 3 kg (219 lb)   06/05/18 (!) 140 kg (308 lb)          Physical Exam  Constitutional:       General: She is not in acute distress  Appearance: Normal appearance   She is not ill-appearing, toxic-appearing or diaphoretic  HENT:      Head: Normocephalic and atraumatic  Eyes:      General: No scleral icterus  Extraocular Movements: Extraocular movements intact  Conjunctiva/sclera: Conjunctivae normal       Pupils: Pupils are equal, round, and reactive to light  Cardiovascular:      Rate and Rhythm: Normal rate and regular rhythm  Heart sounds: Normal heart sounds  No murmur heard  Pulmonary:      Effort: Pulmonary effort is normal  No respiratory distress  Breath sounds: Normal breath sounds  No stridor  No wheezing, rhonchi or rales  Abdominal:      Palpations: Abdomen is soft  Tenderness: There is no abdominal tenderness  Musculoskeletal:         General: No tenderness  Normal range of motion  Cervical back: Normal range of motion and neck supple  Right lower leg: No edema  Left lower leg: No edema  Lymphadenopathy:      Cervical: No cervical adenopathy  Skin:     General: Skin is warm and dry  Coloration: Skin is not jaundiced or pale  Findings: No erythema or rash  Neurological:      General: No focal deficit present  Mental Status: She is alert and oriented to person, place, and time  Mental status is at baseline  Cranial Nerves: No cranial nerve deficit  Motor: No weakness  Psychiatric:         Mood and Affect: Mood normal          Behavior: Behavior normal          Thought Content: Thought content normal          Judgment: Judgment normal        Reviewed historical information        PAST MEDICAL HISTORY:    Past Medical History:   Diagnosis Date    Anemia     Hypercholesterolemia     Sleep apnea     Vitamin deficiency        PAST SURGICAL HISTORY:    Past Surgical History:   Procedure Laterality Date    ECTOPIC PREGNANCY SURGERY      EYE SURGERY      GASTRECTOMY SLEEVE LAPAROSCOPIC           CURRENT MEDICATIONS:     Current Outpatient Medications:     Bacillus Coagulans-Inulin (PROBIOTIC) 1-250 BILLION-MG CAPS, Take 1 capsule by mouth, Disp: , Rfl:     Cholecalciferol (Vitamin D) 125 MCG (5000 UT) CAPS, Take 1 capsule by mouth daily, Disp: 90 capsule, Rfl: 1    omeprazole (PriLOSEC) 40 MG capsule, Take 40 mg by mouth daily, Disp: , Rfl:     sucralfate (CARAFATE) 1 g tablet, Take 1 tablet (1 g total) by mouth 4 (four) times a day (Patient not taking: Reported on 6/4/2021), Disp: 60 tablet, Rfl: 0    SOCIAL HISTORY:    Social History     Tobacco Use    Smoking status: Never Smoker    Smokeless tobacco: Never Used   Vaping Use    Vaping Use: Never used   Substance Use Topics    Alcohol use: No    Drug use: No       FAMILY HISTORY:    Family History   Problem Relation Age of Onset    Brain cancer Mother     Cancer Father     Asthma Brother     Diabetes Maternal Grandmother     Cancer Maternal Grandmother     Hypertension Paternal Grandmother        ALLERGIES:    Allergies   Allergen Reactions    Penicillin G Other (See Comments)    Pollen Extract Sneezing         LAB:    Lab Results   Component Value Date    WBC 5 52 06/14/2021    HGB 9 8 (L) 06/14/2021    HCT 34 0 (L) 06/14/2021    MCV 74 (L) 06/14/2021     06/14/2021       Lab Results   Component Value Date    SODIUM 142 06/04/2021    K 3 8 06/04/2021     06/04/2021    CO2 29 06/04/2021    AGAP 6 06/04/2021    BUN 9 06/04/2021    CREATININE 0 65 06/04/2021    GLUC 99 06/04/2021    GLUF 87 04/29/2021    CALCIUM 8 3 06/04/2021    AST 13 06/04/2021    ALT 17 06/04/2021    ALKPHOS 65 06/04/2021    TP 7 6 06/04/2021    TBILI 0 43 06/04/2021    EGFR 125 06/04/2021       IMAGING:  XR chest pa & lateral  Narrative: CHEST     INDICATION:   Z20 1: Contact with and (suspected) exposure to tuberculosis  COMPARISON:  Chest x-ray dated June 5, 2018  EXAM PERFORMED/VIEWS:  XR CHEST PA & LATERAL    FINDINGS:    Cardiomediastinal silhouette appears unremarkable  The lungs are clear  No pneumothorax or pleural effusion      Osseous structures appear within normal limits for patient age  Impression: No acute cardiopulmonary disease      Workstation performed: [id number]

## 2021-06-28 NOTE — PROGRESS NOTES
67-year-old  3 para 3 presents to the office as a new patient complaining of heavy menstrual cycles with anemia  Patient's hemoglobin down to 7  She was given several iron transfusion which brought her hemoglobin up to over 10  She is now back to 9  Patient has pressure on her bladder  Patient presently not sexually active  Patient had an ultrasound  which showed the uterus to be 13 x 6 2 x 9 cm with a 5 x 4 5 cm fibroid  She has not had an ultrasound since  Patient had been on birth control pills but does not want to go back on them  Spontaneous vaginal delivery x3  One ectopic pregnancy  Physical exam:  Abdomen soft nontender  External genitalia normal   Vagina clear  Cervix no lesions, mobile nontender  Uterus enlarged with large fundal anterior fibroid  Uterus is mobile and nontender  Adnexa not palpable  Impression:  Enlarged fibroid uterus  Menorrhagia with anemia  Plan:  Recommend LAV  Information on medication to to control heavy bleeding with fibroids given  Information on hysterectomy given  Information on fibroids given

## 2021-07-03 ENCOUNTER — HOSPITAL ENCOUNTER (EMERGENCY)
Facility: HOSPITAL | Age: 46
Discharge: HOME | End: 2021-07-03
Attending: EMERGENCY MEDICINE | Admitting: EMERGENCY MEDICINE
Payer: COMMERCIAL

## 2021-07-03 VITALS
DIASTOLIC BLOOD PRESSURE: 67 MMHG | WEIGHT: 234 LBS | HEIGHT: 66 IN | OXYGEN SATURATION: 99 % | HEART RATE: 55 BPM | BODY MASS INDEX: 37.61 KG/M2 | SYSTOLIC BLOOD PRESSURE: 115 MMHG | TEMPERATURE: 98.2 F | RESPIRATION RATE: 18 BRPM

## 2021-07-03 DIAGNOSIS — N93.8 DYSFUNCTIONAL UTERINE BLEEDING: Primary | ICD-10-CM

## 2021-07-03 LAB
ABO GROUP BLD: NORMAL
ANION GAP SERPL CALCULATED.3IONS-SCNC: 9 MMOL/L (ref 4–13)
APTT PPP: 33 SECONDS (ref 23–37)
BASOPHILS # BLD AUTO: 0.02 THOUSANDS/ΜL (ref 0–0.1)
BASOPHILS NFR BLD AUTO: 0 % (ref 0–1)
BLD GP AB SCN SERPL QL: NEGATIVE
BUN SERPL-MCNC: 12 MG/DL (ref 5–25)
CALCIUM SERPL-MCNC: 8.4 MG/DL (ref 8.3–10.1)
CHLORIDE SERPL-SCNC: 104 MMOL/L (ref 100–108)
CO2 SERPL-SCNC: 28 MMOL/L (ref 21–32)
CREAT SERPL-MCNC: 0.62 MG/DL (ref 0.6–1.3)
EOSINOPHIL # BLD AUTO: 0.16 THOUSAND/ΜL (ref 0–0.61)
EOSINOPHIL NFR BLD AUTO: 3 % (ref 0–6)
ERYTHROCYTE [DISTWIDTH] IN BLOOD BY AUTOMATED COUNT: 26.9 % (ref 11.6–15.1)
EXT PREG TEST URINE: NEGATIVE
EXT. CONTROL ED NAV: NORMAL
GFR SERPL CREATININE-BSD FRML MDRD: 127 ML/MIN/1.73SQ M
GLUCOSE SERPL-MCNC: 100 MG/DL (ref 65–140)
HCT VFR BLD AUTO: 40.1 % (ref 34.8–46.1)
HGB BLD-MCNC: 11.8 G/DL (ref 11.5–15.4)
IMM GRANULOCYTES # BLD AUTO: 0.01 THOUSAND/UL (ref 0–0.2)
IMM GRANULOCYTES NFR BLD AUTO: 0 % (ref 0–2)
INR PPP: 1.19 (ref 0.84–1.19)
LYMPHOCYTES # BLD AUTO: 2.04 THOUSANDS/ΜL (ref 0.6–4.47)
LYMPHOCYTES NFR BLD AUTO: 41 % (ref 14–44)
MCH RBC QN AUTO: 22.5 PG (ref 26.8–34.3)
MCHC RBC AUTO-ENTMCNC: 29.4 G/DL (ref 31.4–37.4)
MCV RBC AUTO: 76 FL (ref 82–98)
MONOCYTES # BLD AUTO: 0.55 THOUSAND/ΜL (ref 0.17–1.22)
MONOCYTES NFR BLD AUTO: 11 % (ref 4–12)
NEUTROPHILS # BLD AUTO: 2.19 THOUSANDS/ΜL (ref 1.85–7.62)
NEUTS SEG NFR BLD AUTO: 45 % (ref 43–75)
NRBC BLD AUTO-RTO: 0 /100 WBCS
PLATELET # BLD AUTO: 238 THOUSANDS/UL (ref 149–390)
PMV BLD AUTO: 9.8 FL (ref 8.9–12.7)
POTASSIUM SERPL-SCNC: 3.8 MMOL/L (ref 3.5–5.3)
PROTHROMBIN TIME: 14.6 SECONDS (ref 11.6–14.5)
RBC # BLD AUTO: 5.25 MILLION/UL (ref 3.81–5.12)
RH BLD: POSITIVE
SODIUM SERPL-SCNC: 141 MMOL/L (ref 136–145)
SPECIMEN EXPIRATION DATE: NORMAL
WBC # BLD AUTO: 4.97 THOUSAND/UL (ref 4.31–10.16)

## 2021-07-03 PROCEDURE — 85610 PROTHROMBIN TIME: CPT | Performed by: PHYSICIAN ASSISTANT

## 2021-07-03 PROCEDURE — 86900 BLOOD TYPING SEROLOGIC ABO: CPT | Performed by: PHYSICIAN ASSISTANT

## 2021-07-03 PROCEDURE — 36415 COLL VENOUS BLD VENIPUNCTURE: CPT | Performed by: PHYSICIAN ASSISTANT

## 2021-07-03 PROCEDURE — 86850 RBC ANTIBODY SCREEN: CPT | Performed by: PHYSICIAN ASSISTANT

## 2021-07-03 PROCEDURE — 99284 EMERGENCY DEPT VISIT MOD MDM: CPT | Performed by: PHYSICIAN ASSISTANT

## 2021-07-03 PROCEDURE — 86901 BLOOD TYPING SEROLOGIC RH(D): CPT | Performed by: PHYSICIAN ASSISTANT

## 2021-07-03 PROCEDURE — 85025 COMPLETE CBC W/AUTO DIFF WBC: CPT | Performed by: PHYSICIAN ASSISTANT

## 2021-07-03 PROCEDURE — 80048 BASIC METABOLIC PNL TOTAL CA: CPT | Performed by: PHYSICIAN ASSISTANT

## 2021-07-03 PROCEDURE — 99284 EMERGENCY DEPT VISIT MOD MDM: CPT

## 2021-07-03 PROCEDURE — 81025 URINE PREGNANCY TEST: CPT | Performed by: PHYSICIAN ASSISTANT

## 2021-07-03 PROCEDURE — 93005 ELECTROCARDIOGRAM TRACING: CPT

## 2021-07-03 PROCEDURE — 85730 THROMBOPLASTIN TIME PARTIAL: CPT | Performed by: PHYSICIAN ASSISTANT

## 2021-07-03 NOTE — ED PROVIDER NOTES
History  Chief Complaint   Patient presents with    Vaginal Bleeding     Pt c/o vaginal bleeding for about 7 days, states she got her period twice in june, states she is here for hgb check  42 yo with vaginal bleeding  History of DUB and fibroid uterus  Currently awaiting to have hysterectomy performed for this exact reason  Has been going through about 4-5 pads/tampons a day for the past 7 days  Felt fatigued and run down in the past 3 days  PCP recommended checking her hgb  Mild abd cramping  No chest pain  No syncope  No pregnancy  Not on exogenous extrogen/progesterone  History provided by:  Patient   used: No    Vaginal Bleeding  Quality:  Dark red  Severity:  Moderate  Onset quality:  Gradual  Duration:  1 week  Timing:  Intermittent  Progression:  Unchanged  Chronicity:  Chronic  Menstrual history:  Irregular  Possible pregnancy: no    Context: spontaneously    Context: not after intercourse, not after urination, not at rest, not during intercourse, not during urination, not foreign body and not genital trauma    Relieved by:  Nothing  Worsened by:  Nothing  Ineffective treatments:  None tried  Associated symptoms: abdominal pain    Associated symptoms: no back pain, no dysuria and no fever        Prior to Admission Medications   Prescriptions Last Dose Informant Patient Reported? Taking?    Bacillus Coagulans-Inulin (PROBIOTIC) 1-250 BILLION-MG CAPS   Yes No   Sig: Take 1 capsule by mouth   Cholecalciferol (Vitamin D) 125 MCG (5000 UT) CAPS   No No   Sig: Take 1 capsule by mouth daily   omeprazole (PriLOSEC) 40 MG capsule   Yes No   Sig: Take 40 mg by mouth daily   sucralfate (CARAFATE) 1 g tablet   No No   Sig: Take 1 tablet (1 g total) by mouth 4 (four) times a day   Patient not taking: Reported on 6/4/2021      Facility-Administered Medications: None       Past Medical History:   Diagnosis Date    Anemia     Hypercholesterolemia     Sleep apnea     Vitamin deficiency Past Surgical History:   Procedure Laterality Date    ECTOPIC PREGNANCY SURGERY      EYE SURGERY      GASTRECTOMY SLEEVE LAPAROSCOPIC         Family History   Problem Relation Age of Onset    Brain cancer Mother     Cancer Father     Asthma Brother     Diabetes Maternal Grandmother     Cancer Maternal Grandmother     Hypertension Paternal Grandmother      I have reviewed and agree with the history as documented  E-Cigarette/Vaping    E-Cigarette Use Never User      E-Cigarette/Vaping Substances    Nicotine No     THC No     CBD No     Flavoring No     Other No     Unknown No      Social History     Tobacco Use    Smoking status: Never Smoker    Smokeless tobacco: Never Used   Vaping Use    Vaping Use: Never used   Substance Use Topics    Alcohol use: No    Drug use: No       Review of Systems   Constitutional: Negative for chills and fever  HENT: Negative for ear pain and sore throat  Eyes: Negative for pain and visual disturbance  Respiratory: Negative for cough and shortness of breath  Cardiovascular: Negative for chest pain and palpitations  Gastrointestinal: Positive for abdominal pain  Negative for vomiting  Genitourinary: Positive for vaginal bleeding  Negative for dysuria and hematuria  Musculoskeletal: Negative for arthralgias and back pain  Skin: Negative for color change and rash  Neurological: Negative for seizures and syncope  All other systems reviewed and are negative  Physical Exam  Physical Exam  Vitals and nursing note reviewed  Constitutional:       General: She is not in acute distress  Appearance: She is well-developed  HENT:      Head: Normocephalic and atraumatic  Eyes:      Conjunctiva/sclera: Conjunctivae normal    Cardiovascular:      Rate and Rhythm: Normal rate and regular rhythm  Heart sounds: No murmur heard  Pulmonary:      Effort: Pulmonary effort is normal  No respiratory distress        Breath sounds: Normal breath sounds  Abdominal:      Palpations: Abdomen is soft  Tenderness: There is no abdominal tenderness  Musculoskeletal:      Cervical back: Neck supple  Skin:     General: Skin is warm and dry  Neurological:      Mental Status: She is alert           Vital Signs  ED Triage Vitals [07/03/21 1540]   Temperature Pulse Respirations Blood Pressure SpO2   98 2 °F (36 8 °C) 56 18 168/74 100 %      Temp Source Heart Rate Source Patient Position - Orthostatic VS BP Location FiO2 (%)   Oral Monitor Sitting Left arm --      Pain Score       --           Vitals:    07/03/21 1540 07/03/21 1600   BP: 168/74 115/67   Pulse: 56 55   Patient Position - Orthostatic VS: Sitting Lying         Visual Acuity      ED Medications  Medications - No data to display    Diagnostic Studies  Results Reviewed     Procedure Component Value Units Date/Time    Basic metabolic panel [[id number]] Collected: 07/03/21 1602    Lab Status: Final result Specimen: Blood from Arm, Right Updated: 07/03/21 1636     Sodium 141 mmol/L      Potassium 3 8 mmol/L      Chloride 104 mmol/L      CO2 28 mmol/L      ANION GAP 9 mmol/L      BUN 12 mg/dL      Creatinine 0 62 mg/dL      Glucose 100 mg/dL      Calcium 8 4 mg/dL      eGFR 127 ml/min/1 73sq m     Narrative:      [place] guidelines for Chronic Kidney Disease (CKD):     Stage 1 with normal or high GFR (GFR > 90 mL/min/1 73 square meters)    Stage 2 Mild CKD (GFR = 60-89 mL/min/1 73 square meters)    Stage 3A Moderate CKD (GFR = 45-59 mL/min/1 73 square meters)    Stage 3B Moderate CKD (GFR = 30-44 mL/min/1 73 square meters)    Stage 4 Severe CKD (GFR = 15-29 mL/min/1 73 square meters)    Stage 5 End Stage CKD (GFR <15 mL/min/1 73 square meters)  Note: GFR calculation is accurate only with a steady state creatinine    Protime-INR [[id number]]  (Abnormal) Collected: 07/03/21 1602    Lab Status: Final result Specimen: Blood from Arm, Right Updated: 07/03/21 0556 Protime 14 6 seconds      INR 1 19    APTT [[id number]]  (Normal) Collected: 21 160    Lab Status: Final result Specimen: Blood from Arm, Right Updated: 21 1627     PTT 33 seconds     CBC and differential [[id number]]  (Abnormal) Collected: 21    Lab Status: Final result Specimen: Blood from Arm, Right Updated: 21 1615     WBC 4 97 Thousand/uL      RBC 5 25 Million/uL      Hemoglobin 11 8 g/dL      Hematocrit 40 1 %      MCV 76 fL      MCH 22 5 pg      MCHC 29 4 g/dL      RDW 26 9 %      MPV 9 8 fL      Platelets 963 Thousands/uL      nRBC 0 /100 WBCs      Neutrophils Relative 45 %      Immat GRANS % 0 %      Lymphocytes Relative 41 %      Monocytes Relative 11 %      Eosinophils Relative 3 %      Basophils Relative 0 %      Neutrophils Absolute 2 19 Thousands/µL      Immature Grans Absolute 0 01 Thousand/uL      Lymphocytes Absolute 2 04 Thousands/µL      Monocytes Absolute 0 55 Thousand/µL      Eosinophils Absolute 0 16 Thousand/µL      Basophils Absolute 0 02 Thousands/µL     POCT pregnancy, urine [[id number]]  (Normal) Resulted: 21    Lab Status: Final result Updated: 21     EXT PREG TEST UR (Ref: Negative) negative     Control valid                 No orders to display              Procedures  Procedures         ED Course                             SBIRT 20yo+      Most Recent Value   SBIRT (24 yo +)   In order to provide better care to our patients, we are screening all of our patients for alcohol and drug use  Would it be okay to ask you these screening questions?   No Filed at: 2021 1559                    MDM  Number of Diagnoses or Management Options  Dysfunctional uterine bleeding  Diagnosis management comments: DDx including but not limited to: ectopic pregnancy, threatened , missed , incomplete , anemia, coagulopathy, DUB, tumor, retained products of conception, PCOS; doubt ovarian torsion or ruptured ovarian cyst  Plan: preg  Hgb  Dispo pending  Amount and/or Complexity of Data Reviewed  Clinical lab tests: ordered and reviewed    Risk of Complications, Morbidity, and/or Mortality  Presenting problems: moderate  Management options: low  General comments: 42 yo with DUB  Hgb stable  Vitals normal  Suspect chronic anemia in setting of DUB  Transfusion not indicated  Recommended OBGYN f/u  Return parameters provided  Pt understands and agrees with plan  Patient Progress  Patient progress: stable      Disposition  Final diagnoses:   Dysfunctional uterine bleeding     Time reflects when diagnosis was documented in both MDM as applicable and the Disposition within this note     Time User Action Codes Description Comment    7/3/2021  4:49 PM Dianne Arauz [N93 8] Dysfunctional uterine bleeding       ED Disposition     ED Disposition Condition Date/Time Comment    Discharge Stable Sat Jul 3, 2021  4:49 PM Hoa Henley discharge to home/self care              Follow-up Information     Follow up With Specialties Details Why Contact Info Additional [address]  Obstetrics and Gynecology Call   [address] [place] [id number] [address] [facility] Gynecology [address], [place], [address] [facility]   [phone]          Discharge Medication List as of 7/3/2021  4:49 PM      CONTINUE these medications which have NOT CHANGED    Details   Bacillus Coagulans-Inulin (PROBIOTIC) 1-250 BILLION-MG CAPS Take 1 capsule by mouth, Historical Med      Cholecalciferol (Vitamin D) 125 MCG (5000 UT) CAPS Take 1 capsule by mouth daily, Starting Sun 5/9/2021, Until Sat 8/7/2021, Normal      omeprazole (PriLOSEC) 40 MG capsule Take 40 mg by mouth daily, Historical Med      sucralfate (CARAFATE) 1 g tablet Take 1 tablet (1 g total) by mouth 4 (four) times a day, Starting Fri 4/30/2021, Print           No discharge procedures on file     PDMP Review     None          ED Provider  Electronically Signed by           Conchis Viera PA-C  07/03/21 0759

## 2021-07-03 NOTE — Clinical Note
Kendell Foot was seen and treated in our emergency department on 7/3/2021  Diagnosis:     Martínez Late  may return to work on return date  She may return on this date: 07/05/2021         If you have any questions or concerns, please don't hesitate to call        Myrtle Peterson PA-C    ______________________________           _______________          _______________  Hospital Representative                              Date                                Time

## 2021-07-07 ENCOUNTER — OFFICE VISIT (OUTPATIENT)
Dept: FAMILY MEDICINE CLINIC | Facility: CLINIC | Age: 46
End: 2021-07-07
Payer: COMMERCIAL

## 2021-07-07 VITALS
TEMPERATURE: 97.9 F | OXYGEN SATURATION: 98 % | WEIGHT: 234.4 LBS | BODY MASS INDEX: 37.67 KG/M2 | RESPIRATION RATE: 16 BRPM | HEIGHT: 66 IN | SYSTOLIC BLOOD PRESSURE: 118 MMHG | HEART RATE: 60 BPM | DIASTOLIC BLOOD PRESSURE: 68 MMHG

## 2021-07-07 DIAGNOSIS — G89.29 CHRONIC BILATERAL LOW BACK PAIN WITHOUT SCIATICA: ICD-10-CM

## 2021-07-07 DIAGNOSIS — K21.9 GASTROESOPHAGEAL REFLUX DISEASE, UNSPECIFIED WHETHER ESOPHAGITIS PRESENT: Primary | ICD-10-CM

## 2021-07-07 DIAGNOSIS — D50.0 IRON DEFICIENCY ANEMIA DUE TO CHRONIC BLOOD LOSS: ICD-10-CM

## 2021-07-07 DIAGNOSIS — M54.50 CHRONIC BILATERAL LOW BACK PAIN WITHOUT SCIATICA: ICD-10-CM

## 2021-07-07 DIAGNOSIS — R00.1 BRADYCARDIA: ICD-10-CM

## 2021-07-07 LAB
SL AMB  POCT GLUCOSE, UA: NEGATIVE
SL AMB LEUKOCYTE ESTERASE,UA: NEGATIVE
SL AMB POCT BILIRUBIN,UA: POSITIVE
SL AMB POCT BLOOD,UA: NEGATIVE
SL AMB POCT CLARITY,UA: CLEAR
SL AMB POCT COLOR,UA: YELLOW
SL AMB POCT KETONES,UA: NORMAL
SL AMB POCT NITRITE,UA: NEGATIVE
SL AMB POCT PH,UA: 7
SL AMB POCT SPECIFIC GRAVITY,UA: 1
SL AMB POCT URINE PROTEIN: NORMAL
SL AMB POCT UROBILINOGEN: 0.02

## 2021-07-07 PROCEDURE — 99214 OFFICE O/P EST MOD 30 MIN: CPT | Performed by: NURSE PRACTITIONER

## 2021-07-07 PROCEDURE — 81002 URINALYSIS NONAUTO W/O SCOPE: CPT | Performed by: NURSE PRACTITIONER

## 2021-07-07 RX ORDER — NAPROXEN 500 MG/1
500 TABLET ORAL 2 TIMES DAILY WITH MEALS
Qty: 20 TABLET | Refills: 1 | Status: SHIPPED | OUTPATIENT
Start: 2021-07-07 | End: 2021-09-21

## 2021-07-07 RX ORDER — CYCLOBENZAPRINE HCL 5 MG
5 TABLET ORAL 3 TIMES DAILY PRN
Qty: 15 TABLET | Refills: 1 | Status: SHIPPED | OUTPATIENT
Start: 2021-07-07 | End: 2021-09-21

## 2021-07-07 RX ORDER — OMEPRAZOLE 40 MG/1
40 CAPSULE, DELAYED RELEASE ORAL DAILY
Qty: 30 CAPSULE | Refills: 0 | Status: SHIPPED | OUTPATIENT
Start: 2021-07-07 | End: 2022-04-25

## 2021-07-07 NOTE — PROGRESS NOTES
BMI Counseling: Body mass index is 37 83 kg/m²  The BMI is above normal  Nutrition recommendations include decreasing portion sizes, encouraging healthy choices of fruits and vegetables, decreasing fast food intake, consuming healthier snacks, limiting drinks that contain sugar, moderation in carbohydrate intake, increasing intake of lean protein, reducing intake of saturated and trans fat and reducing intake of cholesterol  Exercise recommendations include vigorous physical activity 75 minutes/week, exercising 3-5 times per week and strength training exercises  No pharmacotherapy was ordered  Patient referred to PCP due to patient being overweight  BMI 37 61 post bariatric surgery       Depression Screening and Follow-up Plan: Patient's depression screening was positive with a PHQ-2 score of 0  Clincally patient does not have depression  No treatment is required       Assessment/Plan:    Presents in office for follow up   Anemia - under care of Hematology - H/H improved   She has had episodes of fatigue and losing energy- referred to Cardiology - Holter monitor ordered - HR noted to be low and I am worried that she may have pauses- she does have family history of pacemaker  GERD has been an issues for her --> will drake further work and she need to follow up with GI due to anemia and GERD issues   She has history of bariatric surgery   At the same time she has had low back pain issues   She has been using muscle relaxer - recommended to keep away and we will try physical therapy for low back pain issues       Will follow up in 4-6 weeks     Problem List Items Addressed This Visit        Other    Iron deficiency anemia    Relevant Orders    Thyroid Antibodies Panel    NOY Screen w/ Reflex to Titer/Pattern    Anti-microsomal antibody    Vitamin D 25 hydroxy    CBC and differential    Sedimentation rate, automated    Comprehensive metabolic panel    Iron Panel (Includes Ferritin, Iron Sat%, Iron, and TIBC)    Ambulatory referral to Gastroenterology      Other Visit Diagnoses     Gastroesophageal reflux disease, unspecified whether esophagitis present    -  Primary    Relevant Medications    omeprazole (PriLOSEC) 40 MG capsule    Other Relevant Orders    Thyroid Antibodies Panel    NOY Screen w/ Reflex to Titer/Pattern    Anti-microsomal antibody    Vitamin D 25 hydroxy    CBC and differential    Sedimentation rate, automated    Comprehensive metabolic panel    Iron Panel (Includes Ferritin, Iron Sat%, Iron, and TIBC)    Ambulatory referral to Gastroenterology    Chronic bilateral low back pain without sciatica        Relevant Orders    Ambulatory referral to Physical Therapy    Thyroid Antibodies Panel    NOY Screen w/ Reflex to Titer/Pattern    Anti-microsomal antibody    Vitamin D 25 hydroxy    CBC and differential    Sedimentation rate, automated    Comprehensive metabolic panel    Iron Panel (Includes Ferritin, Iron Sat%, Iron, and TIBC)    Ambulatory referral to Gastroenterology    Bradycardia        Relevant Orders    Ambulatory referral to Cardiology    Holter monitor - 48 hour            Subjective:      Patient ID: Anjelica Hernandez is a 41 y o  female      Presents in office for follow up   Anemia - under care of Hematology - H/H improved-> she has had multiple ER visit   She has had episodes of fatigue and losing energy- referred to Cardiology - Holter monitor ordered - HR noted to be low and I am worried that she may have pauses- she does have family history of pacemaker  GERD has been an issues for her --> will drake further work and she need to follow up with GI due to anemia and GERD issues   She has history of bariatric surgery   At the same time she has had low back pain issues   She has been using muscle relaxer - recommended to keep away and we will try physical therapy for low back pain issues         The following portions of the patient's history were reviewed and updated as appropriate:   Past Medical History:  She has a past medical history of Anemia, Hypercholesterolemia, Sleep apnea, and Vitamin deficiency  ,  _______________________________________________________________________  Medical Problems:  does not have any pertinent problems on file ,  _______________________________________________________________________  Past Surgical History:   has a past surgical history that includes Ectopic pregnancy surgery; Eye surgery; and GASTRECTOMY SLEEVE LAPAROSCOPIC ,  _______________________________________________________________________  Family History:  family history includes Asthma in her brother; Brain cancer in her mother; Cancer in her father and maternal grandmother; Diabetes in her maternal grandmother; Hypertension in her paternal grandmother ,  _______________________________________________________________________  Social History:   reports that she has never smoked  She has never used smokeless tobacco  She reports that she does not drink alcohol and does not use drugs  ,  _______________________________________________________________________  Allergies:  is allergic to penicillin g and pollen extract     _______________________________________________________________________  Current Outpatient Medications   Medication Sig Dispense Refill    Bacillus Coagulans-Inulin (PROBIOTIC) 1-250 BILLION-MG CAPS Take 1 capsule by mouth      Cholecalciferol (Vitamin D) 125 MCG (5000 UT) CAPS Take 1 capsule by mouth daily 90 capsule 1    omeprazole (PriLOSEC) 40 MG capsule Take 1 capsule (40 mg total) by mouth daily 30 capsule 0    sucralfate (CARAFATE) 1 g tablet Take 1 tablet (1 g total) by mouth 4 (four) times a day (Patient not taking: Reported on 6/4/2021) 60 tablet 0     No current facility-administered medications for this visit      _______________________________________________________________________  Review of Systems   Constitutional: Positive for fatigue     HENT: Negative for congestion, sinus pressure and sore throat  Eyes: Negative  Respiratory: Negative for cough and shortness of breath  Intermittent dry cough    Cardiovascular: Negative for chest pain, palpitations and leg swelling  Gastrointestinal: Negative for abdominal distention and abdominal pain  Endocrine: Positive for cold intolerance  Genitourinary: Positive for vaginal bleeding (irregular heavy periods )  Negative for dysuria, flank pain and frequency  Musculoskeletal: Positive for back pain (low back pain  for few weeks now )  Low back pain    Skin: Negative  Allergic/Immunologic: Positive for environmental allergies  Neurological: Positive for weakness  Negative for dizziness, light-headedness and headaches  Hematological: Negative  Psychiatric/Behavioral: Positive for sleep disturbance  Negative for suicidal ideas  The patient is nervous/anxious  Objective:  Vitals:    07/07/21 1350   BP: 118/68   BP Location: Left arm   Patient Position: Sitting   Cuff Size: Standard   Pulse: 60   Resp: 16   Temp: 97 9 °F (36 6 °C)   TempSrc: Temporal   SpO2: 98%   Weight: 106 kg (234 lb 6 4 oz)   Height: 5' 6" (1 676 m)     Body mass index is 37 83 kg/m²  Physical Exam  Vitals and nursing note reviewed  Constitutional:       Appearance: Normal appearance  Comments: BMI 37 61   HENT:      Right Ear: Tympanic membrane normal       Left Ear: Tympanic membrane normal       Mouth/Throat:      Mouth: Mucous membranes are moist    Eyes:      Pupils: Pupils are equal, round, and reactive to light  Cardiovascular:      Rate and Rhythm: Regular rhythm  Bradycardia present  Heart sounds: Normal heart sounds  Pulmonary:      Effort: Pulmonary effort is normal       Breath sounds: Normal breath sounds  Abdominal:      Palpations: Abdomen is soft  Musculoskeletal:         General: Normal range of motion  Cervical back: Normal range of motion  Skin:     General: Skin is warm     Neurological:      Mental Status: She is alert and oriented to person, place, and time     Psychiatric:         Mood and Affect: Mood normal          Behavior: Behavior normal

## 2021-07-07 NOTE — LETTER
July 7, 2021     Patient: Charmayne Lien   YOB: 1975   Date of Visit: 7/7/2021       To Whom it May Concern:    Charmayne Lien is under my professional care  She was seen in my office on 7/7/2021  She may return to work on 07/09/2021  If you have any questions or concerns, please don't hesitate to call           Sincerely,          DAVE Lopez        CC: No Recipients

## 2021-07-08 ENCOUNTER — HOSPITAL ENCOUNTER (OUTPATIENT)
Dept: ULTRASOUND IMAGING | Facility: HOSPITAL | Age: 46
Discharge: HOME | End: 2021-07-08
Payer: COMMERCIAL

## 2021-07-08 DIAGNOSIS — N92.1 MENORRHAGIA WITH IRREGULAR CYCLE: ICD-10-CM

## 2021-07-08 DIAGNOSIS — D25.1 INTRAMURAL LEIOMYOMA OF UTERUS: ICD-10-CM

## 2021-07-08 PROCEDURE — 76830 TRANSVAGINAL US NON-OB: CPT

## 2021-07-08 PROCEDURE — 76856 US EXAM PELVIC COMPLETE: CPT

## 2021-07-12 ENCOUNTER — HOSPITAL ENCOUNTER (OUTPATIENT)
Dept: INFUSION CENTER | Facility: CLINIC | Age: 46
Discharge: HOME | End: 2021-07-12
Payer: COMMERCIAL

## 2021-07-12 ENCOUNTER — HOSPITAL ENCOUNTER (OUTPATIENT)
Dept: NON INVASIVE DIAGNOSTICS | Facility: CLINIC | Age: 46
Discharge: HOME | End: 2021-07-12
Payer: COMMERCIAL

## 2021-07-12 VITALS
SYSTOLIC BLOOD PRESSURE: 126 MMHG | HEART RATE: 56 BPM | DIASTOLIC BLOOD PRESSURE: 64 MMHG | TEMPERATURE: 97.3 F | RESPIRATION RATE: 18 BRPM

## 2021-07-12 DIAGNOSIS — D50.9 IRON DEFICIENCY ANEMIA, UNSPECIFIED IRON DEFICIENCY ANEMIA TYPE: Primary | ICD-10-CM

## 2021-07-12 DIAGNOSIS — R00.1 BRADYCARDIA: ICD-10-CM

## 2021-07-12 PROCEDURE — 96365 THER/PROPH/DIAG IV INF INIT: CPT

## 2021-07-12 PROCEDURE — 93226 XTRNL ECG REC<48 HR SCAN A/R: CPT

## 2021-07-12 PROCEDURE — 93225 XTRNL ECG REC<48 HRS REC: CPT

## 2021-07-12 RX ORDER — SODIUM CHLORIDE 9 MG/ML
20 INJECTION, SOLUTION INTRAVENOUS ONCE
Status: COMPLETED | OUTPATIENT
Start: 2021-07-12 | End: 2021-07-12

## 2021-07-12 RX ORDER — SODIUM CHLORIDE 9 MG/ML
20 INJECTION, SOLUTION INTRAVENOUS ONCE
Status: CANCELLED | OUTPATIENT
Start: 2021-07-19

## 2021-07-12 RX ADMIN — IRON SUCROSE 300 MG: 20 INJECTION, SOLUTION INTRAVENOUS at 13:55

## 2021-07-12 RX ADMIN — SODIUM CHLORIDE 20 ML/HR: 9 INJECTION, SOLUTION INTRAVENOUS at 13:55

## 2021-07-12 NOTE — PROGRESS NOTES
Pt here for venofer, offering no complaints  Right arm IV placed with positive blood return noted  Tolerated infusion without incident  PIV removed  AVS declined  Walked out in stable condition

## 2021-07-13 ENCOUNTER — TELEPHONE (OUTPATIENT)
Dept: FAMILY MEDICINE CLINIC | Facility: CLINIC | Age: 46
End: 2021-07-13

## 2021-07-13 DIAGNOSIS — T78.40XA ALLERGIC REACTION, INITIAL ENCOUNTER: ICD-10-CM

## 2021-07-13 DIAGNOSIS — R21 RASH: Primary | ICD-10-CM

## 2021-07-13 RX ORDER — HYDROCORTISONE 25 MG/ML
LOTION TOPICAL 2 TIMES DAILY
Qty: 60 ML | Refills: 1 | Status: SHIPPED | OUTPATIENT
Start: 2021-07-13 | End: 2021-07-23

## 2021-07-13 NOTE — TELEPHONE ENCOUNTER
She wore the monitor for less then 24 hours   She developed rash around the stickers     Returned to lab     We will wait result of that and will decide further     I am sending hydrocortisone  And Bactroban ointment

## 2021-07-14 PROCEDURE — 93227 XTRNL ECG REC<48 HR R&I: CPT | Performed by: INTERNAL MEDICINE

## 2021-07-15 ENCOUNTER — TELEPHONE (OUTPATIENT)
Dept: CARDIOLOGY CLINIC | Facility: CLINIC | Age: 46
End: 2021-07-15

## 2021-07-15 ENCOUNTER — CONSULT (OUTPATIENT)
Dept: CARDIOLOGY CLINIC | Facility: CLINIC | Age: 46
End: 2021-07-15
Payer: COMMERCIAL

## 2021-07-15 VITALS
HEIGHT: 66 IN | OXYGEN SATURATION: 98 % | HEART RATE: 64 BPM | WEIGHT: 235 LBS | SYSTOLIC BLOOD PRESSURE: 138 MMHG | DIASTOLIC BLOOD PRESSURE: 90 MMHG | BODY MASS INDEX: 37.77 KG/M2

## 2021-07-15 DIAGNOSIS — E78.49 OTHER HYPERLIPIDEMIA: Primary | ICD-10-CM

## 2021-07-15 DIAGNOSIS — R00.1 BRADYCARDIA: ICD-10-CM

## 2021-07-15 PROCEDURE — 99204 OFFICE O/P NEW MOD 45 MIN: CPT | Performed by: INTERNAL MEDICINE

## 2021-07-15 PROCEDURE — 93000 ELECTROCARDIOGRAM COMPLETE: CPT | Performed by: INTERNAL MEDICINE

## 2021-07-15 PROCEDURE — 3008F BODY MASS INDEX DOCD: CPT | Performed by: INTERNAL MEDICINE

## 2021-07-15 NOTE — PROGRESS NOTES
OP Consultation - Cardiology    Chelsea Dense 41 y o  female MRN: [id number]    Physician Requesting Consult: DAVE Gregg    Reason for Consult / Chief Complaint: Bradycardia    HPI:     41year old woman with past medical history of iron deficiency anemia, uterine fibroids who presents for evaluation of bradycardia  She was in the ER on 7/3/2021 for uterine bleeding/anemia  She noted that her HR would drop on the monitor to 47 bpm    She was seen by PCP  She had a holter monitor placed  She has felt heavy headed yesterday  Better today  Denies presyncope or syncope  Denies falls  Denies chest pain, or shortness of breath  Holter Monitor 7/12/2021:  1  The patient had predominantly sinus rhythm  2  Heart rate varied from 45  bpm to 132 bpm   The patients average heart rate was 69 bpm     3  The patient had a holter monitor tracing done for 13 hours and 30 minutes  4  The patient had 1 PVC and 3 ventricular triplets  5  The patient had 12 PACs, 8 late beats and 1 brief episode of likely 2:1 AV block at 5 20PM   Patient was asymptomatic  6  No cardiovascular symptoms reported by the patient      FAMILY HISTORY:  Family History   Problem Relation Age of Onset    Brain cancer Mother     Cancer Father     Asthma Brother     Diabetes Maternal Grandmother     Cancer Maternal Grandmother     Hypertension Paternal Grandmother         MEDS & ALLERGIES:  Allergies   Allergen Reactions    Penicillin G Other (See Comments)    Pollen Extract Sneezing         Current Outpatient Medications:     Bacillus Coagulans-Inulin (PROBIOTIC) 1-250 BILLION-MG CAPS, Take 1 capsule by mouth, Disp: , Rfl:     Cholecalciferol (Vitamin D) 125 MCG (5000 UT) CAPS, Take 1 capsule by mouth daily, Disp: 90 capsule, Rfl: 1    cyclobenzaprine (FLEXERIL) 5 mg tablet, Take 1 tablet (5 mg total) by mouth 3 (three) times a day as needed for muscle spasms for up to 5 days, Disp: 15 tablet, Rfl: 1    hydrocortisone 2 5 % lotion, Apply topically 2 (two) times a day for 10 days, Disp: 60 mL, Rfl: 1    mupirocin (BACTROBAN) 2 % ointment, Apply topically 3 (three) times a day, Disp: 22 g, Rfl: 0    naproxen (NAPROSYN) 500 mg tablet, Take 1 tablet (500 mg total) by mouth 2 (two) times a day with meals for 10 days, Disp: 20 tablet, Rfl: 1    omeprazole (PriLOSEC) 40 MG capsule, Take 1 capsule (40 mg total) by mouth daily, Disp: 30 capsule, Rfl: 0    sucralfate (CARAFATE) 1 g tablet, Take 1 tablet (1 g total) by mouth 4 (four) times a day, Disp: 60 tablet, Rfl: 0    Past Medical History:   Diagnosis Date    Anemia     Hypercholesterolemia     Sleep apnea     Vitamin deficiency          REVIEW OF SYSTEMS:    Constitutional: Denies fever or chills  Eyes: Denies eye discharge or change in visual acuity   HENT: Denies sneezing, nasal congestion or sore throat   Respiratory: Denies cough, expectoration or shortness of breath   Cardiovascular: Denies chest pain, palpitations or lower extremity swelling   GI: Denies abdominal pain, nausea, vomiting, bloody stools or diarrhea   : Denies dysuria, frequency, difficulty in micturition or nocturia  Musculoskeletal: Denies back pain or joint pain   Neurologic: Denies lightheadedness, syncope, headache, focal weakness or sensory changes   Endocrine: Denies polyuria or polydipsia   Psychiatric: Denies depression, anxiety or panic       PHYSICAL EXAM:  Vitals:   Vitals:    07/15/21 1025   BP: 138/90   BP Location: Left arm   Patient Position: Sitting   Cuff Size: Standard   Pulse: 64   SpO2: 98%   Weight: 107 kg (235 lb)   Height: 5' 6" (1 676 m)        General: Patient is not in acute distress  Awake, alert, responding to commands  Head: Normocephalic  Atraumatic  HEENT: Both pupils normal sized, round and reactive to light  Neck: JVP not elevated  Respiratory: Bilateral bronchovascular breath sounds with no crackles or rhonchi  Cardiovascular: RRR   S1 and S2 normal  No murmur, rub or gallop  GI: Abdomen soft, nontender  No guarding or rigidity  Neurologic: Patient is awake, alert, responding to commands  Well-oriented to time, place and person  Moving all extremities  Extremities: No clubbing, cyanosis or edema  Integument: No skin rashes or ulceration    LABORATORY RESULTS:  Hgb 11 8 on 7/3/2021  Creatinine 0 62    Lipid Profile:   No results found for: CHOL  Lab Results   Component Value Date    HDL 52 04/29/2021     Lab Results   Component Value Date    LDLCALC 116 (H) 04/29/2021     Lab Results   Component Value Date    TRIG 124 04/29/2021       EKG reviewed personally: Sinus bradycardia, HR 54 bpm    Assessment and Plan:    Tony Escobar was seen today for consult and dizziness  Diagnoses and all orders for this visit:    Other hyperlipidemia    Bradycardia  -     Ambulatory referral to Cardiology      Patient presents for evaluation of bradycardia  This was noted on telemetry while she was in the ER for DUB/anemia  Lowest HR was 47 bpm   She had a holter monitor placed recently  Unfortunately had a reaction to either the stick on/or cleaning solution  I donot have the rhythm strips available for review  Report mentions brief episode of ? possible 2:1 AV block - unclear  No specific symptoms  She appears to have symptoms related to aneia  Will get rhythm strips for the holter     Will check a 14 day cardiac event monitor  TSH in 6/4/2021 was normal    Return to clinic in 1 month or PRNEETA Anderson MD  7/15/2021,10:47 AM

## 2021-07-16 LAB
ATRIAL RATE: 106 BPM
P AXIS: 72 DEGREES
PR INTERVAL: 124 MS
QRS AXIS: -50 DEGREES
QRSD INTERVAL: 92 MS
QT INTERVAL: 318 MS
QTC INTERVAL: 422 MS
T WAVE AXIS: 64 DEGREES
VENTRICULAR RATE: 106 BPM

## 2021-07-16 PROCEDURE — 93010 ELECTROCARDIOGRAM REPORT: CPT | Performed by: INTERNAL MEDICINE

## 2021-07-19 ENCOUNTER — HOSPITAL ENCOUNTER (EMERGENCY)
Facility: HOSPITAL | Age: 46
Discharge: HOME | End: 2021-07-19
Attending: EMERGENCY MEDICINE | Admitting: EMERGENCY MEDICINE
Payer: COMMERCIAL

## 2021-07-19 ENCOUNTER — HOSPITAL ENCOUNTER (OUTPATIENT)
Dept: INFUSION CENTER | Facility: CLINIC | Age: 46
Discharge: HOME | End: 2021-07-19
Payer: COMMERCIAL

## 2021-07-19 VITALS
DIASTOLIC BLOOD PRESSURE: 76 MMHG | SYSTOLIC BLOOD PRESSURE: 144 MMHG | RESPIRATION RATE: 16 BRPM | TEMPERATURE: 98 F | HEART RATE: 52 BPM

## 2021-07-19 VITALS
RESPIRATION RATE: 18 BRPM | HEART RATE: 52 BPM | TEMPERATURE: 98.3 F | OXYGEN SATURATION: 99 % | SYSTOLIC BLOOD PRESSURE: 128 MMHG | DIASTOLIC BLOOD PRESSURE: 79 MMHG

## 2021-07-19 DIAGNOSIS — D50.9 IRON DEFICIENCY ANEMIA, UNSPECIFIED IRON DEFICIENCY ANEMIA TYPE: Primary | ICD-10-CM

## 2021-07-19 DIAGNOSIS — R42 LIGHTHEADEDNESS: Primary | ICD-10-CM

## 2021-07-19 LAB
ALBUMIN SERPL BCP-MCNC: 3.5 G/DL (ref 3.5–5)
ALP SERPL-CCNC: 51 U/L (ref 46–116)
ALT SERPL W P-5'-P-CCNC: 15 U/L (ref 12–78)
ANION GAP SERPL CALCULATED.3IONS-SCNC: 8 MMOL/L (ref 4–13)
AST SERPL W P-5'-P-CCNC: 11 U/L (ref 5–45)
BASOPHILS # BLD AUTO: 0.03 THOUSANDS/ΜL (ref 0–0.1)
BASOPHILS NFR BLD AUTO: 1 % (ref 0–1)
BILIRUB SERPL-MCNC: 0.27 MG/DL (ref 0.2–1)
BUN SERPL-MCNC: 11 MG/DL (ref 5–25)
CALCIUM SERPL-MCNC: 8 MG/DL (ref 8.3–10.1)
CHLORIDE SERPL-SCNC: 102 MMOL/L (ref 100–108)
CO2 SERPL-SCNC: 29 MMOL/L (ref 21–32)
CREAT SERPL-MCNC: 0.77 MG/DL (ref 0.6–1.3)
EOSINOPHIL # BLD AUTO: 0.18 THOUSAND/ΜL (ref 0–0.61)
EOSINOPHIL NFR BLD AUTO: 3 % (ref 0–6)
ERYTHROCYTE [DISTWIDTH] IN BLOOD BY AUTOMATED COUNT: 23.9 % (ref 11.6–15.1)
GFR SERPL CREATININE-BSD FRML MDRD: 109 ML/MIN/1.73SQ M
GLUCOSE SERPL-MCNC: 95 MG/DL (ref 65–140)
HCT VFR BLD AUTO: 37.2 % (ref 34.8–46.1)
HGB BLD-MCNC: 11.2 G/DL (ref 11.5–15.4)
IMM GRANULOCYTES # BLD AUTO: 0.01 THOUSAND/UL (ref 0–0.2)
IMM GRANULOCYTES NFR BLD AUTO: 0 % (ref 0–2)
LYMPHOCYTES # BLD AUTO: 2.13 THOUSANDS/ΜL (ref 0.6–4.47)
LYMPHOCYTES NFR BLD AUTO: 38 % (ref 14–44)
MCH RBC QN AUTO: 23.6 PG (ref 26.8–34.3)
MCHC RBC AUTO-ENTMCNC: 30.1 G/DL (ref 31.4–37.4)
MCV RBC AUTO: 79 FL (ref 82–98)
MONOCYTES # BLD AUTO: 0.75 THOUSAND/ΜL (ref 0.17–1.22)
MONOCYTES NFR BLD AUTO: 13 % (ref 4–12)
NEUTROPHILS # BLD AUTO: 2.51 THOUSANDS/ΜL (ref 1.85–7.62)
NEUTS SEG NFR BLD AUTO: 45 % (ref 43–75)
NRBC BLD AUTO-RTO: 0 /100 WBCS
PLATELET # BLD AUTO: 228 THOUSANDS/UL (ref 149–390)
PMV BLD AUTO: 9.5 FL (ref 8.9–12.7)
POTASSIUM SERPL-SCNC: 3.8 MMOL/L (ref 3.5–5.3)
PROT SERPL-MCNC: 7.1 G/DL (ref 6.4–8.2)
RBC # BLD AUTO: 4.74 MILLION/UL (ref 3.81–5.12)
SODIUM SERPL-SCNC: 139 MMOL/L (ref 136–145)
TROPONIN I SERPL-MCNC: <0.02 NG/ML
WBC # BLD AUTO: 5.61 THOUSAND/UL (ref 4.31–10.16)

## 2021-07-19 PROCEDURE — 36415 COLL VENOUS BLD VENIPUNCTURE: CPT

## 2021-07-19 PROCEDURE — 96366 THER/PROPH/DIAG IV INF ADDON: CPT

## 2021-07-19 PROCEDURE — 96365 THER/PROPH/DIAG IV INF INIT: CPT

## 2021-07-19 PROCEDURE — 99285 EMERGENCY DEPT VISIT HI MDM: CPT | Performed by: EMERGENCY MEDICINE

## 2021-07-19 PROCEDURE — 93005 ELECTROCARDIOGRAM TRACING: CPT

## 2021-07-19 PROCEDURE — 80053 COMPREHEN METABOLIC PANEL: CPT | Performed by: EMERGENCY MEDICINE

## 2021-07-19 PROCEDURE — 84484 ASSAY OF TROPONIN QUANT: CPT | Performed by: EMERGENCY MEDICINE

## 2021-07-19 PROCEDURE — 99284 EMERGENCY DEPT VISIT MOD MDM: CPT

## 2021-07-19 PROCEDURE — 85025 COMPLETE CBC W/AUTO DIFF WBC: CPT | Performed by: EMERGENCY MEDICINE

## 2021-07-19 RX ORDER — SODIUM CHLORIDE 9 MG/ML
20 INJECTION, SOLUTION INTRAVENOUS ONCE
Status: CANCELLED | OUTPATIENT
Start: 2021-07-19

## 2021-07-19 RX ORDER — SODIUM CHLORIDE 9 MG/ML
20 INJECTION, SOLUTION INTRAVENOUS ONCE
Status: COMPLETED | OUTPATIENT
Start: 2021-07-19 | End: 2021-07-19

## 2021-07-19 RX ADMIN — SODIUM CHLORIDE 20 ML/HR: 0.9 INJECTION, SOLUTION INTRAVENOUS at 10:34

## 2021-07-19 RX ADMIN — IRON SUCROSE 300 MG: 20 INJECTION, SOLUTION INTRAVENOUS at 10:35

## 2021-07-19 NOTE — Clinical Note
Roshni Chai was seen and treated in our emergency department on 7/19/2021  Diagnosis:     Cleopatra Joaquin  may return to work on return date  She may return on this date: 07/21/2021         If you have any questions or concerns, please don't hesitate to call        Karel Morgan RN    ______________________________           _______________          _______________  Hospital Representative                              Date                                Time

## 2021-07-19 NOTE — PROGRESS NOTES
Pt offers no complaints  Tolerated venofer infusion well without incident  Discharged in stable condition and no further infusion appointments scheduled at this time  AVS provided

## 2021-07-20 ENCOUNTER — TELEPHONE (OUTPATIENT)
Dept: CARDIOLOGY CLINIC | Facility: CLINIC | Age: 46
End: 2021-07-20

## 2021-07-20 NOTE — TELEPHONE ENCOUNTER
Pt called & stated that she was in the hospital yesterday & was told to contact office to discuss her most recent EKG with Dr. Vazquez. Pt is requesting a call back.

## 2021-07-22 ENCOUNTER — OFFICE VISIT (OUTPATIENT)
Dept: OBGYN CLINIC | Facility: CLINIC | Age: 46
End: 2021-07-22
Payer: COMMERCIAL

## 2021-07-22 VITALS
HEIGHT: 66 IN | BODY MASS INDEX: 37.45 KG/M2 | SYSTOLIC BLOOD PRESSURE: 120 MMHG | DIASTOLIC BLOOD PRESSURE: 80 MMHG | WEIGHT: 233 LBS

## 2021-07-22 DIAGNOSIS — N92.0 MENORRHAGIA WITH REGULAR CYCLE: Primary | ICD-10-CM

## 2021-07-22 DIAGNOSIS — D25.1 INTRAMURAL LEIOMYOMA OF UTERUS: ICD-10-CM

## 2021-07-22 PROCEDURE — 1036F TOBACCO NON-USER: CPT | Performed by: OBSTETRICS & GYNECOLOGY

## 2021-07-22 PROCEDURE — 3008F BODY MASS INDEX DOCD: CPT | Performed by: OBSTETRICS & GYNECOLOGY

## 2021-07-22 PROCEDURE — 99213 OFFICE O/P EST LOW 20 MIN: CPT | Performed by: OBSTETRICS & GYNECOLOGY

## 2021-07-22 NOTE — PROGRESS NOTES
The patient is here to discuss treatment  She had a pelvic U/S on 7/8/21  The patient has no new concerns  The patient did have her period twice in 6/2021

## 2021-07-22 NOTE — PROGRESS NOTES
Patient returns to the office to discuss scheduling hysterectomy  Patient had her iron infusion this week with a hemoglobin 11 3  Patient read information on hysterectomy  Patient had an ectopic pregnancy resulting in removal of the to  Patient states that they found adhesions at the time of surgery  Patient had 3 vaginal deliveries  Her repeat ultrasound showed the uterus to be 11 5 x 8 8 x 7 6 cm with a 5 9 x 5 0 x 5 1 cm fibroid  ovaries were normal    Consent for surgery was signed today  Impression:  Menorrhagia with anemia, fibroid uterus, pelvic pain      Plan:  LAVH bilateral salpingectomy

## 2021-07-26 NOTE — PATIENT INSTRUCTIONS
Bradycardia   WHAT YOU NEED TO KNOW:   Bradycardia is a slow heart rate, usually fewer than 60 beats per minute  A slow heart rate is normal for some people, such as athletes, and needs no treatment  Bradycardia may also be caused by health conditions that do need treatment  Your healthcare provider will tell you what heart rate is too low for you  DISCHARGE INSTRUCTIONS:   Call your local emergency number (911 in the [address] [facility]) or have someone call if:   · You have new or worsening dizziness, shortness of breath, chest pain, or confusion  Call your doctor or cardiologist if:   · You feel lightheaded or faint  · Your pulse rate is lower than healthcare providers say it should be, even with treatment  · You are more tired than usual, even with treatment  · You have questions or concerns about your condition or care  Medicines:   · Medicines  may be given to increase your heart rate and help your symptoms  You may also need medicine to treat a condition that is causing your symptoms  · Take your medicine as directed  Contact your healthcare provider if you think your medicine is not helping or if you have side effects  Tell him or her if you are allergic to any medicine  Keep a list of the medicines, vitamins, and herbs you take  Include the amounts, and when and why you take them  Bring the list or the pill bottles to follow-up visits  Carry your medicine list with you in case of an emergency  Heart monitoring at home: You may need to use a heart monitor at home to provide more information about your condition  This device is also called a Holter monitor, event monitor, or mobile telemetry  Bring your monitor with you to follow-up visits with your healthcare provider or cardiologist  Ask for more information about the Holter monitor  Manage or prevent bradycardia:   · Talk to your healthcare provider about all your current medicines    He or she may change a medicine if it is causing your slow heart rate  Do not  stop taking any medicine unless directed by your provider  · Keep a record of your symptoms  Include when you have bradycardia, and what you were doing when it started  Also include anything that made your symptoms better or worse  Bring the record to follow-up visits with your healthcare providers  · Do not smoke  Nicotine and other chemicals in cigarettes and cigars can cause heart and lung damage  Ask your healthcare provider for information if you currently smoke and need help to quit  E-cigarettes or smokeless tobacco still contain nicotine  Talk to your healthcare provider before you use these products  · Reach or maintain a healthy weight  Your healthcare provider can tell you what a healthy weight is for you  He or she can help you create a weight loss plan if needed  Weight loss can help strengthen your heartbeat  If you have sleep apnea, weight loss may help you breathe more regularly while you sleep  · Exercise as directed  Exercise can help strengthen your heart  It can also help you manage your weight and improve your sleep  Your healthcare provider can help you create an exercise plan  He or she may recommend 30 to 40 minutes of exercise most days of the week  · Eat a variety of healthy foods  Healthy foods include fruits, vegetables, whole-grain breads and cereals, low-fat dairy products, lean meats, fish, and cooked beans  Depending on the cause of your bradycardia, your healthcare provider may recommend a heart healthy diet  This diet is low in sodium (salt) and unhealthy fats  A dietitian or your healthcare provider can help you create a heart healthy diet  Follow up with your doctor or cardiologist as directed:  Write down your questions so you remember to ask them during your visits  You may need to see specialists for more treatment  If you have a pacemaker, your cardiologist needs to make sure that it is working as it should    For more information:   · [address]  [place] , [place]   Phone: Pxwrdppjp 6321  Web Address: https://[employer]/ [facility] 2021 Information is for End User's use only and may not be sold, redistributed or otherwise used for commercial purposes  All illustrations and images included in CareNotes® are the copyrighted property of A D A M , Inc  or [facility] [place]   The above information is an  only  It is not intended as medical advice for individual conditions or treatments  Talk to your doctor, nurse or pharmacist before following any medical regimen to see if it is safe and effective for you  Anemia   WHAT YOU NEED TO KNOW:   Anemia is a low number of red blood cells or a low amount of hemoglobin in your red blood cells  Hemoglobin is a protein that helps carry oxygen throughout your body  Red blood cells use iron to create hemoglobin  Anemia may develop if your body does not have enough iron  It may also develop if your body does not make enough red blood cells or they die faster than your body can make them  DISCHARGE INSTRUCTIONS:   Call 911 or have someone call 911 for any of the following:   · You lose consciousness  · You have severe chest pain  Return to the emergency department if:   · You have dark or bloody bowel movements  Contact your healthcare provider if:   · Your symptoms are worse, even after treatment  · You have questions or concerns about your condition or care  Medicines:   · Iron or folic acid supplements  help increase your red blood cell and hemoglobin levels  · Vitamin B12 injections  may help boost your red blood cell level and decrease your symptoms  Ask your healthcare provider how to inject B12  · Take your medicine as directed  Contact your healthcare provider if you think your medicine is not helping or if you have side effects  Tell him of her if you are allergic to any medicine  Keep a list of the medicines, vitamins, and herbs you take  Include the amounts, and when and why you take them  Bring the list or the pill bottles to follow-up visits  Carry your medicine list with you in case of an emergency  Prevent anemia:  Eat healthy foods rich in iron and vitamin C  Nuts, meat, dark leafy green vegetables, and beans are high in iron and protein  Vitamin C helps your body absorb iron  Foods rich in vitamin C include oranges and other citrus fruits  Ask your healthcare provider for a list of other foods that are high in iron or vitamin C  Ask if you need to be on a special diet  Follow up with your healthcare provider as directed:  Write down your questions so you remember to ask them during your visits  © Copyright [employer] 2021 Information is for End User's use only and may not be sold, redistributed or otherwise used for commercial purposes  All illustrations and images included in CareNotes® are the copyrighted property of A D A M , Inc  or [employer]   The above information is an  only  It is not intended as medical advice for individual conditions or treatments  Talk to your doctor, nurse or pharmacist before following any medical regimen to see if it is safe and effective for you  GERD (Gastroesophageal Reflux Disease)   WHAT YOU NEED TO KNOW:   Gastroesophageal reflux disease (GERD) is reflux that occurs more than twice a week for a few weeks  Reflux means acid and food in the stomach back up into the esophagus  It usually causes heartburn and other symptoms  GERD can cause other health problems over time if it is not treated  DISCHARGE INSTRUCTIONS:   Call your local emergency number (911 in the [address] [place] [address],[address]) if:   · You have severe chest pain and sudden trouble breathing  Return to the emergency department if:   · You have trouble breathing after you vomit  · You have trouble swallowing, or pain with swallowing      · Your bowel movements are black, bloody, or tarry-looking  · Your vomit looks like coffee grounds or has blood in it  Call your doctor or gastroenterologist if:   · You feel full and cannot burp or vomit  · You vomit large amounts, or you vomit often  · You are losing weight without trying  · Your symptoms get worse or do not improve with treatment  · You have questions or concerns about your condition or care  Medicines:   · Medicines  are used to decrease stomach acid  Medicine may also be used to help your lower esophageal sphincter and stomach contract (tighten) more  · Take your medicine as directed  Contact your healthcare provider if you think your medicine is not helping or if you have side effects  Tell him of her if you are allergic to any medicine  Keep a list of the medicines, vitamins, and herbs you take  Include the amounts, and when and why you take them  Bring the list or the pill bottles to follow-up visits  Carry your medicine list with you in case of an emergency  Manage GERD:       · Do not have foods or drinks that may increase heartburn  These include chocolate, peppermint, fried or fatty foods, drinks that contain caffeine, or carbonated drinks (soda)  Other foods include spicy foods, onions, tomatoes, and tomato-based foods  Do not have foods or drinks that can irritate your esophagus, such as citrus fruits, juices, and alcohol  · Do not eat large meals  When you eat a lot of food at one time, your stomach needs more acid to digest it  Eat 6 small meals each day instead of 3 large ones, and eat slowly  Do not eat meals 2 to 3 hours before bedtime  · Elevate the head of your bed  Place 6-inch blocks under the head of your bed frame  You may also use more than one pillow under your head and shoulders while you sleep  · Maintain a healthy weight  If you are overweight, weight loss may help relieve symptoms of GERD  · Do not smoke    Smoking weakens the lower esophageal sphincter and increases the risk of GERD  Ask your healthcare provider for information if you currently smoke and need help to quit  E-cigarettes or smokeless tobacco still contain nicotine  Talk to your healthcare provider before you use these products  · Do not wear clothing that is tight around your waist   Tight clothing can put pressure on your stomach and cause or worsen GERD symptoms  Follow up with your doctor or gastroenterologist as directed:  Write down your questions so you remember to ask them during your visits  © Copyright [employer] 2021 Information is for End User's use only and may not be sold, redistributed or otherwise used for commercial purposes  All illustrations and images included in CareNotes® are the copyrighted property of A D A M , Inc  or [facility]   The above information is an  only  It is not intended as medical advice for individual conditions or treatments  Talk to your doctor, nurse or pharmacist before following any medical regimen to see if it is safe and effective for you

## 2021-07-27 LAB
ATRIAL RATE: 56 BPM
P AXIS: 28 DEGREES
PR INTERVAL: 106 MS
QRS AXIS: 74 DEGREES
QRSD INTERVAL: 74 MS
QT INTERVAL: 448 MS
QTC INTERVAL: 432 MS
T WAVE AXIS: 69 DEGREES
VENTRICULAR RATE: 56 BPM

## 2021-07-27 PROCEDURE — 93010 ELECTROCARDIOGRAM REPORT: CPT | Performed by: INTERNAL MEDICINE

## 2021-08-02 NOTE — TELEPHONE ENCOUNTER
Patient called office again.  Requesting call back from Dr Vazquez regarding discussing EKG she had done in the hospital.

## 2021-08-02 NOTE — TELEPHONE ENCOUNTER
Patient awaiting Zio patch/event monitor, but wanted to make us aware that she had an allergic reaction to contact pads for prior Holter monitor

## 2021-08-03 ENCOUNTER — PREP FOR PROCEDURE (OUTPATIENT)
Dept: OBGYN CLINIC | Facility: CLINIC | Age: 46
End: 2021-08-03

## 2021-08-03 DIAGNOSIS — D64.9 ANEMIA: ICD-10-CM

## 2021-08-03 DIAGNOSIS — D25.9 FIBROID UTERUS: ICD-10-CM

## 2021-08-03 DIAGNOSIS — N92.1 MENORRHAGIA WITH IRREGULAR CYCLE: Primary | ICD-10-CM

## 2021-08-03 DIAGNOSIS — R10.2 PELVIC PAIN IN FEMALE: ICD-10-CM

## 2021-08-05 NOTE — TELEPHONE ENCOUNTER
Called and discussed EKG and further steps with patient. She got her Ziopatch and will put It on today.

## 2021-08-24 ENCOUNTER — CLINICAL SUPPORT (OUTPATIENT)
Dept: CARDIOLOGY CLINIC | Facility: CLINIC | Age: 46
End: 2021-08-24
Payer: COMMERCIAL

## 2021-08-24 DIAGNOSIS — R00.1 BRADYCARDIA: ICD-10-CM

## 2021-08-24 PROCEDURE — 93228 REMOTE 30 DAY ECG REV/REPORT: CPT | Performed by: INTERNAL MEDICINE

## 2021-08-26 ENCOUNTER — TELEPHONE (OUTPATIENT)
Dept: CARDIOLOGY CLINIC | Facility: CLINIC | Age: 46
End: 2021-08-26

## 2021-08-26 NOTE — TELEPHONE ENCOUNTER
----- Message from Nancy Harrison MD sent at 8/26/2021  1:21 PM EDT -----  Please let patient know that cardiac test is unremarkable  No significant heart block  Lowest heart rate was 50 bpm  Will discuss more at next visit     Thanks  AQ

## 2021-09-09 ENCOUNTER — APPOINTMENT (OUTPATIENT)
Dept: LAB | Facility: HOSPITAL | Age: 46
End: 2021-09-09
Payer: COMMERCIAL

## 2021-09-09 DIAGNOSIS — K21.9 GASTROESOPHAGEAL REFLUX DISEASE, UNSPECIFIED WHETHER ESOPHAGITIS PRESENT: ICD-10-CM

## 2021-09-09 DIAGNOSIS — N92.1 MENORRHAGIA WITH IRREGULAR CYCLE: ICD-10-CM

## 2021-09-09 DIAGNOSIS — D64.9 ANEMIA: ICD-10-CM

## 2021-09-09 DIAGNOSIS — Z01.818 OTHER SPECIFIED PRE-OPERATIVE EXAMINATION: ICD-10-CM

## 2021-09-09 DIAGNOSIS — D25.9 FIBROID UTERUS: ICD-10-CM

## 2021-09-09 DIAGNOSIS — G89.29 CHRONIC BILATERAL LOW BACK PAIN WITHOUT SCIATICA: ICD-10-CM

## 2021-09-09 DIAGNOSIS — D50.0 IRON DEFICIENCY ANEMIA DUE TO CHRONIC BLOOD LOSS: ICD-10-CM

## 2021-09-09 DIAGNOSIS — R10.2 PELVIC PAIN IN FEMALE: ICD-10-CM

## 2021-09-09 DIAGNOSIS — R00.1 BRADYCARDIA: ICD-10-CM

## 2021-09-09 DIAGNOSIS — M54.50 CHRONIC BILATERAL LOW BACK PAIN WITHOUT SCIATICA: ICD-10-CM

## 2021-09-09 LAB
25(OH)D3 SERPL-MCNC: 26.1 NG/ML (ref 30–100)
ABO GROUP BLD: NORMAL
ALBUMIN SERPL BCP-MCNC: 3.5 G/DL (ref 3.5–5)
ALP SERPL-CCNC: 48 U/L (ref 46–116)
ALT SERPL W P-5'-P-CCNC: 17 U/L (ref 12–78)
ANION GAP SERPL CALCULATED.3IONS-SCNC: 6 MMOL/L (ref 4–13)
AST SERPL W P-5'-P-CCNC: 11 U/L (ref 5–45)
BASOPHILS # BLD AUTO: 0.03 THOUSANDS/ΜL (ref 0–0.1)
BASOPHILS NFR BLD AUTO: 1 % (ref 0–1)
BILIRUB SERPL-MCNC: 0.31 MG/DL (ref 0.2–1)
BLD GP AB SCN SERPL QL: NEGATIVE
BUN SERPL-MCNC: 17 MG/DL (ref 5–25)
CALCIUM ALBUM COR SERPL-MCNC: 8.8 MG/DL (ref 8.3–10.1)
CALCIUM SERPL-MCNC: 8.4 MG/DL (ref 8.3–10.1)
CHLORIDE SERPL-SCNC: 104 MMOL/L (ref 100–108)
CO2 SERPL-SCNC: 29 MMOL/L (ref 21–32)
CREAT SERPL-MCNC: 0.69 MG/DL (ref 0.6–1.3)
EOSINOPHIL # BLD AUTO: 0.14 THOUSAND/ΜL (ref 0–0.61)
EOSINOPHIL NFR BLD AUTO: 3 % (ref 0–6)
ERYTHROCYTE [DISTWIDTH] IN BLOOD BY AUTOMATED COUNT: 15 % (ref 11.6–15.1)
ERYTHROCYTE [SEDIMENTATION RATE] IN BLOOD: 17 MM/HOUR (ref 0–19)
EST. AVERAGE GLUCOSE BLD GHB EST-MCNC: 105 MG/DL
FERRITIN SERPL-MCNC: 21 NG/ML (ref 8–388)
GFR SERPL CREATININE-BSD FRML MDRD: 122 ML/MIN/1.73SQ M
GLUCOSE P FAST SERPL-MCNC: 65 MG/DL (ref 65–99)
HBA1C MFR BLD: 5.3 %
HCT VFR BLD AUTO: 42 % (ref 34.8–46.1)
HGB BLD-MCNC: 13.2 G/DL (ref 11.5–15.4)
IMM GRANULOCYTES # BLD AUTO: 0.02 THOUSAND/UL (ref 0–0.2)
IMM GRANULOCYTES NFR BLD AUTO: 0 % (ref 0–2)
IRON SATN MFR SERPL: 15 % (ref 15–50)
IRON SERPL-MCNC: 53 UG/DL (ref 50–170)
LYMPHOCYTES # BLD AUTO: 1.58 THOUSANDS/ΜL (ref 0.6–4.47)
LYMPHOCYTES NFR BLD AUTO: 35 % (ref 14–44)
MAGNESIUM SERPL-MCNC: 2 MG/DL (ref 1.6–2.6)
MCH RBC QN AUTO: 25.9 PG (ref 26.8–34.3)
MCHC RBC AUTO-ENTMCNC: 31.4 G/DL (ref 31.4–37.4)
MCV RBC AUTO: 82 FL (ref 82–98)
MONOCYTES # BLD AUTO: 0.61 THOUSAND/ΜL (ref 0.17–1.22)
MONOCYTES NFR BLD AUTO: 14 % (ref 4–12)
NEUTROPHILS # BLD AUTO: 2.12 THOUSANDS/ΜL (ref 1.85–7.62)
NEUTS SEG NFR BLD AUTO: 47 % (ref 43–75)
NRBC BLD AUTO-RTO: 0 /100 WBCS
PLATELET # BLD AUTO: 238 THOUSANDS/UL (ref 149–390)
PMV BLD AUTO: 9.7 FL (ref 8.9–12.7)
POTASSIUM SERPL-SCNC: 4.2 MMOL/L (ref 3.5–5.3)
PROT SERPL-MCNC: 7.4 G/DL (ref 6.4–8.2)
RBC # BLD AUTO: 5.1 MILLION/UL (ref 3.81–5.12)
RH BLD: POSITIVE
SODIUM SERPL-SCNC: 139 MMOL/L (ref 136–145)
SPECIMEN EXPIRATION DATE: NORMAL
TIBC SERPL-MCNC: 357 UG/DL (ref 250–450)
WBC # BLD AUTO: 4.5 THOUSAND/UL (ref 4.31–10.16)

## 2021-09-09 PROCEDURE — 85025 COMPLETE CBC W/AUTO DIFF WBC: CPT

## 2021-09-09 PROCEDURE — 86901 BLOOD TYPING SEROLOGIC RH(D): CPT

## 2021-09-09 PROCEDURE — 83550 IRON BINDING TEST: CPT

## 2021-09-09 PROCEDURE — 85652 RBC SED RATE AUTOMATED: CPT

## 2021-09-09 PROCEDURE — 86800 THYROGLOBULIN ANTIBODY: CPT

## 2021-09-09 PROCEDURE — 86376 MICROSOMAL ANTIBODY EACH: CPT

## 2021-09-09 PROCEDURE — 86850 RBC ANTIBODY SCREEN: CPT

## 2021-09-09 PROCEDURE — 86900 BLOOD TYPING SEROLOGIC ABO: CPT

## 2021-09-09 PROCEDURE — 82728 ASSAY OF FERRITIN: CPT

## 2021-09-09 PROCEDURE — 36415 COLL VENOUS BLD VENIPUNCTURE: CPT

## 2021-09-09 PROCEDURE — 83540 ASSAY OF IRON: CPT

## 2021-09-09 PROCEDURE — 86038 ANTINUCLEAR ANTIBODIES: CPT

## 2021-09-09 PROCEDURE — 83036 HEMOGLOBIN GLYCOSYLATED A1C: CPT

## 2021-09-09 PROCEDURE — 83735 ASSAY OF MAGNESIUM: CPT

## 2021-09-09 PROCEDURE — 82306 VITAMIN D 25 HYDROXY: CPT

## 2021-09-09 PROCEDURE — 80053 COMPREHEN METABOLIC PANEL: CPT

## 2021-09-10 LAB
RYE IGE QN: NEGATIVE
THYROGLOB AB SERPL-ACNC: <1 IU/ML (ref 0–0.9)
THYROPEROXIDASE AB SERPL-ACNC: <8 IU/ML (ref 0–34)

## 2021-09-14 ENCOUNTER — OFFICE VISIT (OUTPATIENT)
Dept: OBGYN CLINIC | Facility: CLINIC | Age: 46
End: 2021-09-14

## 2021-09-14 VITALS
WEIGHT: 237 LBS | HEIGHT: 66 IN | DIASTOLIC BLOOD PRESSURE: 80 MMHG | BODY MASS INDEX: 38.09 KG/M2 | SYSTOLIC BLOOD PRESSURE: 130 MMHG

## 2021-09-14 DIAGNOSIS — Z01.818 VISIT FOR PRE-OPERATIVE EXAMINATION: Primary | ICD-10-CM

## 2021-09-14 PROCEDURE — PREOP: Performed by: OBSTETRICS & GYNECOLOGY

## 2021-09-14 NOTE — PROGRESS NOTES
Patient presents the office for preop visit  Patient scheduled for LAVH bilateral salpingectomy on September 28, 2021  Patient has a history of menorrhagia with anemia with fibroid uterus  Her hemoglobin on September 9, 2021 was 13 2  Consent for surgery is signed prior  Surgery was explained in detail  Postoperative course also discussed  Information on preop carbohydrate drinks and preop surgical scrub was given  Patient was also given the drink and  antibacterial soap today  Patient's questions were answered      Impression:  Fibroid uterus, menorrhagia with anemia,    Plan:  LAVH bilateral salpingectomy

## 2021-09-14 NOTE — PROGRESS NOTES
The patient is here for a pre-op  She had a LAVH Phong Salpingectomy scheduled on 9/28/21  The patient has no concerns

## 2021-09-16 PROCEDURE — NC001 PR NO CHARGE: Performed by: OBSTETRICS & GYNECOLOGY

## 2021-09-21 RX ORDER — DIPHENOXYLATE HYDROCHLORIDE AND ATROPINE SULFATE 2.5; .025 MG/1; MG/1
1 TABLET ORAL DAILY
COMMUNITY

## 2021-09-21 RX ORDER — NICOTINE POLACRILEX 2 MG
GUM BUCCAL
COMMUNITY

## 2021-09-21 RX ORDER — CHLORAL HYDRATE 500 MG
1000 CAPSULE ORAL DAILY
COMMUNITY

## 2021-09-21 RX ORDER — UBIDECARENONE 75 MG
CAPSULE ORAL DAILY
COMMUNITY

## 2021-09-21 RX ORDER — VITAMIN B COMPLEX
1000 TABLET ORAL DAILY
COMMUNITY

## 2021-09-27 ENCOUNTER — ANESTHESIA EVENT (OUTPATIENT)
Dept: PERIOP | Facility: HOSPITAL | Age: 46
End: 2021-09-27
Payer: COMMERCIAL

## 2021-09-28 ENCOUNTER — ANESTHESIA (OUTPATIENT)
Dept: PERIOP | Facility: HOSPITAL | Age: 46
End: 2021-09-28
Payer: COMMERCIAL

## 2021-09-28 ENCOUNTER — HOSPITAL ENCOUNTER (OUTPATIENT)
Facility: HOSPITAL | Age: 46
Setting detail: OUTPATIENT SURGERY
Discharge: HOME | End: 2021-09-28
Attending: OBSTETRICS & GYNECOLOGY | Admitting: OBSTETRICS & GYNECOLOGY
Payer: COMMERCIAL

## 2021-09-28 VITALS
RESPIRATION RATE: 17 BRPM | WEIGHT: 237 LBS | OXYGEN SATURATION: 98 % | HEIGHT: 66 IN | DIASTOLIC BLOOD PRESSURE: 72 MMHG | HEART RATE: 83 BPM | TEMPERATURE: 97.8 F | SYSTOLIC BLOOD PRESSURE: 118 MMHG | BODY MASS INDEX: 38.09 KG/M2

## 2021-09-28 DIAGNOSIS — D64.9 ANEMIA: ICD-10-CM

## 2021-09-28 DIAGNOSIS — Z90.710 S/P LAPAROSCOPIC ASSISTED VAGINAL HYSTERECTOMY (LAVH): Primary | ICD-10-CM

## 2021-09-28 DIAGNOSIS — D25.9 FIBROID UTERUS: ICD-10-CM

## 2021-09-28 DIAGNOSIS — N92.1 MENORRHAGIA WITH IRREGULAR CYCLE: ICD-10-CM

## 2021-09-28 DIAGNOSIS — R10.2 PELVIC PAIN IN FEMALE: ICD-10-CM

## 2021-09-28 DIAGNOSIS — Z98.84 S/P LAPAROSCOPIC SLEEVE GASTRECTOMY: ICD-10-CM

## 2021-09-28 PROBLEM — G47.33 OSA (OBSTRUCTIVE SLEEP APNEA): Status: ACTIVE | Noted: 2021-09-28

## 2021-09-28 LAB
ERYTHROCYTE [DISTWIDTH] IN BLOOD BY AUTOMATED COUNT: 14.4 % (ref 11.6–15.1)
EXT PREGNANCY TEST URINE: NEGATIVE
EXT. CONTROL: NORMAL
GLUCOSE SERPL-MCNC: 113 MG/DL (ref 65–140)
GLUCOSE SERPL-MCNC: 151 MG/DL (ref 65–140)
HCT VFR BLD AUTO: 32.9 % (ref 34.8–46.1)
HGB BLD-MCNC: 10.3 G/DL (ref 11.5–15.4)
MCH RBC QN AUTO: 26.6 PG (ref 26.8–34.3)
MCHC RBC AUTO-ENTMCNC: 31.3 G/DL (ref 31.4–37.4)
MCV RBC AUTO: 85 FL (ref 82–98)
PLATELET # BLD AUTO: 247 THOUSANDS/UL (ref 149–390)
PMV BLD AUTO: 9.3 FL (ref 8.9–12.7)
RBC # BLD AUTO: 3.87 MILLION/UL (ref 3.81–5.12)
WBC # BLD AUTO: 13.09 THOUSAND/UL (ref 4.31–10.16)

## 2021-09-28 PROCEDURE — 81025 URINE PREGNANCY TEST: CPT | Performed by: OBSTETRICS & GYNECOLOGY

## 2021-09-28 PROCEDURE — 58554 LAPARO-VAG HYST W/T/O COMPL: CPT | Performed by: OBSTETRICS & GYNECOLOGY

## 2021-09-28 PROCEDURE — 88307 TISSUE EXAM BY PATHOLOGIST: CPT | Performed by: PATHOLOGY

## 2021-09-28 PROCEDURE — 82948 REAGENT STRIP/BLOOD GLUCOSE: CPT

## 2021-09-28 PROCEDURE — 85027 COMPLETE CBC AUTOMATED: CPT | Performed by: NURSE ANESTHETIST, CERTIFIED REGISTERED

## 2021-09-28 RX ORDER — LIDOCAINE HYDROCHLORIDE 10 MG/ML
INJECTION, SOLUTION EPIDURAL; INFILTRATION; INTRACAUDAL; PERINEURAL AS NEEDED
Status: DISCONTINUED | OUTPATIENT
Start: 2021-09-28 | End: 2021-09-28

## 2021-09-28 RX ORDER — IBUPROFEN 600 MG/1
600 TABLET ORAL EVERY 6 HOURS PRN
Status: DISCONTINUED | OUTPATIENT
Start: 2021-09-28 | End: 2021-09-28 | Stop reason: HOSPADM

## 2021-09-28 RX ORDER — PROPOFOL 10 MG/ML
INJECTION, EMULSION INTRAVENOUS AS NEEDED
Status: DISCONTINUED | OUTPATIENT
Start: 2021-09-28 | End: 2021-09-28

## 2021-09-28 RX ORDER — SODIUM CHLORIDE 9 MG/ML
INJECTION, SOLUTION INTRAVENOUS CONTINUOUS PRN
Status: DISCONTINUED | OUTPATIENT
Start: 2021-09-28 | End: 2021-09-28

## 2021-09-28 RX ORDER — FENTANYL CITRATE 50 UG/ML
INJECTION, SOLUTION INTRAMUSCULAR; INTRAVENOUS AS NEEDED
Status: DISCONTINUED | OUTPATIENT
Start: 2021-09-28 | End: 2021-09-28

## 2021-09-28 RX ORDER — OXYCODONE HYDROCHLORIDE 5 MG/1
5 TABLET ORAL EVERY 4 HOURS PRN
Status: DISCONTINUED | OUTPATIENT
Start: 2021-09-28 | End: 2021-09-28 | Stop reason: HOSPADM

## 2021-09-28 RX ORDER — HYDROMORPHONE HCL/PF 1 MG/ML
0.5 SYRINGE (ML) INJECTION EVERY 4 HOURS PRN
Status: DISCONTINUED | OUTPATIENT
Start: 2021-09-28 | End: 2021-09-28 | Stop reason: HOSPADM

## 2021-09-28 RX ORDER — NEOSTIGMINE METHYLSULFATE 1 MG/ML
INJECTION INTRAVENOUS AS NEEDED
Status: DISCONTINUED | OUTPATIENT
Start: 2021-09-28 | End: 2021-09-28

## 2021-09-28 RX ORDER — DEXAMETHASONE SODIUM PHOSPHATE 10 MG/ML
INJECTION, SOLUTION INTRAMUSCULAR; INTRAVENOUS AS NEEDED
Status: DISCONTINUED | OUTPATIENT
Start: 2021-09-28 | End: 2021-09-28

## 2021-09-28 RX ORDER — ONDANSETRON 2 MG/ML
4 INJECTION INTRAMUSCULAR; INTRAVENOUS EVERY 6 HOURS PRN
Status: CANCELLED | OUTPATIENT
Start: 2021-09-28

## 2021-09-28 RX ORDER — MIDAZOLAM HYDROCHLORIDE 2 MG/2ML
INJECTION, SOLUTION INTRAMUSCULAR; INTRAVENOUS AS NEEDED
Status: DISCONTINUED | OUTPATIENT
Start: 2021-09-28 | End: 2021-09-28

## 2021-09-28 RX ORDER — MAGNESIUM HYDROXIDE 1200 MG/15ML
LIQUID ORAL AS NEEDED
Status: DISCONTINUED | OUTPATIENT
Start: 2021-09-28 | End: 2021-09-28 | Stop reason: HOSPADM

## 2021-09-28 RX ORDER — KETAMINE HYDROCHLORIDE 50 MG/ML
INJECTION, SOLUTION, CONCENTRATE INTRAMUSCULAR; INTRAVENOUS AS NEEDED
Status: DISCONTINUED | OUTPATIENT
Start: 2021-09-28 | End: 2021-09-28

## 2021-09-28 RX ORDER — ONDANSETRON 2 MG/ML
4 INJECTION INTRAMUSCULAR; INTRAVENOUS ONCE AS NEEDED
Status: COMPLETED | OUTPATIENT
Start: 2021-09-28 | End: 2021-09-28

## 2021-09-28 RX ORDER — FENTANYL CITRATE/PF 50 MCG/ML
25 SYRINGE (ML) INJECTION
Status: DISCONTINUED | OUTPATIENT
Start: 2021-09-28 | End: 2021-09-28 | Stop reason: HOSPADM

## 2021-09-28 RX ORDER — PROMETHAZINE HYDROCHLORIDE 25 MG/ML
12.5 INJECTION, SOLUTION INTRAMUSCULAR; INTRAVENOUS ONCE AS NEEDED
Status: DISCONTINUED | OUTPATIENT
Start: 2021-09-28 | End: 2021-09-28 | Stop reason: HOSPADM

## 2021-09-28 RX ORDER — ALBUTEROL SULFATE 2.5 MG/3ML
2.5 SOLUTION RESPIRATORY (INHALATION) ONCE AS NEEDED
Status: DISCONTINUED | OUTPATIENT
Start: 2021-09-28 | End: 2021-09-28 | Stop reason: HOSPADM

## 2021-09-28 RX ORDER — POLYETHYLENE GLYCOL 3350 17 G/17G
17 POWDER, FOR SOLUTION ORAL DAILY
Qty: 1 EACH | Refills: 0 | Status: SHIPPED | OUTPATIENT
Start: 2021-09-28

## 2021-09-28 RX ORDER — ROCURONIUM BROMIDE 10 MG/ML
INJECTION, SOLUTION INTRAVENOUS AS NEEDED
Status: DISCONTINUED | OUTPATIENT
Start: 2021-09-28 | End: 2021-09-28

## 2021-09-28 RX ORDER — ALBUMIN, HUMAN INJ 5% 5 %
SOLUTION INTRAVENOUS CONTINUOUS PRN
Status: DISCONTINUED | OUTPATIENT
Start: 2021-09-28 | End: 2021-09-28

## 2021-09-28 RX ORDER — GLYCOPYRROLATE 0.2 MG/ML
INJECTION INTRAMUSCULAR; INTRAVENOUS AS NEEDED
Status: DISCONTINUED | OUTPATIENT
Start: 2021-09-28 | End: 2021-09-28

## 2021-09-28 RX ORDER — HYDROMORPHONE HCL 110MG/55ML
PATIENT CONTROLLED ANALGESIA SYRINGE INTRAVENOUS AS NEEDED
Status: DISCONTINUED | OUTPATIENT
Start: 2021-09-28 | End: 2021-09-28

## 2021-09-28 RX ORDER — PROPOFOL 10 MG/ML
INJECTION, EMULSION INTRAVENOUS CONTINUOUS PRN
Status: DISCONTINUED | OUTPATIENT
Start: 2021-09-28 | End: 2021-09-28

## 2021-09-28 RX ORDER — HYDROMORPHONE HCL/PF 1 MG/ML
0.5 SYRINGE (ML) INJECTION
Status: DISCONTINUED | OUTPATIENT
Start: 2021-09-28 | End: 2021-09-28 | Stop reason: HOSPADM

## 2021-09-28 RX ORDER — OXYCODONE HYDROCHLORIDE AND ACETAMINOPHEN 5; 325 MG/1; MG/1
1 TABLET ORAL EVERY 4 HOURS PRN
Qty: 6 TABLET | Refills: 0 | Status: SHIPPED | OUTPATIENT
Start: 2021-09-28 | End: 2021-10-08

## 2021-09-28 RX ORDER — KETOROLAC TROMETHAMINE 30 MG/ML
INJECTION, SOLUTION INTRAMUSCULAR; INTRAVENOUS AS NEEDED
Status: DISCONTINUED | OUTPATIENT
Start: 2021-09-28 | End: 2021-09-28

## 2021-09-28 RX ORDER — SODIUM CHLORIDE, SODIUM LACTATE, POTASSIUM CHLORIDE, CALCIUM CHLORIDE 600; 310; 30; 20 MG/100ML; MG/100ML; MG/100ML; MG/100ML
INJECTION, SOLUTION INTRAVENOUS CONTINUOUS PRN
Status: DISCONTINUED | OUTPATIENT
Start: 2021-09-28 | End: 2021-09-28

## 2021-09-28 RX ORDER — ONDANSETRON 2 MG/ML
INJECTION INTRAMUSCULAR; INTRAVENOUS AS NEEDED
Status: DISCONTINUED | OUTPATIENT
Start: 2021-09-28 | End: 2021-09-28

## 2021-09-28 RX ORDER — BUPIVACAINE HYDROCHLORIDE 2.5 MG/ML
INJECTION, SOLUTION EPIDURAL; INFILTRATION; INTRACAUDAL AS NEEDED
Status: DISCONTINUED | OUTPATIENT
Start: 2021-09-28 | End: 2021-09-28 | Stop reason: HOSPADM

## 2021-09-28 RX ORDER — CLINDAMYCIN PHOSPHATE 900 MG/50ML
900 INJECTION INTRAVENOUS ONCE
Status: COMPLETED | OUTPATIENT
Start: 2021-09-28 | End: 2021-09-28

## 2021-09-28 RX ORDER — FERROUS SULFATE TAB EC 324 MG (65 MG FE EQUIVALENT) 324 (65 FE) MG
324 TABLET DELAYED RESPONSE ORAL
Qty: 90 TABLET | Refills: 0 | Status: SHIPPED | OUTPATIENT
Start: 2021-09-28

## 2021-09-28 RX ORDER — ACETAMINOPHEN 325 MG/1
650 TABLET ORAL EVERY 6 HOURS PRN
Status: DISCONTINUED | OUTPATIENT
Start: 2021-09-28 | End: 2021-09-28 | Stop reason: HOSPADM

## 2021-09-28 RX ADMIN — HYDROMORPHONE HYDROCHLORIDE 0.5 MG: 1 INJECTION, SOLUTION INTRAMUSCULAR; INTRAVENOUS; SUBCUTANEOUS at 15:46

## 2021-09-28 RX ADMIN — SODIUM CHLORIDE, SODIUM LACTATE, POTASSIUM CHLORIDE, AND CALCIUM CHLORIDE: .6; .31; .03; .02 INJECTION, SOLUTION INTRAVENOUS at 07:32

## 2021-09-28 RX ADMIN — ONDANSETRON 4 MG: 2 INJECTION INTRAMUSCULAR; INTRAVENOUS at 10:34

## 2021-09-28 RX ADMIN — ROCURONIUM BROMIDE 20 MG: 10 INJECTION, SOLUTION INTRAVENOUS at 08:10

## 2021-09-28 RX ADMIN — ONDANSETRON 4 MG: 2 INJECTION INTRAMUSCULAR; INTRAVENOUS at 15:46

## 2021-09-28 RX ADMIN — GLYCOPYRROLATE 0.1 MG: 0.2 INJECTION, SOLUTION INTRAMUSCULAR; INTRAVENOUS at 07:58

## 2021-09-28 RX ADMIN — GENTAMICIN SULFATE 120 MG: 40 INJECTION, SOLUTION INTRAMUSCULAR; INTRAVENOUS at 07:45

## 2021-09-28 RX ADMIN — FENTANYL CITRATE 50 MCG: 50 INJECTION, SOLUTION INTRAMUSCULAR; INTRAVENOUS at 07:39

## 2021-09-28 RX ADMIN — GLYCOPYRROLATE 0.4 MG: 0.2 INJECTION, SOLUTION INTRAMUSCULAR; INTRAVENOUS at 10:34

## 2021-09-28 RX ADMIN — KETAMINE HYDROCHLORIDE 30 MG: 50 INJECTION INTRAMUSCULAR; INTRAVENOUS at 07:58

## 2021-09-28 RX ADMIN — SODIUM CHLORIDE: 0.9 INJECTION, SOLUTION INTRAVENOUS at 07:43

## 2021-09-28 RX ADMIN — MIDAZOLAM HYDROCHLORIDE 2 MG: 1 INJECTION, SOLUTION INTRAMUSCULAR; INTRAVENOUS at 07:35

## 2021-09-28 RX ADMIN — NEOSTIGMINE METHYLSULFATE 2.5 MG: 1 INJECTION INTRAVENOUS at 10:34

## 2021-09-28 RX ADMIN — HYDROMORPHONE HYDROCHLORIDE 0.5 MG: 2 INJECTION INTRAMUSCULAR; INTRAVENOUS; SUBCUTANEOUS at 08:22

## 2021-09-28 RX ADMIN — ROCURONIUM BROMIDE 50 MG: 10 INJECTION, SOLUTION INTRAVENOUS at 07:39

## 2021-09-28 RX ADMIN — ROCURONIUM BROMIDE 10 MG: 10 INJECTION, SOLUTION INTRAVENOUS at 08:22

## 2021-09-28 RX ADMIN — KETOROLAC TROMETHAMINE 30 MG: 30 INJECTION, SOLUTION INTRAMUSCULAR at 10:41

## 2021-09-28 RX ADMIN — PROPOFOL 50 MCG/KG/MIN: 10 INJECTION, EMULSION INTRAVENOUS at 07:45

## 2021-09-28 RX ADMIN — ALBUMIN (HUMAN): 12.5 INJECTION, SOLUTION INTRAVENOUS at 10:21

## 2021-09-28 RX ADMIN — ROCURONIUM BROMIDE 10 MG: 10 INJECTION, SOLUTION INTRAVENOUS at 08:52

## 2021-09-28 RX ADMIN — FENTANYL CITRATE 50 MCG: 50 INJECTION, SOLUTION INTRAMUSCULAR; INTRAVENOUS at 10:52

## 2021-09-28 RX ADMIN — PROPOFOL 200 MG: 10 INJECTION, EMULSION INTRAVENOUS at 07:39

## 2021-09-28 RX ADMIN — ALBUMIN (HUMAN): 12.5 INJECTION, SOLUTION INTRAVENOUS at 10:31

## 2021-09-28 RX ADMIN — KETAMINE HYDROCHLORIDE 10 MG: 50 INJECTION INTRAMUSCULAR; INTRAVENOUS at 09:55

## 2021-09-28 RX ADMIN — LIDOCAINE HYDROCHLORIDE 100 MG: 10 INJECTION, SOLUTION EPIDURAL; INFILTRATION; INTRACAUDAL at 07:39

## 2021-09-28 RX ADMIN — SODIUM CHLORIDE: 0.9 INJECTION, SOLUTION INTRAVENOUS at 09:19

## 2021-09-28 RX ADMIN — KETAMINE HYDROCHLORIDE 10 MG: 50 INJECTION INTRAMUSCULAR; INTRAVENOUS at 09:02

## 2021-09-28 RX ADMIN — DEXAMETHASONE SODIUM PHOSPHATE 4 MG: 10 INJECTION, SOLUTION INTRAMUSCULAR; INTRAVENOUS at 07:44

## 2021-09-28 RX ADMIN — CLINDAMYCIN PHOSPHATE 900 MG: 18 INJECTION, SOLUTION INTRAMUSCULAR; INTRAVENOUS at 07:35

## 2021-09-28 RX ADMIN — ONDANSETRON 4 MG: 2 INJECTION INTRAMUSCULAR; INTRAVENOUS at 07:44

## 2021-09-28 NOTE — ANESTHESIA PREPROCEDURE EVALUATION
Procedure:  HYSTERECTOMY LAPAROSCOPIC ASSISTED VAGINAL (LAVH) (N/A Abdomen)  SALPINGECTOMY, LAPAROSCOPIC (Bilateral Abdomen)    Relevant Problems   CARDIO   (+) Hyperlipidemia      HEMATOLOGY   (+) Iron deficiency anemia      NEURO/PSYCH   (+) History of PCOS      +SHARI diagnosed before sleeve gastrectomy however patient has not been wearing her CPAP and wasn't before her surgery      Physical Exam    Airway    Mallampati score: II  TM Distance: >3 FB  Neck ROM: full     Dental   No notable dental hx     Cardiovascular  Rhythm: regular, Rate: normal, Cardiovascular exam normal    Pulmonary  Pulmonary exam normal Breath sounds clear to auscultation,     Other Findings        Anesthesia Plan  ASA Score- 2     Anesthesia Type- general with ASA Monitors  Additional Monitors:   Airway Plan: ETT  Plan Factors-Exercise tolerance (METS): >4 METS  Chart reviewed  EKG reviewed  Existing labs reviewed  Patient summary reviewed  Patient is not a current smoker  Obstructive sleep apnea risk education given perioperatively  Induction- intravenous  Postoperative Plan- Plan for postoperative opioid use  Planned trial extubation    Informed Consent- Anesthetic plan and risks discussed with patient  I personally reviewed this patient with the CRNA  Discussed and agreed on the Anesthesia Plan with the CRNA  Marcela Mcfarlane

## 2021-09-30 ENCOUNTER — TELEPHONE (OUTPATIENT)
Dept: HEMATOLOGY ONCOLOGY | Facility: CLINIC | Age: 46
End: 2021-09-30

## 2021-10-05 ENCOUNTER — OFFICE VISIT (OUTPATIENT)
Dept: OBGYN CLINIC | Facility: CLINIC | Age: 46
End: 2021-10-05

## 2021-10-05 VITALS
SYSTOLIC BLOOD PRESSURE: 130 MMHG | BODY MASS INDEX: 37.45 KG/M2 | DIASTOLIC BLOOD PRESSURE: 86 MMHG | HEIGHT: 66 IN | WEIGHT: 233 LBS

## 2021-10-05 DIAGNOSIS — Z48.89 POSTOPERATIVE VISIT: ICD-10-CM

## 2021-10-05 DIAGNOSIS — N30.00 ACUTE CYSTITIS WITHOUT HEMATURIA: Primary | ICD-10-CM

## 2021-10-05 PROCEDURE — 99024 POSTOP FOLLOW-UP VISIT: CPT | Performed by: OBSTETRICS & GYNECOLOGY

## 2021-10-05 RX ORDER — CIPROFLOXACIN 250 MG/1
250 TABLET, FILM COATED ORAL EVERY 12 HOURS SCHEDULED
Qty: 14 TABLET | Refills: 0 | Status: SHIPPED | OUTPATIENT
Start: 2021-10-05 | End: 2021-10-12

## 2021-10-11 ENCOUNTER — TELEPHONE (OUTPATIENT)
Dept: HEMATOLOGY ONCOLOGY | Facility: CLINIC | Age: 46
End: 2021-10-11

## 2021-11-12 ENCOUNTER — TELEPHONE (OUTPATIENT)
Dept: OBGYN CLINIC | Facility: CLINIC | Age: 46
End: 2021-11-12

## 2021-11-17 ENCOUNTER — OFFICE VISIT (OUTPATIENT)
Dept: OBGYN CLINIC | Facility: CLINIC | Age: 46
End: 2021-11-17

## 2021-11-17 VITALS
DIASTOLIC BLOOD PRESSURE: 80 MMHG | BODY MASS INDEX: 37.93 KG/M2 | HEIGHT: 66 IN | WEIGHT: 236 LBS | SYSTOLIC BLOOD PRESSURE: 120 MMHG

## 2021-11-17 DIAGNOSIS — Z48.89 POSTOPERATIVE VISIT: Primary | ICD-10-CM

## 2021-11-17 PROCEDURE — 99024 POSTOP FOLLOW-UP VISIT: CPT | Performed by: OBSTETRICS & GYNECOLOGY

## 2022-04-23 DIAGNOSIS — K21.9 GASTROESOPHAGEAL REFLUX DISEASE, UNSPECIFIED WHETHER ESOPHAGITIS PRESENT: ICD-10-CM

## 2022-04-25 RX ORDER — OMEPRAZOLE 40 MG/1
CAPSULE, DELAYED RELEASE ORAL
Qty: 30 CAPSULE | Refills: 0 | Status: SHIPPED | OUTPATIENT
Start: 2022-04-25

## 2022-09-15 ENCOUNTER — TELEPHONE (OUTPATIENT)
Dept: FAMILY MEDICINE CLINIC | Facility: CLINIC | Age: 47
End: 2022-09-15

## 2022-09-15 NOTE — LETTER
[facility]  [address] [facility] [place] [address]  Phone#  [phone]  Fax#  [phone]      October 31, 2022      Lynn Contreras    We have been trying to reach you in regards to an appointment with Dalia Nassar  In May, she relocated to her new office in [place] ([facility])  We are touching base with our patients to see if you plan to follow her to [place]) or if you would like help establishing with another provider in the [place] location  This new office is an hour away  We understand this may not be ideal to travel  We are here to help make this transition as smooth as possible for you  Please let us know what you plan to do  You are currently due for an appointment  Whether it is in [place], [facility] we can help you get it scheduled      Sincerely,    Purnima Black

## 2023-02-21 ENCOUNTER — TELEPHONE (OUTPATIENT)
Dept: FAMILY MEDICINE CLINIC | Facility: CLINIC | Age: 48
End: 2023-02-21

## 2023-02-21 ENCOUNTER — OFFICE VISIT (OUTPATIENT)
Dept: FAMILY MEDICINE CLINIC | Facility: CLINIC | Age: 48
End: 2023-02-21

## 2023-02-21 VITALS
WEIGHT: 268 LBS | HEIGHT: 65 IN | BODY MASS INDEX: 44.65 KG/M2 | OXYGEN SATURATION: 98 % | HEART RATE: 78 BPM | DIASTOLIC BLOOD PRESSURE: 68 MMHG | TEMPERATURE: 97.8 F | SYSTOLIC BLOOD PRESSURE: 110 MMHG | RESPIRATION RATE: 16 BRPM

## 2023-02-21 DIAGNOSIS — E78.2 MIXED HYPERLIPIDEMIA: ICD-10-CM

## 2023-02-21 DIAGNOSIS — D50.0 IRON DEFICIENCY ANEMIA DUE TO CHRONIC BLOOD LOSS: ICD-10-CM

## 2023-02-21 DIAGNOSIS — Z00.00 ANNUAL PHYSICAL EXAM: ICD-10-CM

## 2023-02-21 DIAGNOSIS — Z12.11 SCREENING FOR COLON CANCER: ICD-10-CM

## 2023-02-21 DIAGNOSIS — Z12.31 ENCOUNTER FOR SCREENING MAMMOGRAM FOR MALIGNANT NEOPLASM OF BREAST: ICD-10-CM

## 2023-02-21 DIAGNOSIS — E55.9 VITAMIN D DEFICIENCY: ICD-10-CM

## 2023-02-21 DIAGNOSIS — Z98.84 S/P LAPAROSCOPIC SLEEVE GASTRECTOMY: ICD-10-CM

## 2023-02-21 DIAGNOSIS — H00.014 HORDEOLUM EXTERNUM OF LEFT UPPER EYELID: ICD-10-CM

## 2023-02-21 DIAGNOSIS — Z00.01 ENCOUNTER FOR GENERAL ADULT MEDICAL EXAMINATION WITH ABNORMAL FINDINGS: Primary | ICD-10-CM

## 2023-02-21 RX ORDER — OFLOXACIN 3 MG/ML
1 SOLUTION/ DROPS OPHTHALMIC 4 TIMES DAILY
Qty: 10 ML | Refills: 0 | Status: SHIPPED | OUTPATIENT
Start: 2023-02-21 | End: 2023-02-28

## 2023-02-21 RX ORDER — ERYTHROMYCIN 5 MG/G
0.5 OINTMENT OPHTHALMIC EVERY 8 HOURS SCHEDULED
Qty: 3.5 G | Refills: 0 | Status: SHIPPED | OUTPATIENT
Start: 2023-02-21 | End: 2023-02-27

## 2023-02-21 NOTE — PROGRESS NOTES
ADULT ANNUAL [address] [facility]    NAME: Alexandra Garner  AGE: 56 y o   SEX: female  : 1975     DATE: 2023     Assessment and Plan:   Pt is a 56 yr old female   Presents in office for follow yo issues with stye to left eye   Follow up Hyperlipidemia and labs needed and she is due for annual physical   She has not been seen in a while   And she is post bariatric surgery - starting to gain her weight back   Denies any other issues   Will do physical today   Ordered labs   Treat stye and follow up in 4-6 weeks with lab reviews   Discussed healthy diet   Exercise and adequate hydration       Problem List Items Addressed This Visit        Other    Hyperlipidemia    Relevant Orders    Comprehensive metabolic panel    CBC and differential    TSH, 3rd generation with Free T4 reflex    HEMOGLOBIN A1C W/ EAG ESTIMATION    UA (URINE) with reflex to Scope    Iron Panel (Includes Ferritin, Iron Sat%, Iron, and TIBC)    Vitamin D 25 hydroxy    Vitamin B12/Folate, Serum Panel    Lipid panel    Hepatitis C antibody    : HIV 1/2 AB/AG w Reflex [facility] for 2 yr old and above    S/P laparoscopic sleeve gastrectomy    Relevant Orders    Comprehensive metabolic panel    CBC and differential    TSH, 3rd generation with Free T4 reflex    HEMOGLOBIN A1C W/ EAG ESTIMATION    UA (URINE) with reflex to Scope    Iron Panel (Includes Ferritin, Iron Sat%, Iron, and TIBC)    Vitamin D 25 hydroxy    Vitamin B12/Folate, Serum Panel    Lipid panel    Hepatitis C antibody    : HIV 1/2 AB/AG w Reflex [facility] for 2 yr old and above    Vitamin D deficiency    Relevant Orders    Comprehensive metabolic panel    CBC and differential    TSH, 3rd generation with Free T4 reflex    HEMOGLOBIN A1C W/ EAG ESTIMATION    UA (URINE) with reflex to Scope    Iron Panel (Includes Ferritin, Iron Sat%, Iron, and TIBC)    Vitamin D 25 hydroxy    Vitamin B12/Folate, Serum Panel    Lipid panel Hepatitis C antibody    : HIV 1/2 AB/AG w Reflex [facility] for 2 yr old and above    Iron deficiency anemia    Relevant Orders    Comprehensive metabolic panel    CBC and differential    TSH, 3rd generation with Free T4 reflex    HEMOGLOBIN A1C W/ EAG ESTIMATION    UA (URINE) with reflex to Scope    Iron Panel (Includes Ferritin, Iron Sat%, Iron, and TIBC)    Vitamin D 25 hydroxy    Vitamin B12/Folate, Serum Panel    Lipid panel    Hepatitis C antibody    : HIV 1/2 AB/AG w Reflex [facility] for 2 yr old and above   Other Visit Diagnoses     Encounter for general adult medical examination with abnormal findings    -  Primary    Relevant Orders    Comprehensive metabolic panel    CBC and differential    TSH, 3rd generation with Free T4 reflex    HEMOGLOBIN A1C W/ EAG ESTIMATION    UA (URINE) with reflex to Scope    Iron Panel (Includes Ferritin, Iron Sat%, Iron, and TIBC)    Vitamin D 25 hydroxy    Vitamin B12/Folate, Serum Panel    Lipid panel    Hepatitis C antibody    : HIV 1/2 AB/AG w Reflex [facility] for 2 yr old and above    Mammo screening bilateral w cad    Hordeolum externum of left upper eyelid        Relevant Medications    ofloxacin (OCUFLOX) 0 3 % ophthalmic solution    erythromycin (ILOTYCIN) ophthalmic ointment    Other Relevant Orders    Comprehensive metabolic panel    CBC and differential    TSH, 3rd generation with Free T4 reflex    HEMOGLOBIN A1C W/ EAG ESTIMATION    UA (URINE) with reflex to Scope    Iron Panel (Includes Ferritin, Iron Sat%, Iron, and TIBC)    Vitamin D 25 hydroxy    Vitamin B12/Folate, Serum Panel    Lipid panel    Hepatitis C antibody    : HIV 1/2 AB/AG w Reflex [facility] for 2 yr old and above    Encounter for screening mammogram for malignant neoplasm of breast        Relevant Orders    Mammo screening bilateral w cad    Annual physical exam        Screening for colon cancer        Relevant Orders    Cologuard          Immunizations and preventive care screenings were discussed with patient today  Appropriate education was printed on patient's after visit summary  Counseling:  Alcohol/drug use: discussed moderation in alcohol intake, the recommendations for healthy alcohol use, and avoidance of illicit drug use  Dental Health: discussed importance of regular tooth brushing, flossing, and dental visits  Injury prevention: discussed safety/seat belts, safety helmets, smoke detectors, carbon dioxide detectors, and smoking near bedding or upholstery  Sexual health: discussed sexually transmitted diseases, partner selection, use of condoms, avoidance of unintended pregnancy, and contraceptive alternatives  · Exercise: the importance of regular exercise/physical activity was discussed  Recommend exercise 3-5 times per week for at least 30 minutes  BMI Counseling: Body mass index is 44 6 kg/m²  The BMI is above normal  Nutrition recommendations include decreasing portion sizes, encouraging healthy choices of fruits and vegetables, decreasing fast food intake, consuming healthier snacks, limiting drinks that contain sugar, moderation in carbohydrate intake, increasing intake of lean protein, reducing intake of saturated and trans fat and reducing intake of cholesterol  Exercise recommendations include vigorous physical activity 75 minutes/week, exercising 3-5 times per week and strength training exercises  No pharmacotherapy was ordered  Patient referred to PCP  Rationale for BMI follow-up plan is due to patient being overweight or obese  Return in 4 weeks (on 3/21/2023)  Chief Complaint:     Chief Complaint   Patient presents with   • Stye      History of Present Illness:     Adult Annual Physical   Patient here for a comprehensive physical exam  The patient reports problems - see HPI   Diet and Physical Activity  · Diet/Nutrition: poor diet  · Exercise: no formal exercise        Depression Screening  PHQ-2/9 Depression Screening    Little interest or pleasure in doing things: 0 - not at all  Feeling down, depressed, or hopeless: 0 - not at all  PHQ-2 Score: 0  PHQ-2 Interpretation: Negative depression screen       General Health  · Sleep: sleeps well  · Hearing: normal - bilateral   · Vision: no vision problems  · Dental: regular dental visits  /GYN Health  · Patient is: postmenopausal  · Last menstrual period: post hysterectomy   · Contraceptive method: n/a  Review of Systems:     Review of Systems   Constitutional: Positive for fatigue  Negative for chills, fever and unexpected weight change  HENT: Negative for congestion and postnasal drip  Eyes:        Left eye upper eye lid external and internal stye    Respiratory: Negative for cough and shortness of breath  Cardiovascular: Negative for chest pain and palpitations  History of hyperlipidemia    Gastrointestinal: Negative for abdominal distention, abdominal pain, nausea and vomiting  Post bariatric surgery    Endocrine: Negative  Genitourinary: Negative for difficulty urinating, dyspareunia, flank pain and vaginal pain  Post hysterectomy    Musculoskeletal: Positive for arthralgias  Skin: Negative for rash  Allergic/Immunologic: Positive for environmental allergies  Neurological: Negative for headaches  Hematological: Negative for adenopathy  Psychiatric/Behavioral: Negative for sleep disturbance and suicidal ideas  The patient is not nervous/anxious         Past Medical History:     Past Medical History:   Diagnosis Date   • Anemia    • History of transfusion    • Hypercholesterolemia    • Sleep apnea    • Vitamin deficiency       Past Surgical History:     Past Surgical History:   Procedure Laterality Date   • ECTOPIC PREGNANCY SURGERY     • EYE SURGERY     • GASTRECTOMY SLEEVE LAPAROSCOPIC     • KS LAPAROSCOPY W/RMVL ADNEXAL STRUCTURES Bilateral 9/28/2021    Procedure: SALPINGECTOMY, LAPAROSCOPIC;  Surgeon: Rome Bosworth, MD;  Location: [facility];  Service: Gynecology   • KS LAPS W/VAG HYSTERECT 250 GM/&RMVL TUBE&/OVARIES N/A 2021    Procedure: HYSTERECTOMY LAPAROSCOPIC ASSISTED VAGINAL (LAVH), LYSIS OF ADHESIONS;  Surgeon: Sofi Escamilla MD;  Location: [facility];  Service: Gynecology      Social History:     Social History     Socioeconomic History   • Marital status: /Civil Union     Spouse name: None   • Number of children: None   • Years of education: None   • Highest education level: None   Occupational History   • None   Tobacco Use   • Smoking status: Never   • Smokeless tobacco: Never   Vaping Use   • Vaping Use: Never used   Substance and Sexual Activity   • Alcohol use: No   • Drug use: No   • Sexual activity: Not Currently   Other Topics Concern   • None   Social History Narrative    Most recent tobacco use screenin2019    Do you currently or have you served in [employer]: No     Social Determinants of Health     Financial Resource Strain: Not on file   Food Insecurity: Not on file   Transportation Needs: Not on file   Physical Activity: Not on file   Stress: Not on file   Social Connections: Not on file   Intimate Partner Violence: Not on file   Housing Stability: Not on file      Family History:     Family History   Problem Relation Age of Onset   • Brain cancer Mother    • Cancer Father    • Asthma Brother    • Diabetes Maternal Grandmother    • Cancer Maternal Grandmother    • Hypertension Paternal Grandmother       Current Medications:     Current Outpatient Medications   Medication Sig Dispense Refill   • erythromycin (ILOTYCIN) ophthalmic ointment Administer 0 5 inches into the left eye every 8 (eight) hours for 5 days 3 5 g 0   • ofloxacin (OCUFLOX) 0 3 % ophthalmic solution Administer 1 drop into the left eye 4 (four) times a day for 7 days 10 mL 0   • Ascorbic Acid (YVONNE-C PO) Take by mouth     • Bacillus Coagulans-Inulin (PROBIOTIC) 1-250 BILLION-MG CAPS Take 1 capsule by mouth Takes once in awhile     • Biotin 1 MG CAPS Take by mouth     • cholecalciferol (VITAMIN D3) 25 mcg (1,000 units) tablet Take 1,000 Units by mouth daily     • cyanocobalamin (VITAMIN B-12) 100 mcg tablet Take by mouth daily     • ferrous sulfate 324 (65 Fe) mg Take 1 tablet (324 mg total) by mouth 3 (three) times a day before meals 90 tablet 0   • hydrocortisone 2 5 % lotion Apply topically 2 (two) times a day for 10 days 60 mL 1   • multivitamin (THERAGRAN) TABS Take 1 tablet by mouth daily     • Omega-3 Fatty Acids (fish oil) 1,000 mg Take 1,000 mg by mouth daily     • omeprazole (PriLOSEC) 40 MG capsule Take 1 capsule by mouth once daily 30 capsule 0   • polyethylene glycol (MIRALAX) 17 g packet Take 17 g by mouth daily 1 each 0   • sucralfate (CARAFATE) 1 g tablet Take 1 tablet (1 g total) by mouth 4 (four) times a day (Patient not taking: Reported on 11/17/2021 ) 60 tablet 0     No current facility-administered medications for this visit  Allergies: Allergies   Allergen Reactions   • Penicillin G Other (See Comments)   • Pollen Extract Sneezing      Physical Exam:     /68   Pulse 78   Temp 97 8 °F (36 6 °C)   Resp 16   Ht 5' 5" (1 651 m)   Wt 122 kg (268 lb)   LMP 09/12/2021 (Exact Date)   SpO2 98%   BMI 44 60 kg/m²     Physical Exam  Vitals and nursing note reviewed  Constitutional:       Appearance: She is obese  Comments: BMI 44 60   HENT:      Head: Atraumatic  Nose: No congestion or rhinorrhea  Eyes:      Comments: Left upper eyelid hordeolum    Cardiovascular:      Rate and Rhythm: Normal rate and regular rhythm  Heart sounds: Normal heart sounds  Pulmonary:      Breath sounds: Normal breath sounds  Musculoskeletal:         General: Normal range of motion  Cervical back: Normal range of motion  Skin:     General: Skin is warm  Capillary Refill: Capillary refill takes less than 2 seconds  Neurological:      Mental Status: She is alert and oriented to person, place, and time     Psychiatric:         Mood and Affect: Mood normal          Behavior: Behavior normal           Santos Randle

## 2023-02-22 ENCOUNTER — PATIENT MESSAGE (OUTPATIENT)
Dept: GYNECOLOGY | Facility: CLINIC | Age: 48
End: 2023-02-22

## 2023-02-25 PROBLEM — E66.01 CLASS 3 SEVERE OBESITY DUE TO EXCESS CALORIES WITHOUT SERIOUS COMORBIDITY WITH BODY MASS INDEX (BMI) OF 40.0 TO 44.9 IN ADULT (HCC): Status: ACTIVE | Noted: 2022-03-17

## 2023-02-25 PROBLEM — E66.813 CLASS 3 SEVERE OBESITY DUE TO EXCESS CALORIES WITHOUT SERIOUS COMORBIDITY WITH BODY MASS INDEX (BMI) OF 40.0 TO 44.9 IN ADULT (HCC): Status: ACTIVE | Noted: 2022-03-17

## 2023-02-26 NOTE — PATIENT INSTRUCTIONS
Wellness Visit for Adults   AMBULATORY CARE:   A wellness visit  is when you see your healthcare provider to get screened for health problems  Your healthcare provider will also give you advice on how to stay healthy  Write down your questions so you remember to ask them  Ask your healthcare provider how often you should have a wellness visit  What happens at a wellness visit:  Your healthcare provider will ask about your health, and your family history of health problems  This includes high blood pressure, heart disease, and cancer  He or she will ask if you have symptoms that concern you, if you smoke, and about your mood  You may also be asked about your intake of medicines, supplements, food, and alcohol  Any of the following may be done:  • Your weight  will be checked  Your height may also be checked so your body mass index (BMI) can be calculated  Your BMI shows if you are at a healthy weight  • Your blood pressure  and heart rate will be checked  Your temperature may also be checked  • Blood and urine tests  may be done  Blood tests may be done to check your cholesterol levels  Abnormal cholesterol levels increase your risk for heart disease and stroke  You may also need a blood or urine test to check for diabetes if you are at increased risk  Urine tests may be done to look for signs of an infection or kidney disease  • A physical exam  includes checking your heartbeat and lungs with a stethoscope  Your healthcare provider may also check your skin to look for sun damage  • Screening tests  may be recommended  A screening test is done to check for diseases that may not cause symptoms  The screening tests you may need depend on your age, gender, family history, and lifestyle habits  For example, colorectal screening may be recommended if you are 48years old or older  Screening tests you need if you are a woman:   • A Pap smear  is used to screen for cervical cancer   Pap smears are usually done every 3 to 5 years depending on your age  You may need them more often if you have had abnormal Pap smear test results in the past  Ask your healthcare provider how often you should have a Pap smear  • A mammogram  is an x-ray of your breasts to screen for breast cancer  Experts recommend mammograms every 2 years starting at age 48 years  You may need a mammogram at age 52 years or younger if you have an increased risk for breast cancer  Talk to your healthcare provider about when you should start having mammograms and how often you need them  Vaccines you may need:   • Get an influenza vaccine  every year  The influenza vaccine protects you from the flu  Several types of viruses cause the flu  The viruses change over time, so new vaccines are made each year  • Get a tetanus-diphtheria (Td) booster vaccine  every 10 years  This vaccine protects you against tetanus and diphtheria  Tetanus is a severe infection that may cause painful muscle spasms and lockjaw  Diphtheria is a severe bacterial infection that causes a thick covering in the back of your mouth and throat  • Get a human papillomavirus (HPV) vaccine  if you are female and aged 23 to 32 or male 23 to 24 and never received it  This vaccine protects you from HPV infection  HPV is the most common infection spread by sexual contact  HPV may also cause vaginal, penile, and anal cancers  • Get a pneumococcal vaccine  if you are aged 72 years or older  The pneumococcal vaccine is an injection given to protect you from pneumococcal disease  Pneumococcal disease is an infection caused by pneumococcal bacteria  The infection may cause pneumonia, meningitis, or an ear infection  • Get a shingles vaccine  if you are 60 or older, even if you have had shingles before  The shingles vaccine is an injection to protect you from the varicella-zoster virus  This is the same virus that causes chickenpox   Shingles is a painful rash that develops in people who had chickenpox or have been exposed to the virus  How to eat healthy:  My Plate is a model for planning healthy meals  It shows the types and amounts of foods that should go on your plate  Fruits and vegetables make up about half of your plate, and grains and protein make up the other half  A serving of dairy is included on the side of your plate  The amount of calories and serving sizes you need depends on your age, gender, weight, and height  Examples of healthy foods are listed below:  • Eat a variety of vegetables  such as dark green, red, and orange vegetables  You can also include canned vegetables low in sodium (salt) and frozen vegetables without added butter or sauces  • Eat a variety of fresh fruits , canned fruit in 100% juice, frozen fruit, and dried fruit  • Include whole grains  At least half of the grains you eat should be whole grains  Examples include whole-wheat bread, wheat pasta, brown rice, and whole-grain cereals such as oatmeal     • Eat a variety of protein foods such as seafood (fish and shellfish), lean meat, and poultry without skin (turkey and chicken)  Examples of lean meats include pork leg, shoulder, or tenderloin, and beef round, sirloin, tenderloin, and extra lean ground beef  Other protein foods include eggs and egg substitutes, beans, peas, soy products, nuts, and seeds  • Choose low-fat dairy products such as skim or 1% milk or low-fat yogurt, cheese, and cottage cheese  • Limit unhealthy fats  such as butter, hard margarine, and shortening  Exercise:  Exercise at least 30 minutes per day on most days of the week  Some examples of exercise include walking, biking, dancing, and swimming  You can also fit in more physical activity by taking the stairs instead of the elevator or parking farther away from stores  Include muscle strengthening activities 2 days each week  Regular exercise provides many health benefits   It helps you manage your weight, and decreases your risk for type 2 diabetes, heart disease, stroke, and high blood pressure  Exercise can also help improve your mood  Ask your healthcare provider about the best exercise plan for you  General health and safety guidelines:   • Do not smoke  Nicotine and other chemicals in cigarettes and cigars can cause lung damage  Ask your healthcare provider for information if you currently smoke and need help to quit  E-cigarettes or smokeless tobacco still contain nicotine  Talk to your healthcare provider before you use these products  • Limit alcohol  A drink of alcohol is 12 ounces of beer, 5 ounces of wine, or 1½ ounces of liquor  • Lose weight, if needed  Being overweight increases your risk of certain health conditions  These include heart disease, high blood pressure, type 2 diabetes, and certain types of cancer  • Protect your skin  Do not sunbathe or use tanning beds  Use sunscreen with a SPF 15 or higher  Apply sunscreen at least 15 minutes before you go outside  Reapply sunscreen every 2 hours  Wear protective clothing, hats, and sunglasses when you are outside  • Drive safely  Always wear your seatbelt  Make sure everyone in your car wears a seatbelt  A seatbelt can save your life if you are in an accident  Do not use your cell phone when you are driving  This could distract you and cause an accident  Pull over if you need to make a call or send a text message  • Practice safe sex  Use latex condoms if are sexually active and have more than one partner  Your healthcare provider may recommend screening tests for sexually transmitted infections (STIs)  • Wear helmets, lifejackets, and protective gear  Always wear a helmet when you ride a bike or motorcycle, go skiing, or play sports that could cause a head injury  Wear protective equipment when you play sports  Wear a lifejacket when you are on a boat or doing water sports      © Copyright [employer] 2022 Information is for End User's use only and may not be sold, redistributed or otherwise used for commercial purposes  The above information is an  only  It is not intended as medical advice for individual conditions or treatments  Talk to your doctor, nurse or pharmacist before following any medical regimen to see if it is safe and effective for you  Cholesterol and Your Health   AMBULATORY CARE:   Cholesterol  is a waxy, fat-like substance  Your body uses cholesterol to make hormones and new cells, and to protect nerves  Cholesterol is made by your body  It also comes from certain foods you eat, such as meat and dairy products  Your healthcare provider can help you set goals for your cholesterol levels  He or she can help you create a plan to meet your goals  Cholesterol level goals: Your cholesterol level goals depend on your risk for heart disease, your age, and your other health conditions  The following are general guidelines:  • Total cholesterol  includes low-density lipoprotein (LDL), high-density lipoprotein (HDL), and triglyceride levels  The total cholesterol level should be lower than 200 mg/dL and is best at about 150 mg/dL  • LDL cholesterol  is called bad cholesterol  because it forms plaque in your arteries  As plaque builds up, your arteries become narrow, and less blood flows through  When plaque decreases blood flow to your heart, you may have chest pain  If plaque completely blocks an artery that brings blood to your heart, you may have a heart attack  Plaque can break off and form blood clots  Blood clots may block arteries in your brain and cause a stroke  The level should be less than 130 mg/dL and is best at about 100 mg/dL  • HDL cholesterol  is called good cholesterol  because it helps remove LDL cholesterol from your arteries  It does this by attaching to LDL cholesterol and carrying it to your liver  Your liver breaks down LDL cholesterol so your body can get rid of it   High levels of HDL cholesterol can help prevent a heart attack and stroke  Low levels of HDL cholesterol can increase your risk for heart disease, heart attack, and stroke  The level should be 60 mg/dL or higher  • Triglycerides  are a type of fat that store energy from foods you eat  High levels of triglycerides also cause plaque buildup  This can increase your risk for a heart attack or stroke  If your triglyceride level is high, your LDL cholesterol level may also be high  The level should be less than 150 mg/dL  Any of the following can increase your risk for high cholesterol:   • Smoking cigarettes    • Being overweight or obese, or not getting enough exercise    • Drinking large amounts of alcohol    • A medical condition such as hypertension (high blood pressure) or diabetes    • Certain genes passed from your parents to you    • Age older than 65 years    What you need to know about having your cholesterol levels checked: Adults 21to 39years of age should have their cholesterol levels checked every 4 to 6 years  Adults 45 years or older should have their cholesterol checked every 1 to 2 years  You may need your cholesterol checked more often, or at a younger age, if you have risk factors for heart disease  You may also need to have your cholesterol checked more often if you have other health conditions, such as diabetes  Blood tests are used to check cholesterol levels  Blood tests measure your levels of triglycerides, LDL cholesterol, and HDL cholesterol  How healthy fats affect your cholesterol levels:  Healthy fats, also called unsaturated fats, help lower LDL cholesterol and triglyceride levels  Healthy fats include the following:  • Monounsaturated fats  are found in foods such as olive oil, canola oil, avocado, nuts, and olives  • Polyunsaturated fats,  such as omega 3 fats, are found in fish, such as salmon, trout, and tuna   They can also be found in plant foods such as flaxseed, walnuts, and soybeans  How unhealthy fats affect your cholesterol levels:  Unhealthy fats increase LDL cholesterol and triglyceride levels  They are found in foods high in cholesterol, saturated fat, and trans fat:  • Cholesterol  is found in eggs, dairy, and meat  • Saturated fat  is found in butter, cheese, ice cream, whole milk, and coconut oil  Saturated fat is also found in meat, such as sausage, hot dogs, and bologna  • Trans fat  is found in liquid oils and is used in fried and baked foods  Foods that contain trans fats include chips, crackers, muffins, sweet rolls, microwave popcorn, and cookies  Treatment  for high cholesterol will also decrease your risk of heart disease, heart attack, and stroke  Treatment may include any of the following:  • Lifestyle changes  may include food, exercise, weight loss, and quitting smoking  You may also need to decrease the amount of alcohol you drink  Your healthcare provider will want you to start with lifestyle changes  Other treatment may be added if lifestyle changes are not enough  Your healthcare provider may recommend you work with a team to manage hyperlipidemia  The team may include medical experts such as a dietitian, an exercise or physical therapist, and a behavior therapist  Your family members may be included in helping you create lifestyle changes  • Medicines  may be given to lower your LDL cholesterol, triglyceride levels, or total cholesterol level  You may need medicines to lower your cholesterol if any of the following is true:    ? You have a history of stroke, TIA, unstable angina, or a heart attack  ? Your LDL cholesterol level is 190 mg/dL or higher  ? You are age 36 to 76 years, have diabetes or heart disease risk factors, and your LDL cholesterol is 70 mg/dL or higher  • Supplements  include fish oil, red yeast rice, and garlic  Fish oil may help lower your triglyceride and LDL cholesterol levels   It may also increase your HDL cholesterol level  Red yeast rice may help decrease your total cholesterol level and LDL cholesterol level  Garlic may help lower your total cholesterol level  Do not take any supplements without talking to your healthcare provider  Food changes you can make to lower your cholesterol levels:  A dietitian can help you create a healthy eating plan  He or she can show you how to read food labels and choose foods low in saturated fat, trans fats, and cholesterol  • Decrease the total amount of fat you eat  Choose lean meats, fat-free or 1% fat milk, and low-fat dairy products, such as yogurt and cheese  Try to limit or avoid red meats  Limit or do not eat fried foods or baked goods, such as cookies  • Replace unhealthy fats with healthy fats  Cook foods in olive oil or canola oil  Choose soft margarines that are low in saturated fat and trans fat  Seeds, nuts, and avocados are other examples of healthy fats  • Eat foods with omega-3 fats  Examples include salmon, tuna, mackerel, walnuts, and flaxseed  Eat fish 2 times per week  Pregnant women should not eat fish that have high levels of mercury, such as shark, swordfish, and sandy mackerel  • Increase the amount of high-fiber foods you eat  High-fiber foods can help lower your LDL cholesterol  Aim to get between 20 and 30 grams of fiber each day  Fruits and vegetables are high in fiber  Eat at least 5 servings each day  Other high-fiber foods are whole-grain or whole-wheat breads, pastas, or cereals, and brown rice  Eat 3 ounces of whole-grain foods each day  Increase fiber slowly  You may have abdominal discomfort, bloating, and gas if you add fiber to your diet too quickly  • Eat healthy protein foods  Examples include low-fat dairy products, skinless chicken and turkey, fish, and nuts  • Limit foods and drinks that are high in sugar    Your dietitian or healthcare provider can help you create daily limits for high-sugar foods and drinks  The limit may be lower if you have diabetes or another health condition  Limits can also help you lose weight if needed  Lifestyle changes you can make to lower your cholesterol levels:   • Maintain a healthy weight  Ask your healthcare provider what a healthy weight is for you  Ask him or her to help you create a weight loss plan if needed  Weight loss can decrease your total cholesterol and triglyceride levels  Weight loss may also help keep your blood pressure at a healthy level  • Be physically active throughout the day  Physical activity, such as exercise, can help lower your total cholesterol level and maintain a healthy weight  Physical activity can also help increase your HDL cholesterol level  Work with your healthcare provider to create an program that is right for you  Get at least 30 to 40 minutes of moderate physical activity most days of the week  Examples of exercise include brisk walking, swimming, or biking  Also include strength training at least 2 times each week  Your healthcare providers can help you create a physical activity plan  • Do not smoke  Nicotine and other chemicals in cigarettes and cigars can raise your cholesterol levels  Ask your healthcare provider for information if you currently smoke and need help to quit  E-cigarettes or smokeless tobacco still contain nicotine  Talk to your healthcare provider before you use these products  • Limit or do not drink alcohol  Alcohol can increase your triglyceride levels  Ask your healthcare provider before you drink alcohol  Ask how much is okay for you to drink in 24 hours or 1 week  Follow up with your doctor as directed:  Write down your questions so you remember to ask them during your visits  © Copyright [employer] 2022 Information is for End User's use only and may not be sold, redistributed or otherwise used for commercial purposes  The above information is an  only   It is not intended as medical advice for individual conditions or treatments  Talk to your doctor, nurse or pharmacist before following any medical regimen to see if it is safe and effective for you

## 2023-02-27 DIAGNOSIS — H00.014 HORDEOLUM EXTERNUM OF LEFT UPPER EYELID: ICD-10-CM

## 2023-02-27 RX ORDER — ERYTHROMYCIN 5 MG/G
OINTMENT OPHTHALMIC
Qty: 4 G | Refills: 0 | Status: SHIPPED | OUTPATIENT
Start: 2023-02-27

## 2023-03-03 ENCOUNTER — APPOINTMENT (OUTPATIENT)
Dept: LAB | Facility: HOSPITAL | Age: 48
End: 2023-03-03

## 2023-03-03 DIAGNOSIS — Z00.01 ENCOUNTER FOR GENERAL ADULT MEDICAL EXAMINATION WITH ABNORMAL FINDINGS: Primary | ICD-10-CM

## 2023-03-03 DIAGNOSIS — D50.0 IRON DEFICIENCY ANEMIA DUE TO CHRONIC BLOOD LOSS: ICD-10-CM

## 2023-03-03 DIAGNOSIS — Z98.84 S/P LAPAROSCOPIC SLEEVE GASTRECTOMY: ICD-10-CM

## 2023-03-03 DIAGNOSIS — E78.2 MIXED HYPERLIPIDEMIA: ICD-10-CM

## 2023-03-03 DIAGNOSIS — H00.014 HORDEOLUM EXTERNUM OF LEFT UPPER EYELID: ICD-10-CM

## 2023-03-03 DIAGNOSIS — E55.9 VITAMIN D DEFICIENCY: ICD-10-CM

## 2023-03-03 LAB
ALBUMIN SERPL BCP-MCNC: 4.2 G/DL (ref 3.5–5)
ALP SERPL-CCNC: 63 U/L (ref 34–104)
ALT SERPL W P-5'-P-CCNC: 8 U/L (ref 7–52)
ANION GAP SERPL CALCULATED.3IONS-SCNC: 5 MMOL/L (ref 4–13)
AST SERPL W P-5'-P-CCNC: 13 U/L (ref 13–39)
BASOPHILS # BLD AUTO: 0.03 THOUSANDS/ÂΜL (ref 0–0.1)
BASOPHILS NFR BLD AUTO: 1 % (ref 0–1)
BILIRUB SERPL-MCNC: 0.64 MG/DL (ref 0.2–1)
BILIRUB UR QL STRIP: NEGATIVE
BUN SERPL-MCNC: 11 MG/DL (ref 5–25)
CALCIUM SERPL-MCNC: 9.1 MG/DL (ref 8.4–10.2)
CHLORIDE SERPL-SCNC: 102 MMOL/L (ref 96–108)
CHOLEST SERPL-MCNC: 247 MG/DL
CLARITY UR: CLEAR
CO2 SERPL-SCNC: 32 MMOL/L (ref 21–32)
COLOR UR: NORMAL
CREAT SERPL-MCNC: 0.66 MG/DL (ref 0.6–1.3)
EOSINOPHIL # BLD AUTO: 0.16 THOUSAND/ÂΜL (ref 0–0.61)
EOSINOPHIL NFR BLD AUTO: 3 % (ref 0–6)
ERYTHROCYTE [DISTWIDTH] IN BLOOD BY AUTOMATED COUNT: 13.4 % (ref 11.6–15.1)
FERRITIN SERPL-MCNC: 24 NG/ML (ref 8–388)
FOLATE SERPL-MCNC: 18 NG/ML (ref 3.1–17.5)
GFR SERPL CREATININE-BSD FRML MDRD: 106 ML/MIN/1.73SQ M
GLUCOSE P FAST SERPL-MCNC: 83 MG/DL (ref 65–99)
GLUCOSE UR STRIP-MCNC: NEGATIVE MG/DL
HCT VFR BLD AUTO: 41.2 % (ref 34.8–46.1)
HDLC SERPL-MCNC: 53 MG/DL
HGB BLD-MCNC: 12.9 G/DL (ref 11.5–15.4)
HGB UR QL STRIP.AUTO: NEGATIVE
IMM GRANULOCYTES # BLD AUTO: 0.01 THOUSAND/UL (ref 0–0.2)
IMM GRANULOCYTES NFR BLD AUTO: 0 % (ref 0–2)
IRON SATN MFR SERPL: 23 % (ref 15–50)
IRON SERPL-MCNC: 80 UG/DL (ref 50–170)
KETONES UR STRIP-MCNC: NEGATIVE MG/DL
LDLC SERPL CALC-MCNC: 151 MG/DL (ref 0–100)
LEUKOCYTE ESTERASE UR QL STRIP: NEGATIVE
LYMPHOCYTES # BLD AUTO: 1.98 THOUSANDS/ÂΜL (ref 0.6–4.47)
LYMPHOCYTES NFR BLD AUTO: 38 % (ref 14–44)
MCH RBC QN AUTO: 26.3 PG (ref 26.8–34.3)
MCHC RBC AUTO-ENTMCNC: 31.3 G/DL (ref 31.4–37.4)
MCV RBC AUTO: 84 FL (ref 82–98)
MONOCYTES # BLD AUTO: 0.58 THOUSAND/ÂΜL (ref 0.17–1.22)
MONOCYTES NFR BLD AUTO: 11 % (ref 4–12)
NEUTROPHILS # BLD AUTO: 2.49 THOUSANDS/ÂΜL (ref 1.85–7.62)
NEUTS SEG NFR BLD AUTO: 47 % (ref 43–75)
NITRITE UR QL STRIP: NEGATIVE
NONHDLC SERPL-MCNC: 194 MG/DL
NRBC BLD AUTO-RTO: 0 /100 WBCS
PH UR STRIP.AUTO: 7.5 [PH]
PLATELET # BLD AUTO: 278 THOUSANDS/UL (ref 149–390)
PMV BLD AUTO: 9.6 FL (ref 8.9–12.7)
POTASSIUM SERPL-SCNC: 4.1 MMOL/L (ref 3.5–5.3)
PROT SERPL-MCNC: 7.7 G/DL (ref 6.4–8.4)
PROT UR STRIP-MCNC: NEGATIVE MG/DL
RBC # BLD AUTO: 4.91 MILLION/UL (ref 3.81–5.12)
SODIUM SERPL-SCNC: 139 MMOL/L (ref 135–147)
SP GR UR STRIP.AUTO: 1.02 (ref 1–1.03)
TIBC SERPL-MCNC: 345 UG/DL (ref 250–450)
TRIGL SERPL-MCNC: 215 MG/DL
TSH SERPL DL<=0.05 MIU/L-ACNC: 1.2 UIU/ML (ref 0.45–4.5)
UROBILINOGEN UR STRIP-ACNC: <2 MG/DL
VIT B12 SERPL-MCNC: 448 PG/ML (ref 100–900)
WBC # BLD AUTO: 5.25 THOUSAND/UL (ref 4.31–10.16)

## 2023-03-04 LAB
25(OH)D3 SERPL-MCNC: 21.1 NG/ML (ref 30–100)
EST. AVERAGE GLUCOSE BLD GHB EST-MCNC: 114 MG/DL
HBA1C MFR BLD: 5.6 %
HCV AB SER QL: NORMAL
HIV 1+2 AB+HIV1 P24 AG SERPL QL IA: NORMAL
HIV 2 AB SERPL QL IA: NORMAL
HIV1 AB SERPL QL IA: NORMAL
HIV1 P24 AG SERPL QL IA: NORMAL

## 2023-03-20 ENCOUNTER — ANNUAL EXAM (OUTPATIENT)
Dept: GYNECOLOGY | Facility: CLINIC | Age: 48
End: 2023-03-20

## 2023-03-20 VITALS
BODY MASS INDEX: 45.65 KG/M2 | DIASTOLIC BLOOD PRESSURE: 80 MMHG | SYSTOLIC BLOOD PRESSURE: 136 MMHG | HEIGHT: 65 IN | WEIGHT: 274 LBS

## 2023-03-20 DIAGNOSIS — Z12.31 SCREENING MAMMOGRAM FOR BREAST CANCER: Primary | ICD-10-CM

## 2023-03-20 DIAGNOSIS — Z01.419 ENCOUNTER FOR ANNUAL ROUTINE GYNECOLOGICAL EXAMINATION: ICD-10-CM

## 2023-03-20 NOTE — PROGRESS NOTES
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  Only gave up drinking soda and is trying to change her dietary habits  Denies any pelvic pain vaginal bleeding or dyspareunia  Denies any breast bowel or bladder issues  No change in family history  Medications reviewed        Review of Systems   Constitutional: Negative  Negative for fatigue, fever and unexpected weight change  HENT: Negative  Eyes: Negative  Respiratory: Negative  Negative for chest tightness, shortness of breath, wheezing and stridor  Cardiovascular: Negative  Negative for chest pain, palpitations and leg swelling  Gastrointestinal: Negative  Negative for abdominal pain, blood in stool, diarrhea, nausea, rectal pain and vomiting  Endocrine: Negative  Genitourinary: Negative for dysuria, frequency, vaginal bleeding, vaginal discharge and vaginal pain  Musculoskeletal: Negative  Skin: Negative  Allergic/Immunologic: Negative  Neurological: Negative  Hematological: Negative  Psychiatric/Behavioral: Negative  All other systems reviewed and are negative  Objective:      LMP 09/12/2021 (Exact Date)          Physical Exam  Constitutional:       Appearance: She is well-developed  Cardiovascular:      Rate and Rhythm: Normal rate and regular rhythm  Heart sounds: Normal heart sounds  Pulmonary:      Effort: Pulmonary effort is normal  No respiratory distress  Breath sounds: No stridor  No wheezing or rales  Chest:      Chest wall: No tenderness     Breasts:     Breasts are symmetrical  Right: No inverted nipple, mass, nipple discharge, skin change or tenderness  Left: No inverted nipple, mass, nipple discharge, skin change or tenderness  Abdominal:      General: Bowel sounds are normal  There is no distension  Palpations: Abdomen is soft  There is no mass  Tenderness: There is no abdominal tenderness  There is no guarding or rebound  Hernia: No hernia is present  There is no hernia in the left inguinal area  Genitourinary:     Labia:         Right: No rash, tenderness, lesion or injury  Left: No rash, tenderness, lesion or injury  Vagina: Normal  No signs of injury and foreign body  No vaginal discharge, erythema, tenderness or bleeding  Adnexa:         Right: No mass, tenderness or fullness  Left: No mass, tenderness or fullness  Rectum: No mass, tenderness, anal fissure, external hemorrhoid or internal hemorrhoid  Normal anal tone  Comments: Urethral meatus normal   Normal [place]'s glands  Vaginal cuff well supported  No cystocele or rectocele  Cervix and uterus absent  Lymphadenopathy:      Lower Body: No right inguinal adenopathy  No left inguinal adenopathy  Psychiatric:         Behavior: Behavior normal          Thought Content:  Thought content normal          Judgment: Judgment normal

## 2023-03-21 ENCOUNTER — OFFICE VISIT (OUTPATIENT)
Dept: FAMILY MEDICINE CLINIC | Facility: CLINIC | Age: 48
End: 2023-03-21

## 2023-03-21 VITALS
HEART RATE: 93 BPM | SYSTOLIC BLOOD PRESSURE: 112 MMHG | BODY MASS INDEX: 45.82 KG/M2 | TEMPERATURE: 98.7 F | WEIGHT: 275 LBS | HEIGHT: 65 IN | DIASTOLIC BLOOD PRESSURE: 84 MMHG | OXYGEN SATURATION: 96 %

## 2023-03-21 DIAGNOSIS — E78.49 OTHER HYPERLIPIDEMIA: Primary | ICD-10-CM

## 2023-03-21 DIAGNOSIS — Z87.42 HISTORY OF PCOS: ICD-10-CM

## 2023-03-21 DIAGNOSIS — E55.9 VITAMIN D DEFICIENCY: ICD-10-CM

## 2023-03-21 DIAGNOSIS — Z98.84 S/P LAPAROSCOPIC SLEEVE GASTRECTOMY: ICD-10-CM

## 2023-03-21 DIAGNOSIS — R00.1 BRADYCARDIA: ICD-10-CM

## 2023-03-21 NOTE — PROGRESS NOTES
Depression Screening and Follow-up Plan: Patient was screened for depression during today's encounter  They screened negative with a PHQ-2 score of 0  Assessment/Plan:    Pt is a 56 yr old female   Presents in office for follow up  Labs   Previous history of  Gastric bypass   She has gained some weight   History of Vitamin D deficiency   hyperlipidemia   She has been feeling ok for most part   Her daughter  cooks her food however it is not the healthiest   Discussed low fat low cholesterol diet and avoiding junk food      Problem List Items Addressed This Visit        Other    Hyperlipidemia - Primary    Relevant Orders    Comprehensive metabolic panel    CBC and differential    TSH, 3rd generation with Free T4 reflex    Lipid panel    UA w Reflex to Microscopic w Reflex to Culture -Lab Collect    S/P laparoscopic sleeve gastrectomy    Vitamin D deficiency    Relevant Medications    cholecalciferol (VITAMIN D3) 25 mcg (1,000 units) tablet    Other Relevant Orders    Vitamin D 25 hydroxy    History of PCOS    Relevant Orders    Comprehensive metabolic panel    CBC and differential    TSH, 3rd generation with Free T4 reflex    Lipid panel    UA w Reflex to Microscopic w Reflex to Culture -Lab Collect    Bradycardia         Subjective:      Patient ID: Christin Nguyen is a 56 y o  female  Pt is a 56 yr old female   Presents in office for follow up  Labs   Previous history of  Gastric bypass   She has gained some weight   History of Vitamin D deficiency   hyperlipidemia       The following portions of the patient's history were reviewed and updated as appropriate:   Past Medical History:  She has a past medical history of Anemia, History of transfusion, Hypercholesterolemia, Sleep apnea, and Vitamin deficiency  ,  _______________________________________________________________________  Medical Problems:  does not have any pertinent problems on file ,  _______________________________________________________________________  Past Surgical History:   has a past surgical history that includes Ectopic pregnancy surgery; Eye surgery; GASTRECTOMY SLEEVE LAPAROSCOPIC; pr laps w/vag hysterect 250 gm/&rmvl tube&/ovaries (N/A, 9/28/2021); and pr laparoscopy w/rmvl adnexal structures (Bilateral, 9/28/2021)  ,  _______________________________________________________________________  Family History:  family history includes Asthma in her brother; Brain cancer in her mother; Cancer in her father and maternal grandmother; Diabetes in her maternal grandmother; Hypertension in her paternal grandmother ,  _______________________________________________________________________  Social History:   reports that she has never smoked  She has never used smokeless tobacco  She reports that she does not drink alcohol and does not use drugs  ,  _______________________________________________________________________  Allergies:  is allergic to penicillin g and pollen extract     _______________________________________________________________________  Current Outpatient Medications   Medication Sig Dispense Refill   • Ascorbic Acid (YVONNE-C PO) Take by mouth     • Bacillus Coagulans-Inulin (PROBIOTIC) 1-250 BILLION-MG CAPS Take 1 capsule by mouth Takes once in awhile     • Biotin 1 MG CAPS Take by mouth     • cholecalciferol (VITAMIN D3) 25 mcg (1,000 units) tablet Take 1 tablet (1,000 Units total) by mouth daily 90 tablet 1   • cyanocobalamin (VITAMIN B-12) 100 mcg tablet Take by mouth daily     • ferrous sulfate 324 (65 Fe) mg Take 1 tablet (324 mg total) by mouth 3 (three) times a day before meals 90 tablet 0   • multivitamin (THERAGRAN) TABS Take 1 tablet by mouth daily     • Omega-3 Fatty Acids (fish oil) 1,000 mg Take 1,000 mg by mouth daily     • omeprazole (PriLOSEC) 40 MG capsule Take 1 capsule by mouth once daily 30 capsule 0   • polyethylene glycol (MIRALAX) 17 g packet Take 17 g by mouth daily 1 each 0   • hydrocortisone 2 5 % lotion Apply topically 2 (two) times a day for 10 days 60 mL 1     No current facility-administered medications for this visit      _______________________________________________________________________  Review of Systems   Constitutional: Positive for fatigue  Negative for chills, fever and unexpected weight change  HENT: Negative for congestion and postnasal drip  Eyes:        Left eye upper eye lid external and internal stye    Respiratory: Negative for cough and shortness of breath  Cardiovascular: Negative for chest pain and palpitations  History of hyperlipidemia    Gastrointestinal: Negative for abdominal distention, abdominal pain, nausea and vomiting  Post bariatric surgery   Positive for weight gain    Endocrine: Negative  Genitourinary: Negative for difficulty urinating, dyspareunia, flank pain and vaginal pain  Post hysterectomy    Musculoskeletal: Positive for arthralgias  Skin: Negative for rash  Allergic/Immunologic: Positive for environmental allergies  Neurological: Negative for headaches  Hematological: Negative for adenopathy  Psychiatric/Behavioral: Negative for sleep disturbance and suicidal ideas  The patient is not nervous/anxious  Objective:  Vitals:    03/21/23 1519   BP: 112/84   BP Location: Left arm   Patient Position: Sitting   Cuff Size: Large   Pulse: 93   Temp: 98 7 °F (37 1 °C)   SpO2: 96%   Weight: 125 kg (275 lb)   Height: 5' 5" (1 651 m)     Body mass index is 45 76 kg/m²  Physical Exam  Vitals and nursing note reviewed  Constitutional:       Appearance: Normal appearance  Comments: BMI 45 76   HENT:      Head: Atraumatic  Mouth/Throat:      Mouth: Mucous membranes are dry  Eyes:      Extraocular Movements: Extraocular movements intact  Cardiovascular:      Rate and Rhythm: Normal rate and regular rhythm  Pulses: Normal pulses        Heart sounds: Normal heart sounds  Pulmonary:      Effort: Pulmonary effort is normal       Breath sounds: Normal breath sounds  Abdominal:      General: Abdomen is flat  Palpations: Abdomen is soft  Musculoskeletal:         General: Normal range of motion  Cervical back: Normal range of motion  Skin:     General: Skin is warm  Capillary Refill: Capillary refill takes less than 2 seconds  Neurological:      Mental Status: She is alert and oriented to person, place, and time  Psychiatric:         Mood and Affect: Mood normal          Behavior: Behavior normal        Contains abnormal data CBC and differential  Order: [id number]   Status: Final result      Visible to patient: Yes (seen)      Next appt: 03/21/2024 at 03:00 PM in Gynecology Jimena Beck MD)      Dx: Encounter for general adult medical e         3 Result Notes     3 Patient Communications  Component Ref Range & Units 3/3/23 12:28 PM 9/28/21 11:05 AM 9/9/21  9:08 AM 7/19/21  4:45 PM 7/3/21  4:02 PM 6/14/21  8:07 AM 6/4/21  7:52 PM   WBC 4 31 - 10 16 Thousand/uL 5 25  13 09 High   4 50  5 61  4 97  5 52  4 74    RBC 3 81 - 5 12 Million/uL 4 91  3 87  5 10  4 74  5 25 High   4 58  5 28 High     Hemoglobin 11 5 - 15 4 g/dL 12 9  10 3 Low   13 2  11 2 Low   11 8  9 8 Low   10 5 Low     Hematocrit 34 8 - 46 1 % 41 2  32 9 Low   42 0  37 2  40 1  34 0 Low   37 4    MCV 82 - 98 fL 84  85  82  79 Low   76 Low   74 Low   71 Low     MCH 26 8 - 34 3 pg 26 3 Low   26 6 Low   25 9 Low   23 6 Low   22 5 Low   21 4 Low   19 9 Low     MCHC 31 4 - 37 4 g/dL 31 3 Low   31 3 Low   31 4  30 1 Low   29 4 Low   28 8 Low   28 1 Low     RDW 11 6 - 15 1 % 13 4  14 4  15 0  23 9 High   26 9 High   29 6 High   28 6 High     MPV 8 9 - 12 7 fL 9 6  9 3  9 7  9 5  9 8  10 4  9 5    Platelets 911 - 600 Thousands/uL 278  247  238  228  238  281  422 High     nRBC /100 WBCs 0   0  0  0  0  0    Neutrophils Relative 43 - 75 % 47   47  45  45  47  45    Immat GRANS % 0 - 2 % 0   0  0 0  0  0    Lymphocytes Relative 14 - 44 % 38   35  38  41  36  38    Monocytes Relative 4 - 12 % 11   14 High   13 High   11  12  14 High     Eosinophils Relative 0 - 6 % 3   3  3  3  4  2    Basophils Relative 0 - 1 % 1   1  1  0  1  1    Neutrophils Absolute 1 85 - 7 62 Thousands/µL 2 49   2 12  2 51  2 19  2 63  2 09    Immature Grans Absolute 0 00 - 0 20 Thousand/uL 0 01   0 02  0 01  0 01  0 01  0 02    Lymphocytes Absolute 0 60 - 4 47 Thousands/µL 1 98   1 58  2 13  2 04  1 98  1 82    Monocytes Absolute 0 17 - 1 22 Thousand/µL 0 58   0 61  0 75  0 55  0 65  0 65    Eosinophils Absolute 0 00 - 0 61 Thousand/µL 0 16   0 14  0 18  0 16  0 21  0 10    Basophils Absolute 0 00 - 0 10 Thousands/µL 0 03   0 03  0 03  0 02  0 04  0 06               Specimen Collected: 03/03/23 12:28 PM Last Resulted: 03/03/23  1:23 PM         Lab Flowsheet      Order Details      View Encounter      Lab and Collection Details      Routing      Result History     View Encounter Conversation           Result Care Coordination      Result Notes   DAVE Salazar   3/4/2023  8:12 PM EST Back to Top      Vitamin D is low take supplement     DAVE Salazar   3/4/2023  8:13 AM EST            Comprehensive metabolic panel  Order: [id number]   Status: Final result      Visible to patient: Yes (seen)      Next appt: 03/21/2024 at 03:00 PM in Gynecology Farhat Adamson MD)      Dx: Encounter for general adult medical e         3 Result Notes     3 Patient Communications  Component Ref Range & Units 3/3/23 12:28 PM 9/9/21  9:08 AM 7/19/21  4:45 PM 7/3/21  4:02 PM 6/4/21  7:52 PM 5/7/21 10:45 PM 4/30/21  1:28 AM   Sodium 135 - 147 mmol/L 139  139 R  139 R  141 R  142 R  142 R  138 R    Potassium 3 5 - 5 3 mmol/L 4 1  4 2  3 8  3 8  3 8  4 0  3 4 Low     Chloride 96 - 108 mmol/L 102  104 R  102 R  104 R  107 R  106 R  104 R    CO2 21 - 32 mmol/L 32  29  29  28  29  28  28    ANION GAP 4 - 13 mmol/L 5  6  8  9  6  8  6    BUN 5 - 25 mg/dL 11  17  11  12  9  13  12    Creatinine 0 60 - 1 30 mg/dL 0 66  0 69 CM  0 77 CM  0 62 CM  0 65 CM  0 68 CM  0 70 CM    Comment: Standardized to IDMS reference method   Glucose, Fasting 65 - 99 mg/dL 83  65 CM         Comment: Specimen collection should occur prior to Sulfasalazine administration due to the potential for falsely depressed results  Specimen collection should occur prior to Sulfapyridine administration due to the potential for falsely elevated results  Calcium 8 4 - 10 2 mg/dL 9 1  8 4 R  8 0 Low  R  8 4 R  8 3 R  8 2 Low  R  8 0 Low  R    AST 13 - 39 U/L 13  11 R, CM  11 R, CM   13 R, CM   12 R, CM    Comment: Specimen collection should occur prior to Sulfasalazine administration due to the potential for falsely depressed results  ALT 7 - 52 U/L 8  17 R, CM  15 R, CM   17 R, CM   13 R, CM    Comment: Specimen collection should occur prior to Sulfasalazine administration due to the potential for falsely depressed results      Alkaline Phosphatase 34 - 104 U/L 63  48 R  51 R   65 R   56 R    Total Protein 6 4 - 8 4 g/dL 7 7  7 4 R  7 1 R   7 6 R   7 5 R    Albumin 3 5 - 5 0 g/dL 4 2  3 5  3 5   3 4 Low    3 3 Low     Total Bilirubin 0 20 - 1 00 mg/dL 0 64  0 31 CM  0 27 CM   0 43 CM   0 50 CM    eGFR ml/min/1 73sq m 106  122  109  127  125  123  122            Narrative    [employer] guidelines for Chronic Kidney Disease (CKD):   •  Stage 1 with normal or high GFR (GFR > 90 mL/min/1 73 square meters)   •  Stage 2 Mild CKD (GFR = 60-89 mL/min/1 73 square meters)   •  Stage 3A Moderate CKD (GFR = 45-59 mL/min/1 73 square meters)   •  Stage 3B Moderate CKD (GFR = 30-44 mL/min/1 73 square meters)   •  Stage 4 Severe CKD (GFR = 15-29 mL/min/1 73 square meters)   •  Stage 5 End Stage CKD (GFR <15 mL/min/1 73 square meters)   Note: GFR calculation is accurate only with a steady state creatinine      Specimen Collected: 03/03/23 12:28 PM Last Resulted: 03/03/23  2:25 PM          3 Patient Communications  Component Ref Range & Units 3/3/23 12:28 PM 6/4/21  7:52 PM 4/29/21  7:50 AM   TSH 3RD GENERATON 0 450 - 4 500 uIU/mL 1 202  1 605 R, CM  2 169 R, CM         HEMOGLOBIN A1C W/ EAG ESTIMATION  Order: [id number]   Status: Final result      Visible to patient: Yes (seen)      Next appt: 03/21/2024 at 03:00 PM in Gynecology Hayes Padilla MD)      Dx: Encounter for general adult medical e         2 Result Notes     2 Patient Communications  Component Ref Range & Units 3/3/23 12:28 PM 9/9/21  9:08 AM 4/29/21  7:50 AM 1/6/20 12:12 PM 2/19/18 12:18 PM   Hemoglobin A1C Normal 3 8-5 6%; PreDiabetic 5 7-6 4%; Diabetic >=6 5%; Glycemic control for adults with diabetes <7 0% % 5 6  5 3  5 4  5 6 R, CM  5 7 High  R, CM    EAG mg/dl 114  105  108           Contains abnormal data Vitamin D 25 hydroxy  Order: [id number]   Status: Final result      Visible to patient: Yes (seen)      Next appt: 03/21/2024 at 03:00 PM in Gynecology Hayes Padilla MD)      Dx: Encounter for general adult medical e         1 Result Note     1 Patient Communication  Component Ref Range & Units 3/3/23 12:28 PM 9/9/21  9:08 AM 4/29/21  7:50 AM   Vit D, 25-Hydroxy 30 0 - 100 0 ng/mL 21 1 Low   26 1 Low   20 6 Low          Contains abnormal data Lipid panel  Order: [id number]   Status: Final result      Visible to patient: Yes (seen)      Next appt: 03/21/2024 at 03:00 PM in Gynecology Hayes Padilla MD)      Dx: Encounter for general adult medical e         3 Result Notes     3 Patient Communications  Component Ref Range & Units 3/3/23 12:28 PM 4/29/21  7:50 AM   Cholesterol See Comment mg/dL 247 High   193 R, CM    Comment: Cholesterol:         Pediatric <18 Years         Desirable          <170 mg/dL       Borderline High    170-199 mg/dL       High               >=200 mg/dL         Adult >=18 Years             Desirable         <200 mg/dL       Borderline High   200-239 mg/dL       High             >239 mg/dL    Triglycerides See Comment mg/dL 215 High   124 R, CM    Comment: Triglyceride:      0-9Y            <75mg/dL      10Y-17Y         <90 mg/dL        >=18Y      Normal          <150 mg/dL      Borderline High 150-199 mg/dL      High            200-499 mg/dL        Very High       >499 mg/dL     Specimen collection should occur prior to N-Acetylcysteine or Metamizole administration due to the potential for falsely depressed results  HDL, Direct >=50 mg/dL 53  52 R, CM    LDL Calculated 0 - 100 mg/dL 151 High   116 High  CM    Comment: LDL Cholesterol:     Optimal           <100 mg/dl     Near Optimal      100-129 mg/dl     Above Optimal       Borderline High 130-159 mg/dl       High            160-189 mg/dl       Very High       >189 mg/dl           This screening LDL is a calculated result  It does not have the accuracy of the Direct Measured LDL in the monitoring of patients with hyperlipidemia and/or statin therapy  Direct Measure LDL (RLC698) must be ordered separately in these patients     Non-HDL-Chol (CHOL-HDL) mg/dl 194  141               Specimen Collected: 03/03/23 12:28 PM Last Resulted: 03/03/23  2:25 PM

## 2023-03-23 RX ORDER — VITAMIN B COMPLEX
1000 TABLET ORAL DAILY
Qty: 90 TABLET | Refills: 1 | Status: SHIPPED | OUTPATIENT
Start: 2023-03-23 | End: 2023-06-21

## 2023-04-18 ENCOUNTER — OFFICE VISIT (OUTPATIENT)
Dept: FAMILY MEDICINE CLINIC | Facility: CLINIC | Age: 48
End: 2023-04-18

## 2023-04-18 VITALS
DIASTOLIC BLOOD PRESSURE: 80 MMHG | BODY MASS INDEX: 45.82 KG/M2 | HEART RATE: 78 BPM | HEIGHT: 65 IN | TEMPERATURE: 97.8 F | WEIGHT: 275 LBS | SYSTOLIC BLOOD PRESSURE: 120 MMHG | RESPIRATION RATE: 16 BRPM | OXYGEN SATURATION: 97 %

## 2023-04-18 DIAGNOSIS — H00.014 HORDEOLUM EXTERNUM OF LEFT UPPER EYELID: Primary | ICD-10-CM

## 2023-04-18 DIAGNOSIS — E78.2 MIXED HYPERLIPIDEMIA: ICD-10-CM

## 2023-04-18 DIAGNOSIS — M54.50 CHRONIC MIDLINE LOW BACK PAIN WITHOUT SCIATICA: ICD-10-CM

## 2023-04-18 DIAGNOSIS — M79.671 RIGHT FOOT PAIN: ICD-10-CM

## 2023-04-18 DIAGNOSIS — M25.561 CHRONIC PAIN OF RIGHT KNEE: ICD-10-CM

## 2023-04-18 DIAGNOSIS — G89.29 CHRONIC PAIN OF RIGHT KNEE: ICD-10-CM

## 2023-04-18 DIAGNOSIS — M25.571 CHRONIC PAIN OF RIGHT ANKLE: ICD-10-CM

## 2023-04-18 DIAGNOSIS — G89.29 CHRONIC MIDLINE LOW BACK PAIN WITHOUT SCIATICA: ICD-10-CM

## 2023-04-18 DIAGNOSIS — G89.29 CHRONIC PAIN OF RIGHT ANKLE: ICD-10-CM

## 2023-04-18 RX ORDER — MONTELUKAST SODIUM 10 MG/1
10 TABLET ORAL
Qty: 30 TABLET | Refills: 1 | Status: SHIPPED | OUTPATIENT
Start: 2023-04-18 | End: 2023-05-18

## 2023-04-18 RX ORDER — ERYTHROMYCIN 5 MG/G
0.5 OINTMENT OPHTHALMIC
Qty: 3.5 G | Refills: 1 | Status: SHIPPED | OUTPATIENT
Start: 2023-04-18 | End: 2023-04-28

## 2023-04-18 NOTE — LETTER
April 26, 2023     Patient: Brandon Holbrook  YOB: 1975  Date of Visit: 4/18/2023      To Whom it May Concern:    James Herron is under my professional care  Damon Tamayo was seen in my office on 4/18/2023  Damon Tamayo may return to work on 04/20/2023  If you have any questions or concerns, please don't hesitate to call           Sincerely,          DAVE Levine        CC: No Recipients

## 2023-04-18 NOTE — PROGRESS NOTES
BMI Counseling: Body mass index is 45 76 kg/m²  The BMI is above normal  Nutrition recommendations include decreasing portion sizes, encouraging healthy choices of fruits and vegetables, decreasing fast food intake, consuming healthier snacks, limiting drinks that contain sugar, moderation in carbohydrate intake, increasing intake of lean protein, reducing intake of saturated and trans fat and reducing intake of cholesterol  Exercise recommendations include vigorous physical activity 75 minutes/week and exercising 3-5 times per week  No pharmacotherapy was ordered  Patient referred to PCP  Rationale for BMI follow-up plan is due to patient being overweight or obese  Low fat low cholesterol diet     Assessment/Plan:    Pt is a 56 yr old female   That was initially not on the schedule but added after me seeing her daughters  For multiple complaints of joint and bach issues - this is a recurrent issue   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   She has hip and back pain as a result and her gait is off   Denies any injuries currently  Her stye is not completley gone and explained to pt that they take a while to completely revolve- continue to treatment and warm compressors     Discussed weight loss   She is post bariatric surgery and she has gained weight   Increase activity   Cut down on junk food   Low fat low cholesterol diet   Hyperlipidemia- discussed avoiding junk food   Prediabetes- increased HbA1C    Follow up in 4-6 months       Problem List Items Addressed This Visit        Other    Hyperlipidemia   Other Visit Diagnoses     Hordeolum externum of left upper eyelid    -  Primary    better but not completely gone     Relevant Medications    erythromycin (ILOTYCIN) ophthalmic ointment    montelukast (SINGULAIR) 10 mg tablet    Chronic midline low back pain without sciatica        XR of lumbar spine and discussed PT     Relevant Orders    XR spine lumbar minimum 4 views non injury (Completed)    XR knee 3 vw right non injury    Ambulatory Referral to Physical Therapy    Chronic pain of right knee        intermittent   low back pain issues and right foot pain     Relevant Orders    XR spine lumbar minimum 4 views non injury (Completed)    XR knee 3 vw right non injury    Ambulatory Referral to Physical Therapy    Chronic pain of right ankle        Relevant Orders    XR spine lumbar minimum 4 views non injury (Completed)    XR knee 3 vw right non injury    Ambulatory Referral to Physical Therapy    Right foot pain        XR foot ordered   initially she stated it is her ankle but it is her foot that is causing the majority of the issues as per patient             Subjective:      Patient ID: Lenore Velasco is a 56 y o  female  Pt is a 56 yr old female   That was initially not on the schedule but added after me seeing her daughters  For multiple complaints of joint and bach issues - this is a recurrent issue   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   She has hip and back pain as a result and her gait is off   Denies any injuries currently  Her stye is not completley gone and explained to pt that they take a while to completely revolve- continue to treatment and warm compressors  Discussed weight loss   She is post bariatric surgery and she has gained weight   Increase activity   Cut down on junk food   Low fat low cholesterol diet   Hyperlipidemia- discussed avoiding junk food   Prediabetes- increased HbA1C    Follow up in 4-6 months          The following portions of the patient's history were reviewed and updated as appropriate:   Past Medical History:  She has a past medical history of Anemia, History of transfusion, Hypercholesterolemia, Sleep apnea, and Vitamin deficiency  ,  _______________________________________________________________________  Medical Problems:  does not have any pertinent problems on file ,  _______________________________________________________________________  Past Surgical History:   has a past surgical history that includes Ectopic pregnancy surgery; Eye surgery; GASTRECTOMY SLEEVE LAPAROSCOPIC; pr laps w/vag hysterect 250 gm/&rmvl tube&/ovaries (N/A, 9/28/2021); and pr laparoscopy w/rmvl adnexal structures (Bilateral, 9/28/2021)  ,  _______________________________________________________________________  Family History:  family history includes Asthma in her brother; Brain cancer in her mother; Cancer in her father and maternal grandmother; Diabetes in her maternal grandmother; Hypertension in her paternal grandmother ,  _______________________________________________________________________  Social History:   reports that she has never smoked  She has never used smokeless tobacco  She reports that she does not drink alcohol and does not use drugs  ,  _______________________________________________________________________  Allergies:  is allergic to penicillin g and pollen extract     _______________________________________________________________________  Current Outpatient Medications   Medication Sig Dispense Refill   • Ascorbic Acid (YVONNE-C PO) Take by mouth     • Bacillus Coagulans-Inulin (PROBIOTIC) 1-250 BILLION-MG CAPS Take 1 capsule by mouth Takes once in awhile     • Biotin 1 MG CAPS Take by mouth     • cholecalciferol (VITAMIN D3) 25 mcg (1,000 units) tablet Take 1 tablet (1,000 Units total) by mouth daily 90 tablet 1   • cyanocobalamin (VITAMIN B-12) 100 mcg tablet Take by mouth daily     • erythromycin (ILOTYCIN) ophthalmic ointment Administer 0 5 inches into the left eye daily at bedtime for 10 days 3 5 g 1   • ferrous sulfate 324 (65 Fe) mg Take 1 tablet (324 mg total) by mouth 3 (three) times a day before meals 90 tablet 0   • hydrocortisone 2 5 % lotion Apply topically 2 (two) times a day for 10 days 60 mL 1   • montelukast (SINGULAIR) 10 mg tablet Take 1 tablet (10 mg total) "by mouth daily at bedtime 30 tablet 1   • multivitamin (THERAGRAN) TABS Take 1 tablet by mouth daily     • Omega-3 Fatty Acids (fish oil) 1,000 mg Take 1,000 mg by mouth daily     • omeprazole (PriLOSEC) 40 MG capsule Take 1 capsule by mouth once daily 30 capsule 0   • polyethylene glycol (MIRALAX) 17 g packet Take 17 g by mouth daily 1 each 0     No current facility-administered medications for this visit      _______________________________________________________________________  Review of Systems   Constitutional: Positive for fatigue and unexpected weight change (weight gain )  Negative for fever  HENT: Positive for congestion  Negative for voice change  Eyes: Negative  Respiratory: Negative for cough and shortness of breath  Cardiovascular: Negative for chest pain, palpitations and leg swelling  Gastrointestinal: Negative for abdominal distention, abdominal pain and vomiting  Endocrine: Negative  Prediabetes    Genitourinary: Negative for difficulty urinating and flank pain  Musculoskeletal: Positive for arthralgias, back pain (low back pain ), joint swelling (right knee and foot pain ) and myalgias  Skin: Negative for rash  Allergic/Immunologic: Positive for environmental allergies  Neurological: Negative for headaches  Psychiatric/Behavioral: Negative for sleep disturbance and suicidal ideas  The patient is not nervous/anxious  Objective:  Vitals:    04/18/23 1700   BP: 120/80   Pulse: 78   Resp: 16   Temp: 97 8 °F (36 6 °C)   SpO2: 97%   Weight: 125 kg (275 lb)   Height: 5' 5\" (1 651 m)     Body mass index is 45 76 kg/m²  Physical Exam  Vitals and nursing note reviewed  Constitutional:       Appearance: Normal appearance  Comments: BMI 45 76   HENT:      Head: Atraumatic  Mouth/Throat:      Mouth: Mucous membranes are dry  Eyes:      Extraocular Movements: Extraocular movements intact     Cardiovascular:      Rate and Rhythm: Normal rate and regular " rhythm  Pulses: Normal pulses  Heart sounds: Normal heart sounds  Pulmonary:      Effort: Pulmonary effort is normal       Breath sounds: Normal breath sounds  Abdominal:      General: Abdomen is flat  Palpations: Abdomen is soft  Musculoskeletal:         General: Swelling and tenderness present  Normal range of motion  Cervical back: Normal range of motion  Comments: Right foot pain and low back pain    Skin:     General: Skin is warm  Capillary Refill: Capillary refill takes less than 2 seconds  Neurological:      Mental Status: She is alert and oriented to person, place, and time  Psychiatric:         Mood and Affect: Mood normal          Behavior: Behavior normal            Comprehensive metabolic panel  Order: [id number]   Status: Final result      Visible to patient: Yes (seen)      Next appt: 05/03/2023 at 05:00 PM in Physical Therapy Blanca De Leon PT)      Dx: Encounter for general adult medical e         3 Result Notes     3 Patient Communications  Component Ref Range & Units 3/3/23 12:28 PM 9/9/21  9:08 AM 7/19/21  4:45 PM 7/3/21  4:02 PM 6/4/21  7:52 PM 5/7/21 10:45 PM 4/30/21  1:28 AM   Sodium 135 - 147 mmol/L 139  139 R  139 R  141 R  142 R  142 R  138 R    Potassium 3 5 - 5 3 mmol/L 4 1  4 2  3 8  3 8  3 8  4 0  3 4 Low     Chloride 96 - 108 mmol/L 102  104 R  102 R  104 R  107 R  106 R  104 R    CO2 21 - 32 mmol/L 32  29  29  28  29  28  28    ANION GAP 4 - 13 mmol/L 5  6  8  9  6  8  6    BUN 5 - 25 mg/dL 11  17  11  12  9  13  12    Creatinine 0 60 - 1 30 mg/dL 0 66  0 69 CM  0 77 CM  0 62 CM  0 65 CM  0 68 CM  0 70 CM    Comment: Standardized to IDMS reference method   Glucose, Fasting 65 - 99 mg/dL 83  65 CM         Comment: Specimen collection should occur prior to Sulfasalazine administration due to the potential for falsely depressed results   Specimen collection should occur prior to Sulfapyridine administration due to the potential for falsely elevated results  Calcium 8 4 - 10 2 mg/dL 9 1  8 4 R  8 0 Low  R  8 4 R  8 3 R  8 2 Low  R  8 0 Low  R    AST 13 - 39 U/L 13  11 R, CM  11 R, CM   13 R, CM   12 R, CM    Comment: Specimen collection should occur prior to Sulfasalazine administration due to the potential for falsely depressed results  ALT 7 - 52 U/L 8  17 R, CM  15 R, CM   17 R, CM   13 R, CM    Comment: Specimen collection should occur prior to Sulfasalazine administration due to the potential for falsely depressed results  Alkaline Phosphatase 34 - 104 U/L 63  48 R  51 R   65 R   56 R    Total Protein 6 4 - 8 4 g/dL 7 7  7 4 R  7 1 R   7 6 R   7 5 R    Albumin 3 5 - 5 0 g/dL 4 2  3 5  3 5   3 4 Low    3 3 Low     Total Bilirubin 0 20 - 1 00 mg/dL 0 64  0 31 CM  0 27 CM   0 43 CM   0 50 CM    eGFR ml/min/1 73sq m 106  122              Contains abnormal data CBC and differential  Order: [id number]   Status: Final result      Visible to patient: Yes (seen)      Next appt: 05/03/2023 at 05:00 PM in Physical Therapy Jody Farley, PT)      Dx: Encounter for general adult medical e         3 Result Notes     3 Patient Communications  Component Ref Range & Units 3/3/23 12:28 PM 9/28/21 11:05 AM 9/9/21  9:08 AM 7/19/21  4:45 PM 7/3/21  4:02 PM 6/14/21  8:07 AM 6/4/21  7:52 PM   WBC 4 31 - 10 16 Thousand/uL 5 25  13 09 High   4 50  5 61  4 97  5 52  4 74    RBC 3 81 - 5 12 Million/uL 4 91  3 87  5 10  4 74  5 25 High   4 58  5 28 High     Hemoglobin 11 5 - 15 4 g/dL 12 9  10 3 Low   13 2  11 2 Low   11 8  9 8 Low   10 5 Low     Hematocrit 34 8 - 46 1 % 41 2  32 9 Low   42 0  37 2  40 1  34 0 Low   37 4    MCV 82 - 98 fL 84  85  82  79 Low   76 Low   74 Low   71 Low     MCH 26 8 - 34 3 pg 26 3 Low   26 6 Low   25 9 Low   23 6 Low   22 5 Low   21 4 Low   19 9 Low     MCHC 31 4 - 37 4 g/dL 31 3 Low   31 3 Low   31 4  30 1 Low   29 4 Low   28 8 Low   28 1 Low     RDW 11 6 - 15 1 % 13 4  14 4  15 0  23 9 High   26 9 High   29 6 High   28 6 High     MPV 8 9 - 12 7 fL 9 6  9 3  9 7  9 5  9 8  10 4  9 5    Platelets 369 - 801 Thousands/uL 278  247  238  228  238  281  422 High     nRBC /100 WBCs 0   0  0  0  0  0    Neutrophils Relative 43 - 75 % 47   47  45  45  47  45    Immat GRANS % 0 - 2 % 0   0  0  0  0  0    Lymphocytes Relative 14 - 44 % 38   35  38  41  36  38    Monocytes Relative 4 - 12 % 11   14 High   13 High   11  12  14 High     Eosinophils Relative 0 - 6 % 3   3  3  3  4  2    Basophils Relative 0 - 1 % 1   1  1  0  1  1    Neutrophils Absolute 1 85 - 7 62 Thousands/µL 2 49   2 12  2 51  2 19  2 63  2 09    Immature Grans Absolute 0 00 - 0 20 Thousand/uL 0 01   0 02  0 01  0 01  0 01  0 02    Lymphocytes Absolute 0 60 - 4 47 Thousands/µL 1 98   1 58  2 13  2 04  1 98  1 82    Monocytes Absolute 0 17 - 1 22 Thousand/µL 0 58   0 61  0 75  0 55  0 65  0 65    Eosinophils Absolute 0 00 - 0 61 Thousand/µL 0 16   0 14  0 18  0 16  0 21  0 10    Basophils Absolute 0 00 - 0 10 Thousands/µL 0 03   0 03  0 03  0 02  0 04  0 06               Specimen Collected: 03/03/23 12:28 PM Last Resulted: 03/03/23  1:23 PM           TSH, 3rd generation with Free T4 reflex  Order: [id number]   Status: Final result      Visible to patient: Yes (seen)      Next appt: 05/03/2023 at 05:00 PM in Physical Therapy Ni Humphrey, PT)      Dx: Encounter for general adult medical e         3 Result Notes     3 Patient Communications  Component Ref Range & Units 3/3/23 12:28 PM 6/4/21  7:52 PM 4/29/21  7:50 AM   TSH 3RD GENERATON 0 450 - 4 500 uIU/mL 1 202  1 605 R, CM  2 169 R, CM         HEMOGLOBIN A1C W/ EAG ESTIMATION  Order: [id number]   Status: Final result      Visible to patient: Yes (seen)      Next appt: 05/03/2023 at 05:00 PM in Physical Therapy Ni Humphrey PT)      Dx: Encounter for general adult medical e         2 Result Notes     2 Patient Communications  Component Ref Range & Units 3/3/23 12:28 PM 9/9/21  9:08 AM 4/29/21  7:50 AM 1/6/20 12:12 PM 2/19/18 12:18 PM Hemoglobin A1C Normal 3 8-5 6%; PreDiabetic 5 7-6 4%; Diabetic >=6 5%; Glycemic control for adults with diabetes <7 0% % 5 6  5 3  5 4  5 6 R, CM  5 7 High  R, CM    EAG mg/dl 114  105  108  114 R  117 R               Specimen Collected: 03/03/23 12:28 PM Last Resulted: 03/04/23  4:36 AM          Contains abnormal data Vitamin D 25 hydroxy  Order: [id number]   Status: Final result      Visible to patient: Yes (seen)      Next appt: 05/03/2023 at 05:00 PM in Physical Therapy Elijah Juares PT)      Dx: Encounter for general adult medical e         1 Result Note     1 Patient Communication  Component Ref Range & Units 3/3/23 12:28 PM 9/9/21  9:08 AM 4/29/21  7:50 AM   Vit D, 25-Hydroxy 30 0 - 100 0 ng/mL 21 1 Low   26 1 Low   20 6 Low          Patient Communications  Component Ref Range & Units 3/3/23 12:28 PM 4/29/21  7:50 AM   Cholesterol See Comment mg/dL 247 High   193 R, CM    Comment: Cholesterol:         Pediatric <18 Years         Desirable          <170 mg/dL       Borderline High    170-199 mg/dL       High               >=200 mg/dL         Adult >=18 Years             Desirable         <200 mg/dL       Borderline High   200-239 mg/dL       High             >239 mg/dL    Triglycerides See Comment mg/dL 215 High   124 R, CM    Comment: Triglyceride:      0-9Y            <75mg/dL      10Y-17Y         <90 mg/dL        >=18Y      Normal          <150 mg/dL      Borderline High 150-199 mg/dL      High            200-499 mg/dL        Very High       >499 mg/dL     Specimen collection should occur prior to N-Acetylcysteine or Metamizole administration due to the potential for falsely depressed results     HDL, Direct >=50 mg/dL 53  52 R, CM    LDL Calculated 0 - 100 mg/dL 151 High   116 High  CM    Comment: LDL Cholesterol:     Optimal           <100 mg/dl     Near Optimal      100-129 mg/dl     Above Optimal       Borderline High 130-159 mg/dl       High            160-189 mg/dl       Very High       >189 mg/dl         This screening LDL is a calculated result  It does not have the accuracy of the Direct Measured LDL in the monitoring of patients with hyperlipidemia and/or statin therapy  Direct Measure LDL ([id number]) must be ordered separately in these patients  Non-HDL-Chol (CHOL-HDL) mg/dl 194  141               Specimen Collected: 03/03/23 12:28 PM Last Resulted: 03/03/23  2:25 PM         Hepatitis C antibody  Order: [id number]   Status: Final result      Visible to patient: Yes (seen)      Next appt: 05/03/2023 at 05:00 PM in Physical Therapy Nav Gaines PT)      Dx: Encounter for general adult medical e         1 Result Note     1 Patient Communication     1 HM Topic  Component Ref Range & Units 3/3/23 12:28 PM    Hepatitis C Ab Non-reactive Non-reactive               Specimen Collected: 03/03/23 12:28 PM Last Resulted: 03/04/23  1:44 PM           : HIV 1/2 AB/AG w Reflex [facility] for 2 yr old and above  Order: [id number]   Status: Final result      Visible to patient: Yes (seen)      Next appt: 05/03/2023 at 05:00 PM in Physical Therapy Nav Gaines PT)      Dx: Encounter for general adult medical e         2 Result Notes     2 Patient Communications     1 HM Topic  Component Ref Range & Units 3/3/23 12:28 PM    HIV-1 p24 Antigen Non-Reactive Non-Reactive    HIV-1 Antibody Non-Reactive Non-Reactive    HIV-2 Antibody Non-Reactive Non-Reactive    HIV Ag-Ab [address] [place] [address]

## 2023-04-19 ENCOUNTER — HOSPITAL ENCOUNTER (OUTPATIENT)
Dept: RADIOLOGY | Facility: HOSPITAL | Age: 48
Discharge: HOME | End: 2023-04-19

## 2023-04-19 DIAGNOSIS — G89.29 CHRONIC PAIN OF RIGHT ANKLE: ICD-10-CM

## 2023-04-19 DIAGNOSIS — G89.29 CHRONIC PAIN OF BOTH KNEES: ICD-10-CM

## 2023-04-19 DIAGNOSIS — M25.571 CHRONIC PAIN OF RIGHT ANKLE: ICD-10-CM

## 2023-04-19 DIAGNOSIS — M54.50 CHRONIC MIDLINE LOW BACK PAIN WITHOUT SCIATICA: ICD-10-CM

## 2023-04-19 DIAGNOSIS — G89.29 CHRONIC MIDLINE LOW BACK PAIN WITHOUT SCIATICA: ICD-10-CM

## 2023-04-19 DIAGNOSIS — M25.562 CHRONIC PAIN OF BOTH KNEES: ICD-10-CM

## 2023-04-19 DIAGNOSIS — M25.561 CHRONIC PAIN OF BOTH KNEES: ICD-10-CM

## 2023-05-03 ENCOUNTER — EVALUATION (OUTPATIENT)
Dept: PHYSICAL THERAPY | Facility: CLINIC | Age: 48
End: 2023-05-03

## 2023-05-03 DIAGNOSIS — G89.29 CHRONIC PAIN OF RIGHT ANKLE: ICD-10-CM

## 2023-05-03 DIAGNOSIS — M25.561 CHRONIC PAIN OF RIGHT KNEE: ICD-10-CM

## 2023-05-03 DIAGNOSIS — G89.29 CHRONIC MIDLINE LOW BACK PAIN WITHOUT SCIATICA: Primary | ICD-10-CM

## 2023-05-03 DIAGNOSIS — M25.571 CHRONIC PAIN OF RIGHT ANKLE: ICD-10-CM

## 2023-05-03 DIAGNOSIS — M54.50 CHRONIC MIDLINE LOW BACK PAIN WITHOUT SCIATICA: Primary | ICD-10-CM

## 2023-05-03 DIAGNOSIS — G89.29 CHRONIC PAIN OF RIGHT KNEE: ICD-10-CM

## 2023-05-03 NOTE — PROGRESS NOTES
"PT Evaluation     Today's date: 5/3/2023  Patient name: Cindy Olivares  : 1975  MRN: [id number]  Referring provider: DAVE Carvajal  Dx:   Encounter Diagnosis     ICD-10-CM    1  Chronic midline low back pain without sciatica  M54 50     G89 29       2  Chronic pain of right knee  M25 561     G89 29       3  Chronic pain of right ankle  M25 571     G89 29           Start Time: 1718  Stop Time: 1800  Total time in clinic (min): 42 minutes    Assessment  Assessment details: Patient is a 55 y/o female with chief c/o R knee, R foot, and low back pain  Patient presents with noted hyperlordosis of the lumbar spine with decreased reversal of lordosis during forward flexion rom assessment  There was noted painful weakness with ankle eversion and dorsiflexion when compared to the contralateral side during strength assessment  She also demonstrated weakness of the R knee when compared to the L side  There was increased pain when in a supine position to the low back which did improve when flexing the knees into a hooklying position and slightly elevating the head of the bed  Patient noted a abdnormal feeling to her low back post therapy interventions today but could not call it pain as she felt pain when she moved into extension  Impairments: abnormal or restricted ROM, activity intolerance, impaired physical strength, lacks appropriate home exercise program, pain with function and poor posture     Symptom irritability: lowUnderstanding of Dx/Px/POC: good   Prognosis: good    Goals  Lumbar:  STGs:  \"Patient will be independent with hep by 2-3 visits  Decrease low back pain by 25% for improved tolerance with adls and home duties by 3-4 weeks  Improve Lumbar rom to wfl for improved tolerance with adls and home duties by 3-4 weeks  \"    LTGs:  \"Improve FOTO score from 35 to 52 indicating improved tolerance with activities involving the low back by discharge     Patient will demonstrate rom and strength to " "the lumbar spine wfl for improved tolerance with adls and home duties by discharge  Patient will be free of radicular symptoms by discharge  \"    Knee:  STGs:  Patient will be independent with HEP in 1-2 visits  Improve L knee flexion to 130 degrees for improved tolerance with transfers and adls by 3-4 weeks  Improve L knee strength to 3+/5 for improved tolerance with adls and transfers by 3-4 weeks  Improve L knee extension to 0 degrees for improved tolerance with adls and transfers by 3-4 weeks  Patient will be compliant with his WB status and rom restrictions by 1-2 weeks      LTGs:  Improve FOTO score from  To  Indicating improved tolerance with activities involving the LE by discharge  Improve L knee strength and rom to wnl for improved tolerance with adls, work duties, and recreation by discharge  Patient will be able to return to normal ambulation without deviation over all terrains without pain or limitation from the knee by discharge  Patient will be able to return to normal work duties without limitation from the knee by discharge  Plan  Plan details: Patient informed that from this point forward, to ensure adherence to the aforementioned plan of care, all or some of the treatment may be performed and carried out by a Physical Therapy Assistant (PTA) with supervision from a licensed Physical Therapist (PT) in accordance with [place] [address]  Patient will continue to benefit from skilled physical therapy to address the functional deficits that were identified during the evaluation today  We will continue to progress the therapy program to address these functional deficits and achieve the established goals            Patient would benefit from: skilled physical therapy  Planned modality interventions: cryotherapy and thermotherapy: hydrocollator packs  Planned therapy interventions: abdominal trunk stabilization, ADL retraining, body mechanics training, " flexibility, functional ROM exercises, home exercise program, therapeutic exercise, therapeutic activities, stretching, strengthening, postural training, patient education, joint mobilization and manual therapy  Frequency: 2x week  Duration in weeks: 8  Plan of Care beginning date: 5/3/2023  Plan of Care expiration date: 6/28/2023  Treatment plan discussed with: patient        Subjective Evaluation    History of Present Illness  Mechanism of injury: Patient presents to out patient physical therapy with chief c/o LBP, R knee, and R ankle pain  Patient reports onset of LBP when she gave birth to her son  She notes that recently it started to bother her more when she wakes up in the morning after brushing her teeth and when she is transitioning from stand to/from sit  She denies any numbness/tingling or radicular symptoms into the LEs  She also discloses R knee and foot pain  She feels that when she is not wearing supportive sneakers she will have increased pain to the R mid foot  She is also reporting increased pain to the R knee with standing/walking  Recurrent probem    Quality of life: good    Pain  Current pain rating: 3  At worst pain rating: 10  Location: Lumbar/R knee/R ankle          Objective     Static Posture     Lumbar Spine   Increased lordosis  Pelvis   Anterior pelvic tilt    Ankle/Foot   Ankle/Foot (Left): Pes planus  Ankle/Foot (Right): Pes planus       Active Range of Motion     Lumbar   Flexion:  WFL  Extension:  with pain Restriction level: minimal  Left lateral flexion:  WFL  Right lateral flexion:  with pain Restriction level: minimal  Left rotation:  WFL  Right rotation:  WFL  Left Knee   Flexion: 128 degrees   Extension: 4 degrees     Right Knee   Flexion: 131 degrees   Extension: 6 degrees   Left Ankle/Foot   Dorsiflexion (ke): 5 degrees   Plantar flexion: 61 degrees   Inversion: 32 degrees   Eversion: 18 degrees     Right Ankle/Foot   Dorsiflexion (ke): 6 degrees   Dorsiflexion "(kf): 52 degrees with pain  Inversion: 22 degrees   Eversion: 12 degrees     Strength/Myotome Testing     Left Hip   Planes of Motion   Flexion: 4+  Extension: 4+  Abduction: 4+  Adduction: 4+    Right Hip   Planes of Motion   Flexion: 4+  Extension: 4+  Abduction: 4+  Adduction: 4+    Left Knee   Flexion: 4+  Extension: 4+    Right Knee   Flexion: 4  Extension: 4    Left Ankle/Foot   Dorsiflexion: 5  Plantar flexion: 4  Inversion: 4+  Eversion: 4+    Right Ankle/Foot   Dorsiflexion: 5  Plantar flexion: 4  Inversion: 4  Eversion: 3+             Precautions: None    Date 5/3 IE       FOTO K=47 L=35 SC       Manuals        Passive quad/hip flexor stretch                                Neuro Re-Ed        PPT        Glute sets        Abdominal set with hooklying marches                                        Ther Ex        Seated lumbar flexion stretch 10\" 10x       Ankle MREs        NuStep        Posture education 5 min                                       Ther Activity                        Gait Training                        Modalities                               "

## 2023-05-03 NOTE — LETTER
May 3, 2023    DAVE Caraballo  [address]    Patient: Kaylynn Perrin   YOB: 1975   Date of Visit: 5/3/2023     Encounter Diagnosis     ICD-10-CM    1  Chronic midline low back pain without sciatica  M54 50     G89 29       2  Chronic pain of right knee  M25 561     G89 29       3  Chronic pain of right ankle  M25 571     G89 29           Dear Dr Katharine Whelan: Thank you for your recent referral of Kaylynn Perrin  Please review the attached evaluation summary from [facility] recent visit  Please verify that you agree with the plan of care by signing the attached order  If you have any questions or concerns, please do not hesitate to call  I sincerely appreciate the opportunity to share in the care of one of your patients and hope to have another opportunity to work with you in the near future  Sincerely,    Malgorzata Monroy, PT      Referring Provider:      I certify that I have read the below Plan of Care and certify the need for these services furnished under this plan of treatment while under my care  DAVE Caraballo  [address] [id number]  [address]          PT Evaluation     Today's date: 5/3/2023  Patient name: Kaylynn Perrin  : 1975  MRN: [id number]  Referring provider: DAVE Kwan  Dx:   Encounter Diagnosis     ICD-10-CM    1  Chronic midline low back pain without sciatica  M54 50     G89 29       2  Chronic pain of right knee  M25 561     G89 29       3  Chronic pain of right ankle  M25 571     G89 29           Start Time: 1718  Stop Time: 1800  Total time in clinic (min): 42 minutes    Assessment  Assessment details: Patient is a 57 y/o female with chief c/o R knee, R foot, and low back pain  Patient presents with noted hyperlordosis of the lumbar spine with decreased reversal of lordosis during forward flexion rom assessment   There was noted painful weakness with ankle eversion "and dorsiflexion when compared to the contralateral side during strength assessment  She also demonstrated weakness of the R knee when compared to the L side  There was increased pain when in a supine position to the low back which did improve when flexing the knees into a hooklying position and slightly elevating the head of the bed  Patient noted a abdnormal feeling to her low back post therapy interventions today but could not call it pain as she felt pain when she moved into extension  Impairments: abnormal or restricted ROM, activity intolerance, impaired physical strength, lacks appropriate home exercise program, pain with function and poor posture     Symptom irritability: lowUnderstanding of Dx/Px/POC: good   Prognosis: good    Goals  Lumbar:  STGs:  \"Patient will be independent with hep by 2-3 visits  Decrease low back pain by 25% for improved tolerance with adls and home duties by 3-4 weeks  Improve Lumbar rom to wfl for improved tolerance with adls and home duties by 3-4 weeks  \"    LTGs:  \"Improve FOTO score from 35 to 52 indicating improved tolerance with activities involving the low back by discharge  Patient will demonstrate rom and strength to the lumbar spine wfl for improved tolerance with adls and home duties by discharge  Patient will be free of radicular symptoms by discharge  \"    Knee:  STGs:  Patient will be independent with HEP in 1-2 visits  Improve L knee flexion to 130 degrees for improved tolerance with transfers and adls by 3-4 weeks  Improve L knee strength to 3+/5 for improved tolerance with adls and transfers by 3-4 weeks  Improve L knee extension to 0 degrees for improved tolerance with adls and transfers by 3-4 weeks  Patient will be compliant with his WB status and rom restrictions by 1-2 weeks      LTGs:  Improve FOTO score from  To  Indicating improved tolerance with activities involving the LE by discharge     Improve L knee strength and rom to wnl for improved " tolerance with adls, work duties, and recreation by discharge  Patient will be able to return to normal ambulation without deviation over all terrains without pain or limitation from the knee by discharge  Patient will be able to return to normal work duties without limitation from the knee by discharge  Plan  Plan details: Patient informed that from this point forward, to ensure adherence to the aforementioned plan of care, all or some of the treatment may be performed and carried out by a Physical Therapy Assistant (PTA) with supervision from a licensed Physical Therapist (PT) in accordance with [place] [address]  Patient will continue to benefit from skilled physical therapy to address the functional deficits that were identified during the evaluation today  We will continue to progress the therapy program to address these functional deficits and achieve the established goals  Patient would benefit from: skilled physical therapy  Planned modality interventions: cryotherapy and thermotherapy: hydrocollator packs  Planned therapy interventions: abdominal trunk stabilization, ADL retraining, body mechanics training, flexibility, functional ROM exercises, home exercise program, therapeutic exercise, therapeutic activities, stretching, strengthening, postural training, patient education, joint mobilization and manual therapy  Frequency: 2x week  Duration in weeks: 8  Plan of Care beginning date: 5/3/2023  Plan of Care expiration date: 6/28/2023  Treatment plan discussed with: patient        Subjective Evaluation    History of Present Illness  Mechanism of injury: Patient presents to out patient physical therapy with chief c/o LBP, R knee, and R ankle pain  Patient reports onset of LBP when she gave birth to her son   She notes that recently it started to bother her more when she wakes up in the morning after brushing her teeth and when she is transitioning from stand to/from sit  She denies any numbness/tingling or radicular symptoms into the LEs  She also discloses R knee and foot pain  She feels that when she is not wearing supportive sneakers she will have increased pain to the R mid foot  She is also reporting increased pain to the R knee with standing/walking  Recurrent probem    Quality of life: good    Pain  Current pain rating: 3  At worst pain rating: 10  Location: Lumbar/R knee/R ankle          Objective     Static Posture     Lumbar Spine   Increased lordosis  Pelvis   Anterior pelvic tilt    Ankle/Foot   Ankle/Foot (Left): Pes planus  Ankle/Foot (Right): Pes planus       Active Range of Motion     Lumbar   Flexion:  WFL  Extension:  with pain Restriction level: minimal  Left lateral flexion:  WFL  Right lateral flexion:  with pain Restriction level: minimal  Left rotation:  WFL  Right rotation:  WFL  Left Knee   Flexion: 128 degrees   Extension: 4 degrees     Right Knee   Flexion: 131 degrees   Extension: 6 degrees   Left Ankle/Foot   Dorsiflexion (ke): 5 degrees   Plantar flexion: 61 degrees   Inversion: 32 degrees   Eversion: 18 degrees     Right Ankle/Foot   Dorsiflexion (ke): 6 degrees   Dorsiflexion (kf): 52 degrees with pain  Inversion: 22 degrees   Eversion: 12 degrees     Strength/Myotome Testing     Left Hip   Planes of Motion   Flexion: 4+  Extension: 4+  Abduction: 4+  Adduction: 4+    Right Hip   Planes of Motion   Flexion: 4+  Extension: 4+  Abduction: 4+  Adduction: 4+    Left Knee   Flexion: 4+  Extension: 4+    Right Knee   Flexion: 4  Extension: 4    Left Ankle/Foot   Dorsiflexion: 5  Plantar flexion: 4  Inversion: 4+  Eversion: 4+    Right Ankle/Foot   Dorsiflexion: 5  Plantar flexion: 4  Inversion: 4  Eversion: 3+            Precautions: None    Date 5/3 IE       FOTO K=47 L=35 SC       Manuals        Passive quad/hip flexor stretch                                Neuro Re-Ed        PPT        Glute sets        Abdominal set with "hooklying marches                                        Ther Ex        Seated lumbar flexion stretch 10\" 10x       Ankle MREs        NuStep        Posture education 5 min                                       Ther Activity                        Gait Training                        Modalities                                              "

## 2023-05-08 ENCOUNTER — OFFICE VISIT (OUTPATIENT)
Dept: PHYSICAL THERAPY | Facility: CLINIC | Age: 48
End: 2023-05-08

## 2023-05-08 DIAGNOSIS — G89.29 CHRONIC PAIN OF RIGHT KNEE: ICD-10-CM

## 2023-05-08 DIAGNOSIS — G89.29 CHRONIC PAIN OF RIGHT ANKLE: ICD-10-CM

## 2023-05-08 DIAGNOSIS — G89.29 CHRONIC MIDLINE LOW BACK PAIN WITHOUT SCIATICA: Primary | ICD-10-CM

## 2023-05-08 DIAGNOSIS — M25.571 CHRONIC PAIN OF RIGHT ANKLE: ICD-10-CM

## 2023-05-08 DIAGNOSIS — M54.50 CHRONIC MIDLINE LOW BACK PAIN WITHOUT SCIATICA: Primary | ICD-10-CM

## 2023-05-08 DIAGNOSIS — M25.561 CHRONIC PAIN OF RIGHT KNEE: ICD-10-CM

## 2023-05-08 NOTE — PROGRESS NOTES
"Daily Note     Today's date: 2023  Patient name: Luca Armendariz  : 1975  MRN: [id number]  Referring provider: DAVE Gomez  Dx:   Encounter Diagnosis     ICD-10-CM    1  Chronic midline low back pain without sciatica  M54 50     G89 29       2  Chronic pain of right knee  M25 561     G89 29       3  Chronic pain of right ankle  M25 571     G89 29                      Subjective: The patient states that she took Ibuprofen early today so her back isn't hurting her too much  Notes most pain is in her knee today  She reports compliance with her HEP, \"I missed one day and I could feel it  \"        Objective: See treatment diary below      Assessment: Initiated TE as outlined below in daily treatment diary  Tolerated treatment fair  She demonstrates weakness in her core and hip musculature  No increase in pain during session but does report muscle fatigue in her abs, gluts and hips  Patient demonstrated fatigue post treatment and would benefit from continued PT      Plan: Continue per plan of care  Monitor status next visit and progress as able in upcoming visits         Precautions: None    Date 5/3 IE       FOTO K=47 L=35 SC       Manuals        Passive quad/hip flexor stretch  ML Phong 8'                              Neuro Re-Ed        PPT  3\" 15x       Glute sets  Bridges 15x       Abdominal set with hooklying marches  15x Phong       Ab Set with knee fallouts  15x Phong                              Ther Ex        Seated lumbar flexion stretch 10\" 10x 10\" 10x      Ankle MREs  MREs all planes 10x       NuStep  L2 8 mins       Posture education 5 min ML 5 mins       Supine SLR   R 2x10                               Ther Activity                        Gait Training                        Modalities                                 "

## 2023-05-11 ENCOUNTER — APPOINTMENT (OUTPATIENT)
Dept: PHYSICAL THERAPY | Facility: CLINIC | Age: 48
End: 2023-05-11
Payer: COMMERCIAL

## 2023-05-17 ENCOUNTER — OFFICE VISIT (OUTPATIENT)
Dept: PHYSICAL THERAPY | Facility: CLINIC | Age: 48
End: 2023-05-17

## 2023-05-17 DIAGNOSIS — M54.50 CHRONIC MIDLINE LOW BACK PAIN WITHOUT SCIATICA: Primary | ICD-10-CM

## 2023-05-17 DIAGNOSIS — M25.571 CHRONIC PAIN OF RIGHT ANKLE: ICD-10-CM

## 2023-05-17 DIAGNOSIS — G89.29 CHRONIC MIDLINE LOW BACK PAIN WITHOUT SCIATICA: Primary | ICD-10-CM

## 2023-05-17 DIAGNOSIS — G89.29 CHRONIC PAIN OF RIGHT ANKLE: ICD-10-CM

## 2023-05-17 DIAGNOSIS — M25.561 CHRONIC PAIN OF RIGHT KNEE: ICD-10-CM

## 2023-05-17 DIAGNOSIS — G89.29 CHRONIC PAIN OF RIGHT KNEE: ICD-10-CM

## 2023-05-17 NOTE — PROGRESS NOTES
"Daily Note     Today's date: 2023  Patient name: Lizzeth Tolentino  : 1975  MRN: [id number]  Referring provider: DAVE Anthony  Dx:   Encounter Diagnosis     ICD-10-CM    1  Chronic midline low back pain without sciatica  M54 50     G89 29       2  Chronic pain of right knee  M25 561     G89 29       3  Chronic pain of right ankle  M25 571     G89 29           Start Time: 1730  Stop Time: 1800  Total time in clinic (min): 30 minutes    Subjective: Pt's cc is R dorsal foot pain; she wore sandals that caused same  Objective: See treatment diary below      Assessment: Tolerated treatment well  Patient would benefit from continued PT  PTA modified pt's program due to lateness  STM dorsal foot- pt will use ice at home tonight  Plan: Continue per plan of care        Precautions: None    Date 5/3 IE      FOTO K=47 L=35 SC       Manuals        Passive quad/hip flexor stretch  ML Phong 8' Ds+ gastroc,opp k-c             Neuro Re-Ed        PPT  3\" 15x  15x 3\"     Glute sets  Bridges 15x  Add c bridge 15x     Abdominal set with hooklying marches  15x Phong       Ab Set with knee fallouts  15x Phong 15x B                     Ther Ex        Seated lumbar flexion stretch 10\" 10x 10\" 10x NV     Ankle MREs  MREs all planes 10x       NuStep  L2 8 mins  NV     Posture education 5 min ML 5 mins       Supine SLR   R 2x10  15x                              Ther Activity                        Gait Training                        Modalities                                   "

## 2023-05-22 ENCOUNTER — OFFICE VISIT (OUTPATIENT)
Dept: PHYSICAL THERAPY | Facility: CLINIC | Age: 48
End: 2023-05-22

## 2023-05-22 DIAGNOSIS — G89.29 CHRONIC MIDLINE LOW BACK PAIN WITHOUT SCIATICA: Primary | ICD-10-CM

## 2023-05-22 DIAGNOSIS — M54.50 CHRONIC MIDLINE LOW BACK PAIN WITHOUT SCIATICA: Primary | ICD-10-CM

## 2023-05-22 DIAGNOSIS — G89.29 CHRONIC PAIN OF RIGHT ANKLE: ICD-10-CM

## 2023-05-22 DIAGNOSIS — M25.571 CHRONIC PAIN OF RIGHT ANKLE: ICD-10-CM

## 2023-05-22 DIAGNOSIS — M25.561 CHRONIC PAIN OF RIGHT KNEE: ICD-10-CM

## 2023-05-22 DIAGNOSIS — G89.29 CHRONIC PAIN OF RIGHT KNEE: ICD-10-CM

## 2023-05-22 NOTE — PROGRESS NOTES
"Daily Note     Today's date: 2023  Patient name: Susan Alejandro  : 1975  MRN: [id number]  Referring provider: DAVE El  Dx:   Encounter Diagnosis     ICD-10-CM    1  Chronic midline low back pain without sciatica  M54 50     G89 29       2  Chronic pain of right knee  M25 561     G89 29       3  Chronic pain of right ankle  M25 571     G89 29           Start Time: 1700  Stop Time: 1745  Total time in clinic (min): 45 minutes    Subjective: Pt notes mild LBP today  Objective: See treatment diary below      Assessment: Tolerated treatment well  Patient exhibited good technique with therapeutic exercises      Plan: Continue per plan of care        Precautions: None    Date 5/3 IE     FOTO K=47 L=35 SC       Manuals        Passive quad/hip flexor stretch  ML Quan 8' Ds+ gastroc,opp k-c ds            Neuro Re-Ed        PPT  3\" 15x  15x 3\" 20x 3\"    Glute sets  Bridges 15x  Add c bridge 15x 20x     Abdominal set with hooklying marches  15x Quan      Ab set c knee fallouts  15x Quan 15x B 15x ea                    Ther Ex        Seated lumbar flexion stretch 10\" 10x 10\" 10x NV 10x 10\"    Ankle MREs  MREs all planes 10x   np    NuStep  L2 8 mins  NV 8m L4    Posture education 5 min ML 5 mins       Supine SLR   R 2x10  15x  1# 2/10                    Ther Activity                        Gait Training                Modalities                                     "

## 2023-05-31 ENCOUNTER — OFFICE VISIT (OUTPATIENT)
Dept: PHYSICAL THERAPY | Facility: CLINIC | Age: 48
End: 2023-05-31

## 2023-05-31 DIAGNOSIS — M54.50 CHRONIC MIDLINE LOW BACK PAIN WITHOUT SCIATICA: Primary | ICD-10-CM

## 2023-05-31 DIAGNOSIS — M25.571 CHRONIC PAIN OF RIGHT ANKLE: ICD-10-CM

## 2023-05-31 DIAGNOSIS — M25.561 CHRONIC PAIN OF RIGHT KNEE: ICD-10-CM

## 2023-05-31 DIAGNOSIS — G89.29 CHRONIC PAIN OF RIGHT KNEE: ICD-10-CM

## 2023-05-31 DIAGNOSIS — G89.29 CHRONIC PAIN OF RIGHT ANKLE: ICD-10-CM

## 2023-05-31 DIAGNOSIS — G89.29 CHRONIC MIDLINE LOW BACK PAIN WITHOUT SCIATICA: Primary | ICD-10-CM

## 2023-05-31 NOTE — PROGRESS NOTES
"Daily Note     Today's date: 2023  Patient name: Lul Ramirez  : 1975  MRN: [id number]  Referring provider: DAVE Agustin  Dx:   Encounter Diagnosis     ICD-10-CM    1  Chronic midline low back pain without sciatica  M54 50     G89 29       2  Chronic pain of right knee  M25 561     G89 29       3  Chronic pain of right ankle  M25 571     G89 29           Start Time: 1730  Stop Time: 1800  Total time in clinic (min): 30 minutes    Subjective: Patient reports her back isn't bothering her today but her ankle is on top  Objective: See treatment diary below      Assessment: Tolerated treatment well  Patient exhibited good technique with therapeutic exercises with cuing  She was able to progress without noted pain  Patient would benefit from continued physical therapy to meet functional goals  Patient left in good condition  Plan: Continue per plan of care        Precautions: None    Date 5/3 IE    FOTO K=47 L=35 SC       Manuals        Passive quad/hip flexor stretch  ML Phong 8' Ds+ gastroc,opp k-c ds kl           Neuro Re-Ed        PPT  3\" 15x  15x 3\" 20x 3\"    Glute sets  Bridges 15x  Add c bridge 15x 20x  20x    Abdominal set with hooklying marches  15x Phong   1#    Ab set c knee fallouts  15x Phong 15x B 15x ea organge 15x                   Ther Ex        Seated lumbar flexion stretch 10\" 10x 10\" 10x NV 10x 10\"    Ankle MREs  MREs all planes 10x   np    NuStep  L2 8 mins  NV 8m L4 L6 5 min   Posture education 5 min ML 5 mins       Supine SLR   R 2x10  15x  1# 2/10 1# 20x, slr abd 1# 2x10                   Ther Activity                        Gait Training                Modalities                                     "

## 2023-06-01 ENCOUNTER — APPOINTMENT (OUTPATIENT)
Dept: PHYSICAL THERAPY | Facility: CLINIC | Age: 48
End: 2023-06-01
Payer: COMMERCIAL

## 2023-06-05 ENCOUNTER — APPOINTMENT (OUTPATIENT)
Dept: PHYSICAL THERAPY | Facility: CLINIC | Age: 48
End: 2023-06-05
Payer: COMMERCIAL

## 2023-06-07 ENCOUNTER — OFFICE VISIT (OUTPATIENT)
Dept: PHYSICAL THERAPY | Facility: CLINIC | Age: 48
End: 2023-06-07
Payer: COMMERCIAL

## 2023-06-07 DIAGNOSIS — M54.50 CHRONIC MIDLINE LOW BACK PAIN WITHOUT SCIATICA: Primary | ICD-10-CM

## 2023-06-07 DIAGNOSIS — G89.29 CHRONIC PAIN OF RIGHT ANKLE: ICD-10-CM

## 2023-06-07 DIAGNOSIS — M25.571 CHRONIC PAIN OF RIGHT ANKLE: ICD-10-CM

## 2023-06-07 DIAGNOSIS — M25.561 CHRONIC PAIN OF RIGHT KNEE: ICD-10-CM

## 2023-06-07 DIAGNOSIS — G89.29 CHRONIC MIDLINE LOW BACK PAIN WITHOUT SCIATICA: Primary | ICD-10-CM

## 2023-06-07 DIAGNOSIS — G89.29 CHRONIC PAIN OF RIGHT KNEE: ICD-10-CM

## 2023-06-07 PROCEDURE — 97112 NEUROMUSCULAR REEDUCATION: CPT

## 2023-06-07 PROCEDURE — 97110 THERAPEUTIC EXERCISES: CPT

## 2023-06-07 PROCEDURE — 97140 MANUAL THERAPY 1/> REGIONS: CPT

## 2023-06-07 NOTE — PROGRESS NOTES
"Daily Note     Today's date: 2023  Patient name: Damien Saavedra  : 1975  MRN: [id number]  Referring provider: Dorn Saint, CRNP  Dx:   Encounter Diagnosis     ICD-10-CM    1  Chronic midline low back pain without sciatica  M54 50     G89 29       2  Chronic pain of right knee  M25 561     G89 29       3  Chronic pain of right ankle  M25 571     G89 29           Start Time: 1715  Stop Time: 1800  Total time in clinic (min): 45 minutes    Subjective: Pt continues with variable levels of LBP  Her cc today is her dorsal foot pain ( she is not able to wear her more supportive shoes at work right now)      Objective: See treatment diary below      Assessment: Tolerated treatment well  Patient exhibited good technique with therapeutic exercises  Plan: Continue per plan of care        Precautions: None    Date    FOTO 54k  44 L-ds       Manuals        Passive quad/hip flexor stretch np ML Phong 8' Ds+ gastroc,opp k-c ds kl   Manual R foot Manual stm       Neuro Re-Ed        PPT 20x 3\" 3\" 15x  15x 3\" 20x 3\"    Bridge c add 20x 5\" Bridges 15x  Add c bridge 15x 20x  20x    Abdominal set with hooklying marches 1 5#  15x Phong   1# 30   Ab set c knee fallouts L3 215 15x Phong 15x B 15x ea L2 15x                   Ther Ex        Seated lumbar flexion stretch 10\" 10x 10\" 10x NV 10x 10\"    Ankle MREs np MREs all planes 10x   np    NuStep 5m L6 L2 8 mins  NV 8m L4 L6 5 min   Posture education  ML 5 mins       Supine SLR  1 5#  2/10 R 2x10  15x  1# 2/10 1# 20x, slr abd 1# 2x10                   Ther Activity                        Gait Training                Modalities        CP 5m dorsal foot                              "

## 2023-06-08 ENCOUNTER — TELEPHONE (OUTPATIENT)
Dept: PHYSICAL THERAPY | Facility: OTHER | Age: 48
End: 2023-06-08

## 2023-06-08 ENCOUNTER — OFFICE VISIT (OUTPATIENT)
Dept: PHYSICAL THERAPY | Facility: CLINIC | Age: 48
End: 2023-06-08
Payer: COMMERCIAL

## 2023-06-08 DIAGNOSIS — M25.571 CHRONIC PAIN OF RIGHT ANKLE: ICD-10-CM

## 2023-06-08 DIAGNOSIS — M54.50 CHRONIC MIDLINE LOW BACK PAIN WITHOUT SCIATICA: Primary | ICD-10-CM

## 2023-06-08 DIAGNOSIS — G89.29 CHRONIC PAIN OF RIGHT ANKLE: ICD-10-CM

## 2023-06-08 DIAGNOSIS — M25.561 CHRONIC PAIN OF RIGHT KNEE: ICD-10-CM

## 2023-06-08 DIAGNOSIS — G89.29 CHRONIC MIDLINE LOW BACK PAIN WITHOUT SCIATICA: Primary | ICD-10-CM

## 2023-06-08 DIAGNOSIS — G89.29 CHRONIC PAIN OF RIGHT KNEE: ICD-10-CM

## 2023-06-08 PROCEDURE — 97110 THERAPEUTIC EXERCISES: CPT | Performed by: PHYSICAL THERAPIST

## 2023-06-08 PROCEDURE — 97112 NEUROMUSCULAR REEDUCATION: CPT | Performed by: PHYSICAL THERAPIST

## 2023-06-08 PROCEDURE — 97140 MANUAL THERAPY 1/> REGIONS: CPT | Performed by: PHYSICAL THERAPIST

## 2023-06-08 NOTE — PROGRESS NOTES
"Daily Note     Today's date: 2023  Patient name: Hardy Gerber  : 1975  MRN: [id number]  Referring provider: DAVE De Anda  Dx:   Encounter Diagnosis     ICD-10-CM    1  Chronic midline low back pain without sciatica  M54 50     G89 29       2  Chronic pain of right knee  M25 561     G89 29       3  Chronic pain of right ankle  M25 571     G89 29                      Subjective: The patient states that both her feet hurt today  She made an appointment with the foot doctor, will be seeing her on   Objective: See treatment diary below      Assessment: Progressed patient with TE as outlined below in daily treatment diary  Tolerated treatment fair  Increased foot and LBP with standing TE   CP to feet x 5 minutes to end session  Patient demonstrated fatigue post treatment and would benefit from continued PT to improve function  Plan: Continue per plan of care        Precautions: None    Date    FOTO 54k  44 L-ds       Manuals        Passive quad/hip flexor stretch np  Ds+ gastroc,opp k-c ds kl   Manual R foot Manual stm Manual STM R foot + gastroc stretch      Neuro Re-Ed        PPT 20x 3\" HEP 15x 3\" 20x 3\"    Bridge c add 20x 5\" 5\"x20 Add c bridge 15x 20x  20x    Abdominal set with hooklying marches 1 5#  HEP  1#    Ab set c knee fallouts L3 /15  15x B 15x ea L2 15x   Clamshells  GTB 15x      MTP/LTP  OTB 15x each      Ther Ex        Seated lumbar flexion stretch 10\" 10x 10\" 10x NV 10x 10\"    Ankle MREs np    np    NuStep 5m L6 L6 6 min NV 8m L4 L6 5 min   Posture education        Supine SLR  1 5#  2/10 Stand 1 5# 2x10 ea  Abd & Ext Phong  15x  1# 2/10 1# 20x, slr abd 1# 2x10                   Ther Activity                        Gait Training                Modalities        CP 5m dorsal foot 5 min dorsal foot                               "

## 2023-06-08 NOTE — TELEPHONE ENCOUNTER
Patient filled out an online form for [facility] on 06/08/23 @12:09pm     Called patient on  06/08/23 @12:45pm    V/M left for patient to call back  Phone number and hours of business provided      NOTE: patient is currently in PT at [facility] (not with advanced PT)

## 2023-06-12 ENCOUNTER — LAB (OUTPATIENT)
Dept: LAB | Facility: HOSPITAL | Age: 48
End: 2023-06-12
Payer: COMMERCIAL

## 2023-06-12 ENCOUNTER — OFFICE VISIT (OUTPATIENT)
Dept: FAMILY MEDICINE CLINIC | Facility: CLINIC | Age: 48
End: 2023-06-12
Payer: COMMERCIAL

## 2023-06-12 VITALS
HEART RATE: 83 BPM | BODY MASS INDEX: 44.48 KG/M2 | DIASTOLIC BLOOD PRESSURE: 84 MMHG | WEIGHT: 267 LBS | HEIGHT: 65 IN | TEMPERATURE: 98.7 F | OXYGEN SATURATION: 95 % | SYSTOLIC BLOOD PRESSURE: 120 MMHG

## 2023-06-12 DIAGNOSIS — Z87.42 HISTORY OF PCOS: ICD-10-CM

## 2023-06-12 DIAGNOSIS — G89.29 CHRONIC MIDLINE LOW BACK PAIN WITH BILATERAL SCIATICA: Primary | ICD-10-CM

## 2023-06-12 DIAGNOSIS — M54.50 LOW BACK PAIN RADIATING TO BOTH LEGS: ICD-10-CM

## 2023-06-12 DIAGNOSIS — M54.42 CHRONIC MIDLINE LOW BACK PAIN WITH BILATERAL SCIATICA: Primary | ICD-10-CM

## 2023-06-12 DIAGNOSIS — E78.2 MIXED HYPERLIPIDEMIA: ICD-10-CM

## 2023-06-12 DIAGNOSIS — E78.49 OTHER HYPERLIPIDEMIA: ICD-10-CM

## 2023-06-12 DIAGNOSIS — E66.01 CLASS 3 SEVERE OBESITY DUE TO EXCESS CALORIES WITH SERIOUS COMORBIDITY AND BODY MASS INDEX (BMI) OF 40.0 TO 44.9 IN ADULT (HCC): ICD-10-CM

## 2023-06-12 DIAGNOSIS — M54.41 CHRONIC MIDLINE LOW BACK PAIN WITH BILATERAL SCIATICA: Primary | ICD-10-CM

## 2023-06-12 DIAGNOSIS — M79.605 LOW BACK PAIN RADIATING TO BOTH LEGS: ICD-10-CM

## 2023-06-12 DIAGNOSIS — E55.9 VITAMIN D DEFICIENCY: ICD-10-CM

## 2023-06-12 DIAGNOSIS — M79.671 FOOT PAIN, BILATERAL: ICD-10-CM

## 2023-06-12 DIAGNOSIS — M79.604 LOW BACK PAIN RADIATING TO BOTH LEGS: ICD-10-CM

## 2023-06-12 DIAGNOSIS — R22.2 LUMP OF SKIN OF BACK: ICD-10-CM

## 2023-06-12 DIAGNOSIS — M79.672 FOOT PAIN, BILATERAL: ICD-10-CM

## 2023-06-12 LAB
25(OH)D3 SERPL-MCNC: 27.7 NG/ML (ref 30–100)
ALBUMIN SERPL BCP-MCNC: 4.1 G/DL (ref 3.5–5)
ALP SERPL-CCNC: 54 U/L (ref 34–104)
ALT SERPL W P-5'-P-CCNC: 8 U/L (ref 7–52)
ANION GAP SERPL CALCULATED.3IONS-SCNC: 6 MMOL/L (ref 4–13)
AST SERPL W P-5'-P-CCNC: 12 U/L (ref 13–39)
BASOPHILS # BLD AUTO: 0.02 THOUSANDS/ÂΜL (ref 0–0.1)
BASOPHILS NFR BLD AUTO: 0 % (ref 0–1)
BILIRUB SERPL-MCNC: 0.52 MG/DL (ref 0.2–1)
BILIRUB UR QL STRIP: NEGATIVE
BUN SERPL-MCNC: 12 MG/DL (ref 5–25)
CALCIUM SERPL-MCNC: 9.2 MG/DL (ref 8.4–10.2)
CHLORIDE SERPL-SCNC: 102 MMOL/L (ref 96–108)
CHOLEST SERPL-MCNC: 229 MG/DL
CLARITY UR: CLEAR
CO2 SERPL-SCNC: 30 MMOL/L (ref 21–32)
COLOR UR: NORMAL
CREAT SERPL-MCNC: 0.68 MG/DL (ref 0.6–1.3)
EOSINOPHIL # BLD AUTO: 0.22 THOUSAND/ÂΜL (ref 0–0.61)
EOSINOPHIL NFR BLD AUTO: 4 % (ref 0–6)
ERYTHROCYTE [DISTWIDTH] IN BLOOD BY AUTOMATED COUNT: 13.7 % (ref 11.6–15.1)
GFR SERPL CREATININE-BSD FRML MDRD: 105 ML/MIN/1.73SQ M
GLUCOSE P FAST SERPL-MCNC: 93 MG/DL (ref 65–99)
GLUCOSE UR STRIP-MCNC: NEGATIVE MG/DL
HCT VFR BLD AUTO: 41.3 % (ref 34.8–46.1)
HDLC SERPL-MCNC: 48 MG/DL
HGB BLD-MCNC: 13.1 G/DL (ref 11.5–15.4)
HGB UR QL STRIP.AUTO: NEGATIVE
IMM GRANULOCYTES # BLD AUTO: 0.01 THOUSAND/UL (ref 0–0.2)
IMM GRANULOCYTES NFR BLD AUTO: 0 % (ref 0–2)
KETONES UR STRIP-MCNC: NEGATIVE MG/DL
LDLC SERPL CALC-MCNC: 139 MG/DL (ref 0–100)
LEUKOCYTE ESTERASE UR QL STRIP: NEGATIVE
LYMPHOCYTES # BLD AUTO: 2.08 THOUSANDS/ÂΜL (ref 0.6–4.47)
LYMPHOCYTES NFR BLD AUTO: 37 % (ref 14–44)
MCH RBC QN AUTO: 26.5 PG (ref 26.8–34.3)
MCHC RBC AUTO-ENTMCNC: 31.7 G/DL (ref 31.4–37.4)
MCV RBC AUTO: 83 FL (ref 82–98)
MONOCYTES # BLD AUTO: 0.74 THOUSAND/ÂΜL (ref 0.17–1.22)
MONOCYTES NFR BLD AUTO: 13 % (ref 4–12)
NEUTROPHILS # BLD AUTO: 2.55 THOUSANDS/ÂΜL (ref 1.85–7.62)
NEUTS SEG NFR BLD AUTO: 46 % (ref 43–75)
NITRITE UR QL STRIP: NEGATIVE
NONHDLC SERPL-MCNC: 181 MG/DL
NRBC BLD AUTO-RTO: 0 /100 WBCS
PH UR STRIP.AUTO: 6.5 [PH]
PLATELET # BLD AUTO: 261 THOUSANDS/UL (ref 149–390)
PMV BLD AUTO: 9.6 FL (ref 8.9–12.7)
POTASSIUM SERPL-SCNC: 4.5 MMOL/L (ref 3.5–5.3)
PROT SERPL-MCNC: 7.5 G/DL (ref 6.4–8.4)
PROT UR STRIP-MCNC: NEGATIVE MG/DL
RBC # BLD AUTO: 4.95 MILLION/UL (ref 3.81–5.12)
SODIUM SERPL-SCNC: 138 MMOL/L (ref 135–147)
SP GR UR STRIP.AUTO: 1.02 (ref 1–1.03)
TRIGL SERPL-MCNC: 211 MG/DL
TSH SERPL DL<=0.05 MIU/L-ACNC: 1.17 UIU/ML (ref 0.45–4.5)
UROBILINOGEN UR STRIP-ACNC: <2 MG/DL
WBC # BLD AUTO: 5.62 THOUSAND/UL (ref 4.31–10.16)

## 2023-06-12 PROCEDURE — 80061 LIPID PANEL: CPT

## 2023-06-12 PROCEDURE — 84443 ASSAY THYROID STIM HORMONE: CPT

## 2023-06-12 PROCEDURE — 99214 OFFICE O/P EST MOD 30 MIN: CPT | Performed by: NURSE PRACTITIONER

## 2023-06-12 PROCEDURE — 81003 URINALYSIS AUTO W/O SCOPE: CPT

## 2023-06-12 PROCEDURE — 36415 COLL VENOUS BLD VENIPUNCTURE: CPT

## 2023-06-12 PROCEDURE — 85025 COMPLETE CBC W/AUTO DIFF WBC: CPT

## 2023-06-12 PROCEDURE — 82306 VITAMIN D 25 HYDROXY: CPT

## 2023-06-12 PROCEDURE — 80053 COMPREHEN METABOLIC PANEL: CPT

## 2023-06-12 RX ORDER — TRAMADOL HYDROCHLORIDE 50 MG/1
50 TABLET ORAL EVERY 8 HOURS PRN
Qty: 30 TABLET | Refills: 0 | Status: SHIPPED | OUTPATIENT
Start: 2023-06-12 | End: 2023-06-22

## 2023-06-12 RX ORDER — CYCLOBENZAPRINE HCL 5 MG
5 TABLET ORAL
Qty: 21 TABLET | Refills: 0 | Status: SHIPPED | OUTPATIENT
Start: 2023-06-12 | End: 2023-07-03

## 2023-06-12 NOTE — PROGRESS NOTES
BMI Counseling: Body mass index is 44 43 kg/m²  The BMI is above normal  Nutrition recommendations include decreasing portion sizes, encouraging healthy choices of fruits and vegetables, decreasing fast food intake, consuming healthier snacks, limiting drinks that contain sugar, moderation in carbohydrate intake, increasing intake of lean protein, reducing intake of saturated and trans fat and reducing intake of cholesterol  Exercise recommendations include vigorous physical activity 75 minutes/week, exercising 3-5 times per week and strength training exercises  No pharmacotherapy was ordered  Patient referred to PCP  Rationale for BMI follow-up plan is due to patient being overweight or obese  Depression Screening and Follow-up Plan: Patient was screened for depression during today's encounter  They screened negative with a PHQ-2 score of 0      Assessment/Plan:    Pt is a 56 yr old female   Presents in office for follow up low backs she continues to see Physical therapy however has increased pain and intermittent radiating pain and weakness to b/l legs   Has a hard time standing for too long   She works in a post office   She has had lower extremity ankle pain to the RIGHT LE   complains of cramps and muscle spasms   Requesting something stronger for the pain   NSAIDs and tylenol are no longer helping   Sent over tramadol   Will need pain management however we will work on getting and MRI prior to that   Discussed weight management   She has gained weight recently which could be contributing to her worsening back pain   She has not been eating healthy   Has been referred to weight management   I did order wegovy but back ordered so I will be sending [address] for daily use   I will see her back in 4-6 weeks      Problem List Items Addressed This Visit        Other    Hyperlipidemia     Hyperlipidemia diagnoses assessed and discussed   Wt loss recommneded   Reviewed medications and plan of care   Labs reviewed 06/12/2023   Discussed low fat low cholesterol diet   Discussed exercise and adequate hydration   Will continue to monitor every 4 months          Other Visit Diagnoses     Chronic midline low back pain with bilateral sciatica    -  Primary    Relevant Orders    MRI lumbar spine wo contrast    Low back pain radiating to both legs        Relevant Medications    traMADol (Ultram) 50 mg tablet    cyclobenzaprine (FLEXERIL) 5 mg tablet    Other Relevant Orders    MRI lumbar spine wo contrast    Foot pain, bilateral        Relevant Orders    MRI lumbar spine wo contrast    Class 3 severe obesity due to excess calories with serious comorbidity and body mass index (BMI) of 40 0 to 44 9 in [facility])        Relevant Medications    Semaglutide-Weight Management (WEGOVY) 0 5 MG/0 5ML    Other Relevant Orders    Ambulatory Referral to Weight Management    Lump of skin of back        Relevant Orders    Ambulatory Referral to General Surgery            Subjective:      Patient ID: Shama Duckworth is a 56 y o  female      Pt is a 56 yr old female   Presents in office for follow up low backs she continues to see Physical therapy however has increased pain and intermittent radiating pain and weakness to b/l legs   Has a hard time standing for too long   She works in a post office   She has had lower extremity ankle pain to the RIGHT LE   complains of cramps and muscle spasms   Requesting something stronger for the pain   NSAIDs and tylenol are no longer helping   Sent over tramadol   Will need pain management however we will work on getting and MRI prior to that   Discussed weight management   She has gained weight recently which could be contributing to her worsening back pain   She has not been eating healthy   Has been referred to weight management   I did order wegovy but back ordered so I will be sending [place] for daily use   I will see her back in 4-6 weeks         The following portions of the patient's history were reviewed and updated as appropriate:   Past Medical History:  She has a past medical history of Anemia, History of transfusion, Hypercholesterolemia, Sleep apnea, and Vitamin deficiency  ,  _______________________________________________________________________  Medical Problems:  does not have any pertinent problems on file ,  _______________________________________________________________________  Past Surgical History:   has a past surgical history that includes Ectopic pregnancy surgery; Eye surgery; GASTRECTOMY SLEEVE LAPAROSCOPIC; pr laps w/vag hysterect 250 gm/&rmvl tube&/ovaries (N/A, 9/28/2021); and pr laparoscopy w/rmvl adnexal structures (Bilateral, 9/28/2021)  ,  _______________________________________________________________________  Family History:  family history includes Asthma in her brother; Brain cancer in her mother; Cancer in her father and maternal grandmother; Diabetes in her maternal grandmother; Hypertension in her paternal grandmother ,  _______________________________________________________________________  Social History:   reports that she has never smoked  She has never used smokeless tobacco  She reports that she does not drink alcohol and does not use drugs  ,  _______________________________________________________________________  Allergies:  is allergic to penicillin g and pollen extract     _______________________________________________________________________  Current Outpatient Medications   Medication Sig Dispense Refill   • Ascorbic Acid (YVONNE-C PO) Take by mouth     • Bacillus Coagulans-Inulin (PROBIOTIC) 1-250 BILLION-MG CAPS Take 1 capsule by mouth Takes once in awhile     • Biotin 1 MG CAPS Take by mouth     • cholecalciferol (VITAMIN D3) 25 mcg (1,000 units) tablet Take 1 tablet (1,000 Units total) by mouth daily 90 tablet 1   • cyanocobalamin (VITAMIN B-12) 100 mcg tablet Take by mouth daily     • cyclobenzaprine (FLEXERIL) 5 mg tablet Take 1 tablet (5 mg total) by mouth daily at bedtime as needed for muscle spasms (as needed for back pain) for up to 21 days 21 tablet 0   • ferrous sulfate 324 (65 Fe) mg Take 1 tablet (324 mg total) by mouth 3 (three) times a day before meals 90 tablet 0   • multivitamin (THERAGRAN) TABS Take 1 tablet by mouth daily     • Omega-3 Fatty Acids (fish oil) 1,000 mg Take 1,000 mg by mouth daily     • Semaglutide-Weight Management (WEGOVY) 0 5 MG/0 5ML Inject 0 5 mL (0 5 mg total) under the skin once a week 2 mL 1   • traMADol (Ultram) 50 mg tablet Take 1 tablet (50 mg total) by mouth every 8 (eight) hours as needed for moderate pain for up to 10 days 30 tablet 0   • hydrocortisone 2 5 % lotion Apply topically 2 (two) times a day for 10 days 60 mL 1   • pantoprazole (PROTONIX) 40 mg tablet Take 40 mg by mouth every morning       No current facility-administered medications for this visit      _______________________________________________________________________  Review of Systems   Constitutional: Positive for fatigue and unexpected weight change  Negative for fever  HENT: Negative for congestion, postnasal drip, sinus pressure, sore throat and voice change  Eyes: Negative  Respiratory: Negative for cough and shortness of breath  Cardiovascular: Positive for leg swelling  Negative for chest pain and palpitations  Gastrointestinal: Negative for abdominal distention, abdominal pain, nausea and vomiting  Endocrine: Negative  Genitourinary: Positive for flank pain  Negative for difficulty urinating  Musculoskeletal: Positive for back pain, gait problem and myalgias  Low back pain    Skin: Negative for rash  Allergic/Immunologic: Positive for environmental allergies  Neurological: Negative for headaches  Hematological: Negative for adenopathy  Psychiatric/Behavioral: Negative for sleep disturbance and suicidal ideas  The patient is not nervous/anxious            Objective:  Vitals:    06/12/23 1624   BP: 120/84   BP "Location: Left arm   Patient Position: Sitting   Cuff Size: Large   Pulse: 83   Temp: 98 7 °F (37 1 °C)   SpO2: 95%   Weight: 121 kg (267 lb)   Height: 5' 5\" (1 651 m)     Body mass index is 44 43 kg/m²  Physical Exam  Vitals and nursing note reviewed  Constitutional:       Appearance: She is obese  Comments: BMI 44 43   HENT:      Head: Atraumatic  Nose: No congestion or rhinorrhea  Mouth/Throat:      Mouth: Mucous membranes are moist    Eyes:      Extraocular Movements: Extraocular movements intact  Cardiovascular:      Rate and Rhythm: Normal rate and regular rhythm  Pulses: Normal pulses  Heart sounds: Normal heart sounds  Pulmonary:      Effort: Pulmonary effort is normal       Breath sounds: Normal breath sounds  Abdominal:      Palpations: Abdomen is soft  Musculoskeletal:         General: Normal range of motion  Cervical back: Normal range of motion  Skin:     General: Skin is warm  Capillary Refill: Capillary refill takes less than 2 seconds  Neurological:      Mental Status: She is oriented to person, place, and time  Motor: Weakness present  Gait: Gait abnormal    Psychiatric:         Mood and Affect: Mood normal          Behavior: Behavior normal        Contains abnormal data Vitamin D 25 hydroxy  Order: [id number]   Status: Final result      Visible to patient: Yes (not seen)      Next appt: 06/14/2023 at 05:00 PM in Physical Therapy TIKA Cunningham      Dx:  Vitamin D deficiency      1 Result Note     1 Patient Communication          Component Ref Range & Units 6/12/23  3:19 PM 3/3/23 12:28 PM 9/9/21  9:08 AM 4/29/21  7:50 AM   Vit D, 25-Hydroxy 30 0 - 100 0 ng/mL 27 7 Low   21 1 Low   26 1 Low   20 6 Low     Comment: Vitamin D guidelines established by Clinical Guidelines Subcommittee  of the Endocrine Society Task Force, 2011     Deficiency <20ng/ml   Insufficiency 20-30ng/ml   Sufficient  ng/ml               Specimen Collected: " 06/12/23  3:19 PM Last Resulted: 06/12/23  9:32 PM         UA w Reflex to Microscopic w Reflex to Culture -Lab Collect  Order: [id number]   Status: Final result      Visible to patient: Yes (seen)      Next appt: 06/14/2023 at 05:00 PM in Physical Therapy Morna Case, [facility])      Dx: History of PCOS; Other hyperlipidemia     Specimen Information: Urine, Clean Catch    0 Result Notes            Component Ref Range & Units 6/12/23  3:19 PM 3/3/23 12:28 PM 7/7/21  2:40 PM 2/10/21  9:06 AM 9/3/20  3:25 PM 6/5/18 10:30 AM   Color, UA  Light Yellow  Light Yellow  yellow  Yellow  Yellow  Red    Clarity, UA  Clear  Clear  clear  Clear  Cloudy  Cloudy    Specific [place], UA 1 003 - 1 030 1 024  1 020  1 005 R  1 020  1 020  1 015    pH, UA 4 5, 5 0, 5 5, 6 0, 6 5, 7 0, 7 5, 8 0 6 5  7 5  7 R  7 0  7 5  6 0 R    Leukocytes, UA Negative Negative  Negative  negative R  Negative  Trace Abnormal   Trace Abnormal     Nitrite, UA Negative Negative  Negative  negative R  Negative  Negative  Negative    Protein, UA Negative mg/dl Negative  Negative  trace R  Negative  30 (1+) Abnormal   30 (1+) Abnormal     Glucose, UA Negative mg/dl Negative  Negative  negative R  Negative  Negative  Negative    Ketones, UA Negative mg/dl Negative  Negative  trace R  Negative  Negative  Negative    Urobilinogen, UA <2 0 mg/dl mg/dl <2 0  <2 0        Bilirubin, UA Negative Negative  Negative   Negative  Negative  Negative    Occult Blood, UA Negative Negative  Negative  negative R  Negative  Large Abnormal   Large Abnormal                Specimen Collected: 06/12/23  3:19 PM Last Resulted: 06/12/23  5:22 PM           Contains abnormal data Lipid panel  Order: [id number]   Status: Final result      Visible to patient: Yes (not seen)      Next appt: 06/14/2023 at 05:00 PM in Physical Therapy Morna Case, [facility])      Dx:  History of PCOS; Other hyperlipidemia      1 Result Note     1 Patient Communication         Component Ref Range & Units 6/12/23  3:19 PM 3/3/23 12:28 PM 4/29/21  7:50 AM   Cholesterol See Comment mg/dL 229 High   247 High  CM  193 R, CM    Comment: Cholesterol:         Pediatric <18 Years         Desirable          <170 mg/dL       Borderline High    170-199 mg/dL       High               >=200 mg/dL         Adult >=18 Years             Desirable         <200 mg/dL       Borderline High   200-239 mg/dL       High             >239 mg/dL    Triglycerides See Comment mg/dL 211 High   215 High  CM  124 R, CM    Comment: Triglyceride:      0-9Y            <75mg/dL      10Y-17Y         <90 mg/dL        >=18Y      Normal          <150 mg/dL      Borderline High 150-199 mg/dL      High            200-499 mg/dL        Very High       >499 mg/dL     Specimen collection should occur prior to Metamizole administration due to the potential for falsely depressed results  HDL, Direct >=50 mg/dL 48 Low   53  52 R, CM    LDL Calculated 0 - 100 mg/dL 139 High   151 High  CM  116 High  CM    Comment: LDL Cholesterol:     Optimal           <100 mg/dl     Near Optimal      100-129 mg/dl     Above Optimal       Borderline High 130-159 mg/dl       High            160-189 mg/dl       Very High       >189 mg/dl             TSH, 3rd generation with Free T4 reflex  Order: [id number]   Status: Final result      Visible to patient: Yes (seen)      Next appt: 06/14/2023 at 05:00 PM in Physical Therapy [facility])      Dx: History of PCOS; Other hyperlipidemia      0 Result Notes          Component Ref Range & Units 6/12/23  3:19 PM 3/3/23 12:28 PM 6/4/21  7:52 PM 4/29/21  7:50 AM   TSH 3RD GENERATON 0 450 - 4 500 uIU/mL 1 167  1 202 CM  1 605 R,          Contains abnormal data CBC and differential  Order: [id number]   Status: Final result      Visible to patient: Yes (not seen)      Next appt: 06/14/2023 at 05:00 PM in Physical Therapy [facility])      Dx:  History of PCOS; Other hyperlipidemia      1 Result Note     1 Patient Communication Component Ref Range & Units 6/12/23  3:19 PM 3/3/23 12:28 PM 9/28/21 11:05 AM 9/9/21  9:08 AM 7/19/21  4:45 PM 7/3/21  4:02 PM 6/14/21  8:07 AM   WBC 4 31 - 10 16 Thousand/uL 5 62  5 25  13 09 High   4 50  5 61  4 97  5 52    RBC 3 81 - 5 12 Million/uL 4 95  4 91  3 87  5 10  4 74  5 25 High   4 58    Hemoglobin 11 5 - 15 4 g/dL 13 1  12 9  10 3 Low   13 2  11 2 Low   11 8  9 8 Low     Hematocrit 34 8 - 46 1 % 41 3  41 2  32 9 Low   42 0  37 2  40 1  34 0 Low     MCV 82 - 98 fL 83  84  85  82  79 Low   76 Low   74 Low     MCH 26 8 - 34 3 pg 26 5 Low   26 3 Low   26 6 Low   25 9 Low   23 6 Low   22 5 Low   21 4 Low     MCHC 31 4 - 37 4 g/dL 31 7  31 3 Low   31 3 Low   31 4  30 1 Low   29 4 Low   28 8 Low     RDW 11 6 - 15 1 % 13 7  13 4  14 4  15 0  23 9 High   26 9 High   29 6 High     MPV 8 9 - 12 7 fL 9 6  9 6  9 3  9 7  9 5  9 8  10 4    Platelets 547 - 377 Thousands/uL 261  278  247  238  228  238  281    nRBC /100 WBCs 0  0   0  0  0  0    Neutrophils Relative 43 - 75 % 46  47   47  45  45  47    Immat GRANS % 0 - 2 % 0  0   0  0  0  0    Lymphocytes Relative 14 - 44 % 37  38   35  38  41  36    Monocytes Relative 4 - 12 % 13 High   11   14 High   13 High   11  12    Eosinophils Relative 0 - 6 % 4  3   3  3  3  4    Basophils Relative 0 - 1 % 0  1   1  1  0  1    Neutrophils Absolute 1 85 - 7 62 Thousands/µL 2 55  2 49   2 12  2 51  2 19  2 63    Immature Grans Absolute 0 00 - 0 20 Thousand/uL 0 01  0 01   0 02  0 01  0 01  0 01    Lymphocytes Absolute 0 60 - 4 47 Thousands/µL 2 08  1 98   1 58  2 13  2 04  1 98    Monocytes Absolute 0 17 - 1 22 Thousand/µL 0 74  0 58   0 61  0 75  0 55  0 65    Eosinophils Absolute 0 00 - 0 61 Thousand/µL 0 22  0 16   0 14  0 18  0 16  0 21    Basophils Absolute 0 00 - 0 10 Thousands/µL 0 02  0 03   0 03  0 03  0 02  0 04               Specimen Collected: 06/12/23  3:19 PM Last Resulted: 06/12/23  3:35 PM           Contains abnormal data Comprehensive metabolic panel  Order: [id number]   Status: Final result      Visible to patient: Yes (not seen)      Next appt: 06[facility]14[facility]2023 [facility] 05[facility]00 [facility] [place]      Dx: History of PCOS; Other hyperlipidemia      1 Result Note     1 Patient Communication             Component Ref Range & Units 6/12/23  3:19 PM 3/3/23 12:28 PM 9/9/21  9:08 AM 7/19/21  4:45 PM 7/3/21  4:02 PM 6/4/21  7:52 PM 5/7/21 10:45 PM   Sodium 135 - 147 mmol/L 138  139  139 R  139 R  141 R  142 R  142 R    Potassium 3 5 - 5 3 mmol/L 4 5  4 1  4 2  3 8  3 8  3 8  4 0    Chloride 96 - 108 mmol/L 102  102  104 R  102 R  104 R  107 R  106 R    CO2 21 - 32 mmol/L 30  32  29  29  28  29  28    ANION GAP 4 - 13 mmol/L 6  5  6  8  9  6  8    BUN 5 - 25 mg/dL 12  11  17  11  12  9  13    Creatinine 0 60 - 1 30 mg/dL 0 68  0 66 CM  0 69 CM  0 77 CM  0 62 CM  0 65 CM  0 68 CM    Comment: Standardized to IDMS reference method   Glucose, Fasting 65 - 99 mg/dL 93  83 CM  65 CM        Calcium 8 4 - 10 2 mg/dL 9 2  9 1  8 4 R  8 0 Low  R  8 4 R  8 3 R  8 2 Low  R    AST 13 - 39 U/L 12 Low   13 CM  11 R, CM  11 R, CM   13 R, CM     ALT 7 - 52 U/L 8  8 CM  17 R, CM  15 R, CM   17 R, CM     Comment: Specimen collection should occur prior to Sulfasalazine administration due to the potential for falsely depressed results  Alkaline Phosphatase 34 - 104 U/L 54  63  48 R  51 R   65 R     Total Protein 6 4 - 8 4 g/dL 7 5  7 7  7 4 R  7 1 R   7 6 R     Albumin 3 5 - 5 0 g/dL 4 1  4 2  3 5  3 5   3 4 Low      Total Bilirubin 0 20 - 1 00 mg/dL 0 52  0 64  0 31 CM  0 27 CM   0 43 CM     Comment: Use of this assay is not recommended for patients undergoing treatment with eltrombopag due to the potential for falsely elevated results  N-acetyl-p-benzoquinone imine (metabolite of Acetaminophen) will generate erroneously low results in samples for patients that have taken an overdose of Acetaminophen     eGFR ml/min/1 73sq m 105  106

## 2023-06-13 RX ORDER — PANTOPRAZOLE SODIUM 40 MG/1
40 TABLET, DELAYED RELEASE ORAL EVERY MORNING
COMMUNITY
Start: 2023-04-22

## 2023-06-13 NOTE — ASSESSMENT & PLAN NOTE
Hyperlipidemia diagnoses assessed and discussed   Wt loss recommneded   Reviewed medications and plan of care   Labs reviewed   06/12/2023   Discussed low fat low cholesterol diet   Discussed exercise and adequate hydration   Will continue to monitor every 4 months

## 2023-06-14 ENCOUNTER — APPOINTMENT (OUTPATIENT)
Dept: PHYSICAL THERAPY | Facility: CLINIC | Age: 48
End: 2023-06-14
Payer: COMMERCIAL

## 2023-06-14 DIAGNOSIS — E66.01 CLASS 3 SEVERE OBESITY DUE TO EXCESS CALORIES WITH SERIOUS COMORBIDITY AND BODY MASS INDEX (BMI) OF 40.0 TO 44.9 IN ADULT (HCC): Primary | ICD-10-CM

## 2023-06-19 ENCOUNTER — TELEPHONE (OUTPATIENT)
Dept: FAMILY MEDICINE CLINIC | Facility: CLINIC | Age: 48
End: 2023-06-19

## 2023-06-19 DIAGNOSIS — E66.01 CLASS 3 SEVERE OBESITY DUE TO EXCESS CALORIES WITH SERIOUS COMORBIDITY AND BODY MASS INDEX (BMI) OF 40.0 TO 44.9 IN ADULT (HCC): Primary | ICD-10-CM

## 2023-06-19 RX ORDER — PEN NEEDLE, DIABETIC 32GX 5/32"
NEEDLE, DISPOSABLE MISCELLANEOUS DAILY
Qty: 100 EACH | Refills: 1 | Status: SHIPPED | OUTPATIENT
Start: 2023-06-19 | End: 2023-09-17

## 2023-06-21 ENCOUNTER — EVALUATION (OUTPATIENT)
Dept: PHYSICAL THERAPY | Facility: CLINIC | Age: 48
End: 2023-06-21
Payer: COMMERCIAL

## 2023-06-21 DIAGNOSIS — M25.571 CHRONIC PAIN OF RIGHT ANKLE: ICD-10-CM

## 2023-06-21 DIAGNOSIS — G89.29 CHRONIC PAIN OF RIGHT KNEE: ICD-10-CM

## 2023-06-21 DIAGNOSIS — M25.561 CHRONIC PAIN OF RIGHT KNEE: ICD-10-CM

## 2023-06-21 DIAGNOSIS — G89.29 CHRONIC MIDLINE LOW BACK PAIN WITHOUT SCIATICA: Primary | ICD-10-CM

## 2023-06-21 DIAGNOSIS — M54.50 CHRONIC MIDLINE LOW BACK PAIN WITHOUT SCIATICA: Primary | ICD-10-CM

## 2023-06-21 DIAGNOSIS — G89.29 CHRONIC PAIN OF RIGHT ANKLE: ICD-10-CM

## 2023-06-21 PROCEDURE — 97112 NEUROMUSCULAR REEDUCATION: CPT

## 2023-06-21 PROCEDURE — 97110 THERAPEUTIC EXERCISES: CPT

## 2023-06-21 PROCEDURE — 97140 MANUAL THERAPY 1/> REGIONS: CPT

## 2023-06-21 NOTE — PROGRESS NOTES
"Daily Note     Today's date: 2023  Patient name: Jay Sacks  : 1975  MRN: [id number]  Referring provider: DAVE Ram  Dx:   Encounter Diagnosis     ICD-10-CM    1  Chronic midline low back pain without sciatica  M54 50 PT plan of care cert/re-cert    T49 58       2  Chronic pain of right knee  M25 561 PT plan of care cert/re-cert    D51 81       3  Chronic pain of right ankle  M25 571 PT plan of care cert/re-cert    A46 97           Start Time: 1715  Stop Time: 1800  Total time in clinic (min): 45 minutes    Subjective: Pt reports she has a podiatry appt on 2023 as well as the LB MRI  She continues with R foot/ LB  pain   Objective: See treatment diary below  FOTO knee 31      Assessment: Tolerated treatment fair  Patient would benefit from continued PT  Modified program performed due to re-eval/time due to pt lateness  Plan: Continue per plan of care        Precautions: None    Date    FOTO 54k  44 L-ds  ds 31k, L30     Manuals        Passive quad/hip flexor stretch np  Ds+ gastroc,opp k-c ds kl   Manual R foot Manual stm Manual STM R foot + gastroc stretch NP     Neuro Re-Ed        PPT 20x 3\" HEP hep 20x 3\"    Bridge c add 20x 5\" 5\"x20 20x 5\" 20x  20x    Abdominal set with hooklying marches 1 5# 130 HEP  1 1# 30   Ab set c knee fallouts L3 2/15   15x ea L2 15x   Clamshells  GTB 15x 10x 5\" P1     MTP/LTP  OTB 15x each NP     Ther Ex        Seated lumbar flexion stretch 10\" 10x 10\" 10x 10x 5\" PB 10x 10\"    Ankle MREs np    np    NuStep 5m L6 L6 6 min 5m L5 8m L4 L6 5 min   Posture education        Supine SLR  1 5#  2/10 Stand 1 5# 2x10 ea  Abd & Ext Phong  1 5# 15x  1# 2/10 1# 20x, slr abd 1# 2x10                   Ther Activity                        Gait Training                Modalities        CP 5m dorsal foot 5 min dorsal foot np                                "

## 2023-06-21 NOTE — LETTER
2023    DAVE [address]en Ala  [address]    Patient: Willie David   YOB: 1975   Date of Visit: 2023     Encounter Diagnosis     ICD-10-CM    1  Chronic midline low back pain without sciatica  M54 50     G89 29       2  Chronic pain of right knee  M25 561     G89 29       3  Chronic pain of right ankle  M25 571     G89 29           Dear Dr Ortez Sprain: Thank you for your recent referral of Willie David  Please review the attached evaluation summary from [facility] recent visit  Please verify that you agree with the plan of care by signing the attached order  If you have any questions or concerns, please do not hesitate to call  I sincerely appreciate the opportunity to share in the care of one of your patients and hope to have another opportunity to work with you in the near future  Sincerely,    Jilda Kussmaul, PT      Referring Provider:      I certify that I have read the below Plan of Care and certify the need for these services furnished under this plan of treatment while under my care  DA[address] [id number]  [address] [place]          Daily Note     Today's date: 2023  Patient name: Willie David  : 1975  MRN: [id number]  Referring provider: DAVE Franco  Dx:   Encounter Diagnosis     ICD-10-CM    1  Chronic midline low back pain without sciatica  M54 50 PT plan of care cert/re-cert    E61 32       2  Chronic pain of right knee  M25 561 PT plan of care cert/re-cert    L73 30       3  Chronic pain of right ankle  M25 571 PT plan of care cert/re-cert    Y53 80           Start Time: 1715  Stop Time: 1800  Total time in clinic (min): 45 minutes    Subjective: Pt reports she has a podiatry appt on 2023 as well as the LB MRI  She continues with R foot/ LB  pain   Objective: See treatment diary below   FOTO knee 31      Assessment: Tolerated treatment "fair  Patient would benefit from continued PT  Modified program performed due to re-eval/time due to pt lateness  Plan: Continue per plan of care  Precautions: None    Date    FOTO 54k  44 L-ds  ds 31k, L30     Manuals        Passive quad/hip flexor stretch np  Ds+ gastroc,opp k-c ds kl   Manual R foot Manual stm Manual STM R foot + gastroc stretch NP     Neuro Re-Ed        PPT 20x 3\" HEP hep 20x 3\"    Bridge c add 20x 5\" 5\"x20 20x 5\" 20x  20x    Abdominal set with hooklying marches 1 5#  HEP   1#    Ab set c knee fallouts L3 2/15   15x ea L2 15x   Clamshells  GTB 15x 10x 5\" P1     MTP/LTP  OTB 15x each NP     Ther Ex        Seated lumbar flexion stretch 10\" 10x 10\" 10x 10x 5\" PB 10x 10\"    Ankle MREs np    np    NuStep 5m L6 L6 6 min 5m L5 8m L4 L6 5 min   Posture education        Supine SLR  1 5#  2/10 Stand 1 5# 2x10 ea  Abd & Ext Phong  1 5# 15x  1# 2/10 1# 20x, slr abd 1# 2x10                   Ther Activity                        Gait Training                Modalities        CP 5m dorsal foot 5 min dorsal foot np                                 PT Evaluation     Today's date: 2023  Patient name: Janis Brown  : 1975  MRN: [id number]  Referring provider: DAVE Santiago  Dx:   Encounter Diagnosis     ICD-10-CM    1  Chronic midline low back pain without sciatica  M54 50     G89 29       2  Chronic pain of right knee  M25 561     G89 29       3  Chronic pain of right ankle  M25 571     G89 29                      Assessment  Assessment details: Patient has attended 8 physical therapy visits to date  She continues to demonstrate mild restriction with lumbar rom and LE strength with the exception of R ankle eversion which is demonstrating moderate strength deficits  She continues to be limited with her tolerance for standing standing and work duties secondary to increased low back and R LE pain   There was noted aberrant movement with lumbopelvic " "rhythm when returning from a forward flexed position  Impairments: abnormal or restricted ROM, activity intolerance, impaired physical strength, lacks appropriate home exercise program, pain with function and poor posture     Symptom irritability: lowUnderstanding of Dx/Px/POC: good   Prognosis: good    Goals  Lumbar:  STGs:  \"Patient will be independent with hep by 2-3 visits  - Partially MET  Decrease low back pain by 25% for improved tolerance with adls and home duties by 3-4 weeks  - Not MET   Improve Lumbar rom to wfl for improved tolerance with adls and home duties by 3-4 weeks  \" - Not MET     LTGs:  \"Improve FOTO score from 35 to 52 indicating improved tolerance with activities involving the low back by discharge  - Not MET   Patient will demonstrate rom and strength to the lumbar spine wfl for improved tolerance with adls and home duties by discharge  - Not MET   Patient will be free of radicular symptoms by discharge  \"- Not MET     Knee:  STGs:  Patient will be independent with HEP in 1-2 visits - Partially MET  Improve L knee strength to 3+/5 for improved tolerance with adls and transfers by 3-4 weeks  - MET     LTGs:  Improve FOTO score from 47 To 60 Indicating improved tolerance with activities involving the LE by discharge  - Not MET   Improve L knee strength and rom to wnl for improved tolerance with adls, work duties, and recreation by discharge  - Not MET   Patient will be able to return to normal ambulation without deviation over all terrains without pain or limitation from the knee by discharge  - Not MET   Patient will be able to return to normal work duties without limitation from the knee by discharge   - Not MET     Plan  Plan details: Patient informed that from this point forward, to ensure adherence to the aforementioned plan of care, all or some of the treatment may be performed and carried out by a Physical Therapy Assistant (PTA) with supervision from a licensed Physical Therapist (PT) " in accordance with [address]  Patient will continue to benefit from skilled physical therapy to address the functional deficits that were identified during the evaluation today  We will continue to progress the therapy program to address these functional deficits and achieve the established goals  Patient would benefit from: skilled physical therapy  Planned modality interventions: cryotherapy and thermotherapy: hydrocollator packs  Planned therapy interventions: abdominal trunk stabilization, ADL retraining, body mechanics training, flexibility, functional ROM exercises, home exercise program, therapeutic exercise, therapeutic activities, stretching, strengthening, postural training, patient education, joint mobilization and manual therapy  Frequency: 2x week  Duration in weeks: 4  Plan of Care beginning date: 6/21/2023  Plan of Care expiration date: 7/19/2023  Treatment plan discussed with: patient        Subjective Evaluation    History of Present Illness  Mechanism of injury: Patient presents to out patient physical therapy with chief c/o LBP, R knee, and R ankle pain  Patient reports onset of LBP when she gave birth to her son  She notes that recently it started to bother her more when she wakes up in the morning after brushing her teeth and when she is transitioning from stand to/from sit  She denies any numbness/tingling or radicular symptoms into the LEs  She also discloses R knee and foot pain  She feels that when she is not wearing supportive sneakers she will have increased pain to the R mid foot  She is also reporting increased pain to the R knee with standing/walking  Update 6/21/2023:  Patient reports that she continues to have back pain which has been elevated lately  She does not correlate this to any change in activity  She continues to report increased pain when she is in a standing position which is relieved when she is laying down in bed   She reports increased radicular symptoms into the R LE during forward bending and increased low back pain/symptoms when in a standing position  Recurrent probem    Quality of life: good    Pain  Current pain ratin  At best pain ratin  At worst pain ratin  Location: Lumbar/R knee/R ankle  Quality: radiating, sharp, tight, discomfort and dull ache  Relieving factors: medications, rest and change in position  Aggravating factors: standing, walking and sitting  Progression: worsening    Patient Goals  Patient goals for therapy: decreased pain, increased motion and increased strength          Objective     Static Posture     Lumbar Spine   Increased lordosis  Pelvis   Anterior pelvic tilt    Ankle/Foot   Ankle/Foot (Left): Pes planus  Ankle/Foot (Right): Pes planus       Active Range of Motion     Lumbar   Flexion:  WFL  Extension:  with pain Restriction level: minimal  Left lateral flexion:  WFL  Right lateral flexion:  with pain Restriction level: minimal  Left rotation:  WFL  Right rotation:  WFL  Left Knee   Flexion: 128 degrees   Extension: 4 degrees     Right Knee   Flexion: 131 degrees   Extension: 6 degrees   Left Ankle/Foot   Dorsiflexion (ke): 5 degrees   Plantar flexion: 61 degrees   Inversion: 32 degrees   Eversion: 18 degrees     Right Ankle/Foot   Dorsiflexion (kf): 8 degrees   Plantar flexion: 48 degrees with pain  Inversion: 30 degrees   Eversion: 22 degrees     Strength/Myotome Testing     Left Hip   Planes of Motion   Flexion: 4+  Extension: 4+  Abduction: 4+  Adduction: 4+    Right Hip   Planes of Motion   Flexion: 4+  Extension: 4+  Abduction: 4+  Adduction: 4+    Left Knee   Flexion: 4+  Extension: 4+    Right Knee   Flexion: 4  Extension: 4    Left Ankle/Foot   Dorsiflexion: 5  Plantar flexion: 5  Inversion: 4+  Eversion: 4+    Right Ankle/Foot   Dorsiflexion: 5  Plantar flexion: 5  Inversion: 4  Eversion: 3+            Precautions: None

## 2023-06-21 NOTE — PROGRESS NOTES
"PT Evaluation     Today's date: 2023  Patient name: Andrew Owen  : 1975  MRN: [id number]  Referring provider: DAVE Nunez  Dx:   Encounter Diagnosis     ICD-10-CM    1  Chronic midline low back pain without sciatica  M54 50     G89 29       2  Chronic pain of right knee  M25 561     G89 29       3  Chronic pain of right ankle  M25 571     G89 29                      Assessment  Assessment details: Patient has attended 8 physical therapy visits to date  She continues to demonstrate mild restriction with lumbar rom and LE strength with the exception of R ankle eversion which is demonstrating moderate strength deficits  She continues to be limited with her tolerance for standing standing and work duties secondary to increased low back and R LE pain  There was noted aberrant movement with lumbopelvic rhythm when returning from a forward flexed position  Impairments: abnormal or restricted ROM, activity intolerance, impaired physical strength, lacks appropriate home exercise program, pain with function and poor posture     Symptom irritability: lowUnderstanding of Dx/Px/POC: good   Prognosis: good    Goals  Lumbar:  STGs:  \"Patient will be independent with hep by 2-3 visits  - Partially MET  Decrease low back pain by 25% for improved tolerance with adls and home duties by 3-4 weeks  - Not MET   Improve Lumbar rom to wfl for improved tolerance with adls and home duties by 3-4 weeks  \" - Not MET     LTGs:  \"Improve FOTO score from 35 to 52 indicating improved tolerance with activities involving the low back by discharge  - Not MET   Patient will demonstrate rom and strength to the lumbar spine wfl for improved tolerance with adls and home duties by discharge  - Not MET   Patient will be free of radicular symptoms by discharge   \"- Not MET     Knee:  STGs:  Patient will be independent with HEP in 1-2 visits - Partially MET  Improve L knee strength to 3+/5 for improved tolerance with adls and " transfers by 3-4 weeks  - MET     LTGs:  Improve FOTO score from 47 To 60 Indicating improved tolerance with activities involving the LE by discharge  - Not MET   Improve L knee strength and rom to wnl for improved tolerance with adls, work duties, and recreation by discharge  - Not MET   Patient will be able to return to normal ambulation without deviation over all terrains without pain or limitation from the knee by discharge  - Not MET   Patient will be able to return to normal work duties without limitation from the knee by discharge  - Not MET     Plan  Plan details: Patient informed that from this point forward, to ensure adherence to the aforementioned plan of care, all or some of the treatment may be performed and carried out by a Physical Therapy Assistant (PTA) with supervision from a licensed Physical Therapist (PT) in accordance with [address]  Patient will continue to benefit from skilled physical therapy to address the functional deficits that were identified during the evaluation today  We will continue to progress the therapy program to address these functional deficits and achieve the established goals            Patient would benefit from: skilled physical therapy  Planned modality interventions: cryotherapy and thermotherapy: hydrocollator packs  Planned therapy interventions: abdominal trunk stabilization, ADL retraining, body mechanics training, flexibility, functional ROM exercises, home exercise program, therapeutic exercise, therapeutic activities, stretching, strengthening, postural training, patient education, joint mobilization and manual therapy  Frequency: 2x week  Duration in weeks: 4  Plan of Care beginning date: 6/21/2023  Plan of Care expiration date: 7/19/2023  Treatment plan discussed with: patient        Subjective Evaluation    History of Present Illness  Mechanism of injury: Patient presents to out patient physical therapy with chief c/o LBP, R knee, and R ankle pain  Patient reports onset of LBP when she gave birth to her son  She notes that recently it started to bother her more when she wakes up in the morning after brushing her teeth and when she is transitioning from stand to/from sit  She denies any numbness/tingling or radicular symptoms into the LEs  She also discloses R knee and foot pain  She feels that when she is not wearing supportive sneakers she will have increased pain to the R mid foot  She is also reporting increased pain to the R knee with standing/walking  Update 2023:  Patient reports that she continues to have back pain which has been elevated lately  She does not correlate this to any change in activity  She continues to report increased pain when she is in a standing position which is relieved when she is laying down in bed  She reports increased radicular symptoms into the R LE during forward bending and increased low back pain/symptoms when in a standing position  Recurrent probem    Quality of life: good    Pain  Current pain ratin  At best pain ratin  At worst pain ratin  Location: Lumbar/R knee/R ankle  Quality: radiating, sharp, tight, discomfort and dull ache  Relieving factors: medications, rest and change in position  Aggravating factors: standing, walking and sitting  Progression: worsening    Patient Goals  Patient goals for therapy: decreased pain, increased motion and increased strength          Objective     Static Posture     Lumbar Spine   Increased lordosis  Pelvis   Anterior pelvic tilt    Ankle/Foot   Ankle/Foot (Left): Pes planus  Ankle/Foot (Right): Pes planus       Active Range of Motion     Lumbar   Flexion:  WFL  Extension:  with pain Restriction level: minimal  Left lateral flexion:  WFL  Right lateral flexion:  with pain Restriction level: minimal  Left rotation:  WFL  Right rotation:  WFL  Left Knee   Flexion: 128 degrees   Extension: 4 degrees     Right Knee Flexion: 131 degrees   Extension: 6 degrees   Left Ankle/Foot   Dorsiflexion (ke): 5 degrees   Plantar flexion: 61 degrees   Inversion: 32 degrees   Eversion: 18 degrees     Right Ankle/Foot   Dorsiflexion (kf): 8 degrees   Plantar flexion: 48 degrees with pain  Inversion: 30 degrees   Eversion: 22 degrees     Strength/Myotome Testing     Left Hip   Planes of Motion   Flexion: 4+  Extension: 4+  Abduction: 4+  Adduction: 4+    Right Hip   Planes of Motion   Flexion: 4+  Extension: 4+  Abduction: 4+  Adduction: 4+    Left Knee   Flexion: 4+  Extension: 4+    Right Knee   Flexion: 4  Extension: 4    Left Ankle/Foot   Dorsiflexion: 5  Plantar flexion: 5  Inversion: 4+  Eversion: 4+    Right Ankle/Foot   Dorsiflexion: 5  Plantar flexion: 5  Inversion: 4  Eversion: 3+             Precautions: None

## 2023-06-26 ENCOUNTER — OFFICE VISIT (OUTPATIENT)
Dept: PHYSICAL THERAPY | Facility: CLINIC | Age: 48
End: 2023-06-26
Payer: COMMERCIAL

## 2023-06-26 DIAGNOSIS — M54.50 CHRONIC MIDLINE LOW BACK PAIN WITHOUT SCIATICA: Primary | ICD-10-CM

## 2023-06-26 DIAGNOSIS — G89.29 CHRONIC MIDLINE LOW BACK PAIN WITHOUT SCIATICA: Primary | ICD-10-CM

## 2023-06-26 DIAGNOSIS — G89.29 CHRONIC PAIN OF RIGHT ANKLE: ICD-10-CM

## 2023-06-26 DIAGNOSIS — G89.29 CHRONIC PAIN OF RIGHT KNEE: ICD-10-CM

## 2023-06-26 DIAGNOSIS — M25.561 CHRONIC PAIN OF RIGHT KNEE: ICD-10-CM

## 2023-06-26 DIAGNOSIS — M25.571 CHRONIC PAIN OF RIGHT ANKLE: ICD-10-CM

## 2023-06-26 PROCEDURE — 97112 NEUROMUSCULAR REEDUCATION: CPT

## 2023-06-26 PROCEDURE — 97140 MANUAL THERAPY 1/> REGIONS: CPT

## 2023-06-26 PROCEDURE — 97110 THERAPEUTIC EXERCISES: CPT

## 2023-06-26 NOTE — PROGRESS NOTES
"Daily Note     Today's date: 2023  Patient name: Markus Beckett  : 1975  MRN: [id number]  Referring provider: DAVE Novoa  Dx:   Encounter Diagnosis     ICD-10-CM    1  Chronic midline low back pain without sciatica  M54 50     G89 29       2  Chronic pain of right knee  M25 561     G89 29       3  Chronic pain of right ankle  M25 571     G89 29           Start Time: 1715  Stop Time: 1800  Total time in clinic (min): 45 minutes    Subjective: Pt comments on R sided pain with overhead reaching at home yesterday  She has had variable levels of back/knee/ foot pain  Objective: See treatment diary below      Assessment: Tolerated treatment well  Patient would benefit from continued PT  PTA resumed exercises for core/LB  Plan: Continue per plan of care        Precautions: None    Date    FOTO 54k  44 L-ds  ds 31k, L30     Manuals        Passive quad/hip flexor stretch np  Ds+ gastroc,opp k-c Ds+ gastroc,opp k-c kl   Manual R foot Manual stm Manual STM R foot + gastroc stretch NP np    Neuro Re-Ed        PPT 20x 3\" HEP hep 20x 3\"    Bridge c add 20x 5\" 5\"x20 20x 5\" 20x  5\" 20x    Abdominal set with hooklying marches 1 5# 30 HEP  1 5# 30 1# 30   Ab set c knee fallouts L3 2/15   DC L2 15x   Clamshells  GTB 15x 10x 5\" P1 P1 10x 5\"    MTP/LTP  OTB 15x each NP L3 20x ea    Ther Ex        Seated lumbar flexion stretch 10\" 10x 10\" 10x 10x 5\" PB PB 10x 5\"    Ankle MREs np    np    NuStep 5m L6 L6 6 min 5m L5 5m L5 L6 5 min   Posture education        Supine SLR  1 5#  2/10 Stand 1 5# 2x10 ea  Abd & Ext Phong  1 5# 15x  1 5# 15x  1# 20x, slr abd 1# 2x10   LTR    NV            Ther Activity                        Gait Training                Modalities        CP 5m dorsal foot 5 min dorsal foot np                                  "

## 2023-06-27 ENCOUNTER — OFFICE VISIT (OUTPATIENT)
Dept: PODIATRY | Facility: CLINIC | Age: 48
End: 2023-06-27
Payer: COMMERCIAL

## 2023-06-27 ENCOUNTER — HOSPITAL ENCOUNTER (OUTPATIENT)
Dept: MRI IMAGING | Facility: HOSPITAL | Age: 48
Discharge: HOME | End: 2023-06-27
Payer: COMMERCIAL

## 2023-06-27 ENCOUNTER — APPOINTMENT (OUTPATIENT)
Dept: RADIOLOGY | Facility: MEDICAL CENTER | Age: 48
End: 2023-06-27
Payer: COMMERCIAL

## 2023-06-27 VITALS — HEIGHT: 65 IN | BODY MASS INDEX: 44.48 KG/M2 | WEIGHT: 267 LBS

## 2023-06-27 DIAGNOSIS — M79.671 FOOT PAIN, BILATERAL: ICD-10-CM

## 2023-06-27 DIAGNOSIS — M54.42 CHRONIC MIDLINE LOW BACK PAIN WITH BILATERAL SCIATICA: ICD-10-CM

## 2023-06-27 DIAGNOSIS — M79.672 FOOT PAIN, BILATERAL: ICD-10-CM

## 2023-06-27 DIAGNOSIS — M54.50 LOW BACK PAIN RADIATING TO BOTH LEGS: ICD-10-CM

## 2023-06-27 DIAGNOSIS — M79.672 LEFT FOOT PAIN: ICD-10-CM

## 2023-06-27 DIAGNOSIS — G89.29 CHRONIC MIDLINE LOW BACK PAIN WITH BILATERAL SCIATICA: ICD-10-CM

## 2023-06-27 DIAGNOSIS — M79.672 LEFT FOOT PAIN: Primary | ICD-10-CM

## 2023-06-27 DIAGNOSIS — M79.605 LOW BACK PAIN RADIATING TO BOTH LEGS: ICD-10-CM

## 2023-06-27 DIAGNOSIS — M54.41 CHRONIC MIDLINE LOW BACK PAIN WITH BILATERAL SCIATICA: ICD-10-CM

## 2023-06-27 DIAGNOSIS — M79.671 RIGHT FOOT PAIN: ICD-10-CM

## 2023-06-27 DIAGNOSIS — M79.604 LOW BACK PAIN RADIATING TO BOTH LEGS: ICD-10-CM

## 2023-06-27 PROCEDURE — G1004 CDSM NDSC: HCPCS

## 2023-06-27 PROCEDURE — 72148 MRI LUMBAR SPINE W/O DYE: CPT

## 2023-06-27 PROCEDURE — 99203 OFFICE O/P NEW LOW 30 MIN: CPT | Performed by: STUDENT IN AN ORGANIZED HEALTH CARE EDUCATION/TRAINING PROGRAM

## 2023-06-27 PROCEDURE — 73630 X-RAY EXAM OF FOOT: CPT

## 2023-06-27 NOTE — PATIENT INSTRUCTIONS
Foot Pain Home Therapy    Stretch  Place painful foot in back with heel on ground and lean against wall  Do not lift heel off of ground  You will feel the stretch in the calf muscles and possibly the heel  Hold the stretch for 20-30 seconds  Do this at least 5-6 times per day  It is best done after exercise when your muscles are warm  Wear supportive shoes at all times  Avoid flip-flops, flat sandals, barefoot walking (never walk barefoot, even at home)  Generally avoid shoes that are too flexible and bend in the arch  Your shoes should only slightly bend in the toe area, not the middle  Running sneakers are often the best choice  Supportive sneaker brands: bigtincanjanuary Garcia, [employer], [employer], [address]  Sarah [address]nel[address], [address], [employer] (discount if mention Dr referred)  Supportive daily shoe brands: Vionic, Orthofeet, [place], Dansko, [place] branch, [employer], Bruce, Birkenstock  Supportive home shoes: Sandy Doyle (recovery slides)  Purchase over the counter topical pain creams such as Voltarin gel, biofreeze, or CBD cream - will need to apply 2-3 times per day for benefit  + deep tissue massage  Look in to over the counter shoe inserts/arch supports such as Dananberg arch supports  These are all available on [address] as well as their individual website's  [employer]  com: Vasyli+Dananberg 1st Ray Orthotic, Medium, 1st Ray Function, Medium Density, Full-Length Insole, Heat Molding Optional, Best All Around Orthotic, Functional Biomechanical Control for Pain Relief, Black Yellow ([address]63) : Health & Household      Achilles Tendon Stretching Exercises    A) Standing Gastrocnemius stretch  Place hands on wall or chair  If using wall, put your hands at eye level  Step the leg you want to stretch behind you  Keep your back heel on the floor and point your toes straight ahead or slightly inward towards the heel of the opposite foot  Bend your knee toward the wall while keeping your back leg straight    Lean toward the wall until you feel a gentle stretch in you calf of the straight leg  Don't lean so far that you feel pain  Hold for 15 seconds  Complete 3 reps  B) Standing soleus stretch  Place your hands on the wall or chair  If using wall, put your hands at eye level  Step the leg you want to stretch behind you (your back foot will need to be closer to the front foot than the above stretch)  Keep your back heel on the floor and point your toes straight ahead or slightly inward towards the heel of the opposite foot  Bend both your front and back knee at the same time (may help to stick your butt out)  You do not need to lean towards the wall, just bend the knees  Lean toward the wall until you feel a gentle stretch in you calf of the straight leg  Don't lean so far that you feel pain  Hold for 15 seconds  Complete 3 reps  Keep these tips and tricks in mind to get the most out of your stretching; Take your time - move slowly, whether you are deepening into a stretch or changing positions  This will limit the risk of injury & discomfort  Avoid bouncing - quick sudden movements will only worsen achilles tendon issues  Stay relaxed during stretch  Keep your heel down and toes straight ahead or slightly inward - this will allow the achilles tendon to stretch properly  Stop if you feel pain - Don't strain or force your muscle  If you feel sharp pain, stop immediately

## 2023-06-27 NOTE — PROGRESS NOTES
Assessment/Plan:     Diagnoses and all orders for this visit:    Left foot pain  -     X-ray foot left 3+ views; Future    Right foot pain          Imaging Reviewed at this visit (I personally reviewed/independently interpreted images and reports in PACS)  · XR left foot WB 3v 6/27/23: pes planus foot type, small plantar calc spur  · XR right foot WB 3v 4/16/23: pes planus foot type, small plantar calc spur        IMPRESSION:  · B/L foot pain  Differential diagnosis include lateral column overload, stress injury due to pes planus, equinus     PLAN:  · I reviewed clinical exam and radiographic imaging (XR's) with patient in detail today  I have discussed with the patient the pathophysiology of this diagnosis and reviewed how the examination correlates with this diagnosis  I explained at length the biomechanical abnormalities leading to the significant overload of the latearl foot  I discussed how a tight achilles tendon/posterior muscle group can lead to abnormal biomechanical overload to foot  Instructions were given for conservative care which was also demonstrated during the clinical visit  I stressed the importance of achilles tendon stretching, icing and applying voltarin gel or biofreeze (OTC) at least 3x/day  I recommend stiff bottomed sneakers/shoes (ex Asics, Vionic, New balance, Arellano, etc) for daily use and Marisel Van (recovery slides) for in home use  I advised pt to obtain OTC orthotics such as Dannanberg arch support  C/w PT to aide in reduction of equinus and also address the gastroc aponeurosis with graston technique  · F/u 2 months for recheck      Subjective:      Patient ID: Josh Lucero is a 56 y o  female  Lacey Lay presents to clinic today concerning B/L foot pain  Pain is present to the lateral dorsal forefoot mainly with barefoot walking and while at work wearing flats  Notes she has no pain in sneakers  Denies N/T/B  No heel pain         The following portions of the patient's "history were reviewed and updated as appropriate: allergies, current medications, past family history, past medical history, past social history, past surgical history and problem list     Review of Systems   Constitutional: Negative for activity change, chills and fever  HENT: Negative  Respiratory: Negative for cough, chest tightness and shortness of breath  Cardiovascular: Negative for chest pain and leg swelling  Endocrine: Negative  Genitourinary: Negative  Musculoskeletal: Positive for gait problem (B/L foot pain)  Neurological: Negative for numbness  Psychiatric/Behavioral: Negative  Negative for agitation and behavioral problems  Objective:      Ht 5' 5\" (1 651 m)   Wt 121 kg (267 lb)   LMP 09/12/2021 (Exact Date)   BMI 44 43 kg/m²          Physical Exam  Constitutional:       Appearance: Normal appearance  She is not ill-appearing  Cardiovascular:      Pulses: Normal pulses  Comments: Bilateral DP & PT pulses 2/4  CRT WNL  Pedal hair present  Feet warm and well perfused  Pulmonary:      Effort: Pulmonary effort is normal  No respiratory distress  Musculoskeletal:         General: Tenderness (B/L dorsal latearl foot at 3/4 metatarsal midshaft region) present  Normal range of motion  Comments: There is decreased ankle DF with knee extended and flexed indicating gastroc-soleal equinus  B/L WB exam exhibits collapse of MLA, heel position valgus  Bilateral ankle, STJ, TNJ, TMTJ, MTPJ ROM WNL and without pain  LE muscle strength WNL  Skin:     General: Skin is warm  Capillary Refill: Capillary refill takes less than 2 seconds  Findings: No erythema or lesion  Neurological:      General: No focal deficit present  Mental Status: She is alert and oriented to person, place, and time  Sensory: No sensory deficit  Comments: Gross sensation intact  Denies N/T/B to B/L feet      Psychiatric:         Mood and Affect: Mood normal          "

## 2023-06-27 NOTE — LETTER
June 27, 2023     Patient: Israel Wooten  YOB: 1975  Date of Visit: 6/27/2023      To Whom it May Concern:    Jennifer Ramomariza is under my professional care  Sheri Diehl was seen in my office on 6/27/2023  Please allow Emerald to wear sneakers while at work  If you have any questions or concerns, please don't hesitate to call           Sincerely,          Pablito Mccarty DPM        CC: No Recipients

## 2023-06-28 ENCOUNTER — OFFICE VISIT (OUTPATIENT)
Dept: FAMILY MEDICINE CLINIC | Facility: CLINIC | Age: 48
End: 2023-06-28
Payer: COMMERCIAL

## 2023-06-28 ENCOUNTER — TELEPHONE (OUTPATIENT)
Dept: FAMILY MEDICINE CLINIC | Facility: CLINIC | Age: 48
End: 2023-06-28

## 2023-06-28 ENCOUNTER — OFFICE VISIT (OUTPATIENT)
Dept: PHYSICAL THERAPY | Facility: CLINIC | Age: 48
End: 2023-06-28
Payer: COMMERCIAL

## 2023-06-28 VITALS
OXYGEN SATURATION: 96 % | DIASTOLIC BLOOD PRESSURE: 76 MMHG | HEART RATE: 72 BPM | SYSTOLIC BLOOD PRESSURE: 102 MMHG | TEMPERATURE: 97.6 F

## 2023-06-28 DIAGNOSIS — G89.29 CHRONIC MIDLINE LOW BACK PAIN WITHOUT SCIATICA: ICD-10-CM

## 2023-06-28 DIAGNOSIS — M25.561 CHRONIC PAIN OF RIGHT KNEE: Primary | ICD-10-CM

## 2023-06-28 DIAGNOSIS — G89.29 CHRONIC PAIN OF RIGHT KNEE: Primary | ICD-10-CM

## 2023-06-28 DIAGNOSIS — M54.50 CHRONIC MIDLINE LOW BACK PAIN WITHOUT SCIATICA: ICD-10-CM

## 2023-06-28 DIAGNOSIS — I95.1 ORTHOSTATIC HYPOTENSION: Primary | ICD-10-CM

## 2023-06-28 PROCEDURE — 99213 OFFICE O/P EST LOW 20 MIN: CPT | Performed by: NURSE PRACTITIONER

## 2023-06-28 PROCEDURE — 97110 THERAPEUTIC EXERCISES: CPT

## 2023-06-28 PROCEDURE — 97140 MANUAL THERAPY 1/> REGIONS: CPT

## 2023-06-28 NOTE — PROGRESS NOTES
"Daily Note     Today's date: 2023  Patient name: Kaitlin Lazcano  : 1975  MRN: [id number]  Referring provider: DAVE Valdes  Dx:   Encounter Diagnosis     ICD-10-CM    1  Chronic pain of right knee  M25 561     G89 29       2  Chronic midline low back pain without sciatica  M54 50     G89 29           Start Time: 1720  Stop Time: 1800  Total time in clinic (min): 40 minutes    Subjective: I had an MRI last night and I saw the DR today  I am suppose to get inserts   Objective: See treatment diary below      Assessment: Tolerated treatment fair  Patient would benefit from continued PT pt tolerated the exercises on the mat well with no pain reported  She asked not to do anything reaching to the ground due to feeling light headed   She felt hot and dizzy after the MRI last night  She states that her pressure was low last night  She states that it was 104/ 70 today when she saw the Dr Essence Gomez states that she feels fine when doing the exercises  Pt states having no pain post        Plan: Continue per plan of care        Precautions: None    Date    FOTO 54k  44 L-ds  ds 31k, L30     Manuals        Passive quad/hip flexor stretch np  Ds+ gastroc,opp k-c Ds+ gastroc,opp k-c JF   Manual R foot Manual stm Manual STM R foot + gastroc stretch NP np    Neuro Re-Ed        PPT 20x 3\" HEP hep 20x 3\" 20 x 3 \"    Bridge c add 20x 5\" 5\"x20 20x 5\" 20x  5\" 20x    Abdominal set with hooklying marches 1 5# /30 HEP  1 5# 1/30 1# 130   Ab set c knee fallouts L3 2/15   DC L2 15x   Clamshells  GTB 15x 10x 5\" P1 P1 10x 5\"    MTP/LTP  OTB 15x each NP L3 20x ea 20 x 5 \"    Ther Ex        Seated lumbar flexion stretch 10\" 10x 10\" 10x 10x 5\" PB PB 10x 5\" hold   Ankle MREs np    np    NuStep 5m L6 L6 6 min 5m L5 5m L5 5 min L 5    Posture education        Supine SLR  1 5#  2/10 Stand 1 5# 2x10 ea  Abd & Ext Phong  1 5# 15x  1 5# 15x  1# 20x, slr abd 1# 2x10   LTR    NV 10 x 5 \"           Ther Activity  " Gait Training                Modalities        CP 5m dorsal foot 5 min dorsal foot np

## 2023-06-28 NOTE — PROGRESS NOTES
BMI Counseling: There is no height or weight on file to calculate BMI  The BMI is above normal  Nutrition recommendations include decreasing portion sizes, encouraging healthy choices of fruits and vegetables, decreasing fast food intake, consuming healthier snacks, limiting drinks that contain sugar, moderation in carbohydrate intake, increasing intake of lean protein, reducing intake of saturated and trans fat and reducing intake of cholesterol  Exercise recommendations include vigorous physical activity 75 minutes/week, exercising 3-5 times per week and strength training exercises  No pharmacotherapy was ordered  Patient referred to PCP  Rationale for BMI follow-up plan is due to patient being overweight or obese  Assessment/Plan:    Pt is a 56 yr old female   Presents in office for follow up on low blood pressures that started yesterday  She was at MRI  And once she got up she felt a hot flash checked her BP it was low at the time   Discussed positional BP changes   She has started the Saxenda - has had some diarrhea with it as a result of eating things she should not  - possibly not hydrating well   Decreased appetite   Discussed adding some Salt to her diet   At least 2 L of water a day   Healthy diet and exercise   Keep log of Bps and call me  If any further low Bps        Problem List Items Addressed This Visit    None  Visit Diagnoses     Orthostatic hypotension    -  Primary    here to get evalauted for low BP that she had yesterday   she was having an MRI  done at the time             Subjective:      Patient ID: Dayton Marina is a 56 y o  female  Pt is a 56 yr old female   Presents in office for follow up on low blood pressures that started yesterday  She was at MRI  And once she got up she felt a hot flash checked her BP it was low at the time   Discussed positional BP changes   She has started the Saxenda - has had some diarrhea with it as a result of eating things she should not   - possibly not hydrating well   Decreased appetite   Discussed adding some Salt to her diet   At least 2 L of water a day   Healthy diet and exercise   Keep log of Bps and call me  If any further low Bps      The following portions of the patient's history were reviewed and updated as appropriate:   Past Medical History:  She has a past medical history of Anemia, History of transfusion, Hypercholesterolemia, Sleep apnea, and Vitamin deficiency  ,  _______________________________________________________________________  Medical Problems:  does not have any pertinent problems on file ,  _______________________________________________________________________  Past Surgical History:   has a past surgical history that includes Ectopic pregnancy surgery; Eye surgery; GASTRECTOMY SLEEVE LAPAROSCOPIC; pr laps w/vag hysterect 250 gm/&rmvl tube&/ovaries (N/A, 9/28/2021); and pr laparoscopy w/rmvl adnexal structures (Bilateral, 9/28/2021)  ,  _______________________________________________________________________  Family History:  family history includes Asthma in her brother; Brain cancer in her mother; Cancer in her father and maternal grandmother; Diabetes in her maternal grandmother; Hypertension in her paternal grandmother ,  _______________________________________________________________________  Social History:   reports that she has never smoked  She has never used smokeless tobacco  She reports that she does not drink alcohol and does not use drugs  ,  _______________________________________________________________________  Allergies:  is allergic to penicillin g and pollen extract     _______________________________________________________________________  Current Outpatient Medications   Medication Sig Dispense Refill   • Ascorbic Acid (YVONNE-C PO) Take by mouth     • Bacillus Coagulans-Inulin (PROBIOTIC) 1-250 BILLION-MG CAPS Take 1 capsule by mouth Takes once in awhile     • Biotin 1 MG CAPS Take by mouth     • cholecalciferol (VITAMIN D3) 25 mcg (1,000 units) tablet Take 1 tablet (1,000 Units total) by mouth daily 90 tablet 1   • cyanocobalamin (VITAMIN B-12) 100 mcg tablet Take by mouth daily     • cyclobenzaprine (FLEXERIL) 5 mg tablet Take 1 tablet (5 mg total) by mouth daily at bedtime as needed for muscle spasms (as needed for back pain) for up to 21 days 21 tablet 0   • ferrous sulfate 324 (65 Fe) mg Take 1 tablet (324 mg total) by mouth 3 (three) times a day before meals 90 tablet 0   • hydrocortisone 2 5 % lotion Apply topically 2 (two) times a day for 10 days 60 mL 1   • Insulin Pen Needle (BD Pen Needle Sarita 2nd Gen) 32G X 4 MM MISC Use in the morning 100 each 1   • liraglutide (SAXENDA) injection Inject 0 1 mL (0 6 mg total) under the skin daily 3 mL 0   • multivitamin (THERAGRAN) TABS Take 1 tablet by mouth daily     • Omega-3 Fatty Acids (fish oil) 1,000 mg Take 1,000 mg by mouth daily     • pantoprazole (PROTONIX) 40 mg tablet Take 40 mg by mouth every morning     • Semaglutide-Weight Management (WEGOVY) 0 5 MG/0 5ML Inject 0 5 mL (0 5 mg total) under the skin once a week 2 mL 1     No current facility-administered medications for this visit      _______________________________________________________________________  Review of Systems   Constitutional: Positive for fatigue and unexpected weight change  Negative for fever  HENT: Negative for congestion, postnasal drip, sinus pressure, sore throat and voice change  Eyes: Negative  Respiratory: Negative for cough and shortness of breath  Cardiovascular: Negative for chest pain, palpitations and leg swelling  Concerns with low Bps    Gastrointestinal: Negative for abdominal distention, abdominal pain, nausea and vomiting  Endocrine: Negative  Genitourinary: Negative for difficulty urinating and flank pain  Musculoskeletal: Positive for back pain, gait problem and myalgias  Low back pain    Skin: Negative for rash     Allergic/Immunologic: Positive for environmental allergies  Neurological: Negative for headaches  Hematological: Negative for adenopathy  Psychiatric/Behavioral: Negative for sleep disturbance and suicidal ideas  The patient is not nervous/anxious  Objective:  Vitals:    06/28/23 1206   BP: 102/76   BP Location: Left arm   Patient Position: Sitting   Cuff Size: Large   Pulse: 72   Temp: 97 6 °F (36 4 °C)   SpO2: 96%     There is no height or weight on file to calculate BMI  Physical Exam  Vitals and nursing note reviewed  Constitutional:       Appearance: Normal appearance  Comments: BMI 44 43    HENT:      Head: Atraumatic  Eyes:      Extraocular Movements: Extraocular movements intact  Cardiovascular:      Rate and Rhythm: Normal rate and regular rhythm  Heart sounds: Normal heart sounds  Pulmonary:      Breath sounds: Normal breath sounds  Abdominal:      Palpations: Abdomen is soft  Musculoskeletal:         General: Normal range of motion  Cervical back: Normal range of motion  Skin:     General: Skin is warm  Capillary Refill: Capillary refill takes less than 2 seconds  Neurological:      Mental Status: She is alert and oriented to person, place, and time     Psychiatric:         Mood and Affect: Mood normal          Behavior: Behavior normal

## 2023-06-28 NOTE — LETTER
June 28, 2023     Patient: José Plascencia  YOB: 1975  Date of Visit: 6/28/2023      To Whom it May Concern:    Jeffla Harrisons is under my professional care  Cesario Mcmullen was seen in my office on 6/28/2023  Cesario Mcmullen may return to work on 06/28/2023   If you have any questions or concerns, please don't hesitate to call           Sincerely,          DAVE Caceres        CC: No Recipients

## 2023-06-30 NOTE — RESULT ENCOUNTER NOTE
IMPRESSION:     1  Disc bulge with mild canal stenosis at level T11-12   2   Mild noncompressive lumbar degenerative change as detailed

## 2023-07-03 ENCOUNTER — APPOINTMENT (OUTPATIENT)
Dept: PHYSICAL THERAPY | Facility: CLINIC | Age: 48
End: 2023-07-03
Payer: COMMERCIAL

## 2023-07-06 ENCOUNTER — OFFICE VISIT (OUTPATIENT)
Dept: PHYSICAL THERAPY | Facility: CLINIC | Age: 48
End: 2023-07-06
Payer: COMMERCIAL

## 2023-07-06 DIAGNOSIS — G89.29 CHRONIC PAIN OF RIGHT ANKLE: Primary | ICD-10-CM

## 2023-07-06 DIAGNOSIS — M54.50 CHRONIC MIDLINE LOW BACK PAIN WITHOUT SCIATICA: ICD-10-CM

## 2023-07-06 DIAGNOSIS — M25.561 CHRONIC PAIN OF RIGHT KNEE: ICD-10-CM

## 2023-07-06 DIAGNOSIS — G89.29 CHRONIC PAIN OF RIGHT KNEE: ICD-10-CM

## 2023-07-06 DIAGNOSIS — M25.571 CHRONIC PAIN OF RIGHT ANKLE: Primary | ICD-10-CM

## 2023-07-06 DIAGNOSIS — G89.29 CHRONIC MIDLINE LOW BACK PAIN WITHOUT SCIATICA: ICD-10-CM

## 2023-07-06 PROCEDURE — 97110 THERAPEUTIC EXERCISES: CPT

## 2023-07-06 PROCEDURE — 97112 NEUROMUSCULAR REEDUCATION: CPT

## 2023-07-06 NOTE — PROGRESS NOTES
Daily Note     Today's date: 2023  Patient name: Abdon Watkins  : 1975  MRN: [id number]  Referring provider: DAVE Doherty  Dx:   Encounter Diagnosis     ICD-10-CM    1. Chronic pain of right ankle  M25.571     G89.29       2. Chronic midline low back pain without sciatica  M54.50     G89.29       3. Chronic pain of right knee  M25.561     G89.29           Start Time: 1715  Stop Time: 1800  Total time in clinic (min): 45 minutes    Subjective:  I am feeling pretty good today,. I have some mild pain in my back though. Objective: See treatment diary below      Assessment: Tolerated treatment well. Patient would benefit from continued PT  Pt reports having some mild back pain today with her program and some mild bilateral hip soreness from doing the exercises. Plan: Continue per plan of care.       Precautions: None    Date    FOTO   ds 31k, L30     Manuals        Passive quad/hip flexor stretch np  Ds+ gastroc,opp k-c Ds+ gastroc,opp k-c JF   Manual R foot  Manual STM R foot + gastroc stretch NP np    Neuro Re-Ed        PPT 20x 3" HEP hep 20x 3" 20 x 3 "    Bridge c add 20x 5" 5"x20 20x 5" 20x  5" 20x    Abdominal set with hooklying marches 1.5# 1/30 HEP  1.5# 30 1# 130   Ab set c knee fallouts L3 2/15   DC L2 15x   Clamshells 15 x 5 "  GTB 15x 10x 5" P1 P1 10x 5"    MTP/LTP  OTB 15x each NP L3 20x ea 20 x 5 "    Ther Ex        Seated lumbar flexion stretch 10" 10x 10" 10x 10x 5" PB PB 10x 5" hold   Ankle MREs np    np    NuStep 5m L6 L6 6 min 5m L5 5m L5 5 min L 5    Posture education        Supine SLR  2 #  2/10 flex  abd Stand 1.5# 2x10 ea  Abd & Ext Phong  1.5# 15x  1.5# 15x  1# 20x, slr abd 1# 2x10   LTR 10 x 5"    NV 10 x 5 "           Ther Activity                        Gait Training                Modalities        CP 5m dorsal foot 5 min dorsal foot np

## 2023-07-10 ENCOUNTER — OFFICE VISIT (OUTPATIENT)
Dept: PHYSICAL THERAPY | Facility: CLINIC | Age: 48
End: 2023-07-10
Payer: COMMERCIAL

## 2023-07-10 ENCOUNTER — TELEPHONE (OUTPATIENT)
Dept: SURGERY | Facility: CLINIC | Age: 48
End: 2023-07-10

## 2023-07-10 ENCOUNTER — CONSULT (OUTPATIENT)
Dept: SURGERY | Facility: CLINIC | Age: 48
End: 2023-07-10
Payer: COMMERCIAL

## 2023-07-10 VITALS
TEMPERATURE: 98 F | HEART RATE: 67 BPM | WEIGHT: 265.4 LBS | SYSTOLIC BLOOD PRESSURE: 148 MMHG | HEIGHT: 65 IN | RESPIRATION RATE: 18 BRPM | OXYGEN SATURATION: 100 % | DIASTOLIC BLOOD PRESSURE: 92 MMHG | BODY MASS INDEX: 44.22 KG/M2

## 2023-07-10 DIAGNOSIS — G89.29 CHRONIC MIDLINE LOW BACK PAIN WITHOUT SCIATICA: Primary | ICD-10-CM

## 2023-07-10 DIAGNOSIS — Z12.11 ENCOUNTER FOR SCREENING COLONOSCOPY: ICD-10-CM

## 2023-07-10 DIAGNOSIS — G89.29 CHRONIC PAIN OF RIGHT ANKLE: ICD-10-CM

## 2023-07-10 DIAGNOSIS — R22.2 LUMP OF SKIN OF BACK: Primary | ICD-10-CM

## 2023-07-10 DIAGNOSIS — M25.571 CHRONIC PAIN OF RIGHT ANKLE: ICD-10-CM

## 2023-07-10 DIAGNOSIS — M54.50 CHRONIC MIDLINE LOW BACK PAIN WITHOUT SCIATICA: Primary | ICD-10-CM

## 2023-07-10 PROCEDURE — 97112 NEUROMUSCULAR REEDUCATION: CPT

## 2023-07-10 PROCEDURE — 97110 THERAPEUTIC EXERCISES: CPT

## 2023-07-10 PROCEDURE — 99203 OFFICE O/P NEW LOW 30 MIN: CPT | Performed by: STUDENT IN AN ORGANIZED HEALTH CARE EDUCATION/TRAINING PROGRAM

## 2023-07-10 RX ORDER — CHLORHEXIDINE GLUCONATE 4 G/100ML
SOLUTION TOPICAL DAILY PRN
OUTPATIENT
Start: 2023-07-10

## 2023-07-10 NOTE — H&P (VIEW-ONLY)
Assessment/Plan:  36-year-old female with lipoma of back  -Presents due to left-sided subcutaneous mass on her back  -Notes that used to be very small and has now grown, can be seen through her clothing  -Patient denies any pain or drainage in the area  -On exam there is a subcutaneous mass roughly 2.5 x 2 cm  -CBC and CMP from 6/12/2023 reviewed  -Primary care physician note from 6/28/2023 reviewed  -Plan for excision of mass of back under local anesthesia  -Will refer patient to Gastroenterology for screening colonoscopy    All risks, benefits, alternatives of the procedure were discussed at length with the patient. Risks include bleeding, infection, damage to surrounding structures, recurrence. All questions were answered to satisfaction. The patient voiced understanding and signed consent. 1. Lump of skin of back  -     Ambulatory Referral to General Surgery  -     Case request operating room: EXCISION  BIOPSY LESION/MASS BACK; Standing  -     Case request operating room: EXCISION  BIOPSY LESION/MASS BACK    2. Encounter for screening colonoscopy  -     Ambulatory Referral to Gastroenterology; Future               Subjective:      Patient ID: Kimmy Romero is a 56 y.o. female. Triage Notes:    Patient is a 43-year-old female who presents the office for evaluation of a mass of her back. Patient states she had a bump more toward the left side of her back that has slowly grown over size. Denies any pain but states that at this time it is large enough it can be seen through her clothing. Denies ever having an infection in the area. The following portions of the patient's history were reviewed and updated as appropriate:   She  has a past medical history of Anemia, History of transfusion, Hypercholesterolemia, Sleep apnea, and Vitamin deficiency.   She   Patient Active Problem List    Diagnosis Date Noted   • Encounter for screening colonoscopy 07/10/2023   • Lump of skin of back 07/10/2023   • Class 3 severe obesity due to excess calories without serious comorbidity with body mass index (BMI) of 40.0 to 44.9 in adult [facility]) 03/17/2022   • SHARI (obstructive sleep apnea) 09/28/2021   • Bradycardia 07/15/2021   • Iron deficiency anemia 05/04/2021   • History of PCOS 04/10/2020   • Encounter for surveillance of contraceptive pills 09/23/2019   • S/P laparoscopic sleeve gastrectomy 10/17/2018   • Cyst of ovary 11/27/2017   • Hyperlipidemia 11/27/2017   • Irregular bleeding 11/27/2017   • Vitamin D deficiency 11/27/2017     She  has a past surgical history that includes Ectopic pregnancy surgery; Eye surgery; GASTRECTOMY SLEEVE LAPAROSCOPIC; pr laps w/vag hysterect 250 gm/&rmvl tube&/ovaries (N/A, 9/28/2021); and pr laparoscopy w/rmvl adnexal structures (Bilateral, 9/28/2021). Her family history includes Asthma in her brother; Brain cancer in her mother; Cancer in her father and maternal grandmother; Diabetes in her maternal grandmother; Hypertension in her paternal grandmother. She  reports that she has never smoked. She has never been exposed to tobacco smoke. She has never used smokeless tobacco. She reports that she does not drink alcohol and does not use drugs.   Current Outpatient Medications on File Prior to Visit   Medication Sig   • Biotin 1 MG CAPS Take by mouth   • cholecalciferol (VITAMIN D3) 25 mcg (1,000 units) tablet Take 1 tablet (1,000 Units total) by mouth daily   • cyanocobalamin (VITAMIN B-12) 100 mcg tablet Take by mouth daily   • cyclobenzaprine (FLEXERIL) 5 mg tablet Take 1 tablet (5 mg total) by mouth daily at bedtime as needed for muscle spasms (as needed for back pain) for up to 21 days   • ferrous sulfate 324 (65 Fe) mg Take 1 tablet (324 mg total) by mouth 3 (three) times a day before meals   • Insulin Pen Needle (BD Pen Needle Sarita 2nd Gen) 32G X 4 MM MISC Use in the morning   • liraglutide (SAXENDA) injection Inject 0.1 mL (0.6 mg total) under the skin daily   • multivitamin [facility]) TABS Take 1 tablet by mouth daily   • Omega-3 Fatty Acids (fish oil) 1,000 mg Take 1,000 mg by mouth daily   • pantoprazole (PROTONIX) 40 mg tablet Take 40 mg by mouth every morning   • [DISCONTINUED] Ascorbic Acid (YVONNE-C PO) Take by mouth (Patient not taking: Reported on 7/10/2023)   • [DISCONTINUED] Bacillus Coagulans-Inulin (PROBIOTIC) 1-250 BILLION-MG CAPS Take 1 capsule by mouth Takes once in awhile (Patient not taking: Reported on 7/10/2023)   • [DISCONTINUED] hydrocortisone 2.5 % lotion Apply topically 2 (two) times a day for 10 days (Patient not taking: Reported on 7/10/2023)   • [DISCONTINUED] Semaglutide-Weight Management (WEGOVY) 0.5 MG/0.5ML Inject 0.5 mL (0.5 mg total) under the skin once a week (Patient not taking: Reported on 7/10/2023)     No current facility-administered medications on file prior to visit. She is allergic to penicillin g and pollen extract. .    Review of Systems   Constitutional: Negative for chills, fatigue and fever. HENT: Negative for congestion, hearing loss, rhinorrhea and sore throat. Eyes: Negative for pain and discharge. Respiratory: Negative for cough, chest tightness and shortness of breath. Cardiovascular: Negative for chest pain and palpitations. Gastrointestinal: Negative for abdominal pain, constipation, diarrhea, nausea and vomiting. Endocrine: Negative for cold intolerance and heat intolerance. Genitourinary: Negative for difficulty urinating and dysuria. Skin: Negative for color change and rash. Positive mass of back   Allergic/Immunologic: Negative for environmental allergies and food allergies. Neurological: Negative for seizures and headaches. Hematological: Negative for adenopathy. Does not bruise/bleed easily. Psychiatric/Behavioral: Negative for confusion and hallucinations.          Objective:      /92 (BP Location: Left arm, Patient Position: Sitting, Cuff Size: Standard)   Pulse 67   Temp 98 °F (36.7 °C) (Temporal)   Resp 18   Ht 5' 5" (1.651 m)   Wt 120 kg (265 lb 6.4 oz)   LMP 09/12/2021 (Exact Date)   SpO2 100%   BMI 44.16 kg/m²     Below is the patient's most recent value for Albumin, ALT, AST, BUN, Calcium, Chloride, Cholesterol, CO2, Creatinine, GFR, Glucose, HDL, Hematocrit, Hemoglobin, Hemoglobin A1C, LDL, Magnesium, Phosphorus, Platelets, Potassium, PSA, Sodium, Triglycerides, and WBC. Lab Results   Component Value Date    ALT 8 06/12/2023    AST 12 (L) 06/12/2023    BUN 12 06/12/2023    CALCIUM 9.2 06/12/2023     06/12/2023    CO2 30 06/12/2023    CREATININE 0.68 06/12/2023    HDL 48 (L) 06/12/2023    HCT 41.3 06/12/2023    HGB 13.1 06/12/2023    HGBA1C 5.6 03/03/2023    MG 2.0 09/09/2021     06/12/2023    K 4.5 06/12/2023    TRIG 211 (H) 06/12/2023    WBC 5.62 06/12/2023     Note: for a comprehensive list of the patient's lab results, access the Results Review activity. Physical Exam  Constitutional:       Appearance: Normal appearance. HENT:      Head: Normocephalic and atraumatic. Nose: Nose normal.   Eyes:      General: No scleral icterus. Conjunctiva/sclera: Conjunctivae normal.   Cardiovascular:      Rate and Rhythm: Normal rate and regular rhythm. Heart sounds: Normal heart sounds. Pulmonary:      Effort: Pulmonary effort is normal.      Breath sounds: Normal breath sounds. Abdominal:      General: There is no distension. Palpations: Abdomen is soft. Tenderness: There is no abdominal tenderness. Musculoskeletal:         General: No signs of injury. Skin:     General: Skin is warm. Coloration: Skin is not jaundiced. Comments: On the patient's back there is a roughly 2 x 2.5 cm subcutaneous mass consistent with lipoma   Neurological:      General: No focal deficit present. Mental Status: She is alert and oriented to person, place, and time.    Psychiatric:         Mood and Affect: Mood normal.         Behavior: Behavior normal.

## 2023-07-10 NOTE — PROGRESS NOTES
Assessment/Plan:  78-year-old female with lipoma of back  -Presents due to left-sided subcutaneous mass on her back  -Notes that used to be very small and has now grown, can be seen through her clothing  -Patient denies any pain or drainage in the area  -On exam there is a subcutaneous mass roughly 2.5 x 2 cm  -CBC and CMP from 6/12/2023 reviewed  -Primary care physician note from 6/28/2023 reviewed  -Plan for excision of mass of back under local anesthesia  -Will refer patient to Gastroenterology for screening colonoscopy    All risks, benefits, alternatives of the procedure were discussed at length with the patient. Risks include bleeding, infection, damage to surrounding structures, recurrence. All questions were answered to satisfaction. The patient voiced understanding and signed consent. 1. Lump of skin of back  -     Ambulatory Referral to General Surgery  -     Case request operating room: EXCISION  BIOPSY LESION/MASS BACK; Standing  -     Case request operating room: EXCISION  BIOPSY LESION/MASS BACK    2. Encounter for screening colonoscopy  -     Ambulatory Referral to Gastroenterology; Future               Subjective:      Patient ID: Gretta Young is a 56 y.o. female. Triage Notes:    Patient is a 78-year-old female who presents the office for evaluation of a mass of her back. Patient states she had a bump more toward the left side of her back that has slowly grown over size. Denies any pain but states that at this time it is large enough it can be seen through her clothing. Denies ever having an infection in the area. The following portions of the patient's history were reviewed and updated as appropriate:   She  has a past medical history of Anemia, History of transfusion, Hypercholesterolemia, Sleep apnea, and Vitamin deficiency.   She   Patient Active Problem List    Diagnosis Date Noted   • Encounter for screening colonoscopy 07/10/2023   • Lump of skin of back 07/10/2023   • Class 3 severe obesity due to excess calories without serious comorbidity with body mass index (BMI) of 40.0 to 44.9 in adult [facility]) 03/17/2022   • SHARI (obstructive sleep apnea) 09/28/2021   • Bradycardia 07/15/2021   • Iron deficiency anemia 05/04/2021   • History of PCOS 04/10/2020   • Encounter for surveillance of contraceptive pills 09/23/2019   • S/P laparoscopic sleeve gastrectomy 10/17/2018   • Cyst of ovary 11/27/2017   • Hyperlipidemia 11/27/2017   • Irregular bleeding 11/27/2017   • Vitamin D deficiency 11/27/2017     She  has a past surgical history that includes Ectopic pregnancy surgery; Eye surgery; GASTRECTOMY SLEEVE LAPAROSCOPIC; pr laps w/vag hysterect 250 gm/&rmvl tube&/ovaries (N/A, 9/28/2021); and pr laparoscopy w/rmvl adnexal structures (Bilateral, 9/28/2021). Her family history includes Asthma in her brother; Brain cancer in her mother; Cancer in her father and maternal grandmother; Diabetes in her maternal grandmother; Hypertension in her paternal grandmother. She  reports that she has never smoked. She has never been exposed to tobacco smoke. She has never used smokeless tobacco. She reports that she does not drink alcohol and does not use drugs.   Current Outpatient Medications on File Prior to Visit   Medication Sig   • Biotin 1 MG CAPS Take by mouth   • cholecalciferol (VITAMIN D3) 25 mcg (1,000 units) tablet Take 1 tablet (1,000 Units total) by mouth daily   • cyanocobalamin (VITAMIN B-12) 100 mcg tablet Take by mouth daily   • cyclobenzaprine (FLEXERIL) 5 mg tablet Take 1 tablet (5 mg total) by mouth daily at bedtime as needed for muscle spasms (as needed for back pain) for up to 21 days   • ferrous sulfate 324 (65 Fe) mg Take 1 tablet (324 mg total) by mouth 3 (three) times a day before meals   • Insulin Pen Needle (BD Pen Needle Sarita 2nd Gen) 32G X 4 MM MISC Use in the morning   • liraglutide (SAXENDA) injection Inject 0.1 mL (0.6 mg total) under the skin daily   • multivitamin [facility]) TABS Take 1 tablet by mouth daily   • Omega-3 Fatty Acids (fish oil) 1,000 mg Take 1,000 mg by mouth daily   • pantoprazole (PROTONIX) 40 mg tablet Take 40 mg by mouth every morning   • [DISCONTINUED] Ascorbic Acid (YVONNE-C PO) Take by mouth (Patient not taking: Reported on 7/10/2023)   • [DISCONTINUED] Bacillus Coagulans-Inulin (PROBIOTIC) 1-250 BILLION-MG CAPS Take 1 capsule by mouth Takes once in awhile (Patient not taking: Reported on 7/10/2023)   • [DISCONTINUED] hydrocortisone 2.5 % lotion Apply topically 2 (two) times a day for 10 days (Patient not taking: Reported on 7/10/2023)   • [DISCONTINUED] Semaglutide-Weight Management (WEGOVY) 0.5 MG/0.5ML Inject 0.5 mL (0.5 mg total) under the skin once a week (Patient not taking: Reported on 7/10/2023)     No current facility-administered medications on file prior to visit. She is allergic to penicillin g and pollen extract. .    Review of Systems   Constitutional: Negative for chills, fatigue and fever. HENT: Negative for congestion, hearing loss, rhinorrhea and sore throat. Eyes: Negative for pain and discharge. Respiratory: Negative for cough, chest tightness and shortness of breath. Cardiovascular: Negative for chest pain and palpitations. Gastrointestinal: Negative for abdominal pain, constipation, diarrhea, nausea and vomiting. Endocrine: Negative for cold intolerance and heat intolerance. Genitourinary: Negative for difficulty urinating and dysuria. Skin: Negative for color change and rash. Positive mass of back   Allergic/Immunologic: Negative for environmental allergies and food allergies. Neurological: Negative for seizures and headaches. Hematological: Negative for adenopathy. Does not bruise/bleed easily. Psychiatric/Behavioral: Negative for confusion and hallucinations.          Objective:      /92 (BP Location: Left arm, Patient Position: Sitting, Cuff Size: Standard)   Pulse 67   Temp 98 °F (36.7 °C) (Temporal)   Resp 18   Ht 5' 5" (1.651 m)   Wt 120 kg (265 lb 6.4 oz)   LMP 09/12/2021 (Exact Date)   SpO2 100%   BMI 44.16 kg/m²     Below is the patient's most recent value for Albumin, ALT, AST, BUN, Calcium, Chloride, Cholesterol, CO2, Creatinine, GFR, Glucose, HDL, Hematocrit, Hemoglobin, Hemoglobin A1C, LDL, Magnesium, Phosphorus, Platelets, Potassium, PSA, Sodium, Triglycerides, and WBC. Lab Results   Component Value Date    ALT 8 06/12/2023    AST 12 (L) 06/12/2023    BUN 12 06/12/2023    CALCIUM 9.2 06/12/2023     06/12/2023    CO2 30 06/12/2023    CREATININE 0.68 06/12/2023    HDL 48 (L) 06/12/2023    HCT 41.3 06/12/2023    HGB 13.1 06/12/2023    HGBA1C 5.6 03/03/2023    MG 2.0 09/09/2021     06/12/2023    K 4.5 06/12/2023    TRIG 211 (H) 06/12/2023    WBC 5.62 06/12/2023     Note: for a comprehensive list of the patient's lab results, access the Results Review activity. Physical Exam  Constitutional:       Appearance: Normal appearance. HENT:      Head: Normocephalic and atraumatic. Nose: Nose normal.   Eyes:      General: No scleral icterus. Conjunctiva/sclera: Conjunctivae normal.   Cardiovascular:      Rate and Rhythm: Normal rate and regular rhythm. Heart sounds: Normal heart sounds. Pulmonary:      Effort: Pulmonary effort is normal.      Breath sounds: Normal breath sounds. Abdominal:      General: There is no distension. Palpations: Abdomen is soft. Tenderness: There is no abdominal tenderness. Musculoskeletal:         General: No signs of injury. Skin:     General: Skin is warm. Coloration: Skin is not jaundiced. Comments: On the patient's back there is a roughly 2 x 2.5 cm subcutaneous mass consistent with lipoma   Neurological:      General: No focal deficit present. Mental Status: She is alert and oriented to person, place, and time.    Psychiatric:         Mood and Affect: Mood normal.         Behavior: Behavior normal.

## 2023-07-10 NOTE — PROGRESS NOTES
Daily Note     Today's date: 7/10/2023  Patient name: Art Peralta  : 1975  MRN: [id number]  Referring provider: DAVE Grajeda  Dx:   Encounter Diagnosis     ICD-10-CM    1. Chronic midline low back pain without sciatica  M54.50     G89.29       2. Chronic pain of right ankle  M25.571     G89.29           Start Time: 1700  Stop Time: 1755  Total time in clinic (min): 55 minutes    Subjective: My low back is sore today. My back is sore even just with standing. It began hurting when I took a short walk when I got out of the care before coming into therapy. Objective: See treatment diary below      Assessment: Tolerated treatment well. Patient would benefit from continued PT  Pt reports having some pain in her back when on the Nustep today. She states that her pain was gone when doing the mat exercises . We began some standing exercises with the cable column and did well., she did reports some fatigue when holding her TA. Pt states having some mild low back tightness post.       Plan: Continue per plan of care.       Precautions: None    Date 7/6 7/10  6/21 6/26 6/28   FOTO   ds 31k, L30     Manuals        Passive quad/hip flexor stretch np  Ds+ gastroc,opp k-c Ds+ gastroc,opp k-c JF   Manual R foot   NP np    Neuro Re-Ed        PPT 20x 3" 20 x 3 "  hep 20x 3" 20 x 3 "    Bridge c add 20x 5" 5"x20 20x 5" 20x  5" 20x    Abdominal set with hooklying marches 1.5# 30 30 x  1.5# 30 1# 30   Ab set c knee fallouts L3 2/15 L 3  30 x  DC L2 15x   Clamshells 15 x 5 "  5 " 15 x  10x 5" P1 P1 10x 5"    MTP/LTP   NP L3 20x ea 20 x 5 "    Ther Ex        Seated lumbar flexion stretch 10" 10x 10" 10x 10x 5" PB PB 10x 5" hold   Ankle MREs np    np    NuStep 5m L6 L6 5  min 5m L5 5m L5 5 min L 5    Posture education        Supine SLR  2 #  2/10 flex  abd Stand  2 # 2x10 ea  Abd & Ext Phong  1.5# 15x  1.5# 15x  1# 20x, slr abd 1# 2x10   LTR 10 x 5"  10 x 5 "   NV 10 x 5 "   AIDE with TA hold  P 2  20 x CC side step  P 2  5 x each 3 steps      CC press outs  P 1 10 x each      Ther Activity                        Gait Training                Modalities        CP 5m dorsal foot  np

## 2023-07-10 NOTE — TELEPHONE ENCOUNTER
Klarissa Ang called in requesting the office to call in for directions on clinical paperwork for authorization.

## 2023-07-12 ENCOUNTER — APPOINTMENT (OUTPATIENT)
Dept: PHYSICAL THERAPY | Facility: CLINIC | Age: 48
End: 2023-07-12
Payer: COMMERCIAL

## 2023-07-13 ENCOUNTER — OFFICE VISIT (OUTPATIENT)
Dept: PHYSICAL THERAPY | Facility: CLINIC | Age: 48
End: 2023-07-13
Payer: COMMERCIAL

## 2023-07-13 DIAGNOSIS — G89.29 CHRONIC PAIN OF RIGHT ANKLE: ICD-10-CM

## 2023-07-13 DIAGNOSIS — M25.571 CHRONIC PAIN OF RIGHT ANKLE: ICD-10-CM

## 2023-07-13 DIAGNOSIS — G89.29 CHRONIC MIDLINE LOW BACK PAIN WITHOUT SCIATICA: ICD-10-CM

## 2023-07-13 DIAGNOSIS — G89.29 CHRONIC PAIN OF RIGHT KNEE: Primary | ICD-10-CM

## 2023-07-13 DIAGNOSIS — M54.50 CHRONIC MIDLINE LOW BACK PAIN WITHOUT SCIATICA: ICD-10-CM

## 2023-07-13 DIAGNOSIS — M25.561 CHRONIC PAIN OF RIGHT KNEE: Primary | ICD-10-CM

## 2023-07-13 PROCEDURE — 97112 NEUROMUSCULAR REEDUCATION: CPT

## 2023-07-13 PROCEDURE — 97110 THERAPEUTIC EXERCISES: CPT

## 2023-07-13 NOTE — PROGRESS NOTES
Daily Note     Today's date: 2023  Patient name: Paul Mondragon  : 1975  MRN: [id number]  Referring provider: DAVE Barnhart  Dx:   Encounter Diagnosis     ICD-10-CM    1. Chronic pain of right knee  M25.561     G89.29       2. Chronic pain of right ankle  M25.571     G89.29       3. Chronic midline low back pain without sciatica  M54.50     G89.29           Start Time: 1715  Stop Time: 1805  Total time in clinic (min): 50 minutes    Subjective: I am feeling pretty good today., I was sitting for most of the day. Objective: See treatment diary below      Assessment: Tolerated treatment well. Patient would benefit from continued PT  Pt completes her exercises with good tolerance today. We began some balance activities today . She had more trouble with her right leg with SLB. Plan: Continue per plan of care.       Precautions: None    Date 7/6 7/10  7/13 6/26 6/28   FOTO        Manuals        Passive quad/hip flexor stretch np   Ds+ gastroc,opp k-c JF   Manual R foot   NP np    Neuro Re-Ed        PPT 20x 3" 20 x 3 "  20 x 3 " 20x 3" 20 x 3 "    Bridge c add 20x 5" 5"x20 20x 5" 20x  5" 20x    Abdominal set with hooklying marches 1.5# 30 30 x 30 x 1.5# 130 1# 130   Ab set c knee fallouts L3 2/15 L 3  30 x L 3 30 x DC L2 15x   Clamshells 15 x 5 "  5 " 15 x  10x 5" P1 P1 10x 5"    SLB   10 " 4 x airex     MTP/LTP   NP L3 20x ea 20 x 5 "    Ther Ex        Seated lumbar flexion stretch 10" 10x 10" 10x 10x 5" PB PB 10x 5" hold   Ankle MREs np    np    NuStep 5m L6 L6 5  min L 5  5 min 5m L5 5 min L 5    Posture education        Supine SLR  2 #  2/10 flex  abd Stand  2 # 2x10 ea  Abd & Ext Phong  2 #  20 x abd ex  1.5# 15x  1# 20x, slr abd 1# 2x10   LTR 10 x 5"  10 x 5 "  10 x 5 "  NV 10 x 5 "   AIDE with TA hold  P 2  20 x  P3 20 x     CC side step  P 2  5 x each 3 steps P 2 5 x each      CC press outs  P 1 10 x each P 1 10 x      Ther Activity                        Gait Training Modalities        CP 5m dorsal foot  np

## 2023-07-17 ENCOUNTER — OFFICE VISIT (OUTPATIENT)
Dept: PHYSICAL THERAPY | Facility: CLINIC | Age: 48
End: 2023-07-17
Payer: COMMERCIAL

## 2023-07-17 DIAGNOSIS — M25.561 CHRONIC PAIN OF RIGHT KNEE: Primary | ICD-10-CM

## 2023-07-17 DIAGNOSIS — M25.571 CHRONIC PAIN OF RIGHT ANKLE: ICD-10-CM

## 2023-07-17 DIAGNOSIS — M54.50 CHRONIC MIDLINE LOW BACK PAIN WITHOUT SCIATICA: ICD-10-CM

## 2023-07-17 DIAGNOSIS — G89.29 CHRONIC PAIN OF RIGHT KNEE: Primary | ICD-10-CM

## 2023-07-17 DIAGNOSIS — G89.29 CHRONIC PAIN OF RIGHT ANKLE: ICD-10-CM

## 2023-07-17 DIAGNOSIS — G89.29 CHRONIC MIDLINE LOW BACK PAIN WITHOUT SCIATICA: ICD-10-CM

## 2023-07-17 PROCEDURE — 97110 THERAPEUTIC EXERCISES: CPT

## 2023-07-17 PROCEDURE — 97112 NEUROMUSCULAR REEDUCATION: CPT

## 2023-07-17 NOTE — PROGRESS NOTES
Daily Note     Today's date: 2023  Patient name: Elayne Wade  : 1975  MRN: [id number]  Referring provider: DAVE Teran  Dx:   Encounter Diagnosis     ICD-10-CM    1. Chronic pain of right knee  M25.561     G89.29       2. Chronic pain of right ankle  M25.571     G89.29       3. Chronic midline low back pain without sciatica  M54.50     G89.29           Start Time: 1700  Stop Time: 1800  Total time in clinic (min): 60 minutes    Subjective: Pt continues with c/o LBP. Objective: See treatment diary below      Assessment: Tolerated treatment well. Patient demonstrated fatigue post treatment      Plan: Continue per plan of care.       Precautions: None    Date 7/6 7/10  7/13 7/17 6/28   FOTO    Ds 52    Manuals        Passive quad/hip flexor stretch np   np JF   Manual R foot   NP np    Neuro Re-Ed        PPT 20x 3" 20 x 3 "  20 x 3 " 20x 3" 20 x 3 "    Bridge c add 20x 5" 5"x20 20x 5" 20x  5" 20x    TA c .5# 1/30 30 x 30 x 2# 30x ea 1# 1/30   Clamshells 15 x 5 "  5 " 15 x  10x 5" P1 P1 10x 10"    SLB- airex   10 " 4 x airex 4x 10" 15" NV   Ther Ex        Seated lumbar flexion stretch 10" 10x 10" 10x 10x 5" PB PB 10x 5"    NuStep 5m L6 L6 5  min L 5  5 min 5m L5 5 min L 5    Hip abd/ext 2 #  2/10 flex  abd Stand  2 # 2x10 ea  Abd & Ext Phong  2 #  20 x abd ex  2# 20x  1# 20x, slr abd 1# 2x10   LTR 10 x 5"  10 x 5 "  10 x 5 "  10x 5" 10 x 5 "   AIDE with TA hold  P 2  20 x  P3 20 x P3 20x    CC side step  P 2  5 x each 3 steps P 2 5 x each  P2  5x ea    CC press outs  P 1 10 x each P 1 10 x  P2 10x     Ther Activity                        Gait Training                Modalities        CP 5m dorsal foot  np np

## 2023-07-18 ENCOUNTER — OFFICE VISIT (OUTPATIENT)
Dept: FAMILY MEDICINE CLINIC | Facility: CLINIC | Age: 48
End: 2023-07-18
Payer: COMMERCIAL

## 2023-07-18 VITALS
OXYGEN SATURATION: 98 % | HEART RATE: 72 BPM | WEIGHT: 264 LBS | SYSTOLIC BLOOD PRESSURE: 104 MMHG | DIASTOLIC BLOOD PRESSURE: 76 MMHG | BODY MASS INDEX: 43.99 KG/M2 | TEMPERATURE: 97.7 F | HEIGHT: 65 IN

## 2023-07-18 DIAGNOSIS — E66.01 CLASS 3 SEVERE OBESITY DUE TO EXCESS CALORIES WITH SERIOUS COMORBIDITY AND BODY MASS INDEX (BMI) OF 40.0 TO 44.9 IN ADULT (HCC): ICD-10-CM

## 2023-07-18 DIAGNOSIS — Z76.89 ENCOUNTER FOR WEIGHT MANAGEMENT: Primary | ICD-10-CM

## 2023-07-18 PROCEDURE — 99213 OFFICE O/P EST LOW 20 MIN: CPT | Performed by: NURSE PRACTITIONER

## 2023-07-19 ENCOUNTER — APPOINTMENT (OUTPATIENT)
Dept: PHYSICAL THERAPY | Facility: CLINIC | Age: 48
End: 2023-07-19
Payer: COMMERCIAL

## 2023-07-20 ENCOUNTER — OFFICE VISIT (OUTPATIENT)
Dept: PHYSICAL THERAPY | Facility: CLINIC | Age: 48
End: 2023-07-20
Payer: COMMERCIAL

## 2023-07-20 DIAGNOSIS — G89.29 CHRONIC PAIN OF RIGHT ANKLE: Primary | ICD-10-CM

## 2023-07-20 DIAGNOSIS — M25.561 CHRONIC PAIN OF RIGHT KNEE: ICD-10-CM

## 2023-07-20 DIAGNOSIS — M25.571 CHRONIC PAIN OF RIGHT ANKLE: Primary | ICD-10-CM

## 2023-07-20 DIAGNOSIS — G89.29 CHRONIC PAIN OF RIGHT KNEE: ICD-10-CM

## 2023-07-20 PROCEDURE — 97110 THERAPEUTIC EXERCISES: CPT

## 2023-07-20 PROCEDURE — 97112 NEUROMUSCULAR REEDUCATION: CPT

## 2023-07-20 NOTE — PROGRESS NOTES
Daily Note     Today's date: 2023  Patient name: Juan Vidal  : 1975  MRN: [id number]  Referring provider: DAVE Mendoza  Dx:   Encounter Diagnosis     ICD-10-CM    1. Chronic pain of right ankle  M25.571     G89.29       2. Chronic pain of right knee  M25.561     G89.29           Start Time: 1700  Stop Time: 1800  Total time in clinic (min): 60 minutes    Subjective: My back is feeling ok today. I have pain in both legs. Objective: See treatment diary below      Assessment: Tolerated treatment well. Patient would benefit from continued PT   Pt reports having some soreness in her back through out her program. She was able to do all of her program today. She did reports that her pain today was in both of her legs. Pt will be off next week due to vacation. Plan: Continue per plan of care.       Precautions: None    Date 7/6 7/10  7/13 7/17 7/20   FOTO    Ds 47    Manuals        Passive quad/hip flexor stretch np   np    Manual R foot   NP np    Neuro Re-Ed        PPT 20x 3" 20 x 3 "  20 x 3 " 20x 3" 20 x 3 "    Bridge c add 20x 5" 5"x20 20x 5" 20x  5" 20x    TA c .5# 1/30 30 x 30 x 2# 30x ea 2# 1/30   Clamshells 15 x 5 "  5 " 15 x  10x 5" P1 P1 10x 10" P 1 10x 10 "   SLB- airex   10 " 4 x airex 4x 10"  4 x 10 "    Ther Ex        Seated lumbar flexion stretch 10" 10x 10" 10x 10x 5" PB PB 10x 5"    NuStep 5m L6 L6 5  min L 5  5 min 5m L5 5 min L 5    Hip abd/ext 2 #  2/10 flex  abd Stand  2 # 2x10 ea  Abd & Ext Phong  2 #  20 x abd ex  2# 20x  1# 20x, slr abd 1# 2x10   LTR 10 x 5"  10 x 5 "  10 x 5 "  10x 5" 10 x 5 "   AIDE with TA hold  P 2  20 x  P3 20 x P3 20x P 3  20 x   CC side step  P 2  5 x each 3 steps P 2 5 x each  P2  5x ea P 2  5 x    CC press outs  P 1 10 x each P 1 10 x  P2 10x  P 2  10 x    Ther Activity                        Gait Training                Modalities        CP 5m dorsal foot  np np

## 2023-07-22 NOTE — PROGRESS NOTES
BMI Counseling: Body mass index is 43.93 kg/m². The BMI is above normal. Nutrition recommendations include decreasing portion sizes, encouraging healthy choices of fruits and vegetables, decreasing fast food intake, consuming healthier snacks, limiting drinks that contain sugar, moderation in carbohydrate intake, increasing intake of lean protein, reducing intake of saturated and trans fat and reducing intake of cholesterol. Exercise recommendations include vigorous physical activity 75 minutes/week, exercising 3-5 times per week and strength training exercises. Patient referred to PCP. Rationale for BMI follow-up plan is due to patient being overweight or obese. Assessment/Plan:    Pt is a 56 yr old female presents in office for weight management    1200 hunter diet provided for  Days   Questions answered   Problem List Items Addressed This Visit    None  Visit Diagnoses     Encounter for weight management    -  Primary    on Praful Bateman   has not lost much   does not follow diet   will increse to 1.2 mg daily DIET AND EXERCISE IS A MUST AND HYDRATION             Subjective:      Patient ID: Tim Galdamez is a 56 y.o. female. Pt is a 56 yr old female presents in office for weight management        The following portions of the patient's history were reviewed and updated as appropriate:   Past Medical History:  She has a past medical history of Anemia, History of transfusion, Hypercholesterolemia, Sleep apnea, and Vitamin deficiency. ,  _______________________________________________________________________  Medical Problems:  does not have any pertinent problems on file.,  _______________________________________________________________________  Past Surgical History:   has a past surgical history that includes Ectopic pregnancy surgery;  Eye surgery; GASTRECTOMY SLEEVE LAPAROSCOPIC; pr laps w/vag hysterect 250 gm/&rmvl tube&/ovaries (N/A, 9/28/2021); and pr laparoscopy w/rmvl adnexal structures (Bilateral, 9/28/2021). ,  _______________________________________________________________________  Family History:  family history includes Asthma in her brother; Brain cancer in her mother; Cancer in her father and maternal grandmother; Diabetes in her maternal grandmother; Hypertension in her paternal grandmother.,  _______________________________________________________________________  Social History:   reports that she has never smoked. She has never been exposed to tobacco smoke. She has never used smokeless tobacco. She reports that she does not drink alcohol and does not use drugs. ,  _______________________________________________________________________  Allergies:  is allergic to penicillin g and pollen extract. .  _______________________________________________________________________  Current Outpatient Medications   Medication Sig Dispense Refill   • Biotin 1 MG CAPS Take by mouth     • cyanocobalamin (VITAMIN B-12) 100 mcg tablet Take by mouth daily     • ferrous sulfate 324 (65 Fe) mg Take 1 tablet (324 mg total) by mouth 3 (three) times a day before meals 90 tablet 0   • Insulin Pen Needle (BD Pen Needle Sarita 2nd Gen) 32G X 4 MM MISC Use in the morning 100 each 1   • liraglutide (SAXENDA) injection Inject 0.1 mL (0.6 mg total) under the skin daily 3 mL 0   • multivitamin (THERAGRAN) TABS Take 1 tablet by mouth daily     • pantoprazole (PROTONIX) 40 mg tablet Take 40 mg by mouth every morning     • cholecalciferol (VITAMIN D3) 25 mcg (1,000 units) tablet Take 1 tablet (1,000 Units total) by mouth daily 90 tablet 1   • cyclobenzaprine (FLEXERIL) 5 mg tablet Take 1 tablet (5 mg total) by mouth daily at bedtime as needed for muscle spasms (as needed for back pain) for up to 21 days 21 tablet 0   • Omega-3 Fatty Acids (fish oil) 1,000 mg Take 1,000 mg by mouth daily (Patient not taking: Reported on 7/18/2023)       No current facility-administered medications for this visit. _______________________________________________________________________  Review of Systems   Constitutional: Positive for fatigue and unexpected weight change (on saxenda ). Negative for fever. HENT: Negative for congestion, postnasal drip, sinus pressure, sore throat and voice change. Eyes: Negative. Respiratory: Negative for cough and shortness of breath. Cardiovascular: Negative for chest pain, palpitations and leg swelling. Gastrointestinal: Negative for abdominal distention, abdominal pain, nausea and vomiting. Endocrine: Negative. Genitourinary: Negative for difficulty urinating and flank pain. Musculoskeletal: Positive for back pain, gait problem and myalgias. Low back pain    Skin: Negative for rash. Allergic/Immunologic: Positive for environmental allergies. Neurological: Negative for headaches. Hematological: Negative for adenopathy. Psychiatric/Behavioral: Negative for sleep disturbance and suicidal ideas. The patient is not nervous/anxious. Objective:  Vitals:    07/18/23 1645   BP: 104/76   BP Location: Left arm   Patient Position: Sitting   Cuff Size: Large   Pulse: 72   Temp: 97.7 °F (36.5 °C)   SpO2: 98%   Weight: 120 kg (264 lb)   Height: 5' 5" (1.651 m)     Body mass index is 43.93 kg/m². Physical Exam  Vitals and nursing note reviewed. Constitutional:       Appearance: She is obese. HENT:      Head: Atraumatic. Eyes:      Extraocular Movements: Extraocular movements intact. Cardiovascular:      Rate and Rhythm: Normal rate and regular rhythm. Heart sounds: Normal heart sounds. Abdominal:      Palpations: Abdomen is soft. Musculoskeletal:      Cervical back: Normal range of motion. Right lower leg: No edema. Left lower leg: No edema. Neurological:      Mental Status: She is alert and oriented to person, place, and time.    Psychiatric:         Mood and Affect: Mood normal.         Behavior: Behavior normal.

## 2023-08-01 ENCOUNTER — HOSPITAL ENCOUNTER (OUTPATIENT)
Facility: HOSPITAL | Age: 48
Setting detail: OUTPATIENT SURGERY
Discharge: HOME | End: 2023-08-01
Attending: STUDENT IN AN ORGANIZED HEALTH CARE EDUCATION/TRAINING PROGRAM | Admitting: STUDENT IN AN ORGANIZED HEALTH CARE EDUCATION/TRAINING PROGRAM
Payer: COMMERCIAL

## 2023-08-01 VITALS
BODY MASS INDEX: 42.09 KG/M2 | SYSTOLIC BLOOD PRESSURE: 115 MMHG | OXYGEN SATURATION: 99 % | TEMPERATURE: 97.6 F | HEIGHT: 66 IN | HEART RATE: 70 BPM | WEIGHT: 261.91 LBS | DIASTOLIC BLOOD PRESSURE: 65 MMHG | RESPIRATION RATE: 18 BRPM

## 2023-08-01 DIAGNOSIS — R22.2 LUMP OF SKIN OF BACK: Primary | ICD-10-CM

## 2023-08-01 PROCEDURE — 88304 TISSUE EXAM BY PATHOLOGIST: CPT | Performed by: PATHOLOGY

## 2023-08-01 PROCEDURE — 21931 EXC BACK LES SC 3 CM/>: CPT | Performed by: STUDENT IN AN ORGANIZED HEALTH CARE EDUCATION/TRAINING PROGRAM

## 2023-08-01 RX ORDER — CLINDAMYCIN PHOSPHATE 900 MG/50ML
900 INJECTION INTRAVENOUS ONCE
Status: COMPLETED | OUTPATIENT
Start: 2023-08-01 | End: 2023-08-01

## 2023-08-01 RX ORDER — LIDOCAINE HYDROCHLORIDE AND EPINEPHRINE 10; 10 MG/ML; UG/ML
INJECTION, SOLUTION INFILTRATION; PERINEURAL AS NEEDED
Status: DISCONTINUED | OUTPATIENT
Start: 2023-08-01 | End: 2023-08-01 | Stop reason: HOSPADM

## 2023-08-01 RX ORDER — CHLORHEXIDINE GLUCONATE 4 G/100ML
SOLUTION TOPICAL DAILY PRN
Status: DISCONTINUED | OUTPATIENT
Start: 2023-08-01 | End: 2023-08-01 | Stop reason: HOSPADM

## 2023-08-01 RX ORDER — TRAMADOL HYDROCHLORIDE 50 MG/1
50 TABLET ORAL EVERY 6 HOURS PRN
Status: DISCONTINUED | OUTPATIENT
Start: 2023-08-01 | End: 2023-08-01 | Stop reason: HOSPADM

## 2023-08-01 RX ORDER — MAGNESIUM HYDROXIDE 1200 MG/15ML
LIQUID ORAL AS NEEDED
Status: DISCONTINUED | OUTPATIENT
Start: 2023-08-01 | End: 2023-08-01 | Stop reason: HOSPADM

## 2023-08-01 RX ORDER — ACETAMINOPHEN 325 MG/1
650 TABLET ORAL EVERY 6 HOURS PRN
Status: DISCONTINUED | OUTPATIENT
Start: 2023-08-01 | End: 2023-08-01 | Stop reason: HOSPADM

## 2023-08-01 RX ORDER — TRAMADOL HYDROCHLORIDE 50 MG/1
50 TABLET ORAL EVERY 6 HOURS PRN
Qty: 12 TABLET | Refills: 0 | Status: SHIPPED | OUTPATIENT
Start: 2023-08-01 | End: 2023-08-11

## 2023-08-01 RX ORDER — DOCUSATE SODIUM 100 MG/1
100 CAPSULE, LIQUID FILLED ORAL 3 TIMES DAILY PRN
Qty: 30 CAPSULE | Refills: 0 | Status: SHIPPED | OUTPATIENT
Start: 2023-08-01

## 2023-08-01 RX ADMIN — CLINDAMYCIN PHOSPHATE 900 MG: 900 INJECTION, SOLUTION INTRAVENOUS at 07:28

## 2023-08-01 NOTE — INTERVAL H&P NOTE
H&P reviewed. After examining the patient I find no changes in the patients condition since the H&P had been written.     Vitals:    08/01/23 0707   BP: 136/82   Pulse: 73   Resp: 18   Temp: (!) 97.4 °F (36.3 °C)   SpO2: 100%

## 2023-08-01 NOTE — DISCHARGE INSTR - AVS FIRST PAGE
Your incisions are covered with Steri-Strips, 4 x 4 gauze, Tegaderm. In 2 days you may remove your dressing, the Steri-Strips will remain on your skin but the 4 x 4 gauze and Tegaderm can be removed. The Steri-Strips will fall off on their own. After your dressings are removed you may shower. Do not soak your incisions including tub baths or swimming. You may shower and let water and soap wash over incisions. Do not scrub your incisions. You may resume your normal diet as tolerated. Do not make any important decisions and do not drive while taking narcotic pain medication. You are prescribed Tramadol for severe pain. Take only as needed. Take Colace to soften your stool while taking narcotic pain medication. You may take Tylenol over-the-counter as needed for pain, follow instructions on the bottle. You may take Ibuprofen over-the-counter as needed for pain, follow instructions on the bottle. You may alternate Tylenol and Ibuprofen if needed, but do not take at the same time. Follow-up with your Surgeon in 2 weeks, call the office for an appointment. You will receive a survey via Email in regards to your same day surgery experience. Please fill out the survey to let us know how we did with your care.

## 2023-08-01 NOTE — OP NOTE
OPERATIVE REPORT  PATIENT NAME: Juan Manuel Mina    :  1975  MRN: [id number]  Pt Location: [facility] OR ROOM 03    SURGERY DATE: 2023    Surgeon(s) and Role:     * Hernando Bonds, DO - Primary    Preop Diagnosis:  Lump of skin of back [R22.2]    Post-Op Diagnosis Codes:     * Lump of skin of back [R22.2]    Procedure(s):  EXCISION  BIOPSY LESION/MASS BACK    Specimen(s):  ID Type Source Tests Collected by Time Destination   1 : Lipoma of back  Tissue Soft Tissue, Lipoma TISSUE EXAM Marilee Casiano, DO 2023 0815        Estimated Blood Loss:   Minimal    Drains:  * No LDAs found *    Anesthesia Type:   Local    Operative Indications:  Lump of skin of back [R22.2]    Operative Findings:  Lipoma of back excised with grossly negative margins  Dissection was carried down into the subcutaneous tissue and did not go any deeper  Specimen roughly 6 x 5 x 1 cm    Complications:   None    Procedure and Technique:  The patient was seen again in Preoperative Holding. All the risks, benefits, complications, treatment options, and expected outcomes were discussed with the patient and family at length. All questions were answered to satisfaction. There was concurrence with the proposed plan and informed consent was obtained. The site of surgery was properly noted/marked. The patient was taken to Operating Room, identified, and the procedure verified as excision of lipoma of back. The patient was placed in the supine position on the stretcher. The patient had received preoperative antibiotics and SCDs placed on the bilateral lower extremities. The patient was placed on the right lateral decubitus position    The back was then prepped and draped in the usual sterile fashion using ChloraPrep. A Time-Out was then performed with all involved present confirming the correct patient, procedure, antibiotics, and any additional concerns.      Using 1% lidocaine with epinephrine the area around the lipoma was anesthetized. Using a #15 blade a transverse incision was made over the mass. Dissection was carried down in the subcutaneous tissue and the mass was excised with grossly negative margins. The cavity was then irrigated and hemostasis was ensured with electrocautery. Deep dermal stitches of 2-0 Vicryl used to approximate the skin. 4-0 Monocryl in a running fashion was used to close the skin. The incision was then cleansed and dried and Steri-Strips, 4 x 4, Tegaderm were used to cover the site. All instrument, sponge, and needle counts were noted to be correct at the conclusion of the case. The patient was brought to the PACU in stable and satisfactory condition. I was present for the entire procedure. and A qualified resident physician was not available.     Patient Disposition:  hemodynamically stable        SIGNATURE: Blake Ojeda,   DATE: August 1, 2023  TIME: 8:33 AM

## 2023-08-03 ENCOUNTER — EVALUATION (OUTPATIENT)
Dept: PHYSICAL THERAPY | Facility: CLINIC | Age: 48
End: 2023-08-03
Payer: COMMERCIAL

## 2023-08-03 DIAGNOSIS — G89.29 CHRONIC PAIN OF RIGHT KNEE: ICD-10-CM

## 2023-08-03 DIAGNOSIS — G89.29 CHRONIC MIDLINE LOW BACK PAIN WITHOUT SCIATICA: Primary | ICD-10-CM

## 2023-08-03 DIAGNOSIS — M25.571 CHRONIC PAIN OF RIGHT ANKLE: ICD-10-CM

## 2023-08-03 DIAGNOSIS — G89.29 CHRONIC PAIN OF RIGHT ANKLE: ICD-10-CM

## 2023-08-03 DIAGNOSIS — M25.561 CHRONIC PAIN OF RIGHT KNEE: ICD-10-CM

## 2023-08-03 DIAGNOSIS — M54.50 CHRONIC MIDLINE LOW BACK PAIN WITHOUT SCIATICA: Primary | ICD-10-CM

## 2023-08-03 PROCEDURE — 97112 NEUROMUSCULAR REEDUCATION: CPT | Performed by: PHYSICAL THERAPIST

## 2023-08-03 PROCEDURE — 97110 THERAPEUTIC EXERCISES: CPT | Performed by: PHYSICAL THERAPIST

## 2023-08-03 NOTE — PROGRESS NOTES
PT Re-Evaluation     Today's date: 8/3/2023  Patient name: Jeannie Cool  : 1975  MRN: [id number]  Referring provider: DAVE Churchill  Dx:   Encounter Diagnosis     ICD-10-CM    1. Chronic midline low back pain without sciatica  M54.50     G89.29       2. Chronic pain of right knee  M25.561     G89.29       3. Chronic pain of right ankle  M25.571     G89.29           Start Time: 1710  Stop Time: 1800  Total time in clinic (min): 50 minutes    Assessment  Assessment details: Patient has attended 16 physical therapy visits to date. Patient continues to demonstrate mild restriction with lumbar rom and notes pain with flexion and extension. Aberrant motion is still noted with lumbopelvic rhythm when returning from a flexed lumbar position. Overall LE strength has shown significant improvement. Bilateral ankle strength has shown the most improvement and patient notes feeling more stability in their ankles since wearing sneakers to work. Knee and ankle ROM has also shown improvement. Significant improvement in ankle inversion and eversion ROM was noted. Patient noted pain with ankle inversion bilaterally with most of the pain noted on the anterior R ankle. Patient continues to be limited in their tolerance for work duties as it requires standing for longer than 10 minutes at a time. Patient notes they have felt improvement since starting physical therapy and has felt decreased symptoms post visits. Impairments: abnormal or restricted ROM, activity intolerance, impaired physical strength, lacks appropriate home exercise program, pain with function and poor posture     Symptom irritability: lowUnderstanding of Dx/Px/POC: good   Prognosis: good    Goals  Lumbar:  STGs:  "Patient will be independent with hep by 2-3 visits. - MET  Decrease low back pain by 25% for improved tolerance with adls and home duties by 3-4 weeks.  - Not MET   Improve Lumbar rom to wfl for improved tolerance with adls and home duties by 3-4 weeks. " - Not MET     LTGs:  "Improve FOTO score from 35 to 52 indicating improved tolerance with activities involving the low back by discharge. - Not MET   Patient will demonstrate rom and strength to the lumbar spine wfl for improved tolerance with adls and home duties by discharge. - Not MET   Patient will be free of radicular symptoms by discharge. "- Not MET     Knee:  STGs:  Patient will be independent with HEP in 1-2 visits - Partially MET  Improve L knee strength to 3+/5 for improved tolerance with adls and transfers by 3-4 weeks. - MET     LTGs:  Improve FOTO score from 47 To 60 Indicating improved tolerance with activities involving the LE by discharge. - Not MET   Improve L knee strength and rom to wnl for improved tolerance with adls, work duties, and recreation by discharge. - Not MET   Patient will be able to return to normal ambulation without deviation over all terrains without pain or limitation from the knee by discharge. - Not MET   Patient will be able to return to normal work duties without limitation from the knee by discharge. - Not MET     Plan  Plan details: Patient informed that from this point forward, to ensure adherence to the aforementioned plan of care, all or some of the treatment may be performed and carried out by a Physical Therapy Assistant (PTA) with supervision from a licensed Physical Therapist (PT) in accordance with [address] [place] [address]. Patient will continue to benefit from skilled physical therapy to address the functional deficits that were identified during the evaluation today. We will continue to progress the therapy program to address these functional deficits and achieve the established goals.           Patient would benefit from: skilled physical therapy  Planned modality interventions: cryotherapy and thermotherapy: hydrocollator packs  Planned therapy interventions: abdominal trunk stabilization, ADL retraining, body mechanics training, flexibility, functional ROM exercises, home exercise program, therapeutic exercise, therapeutic activities, stretching, strengthening, postural training, patient education, joint mobilization and manual therapy  Frequency: 2x week  Duration in weeks: 4  Plan of Care beginning date: 8/3/2023  Plan of Care expiration date: 8/31/2023  Treatment plan discussed with: patient        Subjective Evaluation    History of Present Illness  Mechanism of injury: Patient presents to out patient physical therapy with chief c/o LBP, R knee, and R ankle pain. Patient reports onset of LBP when she gave birth to her son. She notes that recently it started to bother her more when she wakes up in the morning after brushing her teeth and when she is transitioning from stand to/from sit. She denies any numbness/tingling or radicular symptoms into the LEs. She also discloses R knee and foot pain. She feels that when she is not wearing supportive sneakers she will have increased pain to the R mid foot. She is also reporting increased pain to the R knee with standing/walking. Update 6/21/2023:  Patient reports that she continues to have back pain which has been elevated lately. She does not correlate this to any change in activity. She continues to report increased pain when she is in a standing position which is relieved when she is laying down in bed. She reports increased radicular symptoms into the R LE during forward bending and increased low back pain/symptoms when in a standing position. Update 8/3/2023:  Patient reports their low back is bothering them the most today. Patient reports having surgery on Tuesday to remove a lump on their back and has stiches on their back that may also be contributing to their current back pain. Patient reports their knee and ankle is doing better but it still causes them difficulty at work due to prolonged standing.  Patient continues to report fatigue during and after visits in both LEs.          Recurrent probem    Quality of life: good    Patient Goals  Patient goals for therapy: decreased pain, increased motion and increased strength    Pain  Current pain ratin  At best pain rating: 3  At worst pain ratin  Location: Lumbar/R knee/R ankle  Quality: radiating, sharp, tight, discomfort and dull ache  Relieving factors: medications, rest and change in position  Aggravating factors: standing, walking and sitting  Progression: worsening          Objective     Static Posture     Lumbar Spine   Increased lordosis. Pelvis   Anterior pelvic tilt    Ankle/Foot   Ankle/Foot (Left): Pes planus. Ankle/Foot (Right): Pes planus.      Active Range of Motion     Lumbar   Flexion:  WFL and with pain  Extension:  with pain Restriction level: minimal  Left lateral flexion:  WFL  Right lateral flexion:  with pain Restriction level: minimal  Left rotation:  WFL  Right rotation:  WFL  Left Knee   Flexion: 134 degrees   Extension: 4 degrees     Right Knee   Flexion: 131 degrees   Extension: 6 degrees   Left Ankle/Foot   Dorsiflexion (kf): 5 degrees   Plantar flexion: 64 degrees   Inversion: 34 degrees with pain  Eversion: 24 degrees     Right Ankle/Foot   Dorsiflexion (kf): 8 degrees   Plantar flexion: 53 degrees   Inversion: 32 degrees with pain  Eversion: 22 degrees     Strength/Myotome Testing     Left Hip   Planes of Motion   Flexion: 4+  Extension: 5  Abduction: 4+  Adduction: 4+    Right Hip   Planes of Motion   Flexion: 4+  Extension: 5  Abduction: 4+  Adduction: 4+    Left Knee   Flexion: 4+  Extension: 4+    Right Knee   Flexion: 4+  Extension: 4+    Left Ankle/Foot   Dorsiflexion: 5  Plantar flexion: 5  Inversion: 4+  Eversion: 4+    Right Ankle/Foot   Dorsiflexion: 5  Plantar flexion: 5  Inversion: 4  Eversion: 4+      Flowsheet Rows    Flowsheet Row Most Recent Value   PT/OT G-Codes    Current Score 59   Projected Score 60             Precautions: None    Date 8/3 7/10  7/13 7/17 7/20   FOTO ZJ    Ds 47    Manuals        Passive quad/hip flexor stretch    np    Manual R foot   NP np    Neuro Re-Ed        PPT  20 x 3 "  20 x 3 " 20x 3" 20 x 3 "    Bridge c add 5" 20x 5"x20 20x 5" 20x  5" 20x    TA c march 30x 30 x 30 x 2# 30x ea 2# 1/30   Clamshells 10x 10" 5 " 15 x  10x 5" P1 P1 10x 10" P 1 10x 10 "   SLB- airex   10 " 4 x airex 4x 10"  4 x 10 "    Ther Ex        Seated lumbar flexion stretch  10" 10x 10x 5" PB PB 10x 5"    NuStep L6 5 min L6 5  min L 5  5 min 5m L5 5 min L 5    Hip abd/ext  Stand  2 # 2x10 ea  Abd & Ext Phong  2 #  20 x abd ex  2# 20x  1# 20x, slr abd 1# 2x10   LTR 10x 5" 10 x 5 "  10 x 5 "  10x 5" 10 x 5 "   AIDE with TA hold P3 20x P 2  20 x  P3 20 x P3 20x P 3  20 x   CC side step P2 5x ea 3 steps P 2  5 x each 3 steps P 2 5 x each  P2  5x ea P 2  5 x    CC press outs P2 10x P 1 10 x each P 1 10 x  P2 10x  P 2  10 x    Ther Activity                        Gait Training                Modalities        CP   np np

## 2023-08-03 NOTE — LETTER
August 3, 2023    DAVE Zhao  [address]    Patient: Praneeth Redding   YOB: 1975   Date of Visit: 8/3/2023     Encounter Diagnosis     ICD-10-CM    1. Chronic midline low back pain without sciatica  M54.50     G89.29       2. Chronic pain of right knee  M25.561     G89.29       3. Chronic pain of right ankle  M25.571     G89.29           Dear Dr. Romayne Duster: Thank you for your recent referral of Praneeth Redding. Please review the attached evaluation summary from [facility] recent visit. Please verify that you agree with the plan of care by signing the attached order. If you have any questions or concerns, please do not hesitate to call. I sincerely appreciate the opportunity to share in the care of one of your patients and hope to have another opportunity to work with you in the near future. Sincerely,    Satya Bingham      Referring Provider:      I certify that I have read the below Plan of Care and certify the need for these services furnished under this plan of treatment while under my care. DAVE Zhao  [facility] [address]  [address]          PT Re-Evaluation     Today's date: 8/3/2023  Patient name: Praneeth Redding  : 1975  MRN: [id number]  Referring provider: DAVE Zhao  Dx:   Encounter Diagnosis     ICD-10-CM    1. Chronic midline low back pain without sciatica  M54.50     G89.29       2. Chronic pain of right knee  M25.561     G89.29       3. Chronic pain of right ankle  M25.571     G89.29           Start Time: 1710  Stop Time: 1800  Total time in clinic (min): 50 minutes    Assessment  Assessment details: Patient has attended 16 physical therapy visits to date. Patient continues to demonstrate mild restriction with lumbar rom and notes pain with flexion and extension. Aberrant motion is still noted with lumbopelvic rhythm when returning from a flexed lumbar position. Overall LE strength has shown significant improvement. Bilateral ankle strength has shown the most improvement and patient notes feeling more stability in their ankles since wearing sneakers to work. Knee and ankle ROM has also shown improvement. Significant improvement in ankle inversion and eversion ROM was noted. Patient noted pain with ankle inversion bilaterally with most of the pain noted on the anterior R ankle. Patient continues to be limited in their tolerance for work duties as it requires standing for longer than 10 minutes at a time. Patient notes they have felt improvement since starting physical therapy and has felt decreased symptoms post visits. Impairments: abnormal or restricted ROM, activity intolerance, impaired physical strength, lacks appropriate home exercise program, pain with function and poor posture     Symptom irritability: lowUnderstanding of Dx/Px/POC: good   Prognosis: good    Goals  Lumbar:  STGs:  "Patient will be independent with hep by 2-3 visits. - MET  Decrease low back pain by 25% for improved tolerance with adls and home duties by 3-4 weeks. - Not MET   Improve Lumbar rom to wfl for improved tolerance with adls and home duties by 3-4 weeks. " - Not MET     LTGs:  "Improve FOTO score from 35 to 52 indicating improved tolerance with activities involving the low back by discharge. - Not MET   Patient will demonstrate rom and strength to the lumbar spine wfl for improved tolerance with adls and home duties by discharge. - Not MET   Patient will be free of radicular symptoms by discharge. "- Not MET     Knee:  STGs:  Patient will be independent with HEP in 1-2 visits - Partially MET  Improve L knee strength to 3+/5 for improved tolerance with adls and transfers by 3-4 weeks. - MET     LTGs:  Improve FOTO score from 47 To 60 Indicating improved tolerance with activities involving the LE by discharge.  - Not MET   Improve L knee strength and rom to wnl for improved tolerance with adls, work duties, and recreation by discharge. - Not MET   Patient will be able to return to normal ambulation without deviation over all terrains without pain or limitation from the knee by discharge. - Not MET   Patient will be able to return to normal work duties without limitation from the knee by discharge. - Not MET     Plan  Plan details: Patient informed that from this point forward, to ensure adherence to the aforementioned plan of care, all or some of the treatment may be performed and carried out by a Physical Therapy Assistant (PTA) with supervision from a licensed Physical Therapist (PT) in accordance with [address] [place] [address]. Patient will continue to benefit from skilled physical therapy to address the functional deficits that were identified during the evaluation today. We will continue to progress the therapy program to address these functional deficits and achieve the established goals. Patient would benefit from: skilled physical therapy  Planned modality interventions: cryotherapy and thermotherapy: hydrocollator packs  Planned therapy interventions: abdominal trunk stabilization, ADL retraining, body mechanics training, flexibility, functional ROM exercises, home exercise program, therapeutic exercise, therapeutic activities, stretching, strengthening, postural training, patient education, joint mobilization and manual therapy  Frequency: 2x week  Duration in weeks: 4  Plan of Care beginning date: 8/3/2023  Plan of Care expiration date: 8/31/2023  Treatment plan discussed with: patient        Subjective Evaluation    History of Present Illness  Mechanism of injury: Patient presents to out patient physical therapy with chief c/o LBP, R knee, and R ankle pain. Patient reports onset of LBP when she gave birth to her son.  She notes that recently it started to bother her more when she wakes up in the morning after brushing her teeth and when she is transitioning from stand to/from sit. She denies any numbness/tingling or radicular symptoms into the LEs. She also discloses R knee and foot pain. She feels that when she is not wearing supportive sneakers she will have increased pain to the R mid foot. She is also reporting increased pain to the R knee with standing/walking. Update 2023:  Patient reports that she continues to have back pain which has been elevated lately. She does not correlate this to any change in activity. She continues to report increased pain when she is in a standing position which is relieved when she is laying down in bed. She reports increased radicular symptoms into the R LE during forward bending and increased low back pain/symptoms when in a standing position. Update 8/3/2023:  Patient reports their low back is bothering them the most today. Patient reports having surgery on Tuesday to remove a lump on their back and has stiches on their back that may also be contributing to their current back pain. Patient reports their knee and ankle is doing better but it still causes them difficulty at work due to prolonged standing. Patient continues to report fatigue during and after visits in both LEs. Recurrent probem    Quality of life: good    Patient Goals  Patient goals for therapy: decreased pain, increased motion and increased strength    Pain  Current pain ratin  At best pain rating: 3  At worst pain ratin  Location: Lumbar/R knee/R ankle  Quality: radiating, sharp, tight, discomfort and dull ache  Relieving factors: medications, rest and change in position  Aggravating factors: standing, walking and sitting  Progression: worsening          Objective     Static Posture     Lumbar Spine   Increased lordosis. Pelvis   Anterior pelvic tilt    Ankle/Foot   Ankle/Foot (Left): Pes planus. Ankle/Foot (Right): Pes planus.      Active Range of Motion     Lumbar   Flexion:  WFL and with pain  Extension:  with pain Restriction level: minimal  Left lateral flexion:  WFL  Right lateral flexion:  with pain Restriction level: minimal  Left rotation:  WFL  Right rotation:  WFL  Left Knee   Flexion: 134 degrees   Extension: 4 degrees     Right Knee   Flexion: 131 degrees   Extension: 6 degrees   Left Ankle/Foot   Dorsiflexion (kf): 5 degrees   Plantar flexion: 64 degrees   Inversion: 34 degrees with pain  Eversion: 24 degrees     Right Ankle/Foot   Dorsiflexion (kf): 8 degrees   Plantar flexion: 53 degrees   Inversion: 32 degrees with pain  Eversion: 22 degrees     Strength/Myotome Testing     Left Hip   Planes of Motion   Flexion: 4+  Extension: 5  Abduction: 4+  Adduction: 4+    Right Hip   Planes of Motion   Flexion: 4+  Extension: 5  Abduction: 4+  Adduction: 4+    Left Knee   Flexion: 4+  Extension: 4+    Right Knee   Flexion: 4+  Extension: 4+    Left Ankle/Foot   Dorsiflexion: 5  Plantar flexion: 5  Inversion: 4+  Eversion: 4+    Right Ankle/Foot   Dorsiflexion: 5  Plantar flexion: 5  Inversion: 4  Eversion: 4+      Flowsheet Rows    Flowsheet Row Most Recent Value   PT/OT G-Codes    Current Score 59   Projected Score 60            Precautions: None    Date 8/3 7/10  7/13 7/17 7/20   FOTO ZJ    Ds 47    Manuals        Passive quad/hip flexor stretch    np    Manual R foot   NP np    Neuro Re-Ed        PPT  20 x 3 "  20 x 3 " 20x 3" 20 x 3 "    Bridge c add 5" 20x 5"x20 20x 5" 20x  5" 20x    TA c march 30x 30 x 30 x 2# 30x ea 2# 1/30   Clamshells 10x 10" 5 " 15 x  10x 5" P1 P1 10x 10" P 1 10x 10 "   SLB- airex   10 " 4 x airex 4x 10"  4 x 10 "    Ther Ex        Seated lumbar flexion stretch  10" 10x 10x 5" PB PB 10x 5"    NuStep L6 5 min L6 5  min L 5  5 min 5m L5 5 min L 5    Hip abd/ext  Stand  2 # 2x10 ea  Abd & Ext Phong  2 #  20 x abd ex  2# 20x  1# 20x, slr abd 1# 2x10   LTR 10x 5" 10 x 5 "  10 x 5 "  10x 5" 10 x 5 "   AIDE with TA hold P3 20x P 2  20 x  P3 20 x P3 20x P 3  20 x   CC side step P2 5x ea 3 steps P 2  5 x each 3 steps P 2 5 x each  P2  5x ea P 2  5 x    CC press outs P2 10x P 1 10 x each P 1 10 x  P2 10x  P 2  10 x    Ther Activity                        Gait Training                Modalities        CP   np np

## 2023-08-04 PROCEDURE — 88304 TISSUE EXAM BY PATHOLOGIST: CPT | Performed by: PATHOLOGY

## 2023-08-09 ENCOUNTER — APPOINTMENT (OUTPATIENT)
Dept: PHYSICAL THERAPY | Facility: CLINIC | Age: 48
End: 2023-08-09
Payer: COMMERCIAL

## 2023-08-14 ENCOUNTER — APPOINTMENT (OUTPATIENT)
Dept: PHYSICAL THERAPY | Facility: CLINIC | Age: 48
End: 2023-08-14
Payer: COMMERCIAL

## 2023-08-16 ENCOUNTER — OFFICE VISIT (OUTPATIENT)
Dept: PHYSICAL THERAPY | Facility: CLINIC | Age: 48
End: 2023-08-16
Payer: COMMERCIAL

## 2023-08-16 DIAGNOSIS — M25.561 CHRONIC PAIN OF RIGHT KNEE: ICD-10-CM

## 2023-08-16 DIAGNOSIS — G89.29 CHRONIC MIDLINE LOW BACK PAIN WITHOUT SCIATICA: Primary | ICD-10-CM

## 2023-08-16 DIAGNOSIS — M25.571 CHRONIC PAIN OF RIGHT ANKLE: ICD-10-CM

## 2023-08-16 DIAGNOSIS — G89.29 CHRONIC PAIN OF RIGHT ANKLE: ICD-10-CM

## 2023-08-16 DIAGNOSIS — G89.29 CHRONIC PAIN OF RIGHT KNEE: ICD-10-CM

## 2023-08-16 DIAGNOSIS — M54.50 CHRONIC MIDLINE LOW BACK PAIN WITHOUT SCIATICA: Primary | ICD-10-CM

## 2023-08-16 PROCEDURE — 97112 NEUROMUSCULAR REEDUCATION: CPT | Performed by: PHYSICAL THERAPIST

## 2023-08-16 PROCEDURE — 97110 THERAPEUTIC EXERCISES: CPT | Performed by: PHYSICAL THERAPIST

## 2023-08-16 NOTE — PROGRESS NOTES
Daily Note     Today's date: 2023  Patient name: Paul Mondragon  : 1975  MRN: [id number]  Referring provider: DAVE Barnhart  Dx:   Encounter Diagnosis     ICD-10-CM    1. Chronic midline low back pain without sciatica  M54.50     G89.29       2. Chronic pain of right knee  M25.561     G89.29       3. Chronic pain of right ankle  M25.571     G89.29                      Subjective: Patient reports that she is having more pain in her low back today. She feels that the ankle and knee are doing better but she continues with low back pain with activity. She is feeling more pain to the R side of the low back and felt that when she was standing she had increased pain in her low back. Objective: See treatment diary below      Assessment: Tolerated treatment fair. Patient demonstrated fatigue post treatment, exhibited good technique with therapeutic exercises and would benefit from continued PT Patient was challenged with additional strengthening interventions today with fair tolerance. She continues to report central and R sided low back discomfort during her therapy interventions. She noted most fatigue to the low back during pallof press exercise but offered no reports of increased pain to the low back post this exercise. Plan: Continue per plan of care. Progress treatment as tolerated.        Precautions: None    Date 8/3 8/16 7/13 7/17 7/20   FOTO ZJ    Ds 52    Manuals        Passive quad/hip flexor stretch    np    Manual R foot   NP np    Neuro Re-Ed        PPT  HEP 20 x 3 " 20x 3" 20 x 3 "    Bridge c add 5" 20x 5" 20x 20x 5" 20x  5" 20x    TA c march 30x 30x 30 x 2# 30x ea 2# /30   Clamshells 10x 10" 10" 10x 10x 5" P1 P1 10x 10" P 1 10x 10 "   SLB- airex   10 " 4 x airex 4x 10"  4 x 10 "    Ther Ex        Seated lumbar flexion stretch   10x 5" PB PB 10x 5"    NuStep L6 5 min L5 5 min L 5  5 min 5m L5 5 min L 5    Hip abd/ext   2 #  20 x abd ex  2# 20x  1# 20x, slr abd 1# 2x10 LTR 10x 5"  10 x 5 "  10x 5" 10 x 5 "   AIDE with TA hold P3 20x P3 20x P3 20 x P3 20x P 3  20 x   CC side step P2 5x ea 3 steps  P 2 5 x each  P2  5x ea P 2  5 x    Back extension machine  30# 2x15      CC press outs P2 10x P2 3" 20x P 1 10 x  P2 10x  P 2  10 x    Ther Activity                        Gait Training                Modalities        CP   np np

## 2023-08-17 ENCOUNTER — OFFICE VISIT (OUTPATIENT)
Dept: PHYSICAL THERAPY | Facility: CLINIC | Age: 48
End: 2023-08-17
Payer: COMMERCIAL

## 2023-08-17 DIAGNOSIS — G89.29 CHRONIC PAIN OF RIGHT KNEE: ICD-10-CM

## 2023-08-17 DIAGNOSIS — M25.561 CHRONIC PAIN OF RIGHT KNEE: ICD-10-CM

## 2023-08-17 DIAGNOSIS — M25.571 CHRONIC PAIN OF RIGHT ANKLE: ICD-10-CM

## 2023-08-17 DIAGNOSIS — M54.50 CHRONIC MIDLINE LOW BACK PAIN WITHOUT SCIATICA: Primary | ICD-10-CM

## 2023-08-17 DIAGNOSIS — G89.29 CHRONIC MIDLINE LOW BACK PAIN WITHOUT SCIATICA: Primary | ICD-10-CM

## 2023-08-17 DIAGNOSIS — G89.29 CHRONIC PAIN OF RIGHT ANKLE: ICD-10-CM

## 2023-08-17 PROCEDURE — 97112 NEUROMUSCULAR REEDUCATION: CPT | Performed by: PHYSICAL THERAPIST

## 2023-08-17 PROCEDURE — 97110 THERAPEUTIC EXERCISES: CPT | Performed by: PHYSICAL THERAPIST

## 2023-08-17 NOTE — PROGRESS NOTES
Daily Note     Today's date: 2023  Patient name: Karen Villasenor  : 1975  MRN: [id number]  Referring provider: DAVE De Souza  Dx:   Encounter Diagnosis     ICD-10-CM    1. Chronic midline low back pain without sciatica  M54.50     G89.29       2. Chronic pain of right knee  M25.561     G89.29       3. Chronic pain of right ankle  M25.571     G89.29           Start Time: 1712  Stop Time: 1800  Total time in clinic (min): 48 minutes    Subjective: Patient reports that she took some medication today so her back is feeling a little better. She feels that her back continues to be bothersome and will bother her more when she is on her feet at work. She feels that wearing sneakers has helped the pain she was experiencing in her ankle and knee. Objective: See treatment diary below      Assessment: Tolerated treatment well. Patient demonstrated fatigue post treatment, exhibited good technique with therapeutic exercises and would benefit from continued PT Continued to focus on progression of lumbar strengthening interventions. She noted decreased discomfort to the low back during lumbar extension with resistance. She continues to report discomfort to the low back throughout her therapy session more noted during standing interventions. Plan: Continue per plan of care. Progress treatment as tolerated.        Precautions: None    Date 8/3 8/16 8/17 7/17 7/20   FOTO ZJ    Ds 52    Manuals        Passive quad/hip flexor stretch    np    Manual R foot    np    Neuro Re-Ed        PPT  HEP  20x 3" 20 x 3 "    Bridge c add 5" 20x 5" 20x 5" 20x 20x  5" 20x    TA c x 30x  2# 30x ea 2# 1/30   Clamshells 10x 10" 10" 10x 10" 10x P1 10x 10" P 1 10x 10 "   SLB- airex   20" 4x 4x 10"  4 x 10 "    Ther Ex        Seated lumbar flexion stretch    PB 10x 5"    NuStep L6 5 min L5 5 min L5 5 min 5m L5 5 min L 5    Hip abd/ext    2# 20x  1# 20x, slr abd 1# 2x10   LTR 10x 5"   10x 5" 10 x 5 "   AIDE with TA hold P3 20x P3 20x P3 20x P3 20x P 3  20 x   CC side step P2 5x ea 3 steps   P2  5x ea P 2  5 x    Back extension machine  30# 2x15 45# 2x15     CC press outs P2 10x P2 3" 20x P2 3" 20x P2 10x  P 2  10 x    Ther Activity                        Gait Training                Modalities        CP    np

## 2023-08-21 ENCOUNTER — OFFICE VISIT (OUTPATIENT)
Dept: PHYSICAL THERAPY | Facility: CLINIC | Age: 48
End: 2023-08-21
Payer: COMMERCIAL

## 2023-08-21 DIAGNOSIS — M54.50 CHRONIC MIDLINE LOW BACK PAIN WITHOUT SCIATICA: ICD-10-CM

## 2023-08-21 DIAGNOSIS — G89.29 CHRONIC PAIN OF RIGHT KNEE: ICD-10-CM

## 2023-08-21 DIAGNOSIS — G89.29 CHRONIC MIDLINE LOW BACK PAIN WITHOUT SCIATICA: ICD-10-CM

## 2023-08-21 DIAGNOSIS — M25.571 CHRONIC PAIN OF RIGHT ANKLE: Primary | ICD-10-CM

## 2023-08-21 DIAGNOSIS — G89.29 CHRONIC PAIN OF RIGHT ANKLE: Primary | ICD-10-CM

## 2023-08-21 DIAGNOSIS — M25.561 CHRONIC PAIN OF RIGHT KNEE: ICD-10-CM

## 2023-08-21 PROCEDURE — 97112 NEUROMUSCULAR REEDUCATION: CPT

## 2023-08-21 PROCEDURE — 97110 THERAPEUTIC EXERCISES: CPT

## 2023-08-21 NOTE — PROGRESS NOTES
Daily Note     Today's date: 2023  Patient name: Abeba Jernigan  : 1975  MRN: [id number]  Referring provider: DAVE Wilson  Dx:   Encounter Diagnosis     ICD-10-CM    1. Chronic pain of right ankle  M25.571     G89.29       2. Chronic pain of right knee  M25.561     G89.29       3. Chronic midline low back pain without sciatica  M54.50     G89.29           Start Time: 1720  Stop Time: 1800  Total time in clinic (min): 40 minutes    Subjective: My back , knee and ankle are feeling pretty good today. I have pain in my neck today. Objective: See treatment diary below      Assessment: Tolerated treatment well. Patient would benefit from continued PT  Pt completes her full program with no change in pain levels. She did state having neck pain. She feels improvement with the ankle and back. ,       Plan: Continue per plan of care.       Precautions: None    Date 8/3 8/16 8/17 8/21 7/20   FOTO ZJ        Manuals        Passive quad/hip flexor stretch    np    Manual R foot    np    Neuro Re-Ed        PPT  HEP  20x 3" 20 x 3 "    Bridge c add 5" 20x 5" 20x 5" 20x 20x  5" 20x    TA c march 30x 30x  2# 30x ea 2# 1/30   Clamshells 10x 10" 10" 10x 10" 10x P1 10x 10" P 1 10x 10 "   SLB- airex   20" 4x 4x 10"  4 x 10 "    Ther Ex        Seated lumbar flexion stretch    PB 10x 5" PB  10 x 5 "    NuStep L6 5 min L5 5 min L5 5 min 5m L5 5 min L 5    Hip abd/ext     1# 20x, slr abd 1# 2x10   LTR 10x 5"   10x 5" 10 x 5 "   AIDE with TA hold P3 20x P3 20x P3 20x P3 20x P 3  20 x   CC side step P2 5x ea 3 steps   P2  5x ea P 2  5 x    Back extension machine  30# 2x15 45# 2x15     CC press outs P2 10x P2 3" 20x P2 3" 20x P2 20 x P 2  10 x    Ther Activity                        Gait Training                Modalities        CP    np

## 2023-08-24 ENCOUNTER — APPOINTMENT (OUTPATIENT)
Dept: PHYSICAL THERAPY | Facility: CLINIC | Age: 48
End: 2023-08-24
Payer: COMMERCIAL

## 2023-08-24 DIAGNOSIS — E66.01 CLASS 3 SEVERE OBESITY DUE TO EXCESS CALORIES WITH SERIOUS COMORBIDITY AND BODY MASS INDEX (BMI) OF 40.0 TO 44.9 IN ADULT (HCC): ICD-10-CM

## 2023-08-25 ENCOUNTER — HOSPITAL ENCOUNTER (EMERGENCY)
Facility: HOSPITAL | Age: 48
Discharge: HOME | End: 2023-08-25
Attending: EMERGENCY MEDICINE
Payer: COMMERCIAL

## 2023-08-25 VITALS
DIASTOLIC BLOOD PRESSURE: 80 MMHG | HEIGHT: 66 IN | WEIGHT: 261 LBS | BODY MASS INDEX: 41.95 KG/M2 | HEART RATE: 70 BPM | TEMPERATURE: 97.6 F | OXYGEN SATURATION: 100 % | RESPIRATION RATE: 18 BRPM | SYSTOLIC BLOOD PRESSURE: 136 MMHG

## 2023-08-25 DIAGNOSIS — M54.2 CERVICAL SPINE PAIN: Primary | ICD-10-CM

## 2023-08-25 PROCEDURE — 96372 THER/PROPH/DIAG INJ SC/IM: CPT

## 2023-08-25 PROCEDURE — 99283 EMERGENCY DEPT VISIT LOW MDM: CPT

## 2023-08-25 PROCEDURE — 99284 EMERGENCY DEPT VISIT MOD MDM: CPT

## 2023-08-25 RX ORDER — LIRAGLUTIDE 6 MG/ML
INJECTION, SOLUTION SUBCUTANEOUS
Qty: 15 ML | Refills: 0 | Status: SHIPPED | OUTPATIENT
Start: 2023-08-25 | End: 2023-08-29 | Stop reason: SDUPTHER

## 2023-08-25 RX ORDER — LIDOCAINE 50 MG/G
1 PATCH TOPICAL ONCE
Status: DISCONTINUED | OUTPATIENT
Start: 2023-08-25 | End: 2023-08-25 | Stop reason: HOSPADM

## 2023-08-25 RX ORDER — IBUPROFEN 600 MG/1
600 TABLET ORAL EVERY 6 HOURS PRN
Qty: 30 TABLET | Refills: 0 | Status: SHIPPED | OUTPATIENT
Start: 2023-08-25

## 2023-08-25 RX ORDER — LIDOCAINE 50 MG/G
1 PATCH TOPICAL EVERY 24 HOURS
Qty: 15 PATCH | Refills: 0 | Status: SHIPPED | OUTPATIENT
Start: 2023-08-25

## 2023-08-25 RX ORDER — ACETAMINOPHEN 500 MG
1000 TABLET ORAL EVERY 6 HOURS PRN
Qty: 40 TABLET | Refills: 0 | Status: SHIPPED | OUTPATIENT
Start: 2023-08-25

## 2023-08-25 RX ORDER — KETOROLAC TROMETHAMINE 30 MG/ML
15 INJECTION, SOLUTION INTRAMUSCULAR; INTRAVENOUS ONCE
Status: COMPLETED | OUTPATIENT
Start: 2023-08-25 | End: 2023-08-25

## 2023-08-25 RX ORDER — ACETAMINOPHEN 325 MG/1
650 TABLET ORAL ONCE
Status: COMPLETED | OUTPATIENT
Start: 2023-08-25 | End: 2023-08-25

## 2023-08-25 RX ADMIN — ACETAMINOPHEN 650 MG: 325 TABLET, FILM COATED ORAL at 02:54

## 2023-08-25 RX ADMIN — LIDOCAINE 5% 1 PATCH: 700 PATCH TOPICAL at 02:55

## 2023-08-25 RX ADMIN — KETOROLAC TROMETHAMINE 15 MG: 30 INJECTION, SOLUTION INTRAMUSCULAR; INTRAVENOUS at 02:55

## 2023-08-25 NOTE — ED PROVIDER NOTES
History  Chief Complaint   Patient presents with   • Neck Pain     Posterior neck pain going into left arm for two weeks. Patient reports no relief from muscle relaxer or tramadol that PCP prescribed per patient. Displays ability to move and rotate neck. 45-year-old female presents ED for evaluation of neck pain. Patient has been having 2 weeks of neck pain. Pain starts in the cervical spine region and radiates into her left arm. She has been provided with prescription for tramadol and muscle relaxer by her primary care provider. Patient states that these 2 medications have not provided benefit. Tramadol makes her tired and the muscle relaxer does not produce any effect. Patient states that the neck pain began without acute trauma. She has history of bulging disc in her lumbar spine. She is currently attending physical therapy to treat her lumbar spine pain. Patient presents today to the ED in order to obtain further guidance on where to go for treatment of her neck pain, along with pain management. Denies chest pain, shortness of breath, fever, chills, nausea, vomiting, abdominal pain, loss of function of extremities, loss of touch sensation of extremities, acute loss of bowel or bladder continence. The pain is increased with leaning her head to the right. The neck pain causes difficulty with obtaining a full night sleep. Prior to Admission Medications   Prescriptions Last Dose Informant Patient Reported? Taking?    Biotin 1 MG CAPS  Self Yes No   Sig: Take by mouth   Insulin Pen Needle (BD Pen Needle Sarita 2nd Gen) 32G X 4 MM MISC  Self No No   Sig: Use in the morning   Omega-3 Fatty Acids (fish oil) 1,000 mg  Self Yes No   Sig: Take 1,000 mg by mouth daily   cholecalciferol (VITAMIN D3) 25 mcg (1,000 units) tablet  Self No No   Sig: Take 1 tablet (1,000 Units total) by mouth daily   cyanocobalamin (VITAMIN B-12) 100 mcg tablet  Self Yes No   Sig: Take by mouth daily   cyclobenzaprine (FLEXERIL) 5 mg tablet  Self No No   Sig: Take 1 tablet (5 mg total) by mouth daily at bedtime as needed for muscle spasms (as needed for back pain) for up to 21 days   docusate sodium (COLACE) 100 mg capsule   No No   Sig: Take 1 capsule (100 mg total) by mouth 3 (three) times a day as needed for constipation (while taking narcotic pain medication)   ferrous sulfate 324 (65 Fe) mg  Self No No   Sig: Take 1 tablet (324 mg total) by mouth 3 (three) times a day before meals   liraglutide (SAXENDA) injection   No No   Sig: Inject 0.2 mL (1.2 mg total) under the skin daily   multivitamin (THERAGRAN) TABS  Self Yes No   Sig: Take 1 tablet by mouth daily   pantoprazole (PROTONIX) 40 mg tablet  Self Yes No   Sig: Take 40 mg by mouth every morning      Facility-Administered Medications: None       Past Medical History:   Diagnosis Date   • Anemia    • History of transfusion    • Hypercholesterolemia    • Sleep apnea    • Vitamin deficiency        Past Surgical History:   Procedure Laterality Date   • ECTOPIC PREGNANCY SURGERY     • EYE SURGERY     • GASTRECTOMY SLEEVE LAPAROSCOPIC     • MASS EXCISION N/A 8/1/2023    Procedure: EXCISION  BIOPSY LESION/MASS BACK;  Surgeon: Andrew Mcdaniel DO;  Location: [facility];  Service: General   • CA LAPAROSCOPY W/RMVL ADNEXAL STRUCTURES Bilateral 9/28/2021    Procedure: SALPINGECTOMY, LAPAROSCOPIC;  Surgeon: Octavia Nair MD;  Location: [facility];  Service: Gynecology   • CA LAPS W/VAG HYSTERECT 250 GM/&RMVL TUBE&/OVARIES N/A 9/28/2021    Procedure: HYSTERECTOMY LAPAROSCOPIC ASSISTED VAGINAL (LAVH), LYSIS OF ADHESIONS;  Surgeon: Octavia Nair MD;  Location[facility];  Service: Gynecology       Family History   Problem Relation Age of Onset   • Brain cancer Mother    • Cancer Father    • Asthma Brother    • Diabetes Maternal Grandmother    • Cancer Maternal Grandmother    • Hypertension Paternal Grandmother      I have reviewed and agree with the history as documented. E-Cigarette/Vaping   • E-Cigarette Use Never User      E-Cigarette/Vaping Substances   • Nicotine No    • THC No    • CBD No    • Flavoring No    • Other No    • Unknown No      Social History     Tobacco Use   • Smoking status: Never     Passive exposure: Never   • Smokeless tobacco: Never   Vaping Use   • Vaping Use: Never used   Substance Use Topics   • Alcohol use: No   • Drug use: No       Review of Systems   Constitutional: Negative for chills, diaphoresis, fatigue and fever. HENT: Negative for ear pain and sore throat. Eyes: Negative for photophobia, pain, discharge, redness, itching and visual disturbance. Respiratory: Negative for cough, shortness of breath, wheezing and stridor. Cardiovascular: Negative for chest pain and palpitations. Gastrointestinal: Negative for abdominal pain and vomiting. Genitourinary: Negative for dysuria and hematuria. Musculoskeletal: Positive for neck pain. Negative for arthralgias and back pain. Skin: Negative for color change, rash and wound. Neurological: Negative for seizures and syncope. All other systems reviewed and are negative. Physical Exam  Physical Exam  Vitals and nursing note reviewed. Constitutional:       General: She is not in acute distress. Appearance: Normal appearance. She is well-developed. She is not ill-appearing, toxic-appearing or diaphoretic. HENT:      Head: Normocephalic and atraumatic. Eyes:      Conjunctiva/sclera: Conjunctivae normal.   Neck:      Comments: Visual inspection of patient's cervical spine region demonstrates no obvious deformities. No laceration, erythema, ecchymosis, masses. Tender to palpation on the left cervical paraspinal musculature region. Patient able to actively forward flex, abduct, extend bilateral upper extremities. Motor function intact to the bilateral median, ulnar, radial, anterior interosseous nerve. Brisk capillary refill bilaterally.   2+ radial pulse bilaterally. Cardiovascular:      Rate and Rhythm: Normal rate and regular rhythm. Heart sounds: No murmur heard. No friction rub. No gallop. Pulmonary:      Effort: Pulmonary effort is normal. No respiratory distress. Breath sounds: Normal breath sounds. Abdominal:      Palpations: Abdomen is soft. Tenderness: There is no abdominal tenderness. Musculoskeletal:         General: No swelling. Cervical back: Neck supple. Skin:     General: Skin is warm and dry. Capillary Refill: Capillary refill takes less than 2 seconds. Neurological:      Mental Status: She is alert. Psychiatric:         Mood and Affect: Mood normal.         Vital Signs  ED Triage Vitals [08/25/23 0050]   Temperature Pulse Respirations Blood Pressure SpO2   97.6 °F (36.4 °C) 70 18 136/80 100 %      Temp Source Heart Rate Source Patient Position - Orthostatic VS BP Location FiO2 (%)   Oral Monitor Sitting Left arm --      Pain Score       --           Vitals:    08/25/23 0050   BP: 136/80   Pulse: 70   Patient Position - Orthostatic VS: Sitting         Visual Acuity      ED Medications  Medications   lidocaine (LIDODERM) 5 % patch 1 patch (1 patch Topical Medication Applied 8/25/23 0255)   ketorolac (TORADOL) injection 15 mg (15 mg Intramuscular Given 8/25/23 0255)   acetaminophen (TYLENOL) tablet 650 mg (650 mg Oral Given 8/25/23 0254)       Diagnostic Studies  Results Reviewed     None                 No orders to display              Procedures  Procedures         ED Course                               SBIRT 20yo+    Flowsheet Row Most Recent Value   Initial Alcohol Screen: US AUDIT-C     1. How often do you have a drink containing alcohol? 0 Filed at: 08/25/2023 0326   2. How many drinks containing alcohol do you have on a typical day you are drinking? 0 Filed at: 08/25/2023 0326   3b. FEMALE Any Age, or MALE 65+: How often do you have 4 or more drinks on one occassion?  0 Filed at: 08/25/2023 1155 Audit-C Score 0 Filed at: 08/25/2023 4327                    Medical Decision Making  69-year-old female presents ED for evaluation of neck pain of 2 weeks. She has been experiencing the neck pain without any obvious cause. Denies previous neck trauma, heavy lifting. Denies signs of systemic infection. Has been given prescription for tramadol and muscle relaxer by her primary care provider. However, she states these medications have not been providing her with any relief. On physical examination patient has tenderness to palpation of the cervical paraspinal musculature. No midline tenderness. No meningismus signs. No sign of downstream neurovascular impairment in upper extremities. Suspect patient has degenerative changes in the cervical spine region. Do not suspect acute spinal cord compression. We will treat patient symptomatically in ED with Toradol, Tylenol, and lidocaine patch. Patient stable for discharge. At time of discharge she is afebrile, nontachycardic, normotensive, without respiratory distress. She can follow-up with [facility] comprehensive spine program.  I provided her with a referral and the contact information for their service. I advised patient that with their service, she can have further evaluation and treatment as indicated. I provided patient with prescription for ibuprofen, Tylenol, Voltaren gel, lidocaine patches for symptomatic relief. I advised patient that these modalities will not completely eliminate neck pain she is experiencing, however it will reduce it to a measurable level. Patient in agreement with this. Prior to discharge, discharge instructions were discussed with patient at bedside. Patient was provided both verbal and written instructions. Patient is understanding of the discharge instructions and is agreeable to plan of care.  Return precautions were discussed with patient bedside, patient verbalized understanding of signs and symptoms that would necessitate return to the ED. All questions were answered. Patient was comfortable with the plan of care and discharged to home. Risk  OTC drugs. Prescription drug management. Disposition  Final diagnoses:   Cervical spine pain     Time reflects when diagnosis was documented in both MDM as applicable and the Disposition within this note     Time User Action Codes Description Comment    8/25/2023  2:27 AM Leidy Guerra Add [M54.2] Cervical spine pain       ED Disposition     ED Disposition   Discharge    Condition   Stable    Date/Time   Fri Aug 25, 2023  2:27 AM    Comment   Jennifer Temple discharge to home/self care.                Follow-up Information     Follow up With Specialties Details Why Contact Info Additional [address] [facility] [address] [facility] Nurse Practitioner, Family Medicine   [address] [place] [address]  [phone]       [facility] Comprehensive Spine Program Physical Therapy   [phone]          Discharge Medication List as of 8/25/2023  3:03 AM      START taking these medications    Details   acetaminophen (TYLENOL) 500 mg tablet Take 2 tablets (1,000 mg total) by mouth every 6 (six) hours as needed for mild pain, Starting Fri 8/25/2023, Normal      Diclofenac Sodium (VOLTAREN) 1 % Apply 2 g topically 4 (four) times a day, Starting Fri 8/25/2023, Normal      ibuprofen (MOTRIN) 600 mg tablet Take 1 tablet (600 mg total) by mouth every 6 (six) hours as needed for mild pain, Starting Fri 8/25/2023, Normal      lidocaine (LIDODERM) 5 % Apply 1 patch topically over 12 hours every 24 hours Remove & Discard patch within 12 hours or as directed by MD, Starting Fri 8/25/2023, Print         CONTINUE these medications which have NOT CHANGED    Details   Biotin 1 MG CAPS Take by mouth, Historical Med      cholecalciferol (VITAMIN D3) 25 mcg (1,000 units) tablet Take 1 tablet (1,000 Units total) by mouth daily, Starting Thu 3/23/2023, Until Tue 8/1/2023, Normal cyanocobalamin (VITAMIN B-12) 100 mcg tablet Take by mouth daily, Historical Med      cyclobenzaprine (FLEXERIL) 5 mg tablet Take 1 tablet (5 mg total) by mouth daily at bedtime as needed for muscle spasms (as needed for back pain) for up to 21 days, Starting Mon 6/12/2023, Until Mon 7/10/2023 at 2359, Normal      docusate sodium (COLACE) 100 mg capsule Take 1 capsule (100 mg total) by mouth 3 (three) times a day as needed for constipation (while taking narcotic pain medication), Starting Tue 8/1/2023, Normal      ferrous sulfate 324 (65 Fe) mg Take 1 tablet (324 mg total) by mouth 3 (three) times a day before meals, Starting Tue 9/28/2021, Normal      Insulin Pen Needle (BD Pen Needle Sarita 2nd Gen) 32G X 4 MM MISC Use in the morning, Starting Mon 6/19/2023, Until Sun 9/17/2023, Normal      liraglutide (SAXENDA) injection Inject 0.2 mL (1.2 mg total) under the skin daily, Starting Fri 7/21/2023, Until Sun 8/20/2023, Normal      multivitamin (THERAGRAN) TABS Take 1 tablet by mouth daily, Historical Med      Omega-3 Fatty Acids (fish oil) 1,000 mg Take 1,000 mg by mouth daily, Historical Med      pantoprazole (PROTONIX) 40 mg tablet Take 40 mg by mouth every morning, Starting Sat 4/22/2023, Historical Med                 PDMP Review     None          ED Provider  Electronically Signed by           Pao Guzman PA-C  08/25/23 8069

## 2023-08-25 NOTE — DISCHARGE INSTRUCTIONS
Today I provided a prescription for Tylenol, ibuprofen, lidocaine patch, Voltaren gel. These modalities will help with reducing neck pain. It would not completely erase the pain, however they will reduce the pain to a measurable degree. Take as directed. Follow-up with [facility]'s comprehensive spine program for further evaluation and treatment of neck pain. Their service will be able to assess and obtain imaging as needed. Return to ED for new or worsening symptoms.

## 2023-08-28 ENCOUNTER — NURSE TRIAGE (OUTPATIENT)
Dept: PHYSICAL THERAPY | Facility: OTHER | Age: 48
End: 2023-08-28

## 2023-08-28 ENCOUNTER — TELEPHONE (OUTPATIENT)
Dept: PHYSICAL THERAPY | Facility: OTHER | Age: 48
End: 2023-08-28

## 2023-08-28 NOTE — TELEPHONE ENCOUNTER
This nurse called the patient to proceed with the triage initiated earlier today. Message left stating reason for call, [facility] contact information, and hours of operation. Encouraged patient to CB if he would like to complete the triage and referral process. Referral closed per protocol and will await possible call-back from the patient. Referral in system for [facility] entered 6/30/23 for LBP as well. Staff LM for patient to [facility] regarding as well.

## 2023-08-28 NOTE — TELEPHONE ENCOUNTER
Call placed to the patient per Comprehensive Spine Program referral.    Voice message left for patient to call back. Phone number and hours of business provided. This is the 1st attempt to reach the patient. Will defer per protocol.     In PT now for back

## 2023-08-28 NOTE — TELEPHONE ENCOUNTER
Additional Information  • Negative: Is this related to a work injury? • Negative: Is this related to an MVA? • Negative: Are you currently recieving homecare services? Background - Initial Assessment  Clinical complaint: Pain is bilat neck pain, radiates down left arm to the fingers. No numbness or tingling. More of a throbbing pain. Started 8/11/23. NKI. Pt does have a hx of bulging disc in the lumbar spine and in PT now for that. No prior neck pain or surgery. Was seen in ED 8/25/23 for the neck pain.    Date of onset: 8/11/23  Frequency of pain: constant  Quality of pain: throbbing    Protocols used: [facility] COMPREHENSIVE SPINE PROGRAM PROTOCOL

## 2023-08-29 ENCOUNTER — OFFICE VISIT (OUTPATIENT)
Dept: FAMILY MEDICINE CLINIC | Facility: CLINIC | Age: 48
End: 2023-08-29
Payer: COMMERCIAL

## 2023-08-29 VITALS
OXYGEN SATURATION: 96 % | HEART RATE: 64 BPM | BODY MASS INDEX: 42.27 KG/M2 | TEMPERATURE: 97.8 F | DIASTOLIC BLOOD PRESSURE: 90 MMHG | RESPIRATION RATE: 18 BRPM | WEIGHT: 263 LBS | HEIGHT: 66 IN | SYSTOLIC BLOOD PRESSURE: 122 MMHG

## 2023-08-29 DIAGNOSIS — Z76.89 ENCOUNTER FOR WEIGHT MANAGEMENT: ICD-10-CM

## 2023-08-29 DIAGNOSIS — R42 DIZZINESS: ICD-10-CM

## 2023-08-29 DIAGNOSIS — I95.1 ORTHOSTATIC HYPOTENSION: ICD-10-CM

## 2023-08-29 DIAGNOSIS — E66.01 CLASS 3 SEVERE OBESITY DUE TO EXCESS CALORIES WITH SERIOUS COMORBIDITY AND BODY MASS INDEX (BMI) OF 40.0 TO 44.9 IN ADULT (HCC): ICD-10-CM

## 2023-08-29 DIAGNOSIS — R00.1 BRADYCARDIA: Primary | ICD-10-CM

## 2023-08-29 PROCEDURE — 99214 OFFICE O/P EST MOD 30 MIN: CPT | Performed by: NURSE PRACTITIONER

## 2023-08-29 RX ORDER — LIRAGLUTIDE 6 MG/ML
1.8 INJECTION, SOLUTION SUBCUTANEOUS DAILY
Qty: 9 ML | Refills: 0 | Status: SHIPPED | OUTPATIENT
Start: 2023-08-29 | End: 2023-09-28

## 2023-08-29 NOTE — PROGRESS NOTES
BMI Counseling: Body mass index is 42.45 kg/m². The BMI is above normal. Nutrition recommendations include decreasing portion sizes, encouraging healthy choices of fruits and vegetables, decreasing fast food intake, consuming healthier snacks, limiting drinks that contain sugar, moderation in carbohydrate intake, increasing intake of lean protein, reducing intake of saturated and trans fat and reducing intake of cholesterol. Exercise recommendations include vigorous physical activity 75 minutes/week, exercising 3-5 times per week and strength training exercises. No pharmacotherapy was ordered. Patient referred to PCP. Rationale for BMI follow-up plan is due to patient being overweight or obese. Depression Screening and Follow-up Plan: Clincally patient does not have depression. No treatment is required. Assessment/Plan:    Patient is a 52-year-old female presents in office for follow-up on weight management  Also recently complaining of low blood pressures and episode of orthostatic hypotension's and just feeling fatigued and weak. Has had history of cardiac bradycardiac in the past work-up done few years ago and it was just currently being monitored. Noted on physical exam that her heart rate runs anywhere in the 40s and 50s at times and due to the way she has been feeling lately I have ordered a Holter monitor and echo and she is to follow-up with the cardiologist for further work-up. Weight management not  doing so well she has not lost much weight and she has not been compliant with her diet and the medications. She already has had a bariatric surgery and gained some of the weight back. Discussed importance of increased activity limited caloric intake low-fat low-cholesterol diet and avoiding carbs and sweets. I have increased the [address] currently to 1.8 mg daily and discussed that if she is eating stuff she should not she will have more side effects. I will see her back in 4 to 6 weeks. Problem List Items Addressed This Visit        Other    Bradycardia - Primary    Relevant Orders    Holter monitor    Echo complete w/ contrast if indicated    Ambulatory Referral to Cardiology   Other Visit Diagnoses     Orthostatic hypotension        Relevant Orders    Holter monitor    Echo complete w/ contrast if indicated    Ambulatory Referral to Cardiology    Dizziness        Relevant Orders    Holter monitor    Echo complete w/ contrast if indicated    Ambulatory Referral to Cardiology    Class 3 severe obesity due to excess calories with serious comorbidity and body mass index (BMI) of 40.0 to 44.9 in adult [facility])        Relevant Medications    liraglutide (Saxenda) injection    Encounter for weight management        Relevant Medications    liraglutide (Saxenda) injection            Subjective:      Patient ID: Felicia Donovan is a 56 y.o. female. Patient is a 50-year-old female presents in office for follow-up on weight management  Also recently complaining of low blood pressures and episode of orthostatic hypotension's and just feeling fatigued and weak. Has had history of cardiac bradycardiac in the past work-up done few years ago and it was just currently being monitored. Noted on physical exam that her heart rate runs anywhere in the 40s and 50s at times and due to the way she has been feeling lately I have ordered a Holter monitor and echo and she is to follow-up with the cardiologist for further work-up. Weight management not  doing so well she has not lost much weight and she has not been compliant with her diet and the medications. She already has had a bariatric surgery and gained some of the weight back. Discussed importance of increased activity limited caloric intake low-fat low-cholesterol diet and avoiding carbs and sweets. I have increased the Pema Outlaw currently to 1.8 mg daily and discussed that if she is eating stuff she should not she will have more side effects.    I will see her back in 4 to 6 weeks. The following portions of the patient's history were reviewed and updated as appropriate:   Past Medical History:  She has a past medical history of Anemia, History of transfusion, Hypercholesterolemia, Sleep apnea, and Vitamin deficiency. ,  _______________________________________________________________________  Medical Problems:  does not have any pertinent problems on file.,  _______________________________________________________________________  Past Surgical History:   has a past surgical history that includes Ectopic pregnancy surgery; Eye surgery; GASTRECTOMY SLEEVE LAPAROSCOPIC; pr laps w/vag hysterect 250 gm/&rmvl tube&/ovaries (N/A, 9/28/2021); pr laparoscopy w/rmvl adnexal structures (Bilateral, 9/28/2021); and Mass excision (N/A, 8/1/2023). ,  _______________________________________________________________________  Family History:  family history includes Asthma in her brother; Brain cancer in her mother; Cancer in her father and maternal grandmother; Diabetes in her maternal grandmother; Hypertension in her paternal grandmother.,  _______________________________________________________________________  Social History:   reports that she has never smoked. She has never been exposed to tobacco smoke. She has never used smokeless tobacco. She reports that she does not drink alcohol and does not use drugs. ,  _______________________________________________________________________  Allergies:  is allergic to penicillin g and pollen extract. .  _______________________________________________________________________  Current Outpatient Medications   Medication Sig Dispense Refill   • liraglutide (Saxenda) injection Inject 0.3 mL (1.8 mg total) under the skin daily 9 mL 0   • acetaminophen (TYLENOL) 500 mg tablet Take 2 tablets (1,000 mg total) by mouth every 6 (six) hours as needed for mild pain 40 tablet 0   • Biotin 1 MG CAPS Take by mouth     • cholecalciferol (VITAMIN D3) 25 mcg (1,000 units) tablet Take 1 tablet (1,000 Units total) by mouth daily 90 tablet 1   • cyanocobalamin (VITAMIN B-12) 100 mcg tablet Take by mouth daily     • cyclobenzaprine (FLEXERIL) 5 mg tablet Take 1 tablet (5 mg total) by mouth daily at bedtime as needed for muscle spasms (as needed for back pain) for up to 21 days 21 tablet 0   • Diclofenac Sodium (VOLTAREN) 1 % Apply 2 g topically 4 (four) times a day 100 g 0   • docusate sodium (COLACE) 100 mg capsule Take 1 capsule (100 mg total) by mouth 3 (three) times a day as needed for constipation (while taking narcotic pain medication) 30 capsule 0   • ferrous sulfate 324 (65 Fe) mg Take 1 tablet (324 mg total) by mouth 3 (three) times a day before meals 90 tablet 0   • ibuprofen (MOTRIN) 600 mg tablet Take 1 tablet (600 mg total) by mouth every 6 (six) hours as needed for mild pain 30 tablet 0   • Insulin Pen Needle (BD Pen Needle Sarita 2nd Gen) 32G X 4 MM MISC Use in the morning 100 each 1   • lidocaine (LIDODERM) 5 % Apply 1 patch topically over 12 hours every 24 hours Remove & Discard patch within 12 hours or as directed by MD 15 patch 0   • multivitamin (THERAGRAN) TABS Take 1 tablet by mouth daily     • Omega-3 Fatty Acids (fish oil) 1,000 mg Take 1,000 mg by mouth daily     • pantoprazole (PROTONIX) 40 mg tablet Take 40 mg by mouth every morning       No current facility-administered medications for this visit.     _______________________________________________________________________  Review of Systems   Constitutional: Positive for fatigue and unexpected weight change (Difficulty with weight loss despite treatment). Negative for fever. HENT: Negative for congestion and postnasal drip. Eyes: Negative. Respiratory: Negative for cough and shortness of breath. Complains of some dyspnea on exertion at times   Cardiovascular: Positive for palpitations. Negative for chest pain and leg swelling.         Complains of low blood pressures lately and feeling fatigued and weak at times   Gastrointestinal: Negative for abdominal distention, abdominal pain, nausea and vomiting. Endocrine:        History of PCOS   Genitourinary: Negative for difficulty urinating and flank pain. Musculoskeletal: Positive for arthralgias, back pain and myalgias. Skin: Negative for rash. Allergic/Immunologic: Negative for environmental allergies. Neurological: Positive for weakness and light-headedness. Hematological: Negative for adenopathy. Psychiatric/Behavioral: Negative for sleep disturbance and suicidal ideas. The patient is nervous/anxious. Objective:  Vitals:    08/29/23 1650   BP: 122/90   Pulse: 64   Resp: 18   Temp: 97.8 °F (36.6 °C)   SpO2: 96%   Weight: 119 kg (263 lb)   Height: 5' 6" (1.676 m)     Body mass index is 42.45 kg/m². Physical Exam  Vitals and nursing note reviewed. Constitutional:       Appearance: She is obese. Comments: BMI 42.45   HENT:      Head: Atraumatic. Eyes:      Extraocular Movements: Extraocular movements intact. Cardiovascular:      Rate and Rhythm: Regular rhythm. Bradycardia present. Heart sounds: Normal heart sounds. Comments: Low blood pressures and weakness  Pulmonary:      Effort: Pulmonary effort is normal.      Breath sounds: Normal breath sounds. Abdominal:      Palpations: Abdomen is soft. Musculoskeletal:      Cervical back: Normal range of motion. Right lower leg: No edema. Left lower leg: No edema. Skin:     Capillary Refill: Capillary refill takes less than 2 seconds. Findings: No rash. Neurological:      Mental Status: She is alert and oriented to person, place, and time. Psychiatric:         Mood and Affect: Mood normal.         Behavior: Behavior normal.       Portions of the record may have been created with voice recognition software.   Occasional wrong word or "sound a like" substitutions may have occurred due to the inherent limitations of voice recognition software.   Read the chart carefully and recognize, using context, where substitutions have occurred

## 2023-08-30 ENCOUNTER — OFFICE VISIT (OUTPATIENT)
Dept: PHYSICAL THERAPY | Facility: CLINIC | Age: 48
End: 2023-08-30
Payer: COMMERCIAL

## 2023-08-30 DIAGNOSIS — G89.29 CHRONIC PAIN OF RIGHT ANKLE: Primary | ICD-10-CM

## 2023-08-30 DIAGNOSIS — M25.561 CHRONIC PAIN OF RIGHT KNEE: ICD-10-CM

## 2023-08-30 DIAGNOSIS — M25.571 CHRONIC PAIN OF RIGHT ANKLE: Primary | ICD-10-CM

## 2023-08-30 DIAGNOSIS — G89.29 CHRONIC PAIN OF RIGHT KNEE: ICD-10-CM

## 2023-08-30 DIAGNOSIS — G89.29 CHRONIC MIDLINE LOW BACK PAIN WITHOUT SCIATICA: ICD-10-CM

## 2023-08-30 DIAGNOSIS — M54.50 CHRONIC MIDLINE LOW BACK PAIN WITHOUT SCIATICA: ICD-10-CM

## 2023-08-30 PROCEDURE — 97110 THERAPEUTIC EXERCISES: CPT

## 2023-08-30 PROCEDURE — 97112 NEUROMUSCULAR REEDUCATION: CPT

## 2023-08-30 NOTE — PROGRESS NOTES
Daily Note     Today's date: 2023  Patient name: Braden Bartlett  : 1975  MRN: [id number]  Referring provider: DAVE Rushing  Dx:   Encounter Diagnosis     ICD-10-CM    1. Chronic pain of right ankle  M25.571     G89.29       2. Chronic pain of right knee  M25.561     G89.29       3. Chronic midline low back pain without sciatica  M54.50     G89.29           Start Time: 1700  Stop Time: 1745  Total time in clinic (min): 45 minutes    Subjective: Pt without new c/o. Objective: See treatment diary below      Assessment: Tolerated treatment well. Patient exhibited good technique with therapeutic exercises      Plan: Continue per plan of care.       Precautions: None    Date 8/3 8/16 8/17 8/21 8/30   FOTO ZJ     K 49,L 40   Manuals        Passive quad/hip flexor stretch    np np   Manual R foot    np np   Neuro Re-Ed        PPT  HEP  20x 3" Home   Bridge c add 5" 20x 5" 20x 5" 20x 20x  5" 20x 5"   TA c march 30x 30x  2# 30x ea 2# 30x   Clamshells 10x 10" 10" 10x 10" 10x P1 10x 10" P1 10x 10 "   SLB- airex   20" 4x 4x 10"  4 x 10 "    Ther Ex        SLR     2# 10x   Seated lumbar flexion stretch    PB 10x 5" PB  10x 5 "    NuStep L6 5 min L5 5 min L5 5 min 5m L5 5m L5   Hip abd/ext        LTR 10x 5"   10x 5" 10x 5 "   AIDE with TA hold P3 20x P3 20x P3 20x P3 20x P 3  20 x   CC side step P2 5x ea 3 steps   P2  5x ea P 2  5 x    Back extension machine  30# 2x15 45# 2x15 45# 2/15 45# 2/15   CC press outs P2 10x P2 3" 20x P2 3" 20x P2 20 x P2 20x   Ther Activity                        Gait Training                Modalities        CP    np

## 2023-08-31 ENCOUNTER — OFFICE VISIT (OUTPATIENT)
Dept: PHYSICAL THERAPY | Facility: CLINIC | Age: 48
End: 2023-08-31
Payer: COMMERCIAL

## 2023-08-31 DIAGNOSIS — G89.29 CHRONIC MIDLINE LOW BACK PAIN WITHOUT SCIATICA: ICD-10-CM

## 2023-08-31 DIAGNOSIS — G89.29 CHRONIC PAIN OF RIGHT KNEE: ICD-10-CM

## 2023-08-31 DIAGNOSIS — G89.29 CHRONIC PAIN OF RIGHT ANKLE: Primary | ICD-10-CM

## 2023-08-31 DIAGNOSIS — M54.50 CHRONIC MIDLINE LOW BACK PAIN WITHOUT SCIATICA: ICD-10-CM

## 2023-08-31 DIAGNOSIS — M25.561 CHRONIC PAIN OF RIGHT KNEE: ICD-10-CM

## 2023-08-31 DIAGNOSIS — M25.571 CHRONIC PAIN OF RIGHT ANKLE: Primary | ICD-10-CM

## 2023-08-31 PROCEDURE — 97112 NEUROMUSCULAR REEDUCATION: CPT | Performed by: PHYSICAL THERAPIST

## 2023-08-31 PROCEDURE — 97110 THERAPEUTIC EXERCISES: CPT | Performed by: PHYSICAL THERAPIST

## 2023-08-31 NOTE — PROGRESS NOTES
Daily Note     Today's date: 2023  Patient name: Abeba Jernigan  : 1975  MRN: [id number]  Referring provider: DAVE Wilson  Dx:   Encounter Diagnosis     ICD-10-CM    1. Chronic pain of right ankle  M25.571     G89.29       2. Chronic pain of right knee  M25.561     G89.29       3. Chronic midline low back pain without sciatica  M54.50     G89.29           Start Time: 1700  Stop Time: 1800  Total time in clinic (min): 60 minutes    Subjective: Patient reports that her knee and ankle have been doing a little better but she continues with low back pain. Objective: See treatment diary below      Assessment: Tolerated treatment well. Patient demonstrated fatigue post treatment and exhibited good technique with therapeutic exercises We reviewed the exercises with the patient today with good tolerance noted. There was fatigue reported throughout the session today and she felt some unsteadiness during tandem stance with AIDE today. Plan: Patient will continue with a self guided home program at this time. Patient was instructed to contact the office with any questions or concerns regarding her back and LE. Precautions: None    Date  DC    FOTO     K 49,L 40   Manuals        Passive quad/hip flexor stretch    np np   Manual R foot    np np   Neuro Re-Ed        PPT  HEP  20x 3" Home   Bridge c add 5" 20x 5" 20x 5" 20x 20x  5" 20x 5"   TA c march 30x 30x  2# 30x ea 2# 30x   Clamshells 10" 10x 10" 10x 10" 10x P1 10x 10" P1 10x 10 "   SLB- airex 20" 3x  20" 4x 4x 10"  4 x 10 "    Ther Ex        SLR 10x    2# 10x   Seated lumbar flexion stretch    PB 10x 5" PB  10x 5 "    NuStep L5 7 min L5 5 min L5 5 min 5m L5 5m L5   Hip abd/ext        LTR    10x 5" 10x 5 "   AIDE with TA hold P3 in tandem 2x15 P3 20x P3 20x P3 20x P 3  20 x   CC side step P2 5x ea.     P2  5x ea P 2  5 x    Back extension machine 55# 2x20 30# 2x15 45# 2x15 45# 2/15 45# 2/15   CC press outs P2 3" 20x P2 3" 20x P2 3" 20x P2 20 x P2 20x   Ther Activity                        Gait Training                Modalities        CP    np

## 2023-09-11 ENCOUNTER — HOSPITAL ENCOUNTER (OUTPATIENT)
Dept: NON INVASIVE DIAGNOSTICS | Facility: HOSPITAL | Age: 48
Discharge: HOME | End: 2023-09-11
Payer: COMMERCIAL

## 2023-09-11 VITALS
BODY MASS INDEX: 42.16 KG/M2 | WEIGHT: 262.35 LBS | SYSTOLIC BLOOD PRESSURE: 108 MMHG | HEART RATE: 61 BPM | HEIGHT: 66 IN | DIASTOLIC BLOOD PRESSURE: 70 MMHG

## 2023-09-11 DIAGNOSIS — R42 DIZZINESS: ICD-10-CM

## 2023-09-11 DIAGNOSIS — R00.1 BRADYCARDIA: ICD-10-CM

## 2023-09-11 DIAGNOSIS — I95.1 ORTHOSTATIC HYPOTENSION: ICD-10-CM

## 2023-09-11 LAB
AORTIC ROOT: 3.1 CM
ASCENDING AORTA: 3.3 CM
E WAVE DECELERATION TIME: 164 MS
FRACTIONAL SHORTENING: 31 % (ref 28–44)
INTERVENTRICULAR SEPTUM IN DIASTOLE (PARASTERNAL SHORT AXIS VIEW): 0.7 CM
INTERVENTRICULAR SEPTUM: 0.7 CM (ref 0.6–1.1)
LAAS-AP2: 20.5 CM2
LAAS-AP4: 14.9 CM2
LEFT ATRIUM SIZE: 4.1 CM
LEFT ATRIUM VOLUME (MOD BIPLANE): 57 ML
LEFT INTERNAL DIMENSION IN SYSTOLE: 3.1 CM (ref 2.1–4)
LEFT VENTRICULAR INTERNAL DIMENSION IN DIASTOLE: 4.5 CM (ref 3.5–6)
LEFT VENTRICULAR POSTERIOR WALL IN END DIASTOLE: 0.7 CM
LEFT VENTRICULAR STROKE VOLUME: 55 ML
LVSV (TEICH): 55 ML
MV E'TISSUE VEL-LAT: 12 CM/S
MV E'TISSUE VEL-SEP: 10 CM/S
MV PEAK A VEL: 0.47 M/S
MV PEAK E VEL: 62 CM/S
MV STENOSIS PRESSURE HALF TIME: 48 MS
MV VALVE AREA P 1/2 METHOD: 4.58 CM2
PULM VEIN S/D RATIO: 1.2
PV PEAK D VEL: 0.15 M/S
PV PEAK S VEL: 0.18 M/S
RIGHT ATRIUM AREA SYSTOLE A4C: 14.1 CM2
RIGHT VENTRICLE ID DIMENSION: 2.8 CM
SL CV LEFT ATRIUM LENGTH A2C: 4.9 CM
SL CV LV EF: 60
SL CV PED ECHO LEFT VENTRICLE DIASTOLIC VOLUME (MOD BIPLANE) 2D: 92 ML
SL CV PED ECHO LEFT VENTRICLE SYSTOLIC VOLUME (MOD BIPLANE) 2D: 37 ML
TR MAX PG: 17 MMHG
TR PEAK VELOCITY: 2.1 M/S
TRICUSPID ANNULAR PLANE SYSTOLIC EXCURSION: 2.9 CM
TRICUSPID VALVE PEAK E WAVE VELOCITY: 0.11 M/S
TRICUSPID VALVE PEAK REGURGITATION VELOCITY: 2.07 M/S

## 2023-09-11 PROCEDURE — 93225 XTRNL ECG REC<48 HRS REC: CPT

## 2023-09-11 PROCEDURE — 93306 TTE W/DOPPLER COMPLETE: CPT

## 2023-09-11 PROCEDURE — 93306 TTE W/DOPPLER COMPLETE: CPT | Performed by: INTERNAL MEDICINE

## 2023-09-11 PROCEDURE — 93226 XTRNL ECG REC<48 HR SCAN A/R: CPT

## 2023-09-11 NOTE — RESULT ENCOUNTER NOTE
0 Result Notes  Details  ECHO LOOKS GOOD     Result Text     ·  Left Ventricle: Left ventricular cavity size is normal. Wall thickness is normal. The left ventricular ejection fraction is 60%. Systolic function is normal. Wall motion is normal. Diastolic function is normal.  ·  Mitral Valve: There is trace regurgitation. ·  Tricuspid Valve:  There is mild regurgitation.

## 2023-09-14 PROCEDURE — 93227 XTRNL ECG REC<48 HR R&I: CPT | Performed by: INTERNAL MEDICINE

## 2023-09-15 NOTE — RESULT ENCOUNTER NOTE
PLEASE FOLLOW UP WITH CARDIOLOGY   DO HAVE SOME PVCS and ectopic beats   In contest of low BP will need further follow up   IMPRESSION:  1. The patient had predominantly sinus rhythm. 2. Heart rate varied from 53 bpm to 146 bpm.  The patient's average heart rate was 76 bpm.    3. The patient had a holter monitor tracing done for 47 hours and 59 minutes. 4. The patient had 31 PVCs. 5. The patient had 405 supraventricular ectopic beats. There were 2 runs, the longest consisting of 4 beats. 6. The longest R/R interval was 2.4 seconds. 7. There were multiple episodes of non conducted P waves, consider second degree HB Type 2.

## 2023-09-18 ENCOUNTER — TELEPHONE (OUTPATIENT)
Dept: CARDIOLOGY CLINIC | Facility: CLINIC | Age: 48
End: 2023-09-18

## 2023-09-18 ENCOUNTER — HOSPITAL ENCOUNTER (OUTPATIENT)
Facility: HOSPITAL | Age: 48
Setting detail: OBSERVATION
Discharge: HOME | End: 2023-09-18
Attending: EMERGENCY MEDICINE | Admitting: INTERNAL MEDICINE
Payer: COMMERCIAL

## 2023-09-18 ENCOUNTER — CLINICAL SUPPORT (OUTPATIENT)
Dept: CARDIOLOGY CLINIC | Facility: CLINIC | Age: 48
End: 2023-09-18
Payer: COMMERCIAL

## 2023-09-18 VITALS
OXYGEN SATURATION: 97 % | HEART RATE: 71 BPM | WEIGHT: 259.7 LBS | RESPIRATION RATE: 20 BRPM | BODY MASS INDEX: 41.74 KG/M2 | DIASTOLIC BLOOD PRESSURE: 73 MMHG | TEMPERATURE: 97.6 F | SYSTOLIC BLOOD PRESSURE: 114 MMHG | HEIGHT: 66 IN

## 2023-09-18 DIAGNOSIS — I44.1 ATRIOVENTRICULAR BLOCK, MOBITZ TYPE 2: ICD-10-CM

## 2023-09-18 DIAGNOSIS — R94.31 ABNORMAL HOLTER MONITOR FINDING: ICD-10-CM

## 2023-09-18 DIAGNOSIS — I44.1 MOBITZ TYPE II ATRIOVENTRICULAR BLOCK: ICD-10-CM

## 2023-09-18 DIAGNOSIS — R42 LIGHTHEADEDNESS: Primary | ICD-10-CM

## 2023-09-18 LAB
ANION GAP SERPL CALCULATED.3IONS-SCNC: 4 MMOL/L
ATRIAL RATE: 60 BPM
ATRIAL RATE: 61 BPM
ATRIAL RATE: 61 BPM
ATRIAL RATE: 64 BPM
BASOPHILS # BLD AUTO: 0.03 THOUSANDS/ÂΜL (ref 0–0.1)
BASOPHILS NFR BLD AUTO: 0 % (ref 0–1)
BUN SERPL-MCNC: 13 MG/DL (ref 5–25)
CALCIUM SERPL-MCNC: 9 MG/DL (ref 8.4–10.2)
CARDIAC TROPONIN I PNL SERPL HS: <2 NG/L
CHLORIDE SERPL-SCNC: 106 MMOL/L (ref 96–108)
CO2 SERPL-SCNC: 29 MMOL/L (ref 21–32)
CORTIS SERPL-MCNC: 10.2 UG/DL
CREAT SERPL-MCNC: 0.71 MG/DL (ref 0.6–1.3)
EOSINOPHIL # BLD AUTO: 0.14 THOUSAND/ÂΜL (ref 0–0.61)
EOSINOPHIL NFR BLD AUTO: 2 % (ref 0–6)
ERYTHROCYTE [DISTWIDTH] IN BLOOD BY AUTOMATED COUNT: 13.6 % (ref 11.6–15.1)
GFR SERPL CREATININE-BSD FRML MDRD: 102 ML/MIN/1.73SQ M
GLUCOSE SERPL-MCNC: 84 MG/DL (ref 65–140)
HCG SERPL QL: NEGATIVE
HCT VFR BLD AUTO: 40.3 % (ref 34.8–46.1)
HGB BLD-MCNC: 13.1 G/DL (ref 11.5–15.4)
IMM GRANULOCYTES # BLD AUTO: 0.02 THOUSAND/UL (ref 0–0.2)
IMM GRANULOCYTES NFR BLD AUTO: 0 % (ref 0–2)
LYMPHOCYTES # BLD AUTO: 2.88 THOUSANDS/ÂΜL (ref 0.6–4.47)
LYMPHOCYTES NFR BLD AUTO: 38 % (ref 14–44)
MCH RBC QN AUTO: 27 PG (ref 26.8–34.3)
MCHC RBC AUTO-ENTMCNC: 32.5 G/DL (ref 31.4–37.4)
MCV RBC AUTO: 83 FL (ref 82–98)
MONOCYTES # BLD AUTO: 0.93 THOUSAND/ÂΜL (ref 0.17–1.22)
MONOCYTES NFR BLD AUTO: 12 % (ref 4–12)
NEUTROPHILS # BLD AUTO: 3.67 THOUSANDS/ÂΜL (ref 1.85–7.62)
NEUTS SEG NFR BLD AUTO: 48 % (ref 43–75)
NRBC BLD AUTO-RTO: 0 /100 WBCS
P AXIS: 17 DEGREES
P AXIS: 25 DEGREES
P AXIS: 67 DEGREES
P AXIS: 76 DEGREES
PLATELET # BLD AUTO: 255 THOUSANDS/UL (ref 149–390)
PMV BLD AUTO: 9.5 FL (ref 8.9–12.7)
POTASSIUM SERPL-SCNC: 4.2 MMOL/L (ref 3.5–5.3)
PR INTERVAL: 112 MS
PR INTERVAL: 116 MS
PR INTERVAL: 128 MS
PR INTERVAL: 128 MS
QRS AXIS: 28 DEGREES
QRS AXIS: 31 DEGREES
QRS AXIS: 36 DEGREES
QRS AXIS: 48 DEGREES
QRSD INTERVAL: 78 MS
QRSD INTERVAL: 80 MS
QRSD INTERVAL: 80 MS
QRSD INTERVAL: 82 MS
QT INTERVAL: 432 MS
QT INTERVAL: 434 MS
QTC INTERVAL: 432 MS
QTC INTERVAL: 434 MS
QTC INTERVAL: 434 MS
QTC INTERVAL: 447 MS
RBC # BLD AUTO: 4.85 MILLION/UL (ref 3.81–5.12)
SODIUM SERPL-SCNC: 139 MMOL/L (ref 135–147)
T WAVE AXIS: 37 DEGREES
T WAVE AXIS: 37 DEGREES
T WAVE AXIS: 46 DEGREES
T WAVE AXIS: 53 DEGREES
TSH SERPL DL<=0.05 MIU/L-ACNC: 2.86 UIU/ML (ref 0.45–4.5)
VENTRICULAR RATE: 60 BPM
VENTRICULAR RATE: 61 BPM
VENTRICULAR RATE: 61 BPM
VENTRICULAR RATE: 64 BPM
WBC # BLD AUTO: 7.67 THOUSAND/UL (ref 4.31–10.16)

## 2023-09-18 PROCEDURE — 99285 EMERGENCY DEPT VISIT HI MDM: CPT

## 2023-09-18 PROCEDURE — 80048 BASIC METABOLIC PNL TOTAL CA: CPT | Performed by: EMERGENCY MEDICINE

## 2023-09-18 PROCEDURE — 84443 ASSAY THYROID STIM HORMONE: CPT

## 2023-09-18 PROCEDURE — 86618 LYME DISEASE ANTIBODY: CPT | Performed by: INTERNAL MEDICINE

## 2023-09-18 PROCEDURE — 99285 EMERGENCY DEPT VISIT HI MDM: CPT | Performed by: EMERGENCY MEDICINE

## 2023-09-18 PROCEDURE — 93010 ELECTROCARDIOGRAM REPORT: CPT | Performed by: INTERNAL MEDICINE

## 2023-09-18 PROCEDURE — 93242 EXT ECG>48HR<7D RECORDING: CPT | Performed by: INTERNAL MEDICINE

## 2023-09-18 PROCEDURE — 82533 TOTAL CORTISOL: CPT | Performed by: INTERNAL MEDICINE

## 2023-09-18 PROCEDURE — 85025 COMPLETE CBC W/AUTO DIFF WBC: CPT | Performed by: EMERGENCY MEDICINE

## 2023-09-18 PROCEDURE — 93005 ELECTROCARDIOGRAM TRACING: CPT

## 2023-09-18 PROCEDURE — 99244 OFF/OP CNSLTJ NEW/EST MOD 40: CPT | Performed by: INTERNAL MEDICINE

## 2023-09-18 PROCEDURE — 84703 CHORIONIC GONADOTROPIN ASSAY: CPT | Performed by: EMERGENCY MEDICINE

## 2023-09-18 PROCEDURE — NC001 PR NO CHARGE: Performed by: INTERNAL MEDICINE

## 2023-09-18 PROCEDURE — 84484 ASSAY OF TROPONIN QUANT: CPT | Performed by: EMERGENCY MEDICINE

## 2023-09-18 PROCEDURE — 36415 COLL VENOUS BLD VENIPUNCTURE: CPT | Performed by: INTERNAL MEDICINE

## 2023-09-18 PROCEDURE — 99223 1ST HOSP IP/OBS HIGH 75: CPT | Performed by: INTERNAL MEDICINE

## 2023-09-18 RX ORDER — PANTOPRAZOLE SODIUM 40 MG/1
40 TABLET, DELAYED RELEASE ORAL
Status: DISCONTINUED | OUTPATIENT
Start: 2023-09-18 | End: 2023-09-18 | Stop reason: HOSPADM

## 2023-09-18 RX ORDER — CHLORAL HYDRATE 500 MG
1000 CAPSULE ORAL DAILY
Status: DISCONTINUED | OUTPATIENT
Start: 2023-09-18 | End: 2023-09-18 | Stop reason: HOSPADM

## 2023-09-18 RX ORDER — SODIUM CHLORIDE 9 MG/ML
3 INJECTION INTRAVENOUS
Status: DISCONTINUED | OUTPATIENT
Start: 2023-09-18 | End: 2023-09-18 | Stop reason: HOSPADM

## 2023-09-18 RX ORDER — DOCUSATE SODIUM 100 MG/1
100 CAPSULE, LIQUID FILLED ORAL 3 TIMES DAILY PRN
Status: DISCONTINUED | OUTPATIENT
Start: 2023-09-18 | End: 2023-09-18 | Stop reason: HOSPADM

## 2023-09-18 RX ORDER — HEPARIN SODIUM 5000 [USP'U]/ML
5000 INJECTION, SOLUTION INTRAVENOUS; SUBCUTANEOUS EVERY 8 HOURS SCHEDULED
Status: DISCONTINUED | OUTPATIENT
Start: 2023-09-18 | End: 2023-09-18 | Stop reason: HOSPADM

## 2023-09-18 RX ORDER — ACETAMINOPHEN 325 MG/1
650 TABLET ORAL EVERY 6 HOURS PRN
Status: DISCONTINUED | OUTPATIENT
Start: 2023-09-18 | End: 2023-09-18 | Stop reason: HOSPADM

## 2023-09-18 RX ORDER — FERROUS SULFATE 325(65) MG
325 TABLET ORAL
Status: DISCONTINUED | OUTPATIENT
Start: 2023-09-19 | End: 2023-09-18 | Stop reason: HOSPADM

## 2023-09-18 RX ADMIN — DOCUSATE SODIUM 100 MG: 100 CAPSULE, LIQUID FILLED ORAL at 10:28

## 2023-09-18 RX ADMIN — OMEGA-3 FATTY ACIDS CAP 1000 MG 1000 MG: 1000 CAP at 10:25

## 2023-09-18 RX ADMIN — B-COMPLEX W/ C & FOLIC ACID TAB 1 TABLET: TAB at 10:27

## 2023-09-18 RX ADMIN — PANTOPRAZOLE SODIUM 40 MG: 40 TABLET, DELAYED RELEASE ORAL at 10:27

## 2023-09-18 NOTE — CONSULTS
Consultation - Cardiology   Glen Lima 56 y.o. female MRN: [id number]  Unit/Bed#: ED 24 Encounter: [id number]  09/18/23  1:12 PM    Assessment/ Plan:  1. Second-degree AV block, Mobitz 2  • Noted on Holter monitor earlier this month and on Holter monitor in 2021. • Patient denies any syncope. • Seen on telemetry monitoring-event monitor strips uploaded to media. • Case discussed with EP   • Plan for further evaluation with 2-week  • Avoid any AV node blocking agents    2. Hypotension  • Patient notes intermitted episodes of hypotension  • Continue ambulatory monitoring. 3.  SHARI  • Not currently using d/t skin irritation from mask    Will see as needed. Please reach out with any questions or concerns. Will arrange outpatient follow-up and monitor placement. History of Present Illness   Physician Requesting Consult: Juana Pizano,*    Reason for Consult / Principal Problem: Alleged symptomatic second-degree type II HB on Holter, lightheadedness    HPI: Glen Lima is a 56y.o. year old female with PMH of gastric sleeve and partial hysterectomy, hypertension who presents with lightheadedness and abnormal Holter monitor. She discussed her symptoms with her PCP who recommended she be further evaluated in the ED. Patient states that she initially felt normal.  She sat down on a table and suddenly felt lightheaded. She felt woozy as if the room was spinning. Patient denies any syncopal episodes. Patient was that she had a Holter monitor back in 2021. At the time she was experiencing hypotension at the time. Patient was most recently evaluated for lightheadedness with Holter monitor earlier this month which found episodes of second-degree AV block Mobitz 2. Patient denies any chest pain, shortness of breath, or palpitations. Patient denies any significant alcohol or tobacco use.   Patient has a history of sleep apnea but states that she is unable to tolerate the CPAP mask due to the skin irritation. Inpatient consult to Cardiology  Consult performed by: DAVE Tran  Consult ordered by: Kentrell Johnson MD          EKG: Normal sinus rhythm      Review of Systems   Constitutional: Negative for chills, diaphoresis and fever. Respiratory: Negative for cough, chest tightness and shortness of breath. Cardiovascular: Negative for chest pain, palpitations and leg swelling. Gastrointestinal: Negative for abdominal distention, blood in stool, nausea and vomiting. Genitourinary: Negative for difficulty urinating. Musculoskeletal: Negative for arthralgias and back pain. Neurological: Positive for dizziness and light-headedness. Negative for syncope and headaches. Psychiatric/Behavioral: Negative for agitation and confusion. The patient is not nervous/anxious.         Historical Information   Past Medical History:   Diagnosis Date   • Anemia    • History of transfusion    • Hypercholesterolemia    • Sleep apnea    • Vitamin deficiency      Past Surgical History:   Procedure Laterality Date   • ECTOPIC PREGNANCY SURGERY     • EYE SURGERY     • GASTRECTOMY SLEEVE LAPAROSCOPIC     • MASS EXCISION N/A 8/1/2023    Procedure: EXCISION  BIOPSY LESION/MASS BACK;  Surgeon: Alka Swartz DO;  Location: [facility];  Service: General   • SC LAPAROSCOPY W/RMVL ADNEXAL STRUCTURES Bilateral 9/28/2021    Procedure: SALPINGECTOMY, LAPAROSCOPIC;  Surgeon: Karen Carpenter MD;  Location[facility];  Service: Gynecology   • SC LAPS W/VAG HYSTERECT 250 GM/&RMVL TUBE&/OVARIES N/A 9/28/2021    Procedure: HYSTERECTOMY LAPAROSCOPIC ASSISTED VAGINAL (LAVH), LYSIS OF ADHESIONS;  Surgeon: Karen Carpenter MD;  Location[facility];  Service: Gynecology     Social History     Substance and Sexual Activity   Alcohol Use No     Social History     Substance and Sexual Activity   Drug Use No     Social History     Tobacco Use   Smoking Status Never   • Passive exposure: Never   Smokeless Tobacco Never Family History:   Family History   Problem Relation Age of Onset   • Brain cancer Mother    • Cancer Father    • Asthma Brother    • Diabetes Maternal Grandmother    • Cancer Maternal Grandmother    • Hypertension Paternal Grandmother        Meds/Allergies   all current active meds have been reviewed and current meds:   Current Facility-Administered Medications   Medication Dose Route Frequency   • acetaminophen (TYLENOL) tablet 650 mg  650 mg Oral Q6H PRN   • docusate sodium (COLACE) capsule 100 mg  100 mg Oral TID PRN   • [START ON 2023] ferrous sulfate tablet 325 mg  325 mg Oral Daily With Breakfast   • fish oil capsule 1,000 mg  1,000 mg Oral Daily   • heparin (porcine) subcutaneous injection 5,000 Units  5,000 Units Subcutaneous Q8H [facility]   • multivitamin stress formula tablet 1 tablet  1 tablet Oral Daily   • pantoprazole (PROTONIX) EC tablet 40 mg  40 mg Oral Early Morning   • sodium chloride (PF) 0.9 % injection 3 mL  3 mL Intravenous Q1H PRN     Allergies   Allergen Reactions   • Penicillin G Other (See Comments)   • Pollen Extract Sneezing       Objective   Vitals: Blood pressure 113/75, pulse 64, temperature 97.6 °F (36.4 °C), temperature source Temporal, resp. rate 20, height 5' 6" (1.676 m), weight 118 kg (259 lb 11.2 oz), last menstrual period 2021, SpO2 96 %. , Body mass index is 41.92 kg/m².,   Orthostatic Blood Pressures    Flowsheet Row Most Recent Value   Blood Pressure 113/75 filed at 2023 1000   Patient Position - Orthostatic VS Lying filed at 2023 7001          Systolic (90GDL), ETC:194 , Min:108 , VS     Diastolic (12JLS), CZJ:16, Min:59, Max:82      No intake or output data in the 24 hours ending 23 1312    Invasive Devices     Peripheral Intravenous Line  Duration           Peripheral IV 23 Left Antecubital <1 day                    Physical Exam:  Physical Exam  Vitals and nursing note reviewed.    Constitutional:       General: She is not in acute distress. Appearance: She is well-developed. HENT:      Head: Normocephalic and atraumatic. Eyes:      Conjunctiva/sclera: Conjunctivae normal.   Neck:      Vascular: No carotid bruit. Cardiovascular:      Rate and Rhythm: Normal rate and regular rhythm. Heart sounds: Normal heart sounds. No murmur heard. Pulmonary:      Effort: Pulmonary effort is normal. No respiratory distress. Breath sounds: Normal breath sounds. Abdominal:      Palpations: Abdomen is soft. Tenderness: There is no abdominal tenderness. Musculoskeletal:         General: No swelling. Cervical back: Neck supple. Right lower leg: No edema. Left lower leg: No edema. Skin:     General: Skin is warm and dry. Capillary Refill: Capillary refill takes less than 2 seconds. Neurological:      Mental Status: She is alert and oriented to person, place, and time.    Psychiatric:         Mood and Affect: Mood normal.          Lab Results:     Cardiac Profile:   Results from last 7 days   Lab Units 09/18/23  0240   HS TNI 0HR ng/L <2       CBC with diff:   Results from last 7 days   Lab Units 09/18/23  0240   WBC Thousand/uL 7.67   HEMOGLOBIN g/dL 13.1   HEMATOCRIT % 40.3   MCV fL 83   PLATELETS Thousands/uL 255   RBC Million/uL 4.85   MCH pg 27.0   MCHC g/dL 32.5   RDW % 13.6   MPV fL 9.5   NRBC AUTO /100 WBCs 0         CMP:   Results from last 7 days   Lab Units 09/18/23  0240   POTASSIUM mmol/L 4.2   CHLORIDE mmol/L 106   CO2 mmol/L 29   BUN mg/dL 13   CREATININE mg/dL 0.71   CALCIUM mg/dL 9.0   EGFR ml/min/1.73sq m 102

## 2023-09-18 NOTE — DISCHARGE SUMMARY
[address]  Discharge- Mira Noel 1975, 56 y.o. female MRN: [id number]  Unit/Bed#: ED 24 Encounter: [id number]  Primary Care Provider: DAVE Turner   Date and time admitted to hospital: 9/18/2023  2:01 AM    Discharge Diagnosis     Second-degree AV block type II noted on monitor  Lightheadedness  SHARI  Obesity         Discharging Physician / Practitioner: Jesus Manuel Martinez MD  PCP: Kristi Mitchell, [address] [place] [address]  Admission Date:   Admission Orders (From admission, onward)     Ordered        09/18/23 0653  Place in Observation  Once                      Discharge Date: 09/18/23    Consultations During Hospital Stay:  · Cardiology     Outpatient follow up Requested:  · PCP  · Cardiology      Hospital Course: The patient was hospitalized. She was seen by cardiology. She was recommended outpatient event monitor. She remained hemodynamically stable during hospitalization. She can follow-up with cardiology outpatient setting as she was cleared by discharge by them. I told her to follow-up with Lyme testing in the outpatient setting. Condition at Discharge: good     Discharge Day Visit / Exam:     Subjective:  Patient evaluated  Doing well. No further presyncope or syncope. Vitals: Blood Pressure: 114/73 (09/18/23 1300)  Pulse: 71 (09/18/23 1300)  Temperature: 97.6 °F (36.4 °C) (09/18/23 0200)  Temp Source: Temporal (09/18/23 0200)  Respirations: 20 (09/18/23 1300)  Height: 5' 6" (167.6 cm) (09/18/23 0200)  Weight - Scale: 118 kg (259 lb 11.2 oz) (09/18/23 0200)  SpO2: 97 % (09/18/23 1300)  Exam:   Physical Exam  Vitals and nursing note reviewed. Constitutional:       General: She is not in acute distress. Appearance: Normal appearance. She is normal weight. She is not ill-appearing, toxic-appearing or diaphoretic. HENT:      Head: Normocephalic and atraumatic.       Right Ear: External ear normal.      Left Ear: External ear normal.      Nose: Nose normal. No congestion. Mouth/Throat:      Mouth: Mucous membranes are moist.      Pharynx: Oropharynx is clear. No oropharyngeal exudate or posterior oropharyngeal erythema. Eyes:      General: No scleral icterus. Right eye: No discharge. Left eye: No discharge. Extraocular Movements: Extraocular movements intact. Conjunctiva/sclera: Conjunctivae normal.      Pupils: Pupils are equal, round, and reactive to light. Cardiovascular:      Rate and Rhythm: Normal rate and regular rhythm. Pulses: Normal pulses. Heart sounds: Normal heart sounds. No murmur heard. No friction rub. No gallop. Pulmonary:      Effort: Pulmonary effort is normal. No respiratory distress. Breath sounds: Normal breath sounds. No stridor. No wheezing, rhonchi or rales. Chest:      Chest wall: No tenderness. Abdominal:      General: Abdomen is flat. Bowel sounds are normal. There is no distension. Palpations: Abdomen is soft. There is no mass. Tenderness: There is no abdominal tenderness. There is no guarding or rebound. Musculoskeletal:         General: No swelling, tenderness, deformity or signs of injury. Normal range of motion. Cervical back: Normal range of motion and neck supple. No rigidity. No muscular tenderness. Skin:     General: Skin is warm and dry. Capillary Refill: Capillary refill takes less than 2 seconds. Coloration: Skin is not jaundiced or pale. Findings: No bruising, erythema, lesion or rash. Neurological:      General: No focal deficit present. Mental Status: She is alert and oriented to person, place, and time. Mental status is at baseline. Cranial Nerves: No cranial nerve deficit. Sensory: No sensory deficit. Motor: No weakness. Coordination: Coordination normal.   Psychiatric:         Mood and Affect: Mood normal.         Behavior: Behavior normal.         Thought Content:  Thought content normal.         Judgment: Judgment normal.       Discussion with Family: Patient herself    Discharge instructions/Information to patient and family:   See after visit summary for information provided to patient and family. Provisions for Follow-Up Care:  See after visit summary for information related to follow-up care and any pertinent home health orders. Disposition:     Home    For Discharges to [facility] SNF:   · Not Applicable to this Patient - Not Applicable to this Patient    Planned Readmission: No     Discharge Statement:  I spent 81 minutes discharging the patient. This time was spent on the day of discharge. I had direct contact with the patient on the day of discharge. Greater than 50% of the total time was spent examining patient, answering all patient questions, arranging and discussing plan of care with patient as well as directly providing post-discharge instructions. Additional time then spent on discharge activities. Discharge Medications:  See after visit summary for reconciled discharge medications provided to patient and family.       ** Please Note: This note has been constructed using a voice recognition system **

## 2023-09-18 NOTE — ED PROVIDER NOTES
History  Chief Complaint   Patient presents with   • Hypotension     Pt arrived ambulatory with c/o hypotension. Pt has been seeing her doctor for same. 60-year-old female presents to the emergency room with a chief complaint for lightheadedness. Patient states this is an ongoing issue that has been evaluated by primary care with echocardiogram and Holter monitor. Patient states today her symptoms have been present constantly since approximately 1800 hrs. Patient denies any syncopal episodes. Patient does note that her symptoms worsen with ambulation and improved with supine positioning. Patient denies any chest pain. Patient denies any headache, visual changes, or focal neurologic deficit. Patient denies any nausea/vomiting or diarrhea. Patient denies any abdominal pain. The patient denies any history of congestive heart failure, valvular head disease, coronary artery disease, venous thrombotic disease, or cardiac arrhythmia. Patient denies any recent change in medications. Patient denies any use of alcohol or illicit drugs. The patient denies any recent illness. The patient denies any recent trauma. Objective:  PHYSICAL EXAM:  Constitutional :  Non-toxic. Well developed, well-nourished with no acute distress  Eyes:  PERRL, EOMI. No resting nystagmus or with gaze fixation. HENT: Atraumatic. Neck: no carotid bruit, no tenderness to palpitation  CV:  Regular rate and rhythm, no murmur. Peripheral pulses intact and equal.  Respiratory:  Lungs clear to auscultation bilaterally  Abdomen:  Soft, non-tender, non-distended. Back:  No vertebral tenderness  Skin:  Normal color, warm and dry  Extremities:  Non-tender, no pedal edema. Neuro:  Alert and answers questions appropriately. Normal gait. Normal Romberg exam.  No pronator drift. Normal finger to nose. Normal fine motor function with rapid finger movements. Normal hand tap. Cranial nerves II through XII grossly intact.   Visual fields grossly intact. Upper and lower motor strength 5/5 and symmetric. Normal light touch sensory exam.   Normal DTRs. MDM  Patient presenting with lightheadedness with a broad differential.    EKG obtained and was interpreted by myself as listed under ED course. Notably patient's Holter monitor states "There were multiple episodes of non conducted P waves, consider second degree HB Type 2"  which is concerning though there is no definitive statement of this. We will obtain cardiac evaluation and further evaluate after reassessing patient. Patient denies any cardiac symptoms or other findings that would warrant further evaluation. Notably patient denies any episodes of hypotension at home, states that her systolic blood pressure was 100 at home, which she states is typical for her. We will place patient on cardiac monitor, reassess, and reevaluate. Reassessment: Patient monitored overnight with some persistent episodes of lightheadedness but repeat evaluation of the cardiac monitoring and repeat EKGs without signs of arrhythmia that would explain patient's symptoms. Considering patient's recent Holter monitoring with concerns for potential Wenckebach, I discussed with cardiology who suggested patient stay for evaluation by them later today. Discussed with medicine who accept patient for continued monitoring and evaluation. Prior to Admission Medications   Prescriptions Last Dose Informant Patient Reported? Taking?    Biotin 1 MG CAPS  Self Yes No   Sig: Take by mouth   Diclofenac Sodium (VOLTAREN) 1 %   No No   Sig: Apply 2 g topically 4 (four) times a day   Insulin Pen Needle (BD Pen Needle Sarita 2nd Gen) 32G X 4 MM MISC  Self No No   Sig: Use in the morning   Omega-3 Fatty Acids (fish oil) 1,000 mg  Self Yes No   Sig: Take 1,000 mg by mouth daily   acetaminophen (TYLENOL) 500 mg tablet   No No   Sig: Take 2 tablets (1,000 mg total) by mouth every 6 (six) hours as needed for mild pain cholecalciferol (VITAMIN D3) 25 mcg (1,000 units) tablet  Self No No   Sig: Take 1 tablet (1,000 Units total) by mouth daily   cyanocobalamin (VITAMIN B-12) 100 mcg tablet  Self Yes No   Sig: Take by mouth daily   cyclobenzaprine (FLEXERIL) 5 mg tablet  Self No No   Sig: Take 1 tablet (5 mg total) by mouth daily at bedtime as needed for muscle spasms (as needed for back pain) for up to 21 days   docusate sodium (COLACE) 100 mg capsule   No No   Sig: Take 1 capsule (100 mg total) by mouth 3 (three) times a day as needed for constipation (while taking narcotic pain medication)   ferrous sulfate 324 (65 Fe) mg  Self No No   Sig: Take 1 tablet (324 mg total) by mouth 3 (three) times a day before meals   ibuprofen (MOTRIN) 600 mg tablet   No No   Sig: Take 1 tablet (600 mg total) by mouth every 6 (six) hours as needed for mild pain   lidocaine (LIDODERM) 5 %   No No   Sig: Apply 1 patch topically over 12 hours every 24 hours Remove & Discard patch within 12 hours or as directed by MD   liraglutide (Saxenda) injection   No No   Sig: Inject 0.3 mL (1.8 mg total) under the skin daily   multivitamin (THERAGRAN) TABS  Self Yes No   Sig: Take 1 tablet by mouth daily   pantoprazole (PROTONIX) 40 mg tablet  Self Yes No   Sig: Take 40 mg by mouth every morning      Facility-Administered Medications: None       Past Medical History:   Diagnosis Date   • Anemia    • History of transfusion    • Hypercholesterolemia    • Sleep apnea    • Vitamin deficiency        Past Surgical History:   Procedure Laterality Date   • ECTOPIC PREGNANCY SURGERY     • EYE SURGERY     • GASTRECTOMY SLEEVE LAPAROSCOPIC     • MASS EXCISION N/A 8/1/2023    Procedure: EXCISION  BIOPSY LESION/MASS BACK;  Surgeon: Robbie Monterroso DO;  Location: [facility];  Service: General   • ID LAPAROSCOPY W/RMVL ADNEXAL STRUCTURES Bilateral 9/28/2021    Procedure: SALPINGECTOMY, LAPAROSCOPIC;  Surgeon: Yi Bruno MD;  Location[facility];  Service: Gynecology • WI LAPS W/VAG HYSTERECT 250 GM/&RMVL TUBE&/OVARIES N/A 9/28/2021    Procedure: HYSTERECTOMY LAPAROSCOPIC ASSISTED VAGINAL (LAVH), LYSIS OF ADHESIONS;  Surgeon: Marleen Nair MD;  Location[facility];  Service: Gynecology       Family History   Problem Relation Age of Onset   • Brain cancer Mother    • Cancer Father    • Asthma Brother    • Diabetes Maternal Grandmother    • Cancer Maternal Grandmother    • Hypertension Paternal Grandmother      I have reviewed and agree with the history as documented.     E-Cigarette/Vaping   • E-Cigarette Use Never User      E-Cigarette/Vaping Substances   • Nicotine No    • THC No    • CBD No    • Flavoring No    • Other No    • Unknown No      Social History     Tobacco Use   • Smoking status: Never     Passive exposure: Never   • Smokeless tobacco: Never   Vaping Use   • Vaping Use: Never used   Substance Use Topics   • Alcohol use: No   • Drug use: No       Review of Systems    Physical Exam  Physical Exam    Vital Signs  ED Triage Vitals [09/18/23 0200]   Temperature Pulse Respirations Blood Pressure SpO2   97.6 °F (36.4 °C) 66 18 138/71 100 %      Temp Source Heart Rate Source Patient Position - Orthostatic VS BP Location FiO2 (%)   Temporal Monitor Sitting Left arm --      Pain Score       --           Vitals:    09/18/23 0630 09/18/23 0900 09/18/23 1000 09/18/23 1300   BP:  119/82 113/75 114/73   Pulse: 62 86 64 71   Patient Position - Orthostatic VS:  Lying Lying Lying         Visual Acuity  Visual Acuity    Flowsheet Row Most Recent Value   L Pupil Size (mm) 3   R Pupil Size (mm) 3          ED Medications  Medications - No data to display    Diagnostic Studies  Results Reviewed     Procedure Component Value Units Date/Time    Cortisol [[id number]] Collected: 09/18/23 0908    Lab Status: Final result Specimen: Blood from Arm, Left Updated: 09/18/23 1806     Cortisol, Random 10.2 ug/dL     TSH, 3rd generation with Free T4 reflex [[id number]]  (Normal) Collected: 09/18/23 0240    Lab Status: Final result Specimen: Blood from Arm, Left Updated: 09/18/23 1111     TSH 3RD GENERATON 2.861 uIU/mL     Lyme Disease Serology w/Reflex [[id number]] Collected: 09/18/23 0908    Lab Status:  In process Specimen: Arm, Left Updated: 09/18/23 0912    HS Troponin 0hr (reflex protocol) [[id number]]  (Normal) Collected: 09/18/23 0240    Lab Status: Final result Specimen: Blood from Arm, Left Updated: 09/18/23 0326     hs TnI 0hr <2 ng/L     hCG, qualitative pregnancy [[id number]]  (Normal) Collected: 09/18/23 0240    Lab Status: Final result Specimen: Blood from Arm, Left Updated: 09/18/23 0325     Preg, Serum Negative    Basic metabolic panel [[id number]] Collected: 09/18/23 0240    Lab Status: Final result Specimen: Blood from Arm, Left Updated: 09/18/23 0313     Sodium 139 mmol/L      Potassium 4.2 mmol/L      Chloride 106 mmol/L      CO2 29 mmol/L      ANION GAP 4 mmol/L      BUN 13 mg/dL      Creatinine 0.71 mg/dL      Glucose 84 mg/dL      Calcium 9.0 mg/dL      eGFR 102 ml/min/1.73sq m     Narrative:      [place] guidelines for Chronic Kidney Disease (CKD):   •  Stage 1 with normal or high GFR (GFR > 90 mL/min/1.73 square meters)  •  Stage 2 Mild CKD (GFR = 60-89 mL/min/1.73 square meters)  •  Stage 3A Moderate CKD (GFR = 45-59 mL/min/1.73 square meters)  •  Stage 3B Moderate CKD (GFR = 30-44 mL/min/1.73 square meters)  •  Stage 4 Severe CKD (GFR = 15-29 mL/min/1.73 square meters)  •  Stage 5 End Stage CKD (GFR <15 mL/min/1.73 square meters)  Note: GFR calculation is accurate only with a steady state creatinine    CBC and differential [[id number]] Collected: 09/18/23 0240    Lab Status: Final result Specimen: Blood from Arm, Left Updated: 09/18/23 0259     WBC 7.67 Thousand/uL      RBC 4.85 Million/uL      Hemoglobin 13.1 g/dL      Hematocrit 40.3 %      MCV 83 fL      MCH 27.0 pg      MCHC 32.5 g/dL      RDW 13.6 %      MPV 9.5 fL      Platelets 248 Thousands/uL      nRBC 0 /100 WBCs      Neutrophils Relative 48 %      Immat GRANS % 0 %      Lymphocytes Relative 38 %      Monocytes Relative 12 %      Eosinophils Relative 2 %      Basophils Relative 0 %      Neutrophils Absolute 3.67 Thousands/µL      Immature Grans Absolute 0.02 Thousand/uL      Lymphocytes Absolute 2.88 Thousands/µL      Monocytes Absolute 0.93 Thousand/µL      Eosinophils Absolute 0.14 Thousand/µL      Basophils Absolute 0.03 Thousands/µL                  No orders to display              Procedures  Procedures         ED Course  ED Course as of 09/19/23 2139   [facility] Sep 18, 2023   0254 EKG demonstrates normal sinus rhythm with no acute ST segment changes. Intervals are normal including a NC interval of 112 and a QRS of 80. QTc is 432. SBIRT 20yo+    Flowsheet Row Most Recent Value   Initial Alcohol Screen: US AUDIT-C     1. How often do you have a drink containing alcohol? 0 Filed at: 09/18/2023 0649   2. How many drinks containing alcohol do you have on a typical day you are drinking? 0 Filed at: 09/18/2023 0649   3a. Male UNDER 65: How often do you have five or more drinks on one occasion? 0 Filed at: 09/18/2023 0649   3b. FEMALE Any Age, or MALE 65+: How often do you have 4 or more drinks on one occassion? 0 Filed at: 09/18/2023 0649   Audit-C Score 0 Filed at: 09/18/2023 6286   JERMAINE: How many times in the past year have you. .. Used an illegal drug or used a prescription medication for non-medical reasons?  Never Filed at: 09/18/2023 4210                    MDM    Disposition  Final diagnoses:   Lightheadedness   Abnormal Holter monitor finding     Time reflects when diagnosis was documented in both MDM as applicable and the Disposition within this note     Time User Action Codes Description Comment    9/18/2023  6:52 AM Aiden Andrea Add [R42] Lightheadedness     9/18/2023  6:52 AM Aiden Andrea Add [R94.31] Abnormal Holter monitor finding     9/18/2023  9:59 AM Antonio Arauz [I44.1] Atrioventricular block, Mobitz type 2       ED Disposition     ED Disposition   Admit    Condition   Stable    Date/Time   Mon Sep 18, 2023  6:52 AM    Comment   Case was discussed with FELA and the patient's admission status was agreed to be Admission Status: observation status to the service of Dr. Elisabeth Roa .            Follow-up Information    None         Discharge Medication List as of 9/18/2023  2:30 PM      CONTINUE these medications which have NOT CHANGED    Details   acetaminophen (TYLENOL) 500 mg tablet Take 2 tablets (1,000 mg total) by mouth every 6 (six) hours as needed for mild pain, Starting Fri 8/25/2023, Normal      Biotin 1 MG CAPS Take by mouth, Historical Med      cholecalciferol (VITAMIN D3) 25 mcg (1,000 units) tablet Take 1 tablet (1,000 Units total) by mouth daily, Starting u 3/23/2023, Until Tue 8/1/2023, Normal      cyanocobalamin (VITAMIN B-12) 100 mcg tablet Take by mouth daily, Historical Med      cyclobenzaprine (FLEXERIL) 5 mg tablet Take 1 tablet (5 mg total) by mouth daily at bedtime as needed for muscle spasms (as needed for back pain) for up to 21 days, Starting Mon 6/12/2023, Until Mon 7/10/2023 at 2359, Normal      Diclofenac Sodium (VOLTAREN) 1 % Apply 2 g topically 4 (four) times a day, Starting Fri 8/25/2023, Normal      docusate sodium (COLACE) 100 mg capsule Take 1 capsule (100 mg total) by mouth 3 (three) times a day as needed for constipation (while taking narcotic pain medication), Starting Tue 8/1/2023, Normal      ferrous sulfate 324 (65 Fe) mg Take 1 tablet (324 mg total) by mouth 3 (three) times a day before meals, Starting Tue 9/28/2021, Normal      ibuprofen (MOTRIN) 600 mg tablet Take 1 tablet (600 mg total) by mouth every 6 (six) hours as needed for mild pain, Starting Fri 8/25/2023, Normal      Insulin Pen Needle (BD Pen Needle Sarita 2nd Gen) 32G X 4 MM MISC Use in the morning, Starting Mon 6/19/2023, Until Sun 9/17/2023, Normal      lidocaine (LIDODERM) 5 % Apply 1 patch topically over 12 hours every 24 hours Remove & Discard patch within 12 hours or as directed by MD, Starting Fri 8/25/2023, Print      liraglutide (Saxenda) injection Inject 0.3 mL (1.8 mg total) under the skin daily, Starting Tue 8/29/2023, Until Thu 9/28/2023, Normal      multivitamin (THERAGRAN) TABS Take 1 tablet by mouth daily, Historical Med      Omega-3 Fatty Acids (fish oil) 1,000 mg Take 1,000 mg by mouth daily, Historical Med      pantoprazole (PROTONIX) 40 mg tablet Take 40 mg by mouth every morning, Starting Sat 4/22/2023, Historical Med             Outpatient Discharge Orders   AMB event recorder   Standing Status: Future Standing Exp.  Date: 09/18/27       PDMP Review     None          ED Provider  Electronically Signed by           Ryder Sotelo MD  09/19/23 5199

## 2023-09-18 NOTE — DISCHARGE INSTR - AVS FIRST PAGE
Please go to [facility] for event monitor placement  [id number]  [facility] [address] [facility]

## 2023-09-18 NOTE — Clinical Note
Tomasa Mccarthy was seen and treated in our emergency department on 9/18/2023. Diagnosis:     Osmany Domingo  . She may return on this date: 09/21/2023         If you have any questions or concerns, please don't hesitate to call.       Gómez Carreno RN    ______________________________           _______________          _______________  Hospital Representative                              Date                                Time

## 2023-09-18 NOTE — Clinical Note
Marianne Cook was seen and treated in our emergency department on 9/18/2023. Diagnosis:     Andrew [place]  . She may return on this date: 09/21/2023         If you have any questions or concerns, please don't hesitate to call.       Chance Bartlett RN    ______________________________           _______________          _______________  Hospital Representative                              Date                                Time

## 2023-09-18 NOTE — H&P
[address]  H&P  Name: Virgie Huff 56 y.o. female I MRN: [id number]  Unit/Bed#: ED 24 I Date of Admission: 9/18/2023   Date of Service: 9/18/2023 I Hospital Day: 0      Assessment:    Second-degree AV block type II noted on monitor  Lightheadedness  SHARI  Obesity    Plan:    Cardiology evaluation  Telemetry monitoring  Check Lyme and cortisol    VTE Prophylaxis: Pharmacologic VTE Prophylaxis contraindicated due to ambulatory  / sequential compression device   Code Status: FC  POLST: POLST form is not discussed and not completed at this time. Discussion with family: Patient    Anticipated Length of Stay:  Patient will be admitted on an Observation basis with an anticipated length of stay of  observation 2 midnights. Justification for Hospital Stay: Lightheadedness    Total Time for Visit, including Counseling / Coordination of Care: 90 minutes. Greater than 50% of this total time spent on direct patient counseling and coordination of care. Chief Complaint:   Lightheadedness    History of Present Illness:    Virgie Huff is a 56 y.o. female who presents with lightheadedness. The patient has been having lightheadedness on occasion for the past several weeks. She denies any syncope or chest pain. He did undergo Holter monitoring she did mention that she had some abnormalities which she came to be checked for. Otherwise denies any fever, neuropathic symptoms, rash, taking any medicines over-the-counter. She does mention low blood pressure but her blood pressure in the ED is normal.    Review of Systems:    Review of Systems   Neurological:        Lightheadedness   All other systems reviewed and are negative.       Past Medical and Surgical History:     Past Medical History:   Diagnosis Date   • Anemia    • History of transfusion    • Hypercholesterolemia    • Sleep apnea    • Vitamin deficiency        Past Surgical History:   Procedure Laterality Date   • ECTOPIC PREGNANCY SURGERY • EYE SURGERY     • GASTRECTOMY SLEEVE LAPAROSCOPIC     • MASS EXCISION N/A 8/1/2023    Procedure: EXCISION  BIOPSY LESION/MASS BACK;  Surgeon: Irene Fam DO;  Location: [facility];  Service: General   • KS LAPAROSCOPY W/RMVL ADNEXAL STRUCTURES Bilateral 9/28/2021    Procedure: SALPINGECTOMY, LAPAROSCOPIC;  Surgeon: Gabino Hilliard MD;  Location[facility];  Service: Gynecology   • KS LAPS W/VAG HYSTERECT 250 GM/&RMVL TUBE&/OVARIES N/A 9/28/2021    Procedure: HYSTERECTOMY LAPAROSCOPIC ASSISTED VAGINAL (LAVH), LYSIS OF ADHESIONS;  Surgeon: Gabino Hilliard MD;  [facility];  Service: Gynecology       Meds/Allergies:    Prior to Admission medications    Medication Sig Start Date End Date Taking?  Authorizing Provider   acetaminophen (TYLENOL) 500 mg tablet Take 2 tablets (1,000 mg total) by mouth every 6 (six) hours as needed for mild pain 8/25/23   Donita Canavan, PA-C   Biotin 1 MG CAPS Take by mouth    Historical Provider, MD   cholecalciferol (VITAMIN D3) 25 mcg (1,000 units) tablet Take 1 tablet (1,000 Units total) by mouth daily 3/23/23 8/1/23  DAVE Villafuerte   cyanocobalamin (VITAMIN B-12) 100 mcg tablet Take by mouth daily    Historical Provider, MD   cyclobenzaprine (FLEXERIL) 5 mg tablet Take 1 tablet (5 mg total) by mouth daily at bedtime as needed for muscle spasms (as needed for back pain) for up to 21 days 6/12/23 7/10/23  DAVE Villafuerte   Diclofenac Sodium (VOLTAREN) 1 % Apply 2 g topically 4 (four) times a day 8/25/23   Donita Canavan, PA-C   docusate sodium (COLACE) 100 mg capsule Take 1 capsule (100 mg total) by mouth 3 (three) times a day as needed for constipation (while taking narcotic pain medication) 8/1/23   Irene Fam DO   ferrous sulfate 324 (65 Fe) mg Take 1 tablet (324 mg total) by mouth 3 (three) times a day before meals 9/28/21   Joe Pugh MD   ibuprofen (MOTRIN) 600 mg tablet Take 1 tablet (600 mg total) by mouth every 6 (six) hours as needed for mild pain 8/25/23   Aileen Iglesias PA-C   Insulin Pen Needle (BD Pen Needle Sarita 2nd Gen) 32G X 4 MM MISC Use in the morning 6/19/23 9/17/23  DAVE Salas   lidocaine (LIDODERM) 5 % Apply 1 patch topically over 12 hours every 24 hours Remove & Discard patch within 12 hours or as directed by MD 8/25/23   Aileen Iglesias PA-C   liraglutide Manuelito Mile) injection Inject 0.3 mL (1.8 mg total) under the skin daily 8/29/23 9/28/23  DAVE Salas   multivitamin (THERAGRAN) TABS Take 1 tablet by mouth daily    Historical Provider, MD   Omega-3 Fatty Acids (fish oil) 1,000 mg Take 1,000 mg by mouth daily    Historical Provider, MD   pantoprazole (PROTONIX) 40 mg tablet Take 40 mg by mouth every morning 4/22/23   Historical Provider, MD PEREZ have reviewed home medications with patient personally. Allergies: Allergies   Allergen Reactions   • Penicillin G Other (See Comments)   • Pollen Extract Sneezing       Social History:     Marital Status: /Civil Union     Substance Use History:   Social History     Substance and Sexual Activity   Alcohol Use No     Social History     Tobacco Use   Smoking Status Never   • Passive exposure: Never   Smokeless Tobacco Never     Social History     Substance and Sexual Activity   Drug Use No       Family History:    non-contributory    Physical Exam:     Vitals:   Blood Pressure: 114/73 (09/18/23 1300)  Pulse: 71 (09/18/23 1300)  Temperature: 97.6 °F (36.4 °C) (09/18/23 0200)  Temp Source: Temporal (09/18/23 0200)  Respirations: 20 (09/18/23 1300)  Height: 5' 6" (167.6 cm) (09/18/23 0200)  Weight - Scale: 118 kg (259 lb 11.2 oz) (09/18/23 0200)  SpO2: 97 % (09/18/23 1300)    Physical Exam  Vitals and nursing note reviewed. Constitutional:       Appearance: Normal appearance. She is normal weight. Comments: Female in bed, awake   HENT:      Head: Normocephalic and atraumatic.       Right Ear: External ear normal.      Left Ear: External ear normal.      Nose: Nose normal. No congestion. Mouth/Throat:      Mouth: Mucous membranes are moist.      Pharynx: Oropharynx is clear. No oropharyngeal exudate or posterior oropharyngeal erythema. Eyes:      General: No scleral icterus. Right eye: No discharge. Left eye: No discharge. Extraocular Movements: Extraocular movements intact. Conjunctiva/sclera: Conjunctivae normal.      Pupils: Pupils are equal, round, and reactive to light. Cardiovascular:      Rate and Rhythm: Normal rate and regular rhythm. Pulses: Normal pulses. Heart sounds: Normal heart sounds. No murmur heard. No friction rub. No gallop. Pulmonary:      Effort: Pulmonary effort is normal. No respiratory distress. Breath sounds: Normal breath sounds. No stridor. No wheezing, rhonchi or rales. Chest:      Chest wall: No tenderness. Abdominal:      General: Abdomen is flat. Bowel sounds are normal. There is no distension. Palpations: Abdomen is soft. There is no mass. Tenderness: There is no abdominal tenderness. There is no guarding or rebound. Musculoskeletal:         General: No swelling, tenderness, deformity or signs of injury. Normal range of motion. Cervical back: Normal range of motion and neck supple. No rigidity. No muscular tenderness. Skin:     General: Skin is warm and dry. Capillary Refill: Capillary refill takes less than 2 seconds. Coloration: Skin is not jaundiced or pale. Findings: No bruising, erythema, lesion or rash. Neurological:      General: No focal deficit present. Mental Status: She is alert and oriented to person, place, and time. Mental status is at baseline. Cranial Nerves: No cranial nerve deficit. Sensory: No sensory deficit. Motor: No weakness.       Coordination: Coordination normal.   Psychiatric:         Mood and Affect: Mood normal.         Behavior: Behavior normal.         Thought Content: Thought content normal.         Judgment: Judgment normal.           Additional Data:     Lab Results: I have personally reviewed pertinent reports. Results from last 7 days   Lab Units 09/18/23  0240   WBC Thousand/uL 7.67   HEMOGLOBIN g/dL 13.1   HEMATOCRIT % 40.3   PLATELETS Thousands/uL 255   NEUTROS PCT % 48   LYMPHS PCT % 38   MONOS PCT % 12   EOS PCT % 2     Results from last 7 days   Lab Units 09/18/23  0240   SODIUM mmol/L 139   POTASSIUM mmol/L 4.2   CHLORIDE mmol/L 106   CO2 mmol/L 29   BUN mg/dL 13   CREATININE mg/dL 0.71   ANION GAP mmol/L 4   CALCIUM mg/dL 9.0   GLUCOSE RANDOM mg/dL 84                       Imaging: I have personally reviewed pertinent reports. No orders to display     [employer] [facility] Records Reviewed: Yes     ** Please Note: This note has been constructed using a voice recognition system.  **

## 2023-09-18 NOTE — PROGRESS NOTES
Patient was seen in the office today for a nurse visit Live Zio AT monitor as per [facility]. Zio monitor placement was successful. Patient understood wear and care instructions for device.

## 2023-09-18 NOTE — Clinical Note
Humaira Nguyen was seen and treated in our emergency department on 9/18/2023. Diagnosis:     Kelsey Freed  . She may return on this date: 09/21/2023         If you have any questions or concerns, please don't hesitate to call.       Alfredo Sanz RN    ______________________________           _______________          _______________  Hospital Representative                              Date                                Time

## 2023-09-18 NOTE — TELEPHONE ENCOUNTER
----- Message from DAVE Caldwell sent at 9/18/2023 10:01 AM EDT -----  Regarding: ZIO AT  Patient will be discharged later today and go to office for 2 week live ZIO AT placement.

## 2023-09-22 ENCOUNTER — OFFICE VISIT (OUTPATIENT)
Dept: FAMILY MEDICINE CLINIC | Facility: CLINIC | Age: 48
End: 2023-09-22
Payer: COMMERCIAL

## 2023-09-22 VITALS
HEIGHT: 66 IN | TEMPERATURE: 98.5 F | HEART RATE: 72 BPM | OXYGEN SATURATION: 97 % | BODY MASS INDEX: 41.46 KG/M2 | WEIGHT: 258 LBS | DIASTOLIC BLOOD PRESSURE: 80 MMHG | SYSTOLIC BLOOD PRESSURE: 110 MMHG

## 2023-09-22 DIAGNOSIS — R53.83 OTHER FATIGUE: Primary | ICD-10-CM

## 2023-09-22 DIAGNOSIS — R00.1 BRADYCARDIA: ICD-10-CM

## 2023-09-22 DIAGNOSIS — R94.31 ABNORMAL EKG: ICD-10-CM

## 2023-09-22 DIAGNOSIS — R42 DIZZINESS: ICD-10-CM

## 2023-09-22 DIAGNOSIS — R53.1 GENERALIZED WEAKNESS: ICD-10-CM

## 2023-09-22 PROCEDURE — 99213 OFFICE O/P EST LOW 20 MIN: CPT | Performed by: NURSE PRACTITIONER

## 2023-09-22 NOTE — PROGRESS NOTES
BMI Counseling: Body mass index is 41.64 kg/m². The BMI is above normal. Nutrition recommendations include decreasing portion sizes, encouraging healthy choices of fruits and vegetables, decreasing fast food intake, consuming healthier snacks, limiting drinks that contain sugar, moderation in carbohydrate intake, increasing intake of lean protein, reducing intake of saturated and trans fat and reducing intake of cholesterol. Exercise recommendations include vigorous physical activity 75 minutes/week, exercising 3-5 times per week and strength training exercises. No pharmacotherapy was ordered. Patient referred to PCP. Rationale for BMI follow-up plan is due to patient being overweight or obese. Assessment/Plan:    Pt is a 56 yr old female   Presents in office for follow up ER and discuss abnormal work up   Was not feeling well yesterday   Needs not for work to be extended until she sees Cardiology next well   She has had hypotension with abnormal findings on Holter and nonncunductive P waves - currently has a Zio patch on and has follow up with cardiology next week. Discussed slow positioning  To reduce BP fluctuations   Hydrate well   She does get weakness and dizziness at times   HR in the low 60s in office   Will follow up in 4-6 weeks if worsening of symptoms --> ER     Problem List Items Addressed This Visit        Other    Bradycardia   Other Visit Diagnoses     Other fatigue    -  Primary    Generalized weakness        Dizziness        Abnormal EKG        non conductive P wave            [facility] [address]  Name: Josselin Woodruff                       : 1975  MRN: [id number]                       Age: 56 y.o.   Patient Status: Outpatient          Gender: female  Holter monitor - 48 hour    Height:  5' 6" (1.676 m)   Weight:  262 lb 5.6 oz (119 kg)   BSA:  2.24 m²   Blood Pressure:  Not recorded    Date of Study:  23 Ordering Provider:  DAVE Lee   Clinical Indications:  dizziness  bradycardia       Interpreting Physicians  Performing Staff    Elder Johns MD Tech:  [place] TOVA Yang          Vitals    Height Weight BSA (Calculated - m2) BP Pulse              PACS Images     Show images for Holter monitor        Holter monitor  Order: [id number]   Status: Final result        Visible to patient: Yes (seen)        Dx: Orthostatic hypotension; Dizziness; B...        2 Result Notes       1 Patient Communication      Details    Reading Physician Reading Date Result Priority   Elder Johns MD  [phone] 2023 Routine     Result Text     PT NAME: Nadya Stearns  : 1975                      AGE: 56 y.o. GENDER: female  MRN: [id number]                           PROCEDURE: Holter monitor           INDICATIONS:   Bradycardia        IMPRESSION:  1. The patient had predominantly sinus rhythm. 2. Heart rate varied from 53 bpm to 146 bpm.  The patient’s average heart rate was 76 bpm.    3. The patient had a holter monitor tracing done for 47 hours and 59 minutes. 4. The patient had 31 PVCs. 5. The patient had 405 supraventricular ectopic beats. There were 2 runs, the longest consisting of 4 beats. 6. The longest R/R interval was 2.4 seconds.   7. There were multiple episodes of non conducted P waves, consider second degree HB Type 2.                                         Exam Ended: 23 11:11 AM Last Resulted: 23  1:00 PM        Order Details        View Encounter        Lab and Collection Details        Routing        Result History       View All Conversations on this Encounter             Scans on Order [id number]    Cardiology Results - Scan on 2023 11:09 AM: holter strips             Result Care Coordination        Result Notes     Duarte Arzate   9/15/2023 10:45 AM EDT Back to Top        Patient was advised of results by cardiology already and has upcoming appt DAVE Rushing   9/15/2023  8:48 AM EDT          PLEASE FOLLOW UP WITH CARDIOLOGY   DO HAVE SOME PVCS and ectopic beats   In contest of low BP will need further follow up   IMPRESSION:  1. The patient had predominantly sinus rhythm. 2. Heart rate varied from 53 bpm to 146 bpm.  The patient's average heart rate was 76 bpm.    3. The patient had a holter monitor tracing done for 47 hours and 59 minutes. 4. The patient had 31 PVCs. 5. The patient had 405 supraventricular ectopic beats. There were 2 runs, the longest consisting of 4 beats. 6. The longest R/R interval was 2.4 seconds. 7. There were multiple episodes of non conducted P waves, consider second degree HB Type 2.          Patient Communication     Append Comments   Seen Back to Top       PLEASE FOLLOW UP WITH CARDIOLOGY   DO HAVE SOME PVCS and ectopic beats   In contest of low BP will need further follow up   IMPRESSION: . .. Written by DAVE Rushing on 9/15/2023  8:48 AM EDT View Full Comments  Seen by patient Braden Bartlett on 2023  2:24 AM                      MUSE [facility] Darya MUSE Strips      Signed    Electronically signed by Sona Bailey MD on 23 at 1305 EDT       Printable Result Report    Result Report       Interpretation Summary       PT NAME: Braden Bartlett  : 1975                      AGE: 56 y.o. GENDER: female  MRN: [id number]                           PROCEDURE: Holter monitor           INDICATIONS:   Bradycardia        IMPRESSION:  1. The patient had predominantly sinus rhythm. 2. Heart rate varied from 53 bpm to 146 bpm.  The patient’s average heart rate was 76 bpm.    3. The patient had a holter monitor tracing done for 47 hours and 59 minutes. 4. The patient had 31 PVCs. 5. The patient had 405 supraventricular ectopic beats. There were 2 runs, the longest consisting of 4 beats. 6. The longest R/R interval was 2.4 seconds.   7. There were multiple episodes of non conducted P waves, consider second degree HB Type 2.                                    Subjective:      Patient ID: Ahsan Hadley is a 56 y.o. female. Pt is a 56 yr old female   Presents in office for follow up ER and discuss abnormal work up   Was not feeling well yesterday   Needs not for work to be extended until she sees Cardiology next well   She has had hypotension with abnormal findings on Holter and nonncunductive P waves - currently has a Zio patch on and has follow up with cardiology next week. Discussed slow positioning  To reduce BP fluctuations   Hydrate well   She does get weakness and dizziness at times   HR in the low 60s in office   Will follow up in 4-6 weeks if worsening of symptoms --> ER      The following portions of the patient's history were reviewed and updated as appropriate:   Past Medical History:  She has a past medical history of Anemia, History of transfusion, Hypercholesterolemia, Sleep apnea, and Vitamin deficiency. ,  _______________________________________________________________________  Medical Problems:  does not have any pertinent problems on file.,  _______________________________________________________________________  Past Surgical History:   has a past surgical history that includes Ectopic pregnancy surgery; Eye surgery; GASTRECTOMY SLEEVE LAPAROSCOPIC; pr laps w/vag hysterect 250 gm/&rmvl tube&/ovaries (N/A, 9/28/2021); pr laparoscopy w/rmvl adnexal structures (Bilateral, 9/28/2021); and Mass excision (N/A, 8/1/2023). ,  _______________________________________________________________________  Family History:  family history includes Asthma in her brother; Brain cancer in her mother; Cancer in her father and maternal grandmother; Diabetes in her maternal grandmother; Hypertension in her paternal grandmother.,  _______________________________________________________________________  Social History:   reports that she has never smoked. She has never been exposed to tobacco smoke. She has never used smokeless tobacco. She reports that she does not drink alcohol and does not use drugs. ,  _______________________________________________________________________  Allergies:  is allergic to penicillin g and pollen extract. .  _______________________________________________________________________  Current Outpatient Medications   Medication Sig Dispense Refill   • acetaminophen (TYLENOL) 500 mg tablet Take 2 tablets (1,000 mg total) by mouth every 6 (six) hours as needed for mild pain 40 tablet 0   • Biotin 1 MG CAPS Take by mouth     • cyanocobalamin (VITAMIN B-12) 100 mcg tablet Take by mouth daily     • Diclofenac Sodium (VOLTAREN) 1 % Apply 2 g topically 4 (four) times a day 100 g 0   • docusate sodium (COLACE) 100 mg capsule Take 1 capsule (100 mg total) by mouth 3 (three) times a day as needed for constipation (while taking narcotic pain medication) 30 capsule 0   • ferrous sulfate 324 (65 Fe) mg Take 1 tablet (324 mg total) by mouth 3 (three) times a day before meals 90 tablet 0   • ibuprofen (MOTRIN) 600 mg tablet Take 1 tablet (600 mg total) by mouth every 6 (six) hours as needed for mild pain 30 tablet 0   • lidocaine (LIDODERM) 5 % Apply 1 patch topically over 12 hours every 24 hours Remove & Discard patch within 12 hours or as directed by MD 15 patch 0   • liraglutide (Saxenda) injection Inject 0.3 mL (1.8 mg total) under the skin daily (Patient not taking: Reported on 9/26/2023) 9 mL 0   • multivitamin (THERAGRAN) TABS Take 1 tablet by mouth daily     • Omega-3 Fatty Acids (fish oil) 1,000 mg Take 1,000 mg by mouth daily     • pantoprazole (PROTONIX) 40 mg tablet Take 40 mg by mouth every morning     • cholecalciferol (VITAMIN D3) 25 mcg (1,000 units) tablet Take 1 tablet (1,000 Units total) by mouth daily 90 tablet 1   • Insulin Pen Needle (BD Pen Needle Sarita 2nd Gen) 32G X 4 MM MISC Use in the morning 100 each 1     No current facility-administered medications for this visit. _______________________________________________________________________  Review of Systems   Constitutional: Positive for fatigue and unexpected weight change (Difficulty with weight loss despite treatment). Negative for fever. HENT: Negative for congestion and postnasal drip. Eyes: Negative. Respiratory: Negative for cough and shortness of breath. Complains of some dyspnea on exertion at times   Cardiovascular: Positive for palpitations. Negative for chest pain and leg swelling. Complains of low blood pressures lately and feeling fatigued and weak at times  Low HR    Gastrointestinal: Negative for abdominal distention, abdominal pain, nausea and vomiting. Endocrine:        History of PCOS   Genitourinary: Negative for difficulty urinating and flank pain. Musculoskeletal: Positive for arthralgias, back pain and myalgias. Skin: Negative for rash. Allergic/Immunologic: Negative for environmental allergies. Neurological: Positive for weakness and light-headedness. Hematological: Negative for adenopathy. Psychiatric/Behavioral: Negative for sleep disturbance and suicidal ideas. The patient is nervous/anxious. Objective:  Vitals:    09/22/23 1630   BP: 110/80   BP Location: Right arm   Patient Position: Sitting   Cuff Size: Large   Pulse: 72   Temp: 98.5 °F (36.9 °C)   SpO2: 97%   Weight: 117 kg (258 lb)   Height: 5' 6" (1.676 m)     Body mass index is 41.64 kg/m². Physical Exam  Vitals and nursing note reviewed. Constitutional:       Appearance: Normal appearance. Comments: BMI 41.48   HENT:      Head: Atraumatic. Nose: No congestion or rhinorrhea. Eyes:      Extraocular Movements: Extraocular movements intact. Cardiovascular:      Rate and Rhythm: Regular rhythm. Bradycardia present. Pulses: Normal pulses. Heart sounds: Normal heart sounds. Pulmonary:      Effort: Pulmonary effort is normal.      Breath sounds: Normal breath sounds. Abdominal:      Palpations: Abdomen is soft. Musculoskeletal:      Cervical back: Normal range of motion. Right lower leg: No edema. Left lower leg: No edema. Skin:     Capillary Refill: Capillary refill takes less than 2 seconds. Neurological:      Mental Status: She is alert and oriented to person, place, and time. Psychiatric:         Mood and Affect: Mood normal.         Behavior: Behavior normal.          PT NAME: Alex Walsh  : 1975                      AGE: 56 y.o. GENDER: female  MRN: [id number]                           PROCEDURE: Holter monitor           INDICATIONS:   Bradycardia        IMPRESSION:  1. The patient had predominantly sinus rhythm. 2. Heart rate varied from 53 bpm to 146 bpm.  The patient’s average heart rate was 76 bpm.    3. The patient had a holter monitor tracing done for 47 hours and 59 minutes. 4. The patient had 31 PVCs. 5. The patient had 405 supraventricular ectopic beats. There were 2 runs, the longest consisting of 4 beats. 6. The longest R/R interval was 2.4 seconds. There were multiple episodes of non conducted P waves, consider second degree HB Type 2.

## 2023-09-22 NOTE — LETTER
September 22, 2023     Patient: Lebron Schwab  YOB: 1975  Date of Visit: 9/22/2023      To Whom it May Concern:    Madhuri Gonzalez is under my professional care. Alana Hernandez was seen in my office on 9/22/2023. Alana Hernandez may return to work on 09/27/2023 after seeing the cardiologist  . Initially was supposed to go back  on 09/21/2023  however she was not feeling well and was dizzy. So we will await return to work until  cardiology sees her. If you have any questions or concerns, please don't hesitate to call.          Sincerely,          ADVE Lopes        CC: No Recipients

## 2023-09-26 ENCOUNTER — CONSULT (OUTPATIENT)
Dept: CARDIOLOGY CLINIC | Facility: CLINIC | Age: 48
End: 2023-09-26
Payer: COMMERCIAL

## 2023-09-26 VITALS
SYSTOLIC BLOOD PRESSURE: 120 MMHG | HEART RATE: 64 BPM | HEIGHT: 66 IN | BODY MASS INDEX: 41.3 KG/M2 | RESPIRATION RATE: 16 BRPM | WEIGHT: 257 LBS | OXYGEN SATURATION: 98 % | DIASTOLIC BLOOD PRESSURE: 88 MMHG

## 2023-09-26 DIAGNOSIS — I95.1 ORTHOSTATIC HYPOTENSION: ICD-10-CM

## 2023-09-26 DIAGNOSIS — G47.33 OSA (OBSTRUCTIVE SLEEP APNEA): Primary | ICD-10-CM

## 2023-09-26 DIAGNOSIS — R00.1 BRADYCARDIA: ICD-10-CM

## 2023-09-26 DIAGNOSIS — E78.2 MIXED HYPERLIPIDEMIA: ICD-10-CM

## 2023-09-26 DIAGNOSIS — R42 LIGHTHEADEDNESS: ICD-10-CM

## 2023-09-26 DIAGNOSIS — R42 DIZZINESS: ICD-10-CM

## 2023-09-26 LAB — MISCELLANEOUS LAB TEST RESULT: NORMAL

## 2023-09-26 PROCEDURE — 99214 OFFICE O/P EST MOD 30 MIN: CPT | Performed by: INTERNAL MEDICINE

## 2023-09-26 NOTE — PROGRESS NOTES
[facility]   Office Visit    Alex Walsh 56 y.o. female MRN: [id number]    09/26/23        Physician Requesting Consult:  Reason for Consult / Chief Complaint: Episodic hypotension, dizziness    Assessment/Plan:  1. Episodic hypotension, dizziness. •     2. Second-degree AV block type I.  Rule out type II.  •     3. SHARI  •     4. Obesity. •     Plan:  She is currently wearing a 2-week monitor, await results. We will look for symptomatic bradycardia and possible second-degree AV block type II. We discussed orthostatic hypotension and recommend conservative measures including compression stockings, staying well-hydrated and getting up slowly. We also discussed possible Linq placement if further diagnostic information is needed. We also discussed possible midodrine. Await results of cardiac monitor. HPI: Alex Walsh is a 56y.o. year old female with a history of obesity, hypercholesterolemia, SHARI who presents with low BP. She gets episodes of lightheadedness and dizzyness going on for a few months. 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 May occur at any time, standing, sitting. Bps drops to 91/60. 88/62, 91/60 and she is symptomatic when it's this low. She was seen in the emergency room by cardiology September 18, 2023 with the same symptoms episodic hypotension and dizziness. She was noted to have a history of second-degree A-V block type I Wenke BachAV block. She also had periods of second-degree AV block, mostly type I and a few drop beats. Possible second-degree type II and one of the recordings. It was felt there was no definite indication for pacemaker. This was discussed with electrophysiology. She's on insulin for weight loss. Family History:  Tobacco: No smoking  Alcohol:None    Cardiovascular imaging:  Echo 9/11/23 EF 60%, normal.   Holter 48 hr 9/11/23: IMPRESSION:  1. The patient had predominantly sinus rhythm.    2. Heart rate varied from 53 bpm to 146 bpm.  The patient’s average heart rate was 76 bpm.    3. The patient had a holter monitor tracing done for 47 hours and 59 minutes. 4. The patient had 31 PVCs. 5. The patient had 405 supraventricular ectopic beats. There were 2 runs, the longest consisting of 4 beats. 6. The longest R/R interval was 2.4 seconds. 7. There were multiple episodes of non conducted P waves, consider second degree HB Type 2.   EKG 9/18/23: NSR, normal  Holter Monitor 7/12/2021:  1. The patient had predominantly sinus rhythm. 2. Heart rate varied from 45  bpm to 132 bpm.  The patient’s average heart rate was 69 bpm.    3. The patient had a holter monitor tracing done for 13 hours and 30 minutes. 4. The patient had 1 PVC and 3 ventricular triplets.    5. The patient had 12 PACs, 8 late beats and 1 brief episode of likely 2:1 AV block at 5.20PM.  Patient was asymptomatic. 6. No cardiovascular symptoms reported by the patient.       Review of Systems:  Review of Systemsotherwise negative    PHYSICAL EXAM:  Vitals:   Vitals:    09/26/23 0826   BP: 120/88   BP Location: Left arm   Patient Position: Sitting   Cuff Size: Large   Pulse: 64   Resp: 16   SpO2: 98%   Weight: 117 kg (257 lb)   Height: 5' 6" (1.676 m)        Physical Exam:  Physical Exam GEN: Alert and oriented x 3, in no acute distress. Well appearing and well nourished. HEENT: Sclera anicteric, conjunctivae pink, mucous membranes moist. Oropharynx clear. NECK: Supple, no carotid bruits, no significant JVD. Trachea midline, no thyromegaly. HEART: Regular rhythm, normal S1 and S2, no murmurs, clicks, gallops or rubs. PMI nondisplaced, no thrills. LUNGS: Clear to auscultation bilaterally; no wheezes, rales, or rhonchi. No increased work of breathing or signs of respiratory distress. ABDOMEN: Soft, nontender, nondistended, normoactive bowel sounds. EXTREMITIES: Skin warm and well perfused, no clubbing, cyanosis, or edema. NEURO: No focal findings.  Normal speech. Mood and affect normal.   SKIN: Normal without suspicious lesions on exposed skin.         Follow up: 6 months or sooner as needed    Allergies   Allergen Reactions   • Penicillin G Other (See Comments)   • Pollen Extract Sneezing         Current Outpatient Medications:   •  acetaminophen (TYLENOL) 500 mg tablet, Take 2 tablets (1,000 mg total) by mouth every 6 (six) hours as needed for mild pain, Disp: 40 tablet, Rfl: 0  •  Biotin 1 MG CAPS, Take by mouth, Disp: , Rfl:   •  cholecalciferol (VITAMIN D3) 25 mcg (1,000 units) tablet, Take 1 tablet (1,000 Units total) by mouth daily, Disp: 90 tablet, Rfl: 1  •  cyanocobalamin (VITAMIN B-12) 100 mcg tablet, Take by mouth daily, Disp: , Rfl:   •  docusate sodium (COLACE) 100 mg capsule, Take 1 capsule (100 mg total) by mouth 3 (three) times a day as needed for constipation (while taking narcotic pain medication), Disp: 30 capsule, Rfl: 0  •  ferrous sulfate 324 (65 Fe) mg, Take 1 tablet (324 mg total) by mouth 3 (three) times a day before meals, Disp: 90 tablet, Rfl: 0  •  ibuprofen (MOTRIN) 600 mg tablet, Take 1 tablet (600 mg total) by mouth every 6 (six) hours as needed for mild pain, Disp: 30 tablet, Rfl: 0  •  multivitamin (THERAGRAN) TABS, Take 1 tablet by mouth daily, Disp: , Rfl:   •  Omega-3 Fatty Acids (fish oil) 1,000 mg, Take 1,000 mg by mouth daily, Disp: , Rfl:   •  pantoprazole (PROTONIX) 40 mg tablet, Take 40 mg by mouth every morning, Disp: , Rfl:   •  Diclofenac Sodium (VOLTAREN) 1 %, Apply 2 g topically 4 (four) times a day, Disp: 100 g, Rfl: 0  •  Insulin Pen Needle (BD Pen Needle Sarita 2nd Gen) 32G X 4 MM MISC, Use in the morning, Disp: 100 each, Rfl: 1  •  lidocaine (LIDODERM) 5 %, Apply 1 patch topically over 12 hours every 24 hours Remove & Discard patch within 12 hours or as directed by MD, Disp: 15 patch, Rfl: 0  •  liraglutide (Saxenda) injection, Inject 0.3 mL (1.8 mg total) under the skin daily (Patient not taking: Reported on 2023), Disp: 9 mL, Rfl: 0    Past Medical History:   Diagnosis Date   • Anemia    • History of transfusion    • Hypercholesterolemia    • Sleep apnea    • Vitamin deficiency        Family History   Problem Relation Age of Onset   • Brain cancer Mother    • Cancer Father    • Asthma Brother    • Diabetes Maternal Grandmother    • Cancer Maternal Grandmother    • Hypertension Paternal Grandmother        Past Medical History:   Diagnosis Date   • Anemia    • History of transfusion    • Hypercholesterolemia    • Sleep apnea    • Vitamin deficiency        Past Surgical History:   Procedure Laterality Date   • ECTOPIC PREGNANCY SURGERY     • EYE SURGERY     • GASTRECTOMY SLEEVE LAPAROSCOPIC     • MASS EXCISION N/A 2023    Procedure: EXCISION  BIOPSY LESION/MASS BACK;  Surgeon: Joenathan Shone, DO;  Location: [facility];  Service: General   • W[place] LAPAROSCOPY W/RMVL ADNEXAL STRUCTURES Bilateral 2021    Procedure: SALPINGECTOMY, LAPAROSCOPIC;  Surgeon: Carter Bower MD;  [facility];  Service: Gynecology   • WV LAPS W/VAG HYSTERECT 250 GM/&RMVL TUBE&/OVARIES N/A 2021    Procedure: HYSTERECTOMY LAPAROSCOPIC ASSISTED VAGINAL (LAVH), LYSIS OF ADHESIONS;  Surgeon: Carter Bower MD;  [facility];  Service: Gynecology       Social History     Socioeconomic History   • Marital status: /Civil Union     Spouse name: Not on file   • Number of children: Not on file   • Years of education: Not on file   • Highest education level: Not on file   Occupational History   • Not on file   Tobacco Use   • Smoking status: Never     Passive exposure: Never   • Smokeless tobacco: Never   Vaping Use   • Vaping Use: Never used   Substance and Sexual Activity   • Alcohol use: No   • Drug use: No   • Sexual activity: Yes     Partners: Male   Other Topics Concern   • Not on file   Social History Narrative    Most recent tobacco use screenin2019    Do you currently or have you served in the [address] [place] [address] Forces: No     Social Determinants of Health     Financial Resource Strain: Not on file   Food Insecurity: Not on file   Transportation Needs: Not on file   Physical Activity: Not on file   Stress: Not on file   Social Connections: Not on file   Intimate Partner Violence: Not on file   Housing Stability: Not on file             LABORATORY RESULTS:    Lab Results   Component Value Date    WBC 7.67 09/18/2023    HGB 13.1 09/18/2023    HCT 40.3 09/18/2023    MCV 83 09/18/2023     09/18/2023     Lab Results   Component Value Date    CALCIUM 9.0 09/18/2023    K 4.2 09/18/2023    CO2 29 09/18/2023     09/18/2023    BUN 13 09/18/2023    CREATININE 0.71 09/18/2023     Lab Results   Component Value Date    HGBA1C 5.6 03/03/2023       Lipid Profile:   No results found for: "CHOL"  Lab Results   Component Value Date    HDL 48 (L) 06/12/2023    HDL 53 03/03/2023    HDL 52 04/29/2021     Lab Results   Component Value Date    LDLCALC 139 (H) 06/12/2023    LDLCALC 151 (H) 03/03/2023    LDLCALC 116 (H) 04/29/2021     Lab Results   Component Value Date    TRIG 211 (H) 06/12/2023    TRIG 215 (H) 03/03/2023    TRIG 124 04/29/2021       The 10-year ASCVD risk score (Noreen BEARD, et al., 2019) is: 1.2%    Values used to calculate the score:      Age: 56 years      Sex: Female      Is Non- : No      Diabetic: No      Tobacco smoker: No      Systolic Blood Pressure: 967 mmHg      Is BP treated: No      HDL Cholesterol: 48 mg/dL      Total Cholesterol: 229 mg/dL    1. SHARI (obstructive sleep apnea)        2. Bradycardia  Ambulatory Referral to Cardiology      3. Orthostatic hypotension  Ambulatory Referral to Cardiology      4. Dizziness  Ambulatory Referral to Cardiology      5. Mixed hyperlipidemia        6. Lightheadedness        7. Diabetes 1.5, managed as type 2 ([address])        8. Second-degree AV block, type I, rule out type II. Imaging: I have personally reviewed pertinent reports.         EKG reviewed personally: Abdifatah FLYNN      [place] MD Devin  9/26/2023,8:42 AM

## 2023-10-12 ENCOUNTER — CLINICAL SUPPORT (OUTPATIENT)
Dept: CARDIOLOGY CLINIC | Facility: CLINIC | Age: 48
End: 2023-10-12
Payer: COMMERCIAL

## 2023-10-12 DIAGNOSIS — R42 LIGHTHEADEDNESS: ICD-10-CM

## 2023-10-12 DIAGNOSIS — I44.1 ATRIOVENTRICULAR BLOCK, MOBITZ TYPE 2: ICD-10-CM

## 2023-10-12 PROCEDURE — 93248 EXT ECG>7D<15D REV&INTERPJ: CPT | Performed by: INTERNAL MEDICINE

## 2023-10-15 ENCOUNTER — HOSPITAL ENCOUNTER (INPATIENT)
Facility: HOSPITAL | Age: 48
LOS: 2 days | DRG: 309 | End: 2023-10-17
Attending: EMERGENCY MEDICINE | Admitting: INTERNAL MEDICINE
Payer: COMMERCIAL

## 2023-10-15 DIAGNOSIS — R00.1 BRADYCARDIA: ICD-10-CM

## 2023-10-15 DIAGNOSIS — I45.9 HEART BLOCK: Primary | ICD-10-CM

## 2023-10-15 DIAGNOSIS — I45.5 SINUS PAUSE: ICD-10-CM

## 2023-10-15 DIAGNOSIS — R42 DIZZINESS: ICD-10-CM

## 2023-10-15 PROBLEM — I44.1 HEART BLOCK AV SECOND DEGREE: Status: ACTIVE | Noted: 2023-10-15

## 2023-10-15 LAB
ALBUMIN SERPL BCP-MCNC: 4.1 G/DL (ref 3.5–5)
ALP SERPL-CCNC: 54 U/L (ref 34–104)
ALT SERPL W P-5'-P-CCNC: 9 U/L (ref 7–52)
ANION GAP SERPL CALCULATED.3IONS-SCNC: 4 MMOL/L
AST SERPL W P-5'-P-CCNC: 16 U/L (ref 13–39)
BASOPHILS # BLD AUTO: 0.02 THOUSANDS/ÂΜL (ref 0–0.1)
BASOPHILS NFR BLD AUTO: 0 % (ref 0–1)
BILIRUB DIRECT SERPL-MCNC: 0.13 MG/DL (ref 0–0.2)
BILIRUB SERPL-MCNC: 0.42 MG/DL (ref 0.2–1)
BUN SERPL-MCNC: 13 MG/DL (ref 5–25)
CALCIUM SERPL-MCNC: 9.2 MG/DL (ref 8.4–10.2)
CARDIAC TROPONIN I PNL SERPL HS: <2 NG/L
CHLORIDE SERPL-SCNC: 106 MMOL/L (ref 96–108)
CO2 SERPL-SCNC: 31 MMOL/L (ref 21–32)
CREAT SERPL-MCNC: 0.74 MG/DL (ref 0.6–1.3)
EOSINOPHIL # BLD AUTO: 0.05 THOUSAND/ÂΜL (ref 0–0.61)
EOSINOPHIL NFR BLD AUTO: 1 % (ref 0–6)
ERYTHROCYTE [DISTWIDTH] IN BLOOD BY AUTOMATED COUNT: 13.3 % (ref 11.6–15.1)
GFR SERPL CREATININE-BSD FRML MDRD: 96 ML/MIN/1.73SQ M
GLUCOSE SERPL-MCNC: 98 MG/DL (ref 65–140)
HCT VFR BLD AUTO: 41 % (ref 34.8–46.1)
HGB BLD-MCNC: 13.1 G/DL (ref 11.5–15.4)
IMM GRANULOCYTES # BLD AUTO: 0.01 THOUSAND/UL (ref 0–0.2)
IMM GRANULOCYTES NFR BLD AUTO: 0 % (ref 0–2)
LYMPHOCYTES # BLD AUTO: 2.28 THOUSANDS/ÂΜL (ref 0.6–4.47)
LYMPHOCYTES NFR BLD AUTO: 47 % (ref 14–44)
MAGNESIUM SERPL-MCNC: 2 MG/DL (ref 1.9–2.7)
MCH RBC QN AUTO: 26.7 PG (ref 26.8–34.3)
MCHC RBC AUTO-ENTMCNC: 32 G/DL (ref 31.4–37.4)
MCV RBC AUTO: 84 FL (ref 82–98)
MONOCYTES # BLD AUTO: 0.75 THOUSAND/ÂΜL (ref 0.17–1.22)
MONOCYTES NFR BLD AUTO: 15 % (ref 4–12)
NEUTROPHILS # BLD AUTO: 1.79 THOUSANDS/ÂΜL (ref 1.85–7.62)
NEUTS SEG NFR BLD AUTO: 37 % (ref 43–75)
NRBC BLD AUTO-RTO: 0 /100 WBCS
PLATELET # BLD AUTO: 239 THOUSANDS/UL (ref 149–390)
PMV BLD AUTO: 9.6 FL (ref 8.9–12.7)
POTASSIUM SERPL-SCNC: 3.8 MMOL/L (ref 3.5–5.3)
PROT SERPL-MCNC: 7.7 G/DL (ref 6.4–8.4)
RBC # BLD AUTO: 4.91 MILLION/UL (ref 3.81–5.12)
SODIUM SERPL-SCNC: 141 MMOL/L (ref 135–147)
WBC # BLD AUTO: 4.9 THOUSAND/UL (ref 4.31–10.16)

## 2023-10-15 PROCEDURE — 93005 ELECTROCARDIOGRAM TRACING: CPT

## 2023-10-15 PROCEDURE — 84484 ASSAY OF TROPONIN QUANT: CPT | Performed by: EMERGENCY MEDICINE

## 2023-10-15 PROCEDURE — 36415 COLL VENOUS BLD VENIPUNCTURE: CPT | Performed by: EMERGENCY MEDICINE

## 2023-10-15 PROCEDURE — 99285 EMERGENCY DEPT VISIT HI MDM: CPT | Performed by: EMERGENCY MEDICINE

## 2023-10-15 PROCEDURE — 80076 HEPATIC FUNCTION PANEL: CPT | Performed by: EMERGENCY MEDICINE

## 2023-10-15 PROCEDURE — 80048 BASIC METABOLIC PNL TOTAL CA: CPT | Performed by: EMERGENCY MEDICINE

## 2023-10-15 PROCEDURE — 99222 1ST HOSP IP/OBS MODERATE 55: CPT | Performed by: INTERNAL MEDICINE

## 2023-10-15 PROCEDURE — 99284 EMERGENCY DEPT VISIT MOD MDM: CPT

## 2023-10-15 PROCEDURE — 83735 ASSAY OF MAGNESIUM: CPT | Performed by: EMERGENCY MEDICINE

## 2023-10-15 PROCEDURE — 85025 COMPLETE CBC W/AUTO DIFF WBC: CPT | Performed by: EMERGENCY MEDICINE

## 2023-10-15 RX ORDER — CHLORAL HYDRATE 500 MG
1000 CAPSULE ORAL DAILY
Status: DISCONTINUED | OUTPATIENT
Start: 2023-10-16 | End: 2023-10-17 | Stop reason: HOSPADM

## 2023-10-15 RX ORDER — PANTOPRAZOLE SODIUM 40 MG/1
40 TABLET, DELAYED RELEASE ORAL EVERY MORNING
Status: DISCONTINUED | OUTPATIENT
Start: 2023-10-16 | End: 2023-10-17 | Stop reason: HOSPADM

## 2023-10-15 RX ORDER — FERROUS SULFATE 325(65) MG
325 TABLET ORAL
Status: DISCONTINUED | OUTPATIENT
Start: 2023-10-16 | End: 2023-10-17 | Stop reason: HOSPADM

## 2023-10-15 RX ORDER — MELATONIN
1000 DAILY
Status: DISCONTINUED | OUTPATIENT
Start: 2023-10-16 | End: 2023-10-17 | Stop reason: HOSPADM

## 2023-10-15 NOTE — ED PROVIDER NOTES
History  Chief Complaint   Patient presents with    Evaluation of Abnormal Diagnostic Test     Pt was placed on halter monitor and got results today, advised by her provider to come in due to her HR being in the 20's. Pt reporting intermittent episodes of lightheadedness but denies complaints at this time     54 yo female with episodic lightheadedness and near syncope who has been wearing a 2-week holter monitor and was told two days ago to come to the ED for an episode of complete heart block. She is asymptomatic in the interim, she notes occasional episodes of dizziness but none in the last 48 hours. She has no medical concerns at the moment. She does not take any antihypertensives or antiarrhythmics. Prior to Admission Medications   Prescriptions Last Dose Informant Patient Reported? Taking?    Biotin 1 MG CAPS  Self Yes No   Sig: Take by mouth   Diclofenac Sodium (VOLTAREN) 1 %  Self No No   Sig: Apply 2 g topically 4 (four) times a day   Insulin Pen Needle (BD Pen Needle Sarita 2nd Gen) 32G X 4 MM MISC  Self No No   Sig: Use in the morning   Omega-3 Fatty Acids (fish oil) 1,000 mg  Self Yes No   Sig: Take 1,000 mg by mouth daily   acetaminophen (TYLENOL) 500 mg tablet  Self No No   Sig: Take 2 tablets (1,000 mg total) by mouth every 6 (six) hours as needed for mild pain   cholecalciferol (VITAMIN D3) 25 mcg (1,000 units) tablet  Self No No   Sig: Take 1 tablet (1,000 Units total) by mouth daily   cyanocobalamin (VITAMIN B-12) 100 mcg tablet  Self Yes No   Sig: Take by mouth daily   docusate sodium (COLACE) 100 mg capsule  Self No No   Sig: Take 1 capsule (100 mg total) by mouth 3 (three) times a day as needed for constipation (while taking narcotic pain medication)   ferrous sulfate 324 (65 Fe) mg  Self No No   Sig: Take 1 tablet (324 mg total) by mouth 3 (three) times a day before meals   ibuprofen (MOTRIN) 600 mg tablet  Self No No   Sig: Take 1 tablet (600 mg total) by mouth every 6 (six) hours as needed for mild pain   lidocaine (LIDODERM) 5 %  Self No No   Sig: Apply 1 patch topically over 12 hours every 24 hours Remove & Discard patch within 12 hours or as directed by MD   liraglutide (Saxenda) injection  Self No No   Sig: Inject 0.3 mL (1.8 mg total) under the skin daily   Patient not taking: Reported on 9/26/2023   multivitamin (THERAGRAN) TABS  Self Yes No   Sig: Take 1 tablet by mouth daily   pantoprazole (PROTONIX) 40 mg tablet  Self Yes No   Sig: Take 40 mg by mouth every morning      Facility-Administered Medications: None       Past Medical History:   Diagnosis Date    Anemia     History of transfusion     Hypercholesterolemia     Sleep apnea     Vitamin deficiency        Past Surgical History:   Procedure Laterality Date    ECTOPIC PREGNANCY SURGERY      EYE SURGERY      GASTRECTOMY SLEEVE LAPAROSCOPIC      MASS EXCISION N/A 8/1/2023    Procedure: EXCISION  BIOPSY LESION/MASS BACK;  Surgeon: Jin Patterson DO;  [facility];  Service: General    IN LAPAROSCOPY W/RMVL ADNEXAL STRUCTURES Bilateral 9/28/2021    Procedure: SALPINGECTOMY, LAPAROSCOPIC;  Surgeon: Lori Moreno MD;  [facility];  Service: Gynecology    IN LAPS W/VAG HYSTERECT 250 GM/&RMVL TUBE&/OVARIES N/A 9/28/2021    Procedure: HYSTERECTOMY LAPAROSCOPIC ASSISTED VAGINAL (LAVH), LYSIS OF ADHESIONS;  Surgeon: Lori Moreno MD;  [facility];  Service: Gynecology       Family History   Problem Relation Age of Onset    Brain cancer Mother     Cancer Father     Asthma Brother     Diabetes Maternal Grandmother     Cancer Maternal Grandmother     Hypertension Paternal Grandmother      I have reviewed and agree with the history as documented.     E-Cigarette/Vaping    E-Cigarette Use Never User      E-Cigarette/Vaping Substances    Nicotine No     THC No     CBD No     Flavoring No     Other No     Unknown No      Social History     Tobacco Use    Smoking status: Never     Passive exposure: Never    Smokeless tobacco: Never   Vaping Use    Vaping Use: Never used   Substance Use Topics    Alcohol use: No    Drug use: No       Review of Systems   Neurological:  Positive for dizziness. All other systems reviewed and are negative. Physical Exam  Physical Exam  Vitals and nursing note reviewed. Constitutional:       General: She is not in acute distress. Appearance: Normal appearance. She is well-developed. She is not ill-appearing, toxic-appearing or diaphoretic. HENT:      Head: Normocephalic and atraumatic. Mouth/Throat:      Mouth: Mucous membranes are moist.      Pharynx: Oropharynx is clear. Eyes:      Conjunctiva/sclera: Conjunctivae normal.      Pupils: Pupils are equal, round, and reactive to light. Neck:      Vascular: No JVD. Cardiovascular:      Rate and Rhythm: Normal rate and regular rhythm. Pulses: Normal pulses. Heart sounds: Normal heart sounds. Pulmonary:      Effort: Pulmonary effort is normal. No respiratory distress. Breath sounds: Normal breath sounds. No stridor. Abdominal:      General: There is no distension. Palpations: Abdomen is soft. Tenderness: There is no abdominal tenderness. There is no guarding or rebound. Musculoskeletal:         General: No tenderness or deformity. Normal range of motion. Cervical back: Normal range of motion and neck supple. No rigidity. Skin:     General: Skin is warm and dry. Capillary Refill: Capillary refill takes less than 2 seconds. Coloration: Skin is not jaundiced or pale. Findings: No bruising, erythema or rash. Neurological:      General: No focal deficit present. Mental Status: She is alert and oriented to person, place, and time. Cranial Nerves: No cranial nerve deficit. Sensory: No sensory deficit. Motor: No weakness or abnormal muscle tone.       Coordination: Coordination normal.      Gait: Gait normal.         Vital Signs  ED Triage Vitals   Temperature Pulse Respirations Blood Pressure SpO2   10/15/23 1620 10/15/23 1619 10/15/23 1619 10/15/23 1619 10/15/23 1619   98.5 °F (36.9 °C) 79 16 120/69 100 %      Temp Source Heart Rate Source Patient Position - Orthostatic VS BP Location FiO2 (%)   10/15/23 1620 10/15/23 1619 -- 10/15/23 1619 --   Oral Monitor  Left arm       Pain Score       --                  Vitals:    10/15/23 1619   BP: 120/69   Pulse: 79         Visual Acuity      ED Medications  Medications - No data to display    Diagnostic Studies  Results Reviewed       Procedure Component Value Units Date/Time    HS Troponin 0hr (reflex protocol) [[id number]]  (Normal) Collected: 10/15/23 1622    Lab Status: Final result Specimen: Blood from Arm, Right Updated: 10/15/23 1657     hs TnI 0hr <2 ng/L     HS Troponin I 2hr [[id number]]     Lab Status: No result Specimen: Blood     Basic metabolic panel [[id number]] Collected: 10/15/23 1622    Lab Status: Final result Specimen: Blood from Arm, Right Updated: 10/15/23 1652     Sodium 141 mmol/L      Potassium 3.8 mmol/L      Chloride 106 mmol/L      CO2 31 mmol/L      ANION GAP 4 mmol/L      BUN 13 mg/dL      Creatinine 0.74 mg/dL      Glucose 98 mg/dL      Calcium 9.2 mg/dL      eGFR 96 ml/min/1.73sq m     Narrative:      [facility] guidelines for Chronic Kidney Disease (CKD):     Stage 1 with normal or high GFR (GFR > 90 mL/min/1.73 square meters)    Stage 2 Mild CKD (GFR = 60-89 mL/min/1.73 square meters)    Stage 3A Moderate CKD (GFR = 45-59 mL/min/1.73 square meters)    Stage 3B Moderate CKD (GFR = 30-44 mL/min/1.73 square meters)    Stage 4 Severe CKD (GFR = 15-29 mL/min/1.73 square meters)    Stage 5 End Stage CKD (GFR <15 mL/min/1.73 square meters)  Note: GFR calculation is accurate only with a steady state creatinine    Hepatic function panel [[id number]]  (Normal) Collected: 10/15/23 1622    Lab Status: Final result Specimen: Blood from Arm, Right Updated: 10/15/23 1442     Total Bilirubin 0.42 mg/dL      Bilirubin, Direct 0.13 mg/dL      Alkaline Phosphatase 54 U/L      AST 16 U/L      ALT 9 U/L      Total Protein 7.7 g/dL      Albumin 4.1 g/dL     Magnesium [[id number]]  (Normal) Collected: 10/15/23 1622    Lab Status: Final result Specimen: Blood from Arm, Right Updated: 10/15/23 1652     Magnesium 2.0 mg/dL     CBC and differential [[id number]]  (Abnormal) Collected: 10/15/23 1622    Lab Status: Final result Specimen: Blood from Arm, Right Updated: 10/15/23 1628     WBC 4.90 Thousand/uL      RBC 4.91 Million/uL      Hemoglobin 13.1 g/dL      Hematocrit 41.0 %      MCV 84 fL      MCH 26.7 pg      MCHC 32.0 g/dL      RDW 13.3 %      MPV 9.6 fL      Platelets 449 Thousands/uL      nRBC 0 /100 WBCs      Neutrophils Relative 37 %      Immat GRANS % 0 %      Lymphocytes Relative 47 %      Monocytes Relative 15 %      Eosinophils Relative 1 %      Basophils Relative 0 %      Neutrophils Absolute 1.79 Thousands/µL      Immature Grans Absolute 0.01 Thousand/uL      Lymphocytes Absolute 2.28 Thousands/µL      Monocytes Absolute 0.75 Thousand/µL      Eosinophils Absolute 0.05 Thousand/µL      Basophils Absolute 0.02 Thousands/µL                    No orders to display              Procedures  ECG 12 Lead Documentation Only    Date/Time: 10/15/2023 4:38 PM    Performed by: Brissa Joseph MD  Authorized by: Brissa Joseph MD    Indications / Diagnosis:  Dizziness  ECG reviewed by me, the ED Provider: yes    Patient location:  ED  Previous ECG:     Previous ECG:  Compared to current    Comparison ECG info:  18 sep 2023    Similarity:  Changes noted  Interpretation:     Interpretation: non-specific    Rate:     ECG rate:  77    ECG rate assessment: normal    Rhythm:     Rhythm: sinus rhythm    Comments:      SR. No ischemia, ectopy or arrhythmia. Prolonged QT interval.            ED Course                               SBIRT 20yo+      Flowsheet Row Most Recent Value   Initial Alcohol Screen: US AUDIT-C     1.  How often do you have a drink containing alcohol? 0 Filed at: 10/15/2023 1645   2. How many drinks containing alcohol do you have on a typical day you are drinking? 0 Filed at: 10/15/2023 1645   3b. FEMALE Any Age, or MALE 65+: How often do you have 4 or more drinks on one occassion? 0 Filed at: 10/15/2023 1645   Audit-C Score 0 Filed at: 10/15/2023 1645   JERMAINE: How many times in the past year have you. .. Used an illegal drug or used a prescription medication for non-medical reasons? Never Filed at: 10/15/2023 1645                      Medical Decision Making  Problems Addressed:  Dizziness: complicated acute illness or injury with systemic symptoms that poses a threat to life or bodily functions  Heart block: complicated acute illness or injury with systemic symptoms that poses a threat to life or bodily functions    Amount and/or Complexity of Data Reviewed  Labs: ordered. Risk  Decision regarding hospitalization. Disposition  Final diagnoses:   Heart block   Dizziness     Time reflects when diagnosis was documented in both MDM as applicable and the Disposition within this note       Time User Action Codes Description Comment    10/15/2023  5:21 PM [place] L[place] Add [I45.9] Heart block     10/15/2023  5:21 PM [place] L[place] Add [R42] Dizziness           ED Disposition       ED Disposition   Admit    Condition   Stable    Date/Time   Sun Oct 15, 2023 1724    Comment   Case was discussed with Dr Joaquín Burden and the patient's admission status was agreed to be Admission Status: inpatient status to the service of Dr. Joaquín Burden . Follow-up Information    None         Patient's Medications   Discharge Prescriptions    No medications on file       No discharge procedures on file.     PDMP Review       None            ED Provider  Electronically Signed by             Dai Almanza MD  10/15/23 [phone]

## 2023-10-15 NOTE — ASSESSMENT & PLAN NOTE
2 week event monitoring; also noted with sinus pauses  Directed per Cardiology to present here for possible pacemaker placement  Will officially consult cardiology.   We will keep n.p.o. after midnight just in case procedure to occur tomorrow  Cont telemetry monitoring  Pt remains asymptomatic at this time  S/p recent Echo in 9/2023 thus no need to repeat  S/p recent TFT's in euthyroid state

## 2023-10-15 NOTE — PLAN OF CARE
Problem: INFECTION - ADULT  Goal: Absence or prevention of progression during hospitalization  Description: INTERVENTIONS:  - Assess and monitor for signs and symptoms of infection  - Monitor lab/diagnostic results  - Monitor all insertion sites, i.e. indwelling lines, tubes, and drains  - Monitor endotracheal if appropriate and nasal secretions for changes in amount and color  - [place] appropriate cooling/warming therapies per order  - Administer medications as ordered  - Instruct and encourage patient and family to use good hand hygiene technique  - Identify and instruct in appropriate isolation precautions for identified infection/condition  Outcome: Progressing  Goal: Absence of fever/infection during neutropenic period  Description: INTERVENTIONS:  - Monitor WBC    Outcome: Progressing

## 2023-10-15 NOTE — H&P
[address]  H&P  Name: Abeba Jernigan 53 y.o. female I MRN: [id number]  Unit/Bed#: MS 209Amanda I Date of Admission: 10/15/2023   Date of Service: 10/15/2023 I Hospital Day: 0      Assessment/Plan   * Heart block AV second degree  Assessment & Plan  2 week event monitoring; also noted with sinus pauses  Directed per Cardiology to present here for possible pacemaker placement  Will officially consult cardiology. We will keep n.p.o. after midnight just in case procedure to occur tomorrow  Cont telemetry monitoring  Pt remains asymptomatic at this time  S/p recent Echo in 9/2023 thus no need to repeat  S/p recent TFT's in euthyroid state    Morbid obesity ([address])  Assessment & Plan  Lifestyle/dietary modification  S/p gastric bypass  Used to be on Liraglutide for weight management    SHARI (obstructive sleep apnea)  Assessment & Plan  Has been non compliant with CPAP for 4 yrs         VTE Prophylaxis:  Low risk   / sequential compression device   Code Status: Full code  POLST: POLST is not applicable to this patient  Discussion with family: Plan of care discussed with patient    Anticipated Length of Stay:  Patient will be admitted on an Inpatient basis with an anticipated length of stay of more than 2 midnights. Justification for Hospital Stay: Symptomatic heart block, sinus pauses    Total Time for Visit, including Counseling / Coordination of Care: 60 minutes. Greater than 50% of this total time spent on direct patient counseling and coordination of care. Chief Complaint:   "I was sent in by my cardiologist due to abnormal reading on my event monitor"    History of Present Illness:    Abeba Jernigan is a 53 y.o. female medical history significant for morbid obesity with history of gastric bypass, sleep apnea though noncompliant with CPAP was directed to present to the ER due to having been found with sinus pauses on an event monitor.   Patient reports of a chronic history of intermittent dizziness and and lightheadedness. She had presented to the ER in September 2023 was noted with second-degree type I AV block. She was subsequently sent to cardiology for further evaluation. Initially she received a 24-hour monitor then recently had a 2-week event monitor. Readings on the monitor noted with sinus pauses, she was then advised per her cardiologist to present to the ER for consideration for pacemaker placement. Patient reports that otherwise she has been well with no worsening of her intermittent symptoms of dizziness. She denies any syncope. Upon presentation to the ER, was discussed with EP/cardiology recommended patient can remain at [address] [place] [address]    review of Systems:    Review of Systems   Neurological:  Positive for dizziness. All other systems reviewed and are negative. Past Medical and Surgical History:     Past Medical History:   Diagnosis Date    Anemia     History of transfusion     Hypercholesterolemia     Sleep apnea     Vitamin deficiency        Past Surgical History:   Procedure Laterality Date    ECTOPIC PREGNANCY SURGERY      EYE SURGERY      GASTRECTOMY SLEEVE LAPAROSCOPIC      MASS EXCISION N/A 8/1/2023    Procedure: EXCISION  BIOPSY LESION/MASS BACK;  Surgeon: Zeke Miller DO;  [facility];  Service: General    IA LAPAROSCOPY W/RMVL ADNEXAL STRUCTURES Bilateral 9/28/2021    Procedure: SALPINGECTOMY, LAPAROSCOPIC;  Surgeon: Harshad Valadez MD;  [facility];  Service: Gynecology    IA LAPS W/VAG HYSTERECT 250 GM/&RMVL TUBE&/OVARIES N/A 9/28/2021    Procedure: HYSTERECTOMY LAPAROSCOPIC ASSISTED VAGINAL (LAVH), LYSIS OF ADHESIONS;  Surgeon: Harshad Valadez MD;  [facility];  Service: Gynecology       Meds/Allergies:    Prior to Admission medications    Medication Sig Start Date End Date Taking?  Authorizing Provider   acetaminophen (TYLENOL) 500 mg tablet Take 2 tablets (1,000 mg total) by mouth every 6 (six) hours as needed for mild pain 8/25/23   Sonny Penny PA-C   Biotin 1 MG CAPS Take by mouth    Historical Provider, MD   cholecalciferol (VITAMIN D3) 25 mcg (1,000 units) tablet Take 1 tablet (1,000 Units total) by mouth daily 3/23/23 9/26/23  Hernandez [place], CRNP   cyanocobalamin (VITAMIN B-12) 100 mcg tablet Take by mouth daily    Historical Provider, MD   Diclofenac Sodium (VOLTAREN) 1 % Apply 2 g topically 4 (four) times a day 8/25/23   Sonny Penny PA-C   docusate sodium (COLACE) 100 mg capsule Take 1 capsule (100 mg total) by mouth 3 (three) times a day as needed for constipation (while taking narcotic pain medication) 8/1/23   Volodymyr Sullivan DO   ferrous sulfate 324 (65 Fe) mg Take 1 tablet (324 mg total) by mouth 3 (three) times a day before meals 9/28/21   Argenis Vera MD   ibuprofen (MOTRIN) 600 mg tablet Take 1 tablet (600 mg total) by mouth every 6 (six) hours as needed for mild pain 8/25/23   Sonny Penny PA-C   Insulin Pen Needle (BD Pen Needle Sarita 2nd Gen) 32G X 4 MM MISC Use in the morning 6/19/23 9/17/23  DAVE Alaniz   lidocaine (LIDODERM) 5 % Apply 1 patch topically over 12 hours every 24 hours Remove & Discard patch within 12 hours or as directed by MD 8/25/23   Sonny Penny PA-C   liraglutide [facility]) injection Inject 0.3 mL (1.8 mg total) under the skin daily  Patient not taking: Reported on 9/26/2023 8/29/23 9/28/23  Hernandez LouisvilleALIZANP   multivitamin (THERAGRAN) TABS Take 1 tablet by mouth daily    Historical Provider, MD   Omega-3 Fatty Acids (fish oil) 1,000 mg Take 1,000 mg by mouth daily    Historical Provider, MD   pantoprazole (PROTONIX) 40 mg tablet Take 40 mg by mouth every morning 4/22/23   Historical Provider, MD     I have reviewed home medications with patient personally. Allergies:    Allergies   Allergen Reactions    Penicillin G Other (See Comments)    Pollen Extract Sneezing       Social History:     Marital Status: /Civil Union Occupation: Works as a supervisor in the [employer]  Patient Pre-hospital Living Situation: Resides at home  Patient Pre-hospital Level of Mobility: Independent  Patient Pre-hospital Diet Restrictions: None  Substance Use History:   Social History     Substance and Sexual Activity   Alcohol Use Never     Social History     Tobacco Use   Smoking Status Never    Passive exposure: Never   Smokeless Tobacco Never     Social History     Substance and Sexual Activity   Drug Use No       Family History:    Family History   Problem Relation Age of Onset    Brain cancer Mother     Cancer Father     Asthma Brother     Diabetes Maternal Grandmother     Cancer Maternal Grandmother     Hypertension Paternal Grandmother        Physical Exam:     Vitals:   Blood Pressure: 121/83 (10/15/23 1930)  Pulse: 72 (10/15/23 1930)  Temperature: 98.2 °F (36.8 °C) (10/15/23 1930)  Temp Source: Oral (10/15/23 1620)  Respirations: 19 (10/15/23 1930)  Height: 5' 5" (165.1 cm) (10/15/23 1730)  Weight - Scale: 114 kg (252 lb) (10/15/23 1730)  SpO2: 96 % (10/15/23 1930)    Physical Exam  Constitutional:       Appearance: She is obese. Cardiovascular:      Rate and Rhythm: Normal rate and regular rhythm. Pulses: Normal pulses. Heart sounds: Normal heart sounds. No murmur heard. Pulmonary:      Effort: Pulmonary effort is normal. No respiratory distress. Breath sounds: Normal breath sounds. No wheezing or rales. Abdominal:      General: Abdomen is flat. Bowel sounds are normal. There is no distension. Palpations: Abdomen is soft. Tenderness: There is no abdominal tenderness. There is no guarding. Musculoskeletal:         General: Normal range of motion. Cervical back: Normal range of motion and neck supple. Right lower leg: No edema. Left lower leg: No edema. Skin:     General: Skin is warm and dry. Neurological:      General: No focal deficit present.       Mental Status: She is alert and oriented to person, place, and time. Mental status is at baseline. Cranial Nerves: No cranial nerve deficit. Motor: No weakness. Additional Data:     Lab Results: I have personally reviewed pertinent reports. Results from last 7 days   Lab Units 10/15/23  1622   WBC Thousand/uL 4.90   HEMOGLOBIN g/dL 13.1   HEMATOCRIT % 41.0   PLATELETS Thousands/uL 239   NEUTROS PCT % 37*   LYMPHS PCT % 47*   MONOS PCT % 15*   EOS PCT % 1     Results from last 7 days   Lab Units 10/15/23  1622   SODIUM mmol/L 141   POTASSIUM mmol/L 3.8   CHLORIDE mmol/L 106   CO2 mmol/L 31   BUN mg/dL 13   CREATININE mg/dL 0.74   ANION GAP mmol/L 4   CALCIUM mg/dL 9.2   ALBUMIN g/dL 4.1   TOTAL BILIRUBIN mg/dL 0.42   ALK PHOS U/L 54   ALT U/L 9   AST U/L 16   GLUCOSE RANDOM mg/dL 98                       Imaging: No imaging to review    No orders to display       EKG, Pathology, and Other Studies Reviewed on Admission:   EKG: NSR at 77bpm, nml axis    Allscripts / Epic Records Reviewed: Yes     ** Please Note: This note has been constructed using a voice recognition system.  **

## 2023-10-16 LAB
ANION GAP SERPL CALCULATED.3IONS-SCNC: 6 MMOL/L
ATRIAL RATE: 77 BPM
BUN SERPL-MCNC: 12 MG/DL (ref 5–25)
CALCIUM SERPL-MCNC: 9 MG/DL (ref 8.4–10.2)
CHLORIDE SERPL-SCNC: 105 MMOL/L (ref 96–108)
CO2 SERPL-SCNC: 28 MMOL/L (ref 21–32)
CREAT SERPL-MCNC: 0.65 MG/DL (ref 0.6–1.3)
ERYTHROCYTE [DISTWIDTH] IN BLOOD BY AUTOMATED COUNT: 13.3 % (ref 11.6–15.1)
GFR SERPL CREATININE-BSD FRML MDRD: 106 ML/MIN/1.73SQ M
GLUCOSE SERPL-MCNC: 89 MG/DL (ref 65–140)
HCT VFR BLD AUTO: 39.8 % (ref 34.8–46.1)
HGB BLD-MCNC: 12.8 G/DL (ref 11.5–15.4)
MCH RBC QN AUTO: 26.7 PG (ref 26.8–34.3)
MCHC RBC AUTO-ENTMCNC: 32.2 G/DL (ref 31.4–37.4)
MCV RBC AUTO: 83 FL (ref 82–98)
P AXIS: 65 DEGREES
PLATELET # BLD AUTO: 226 THOUSANDS/UL (ref 149–390)
PMV BLD AUTO: 9.5 FL (ref 8.9–12.7)
POTASSIUM SERPL-SCNC: 3.6 MMOL/L (ref 3.5–5.3)
PR INTERVAL: 128 MS
QRS AXIS: 58 DEGREES
QRSD INTERVAL: 74 MS
QT INTERVAL: 622 MS
QTC INTERVAL: 703 MS
RBC # BLD AUTO: 4.8 MILLION/UL (ref 3.81–5.12)
SODIUM SERPL-SCNC: 139 MMOL/L (ref 135–147)
T WAVE AXIS: 84 DEGREES
VENTRICULAR RATE: 77 BPM
WBC # BLD AUTO: 4.63 THOUSAND/UL (ref 4.31–10.16)

## 2023-10-16 PROCEDURE — 93010 ELECTROCARDIOGRAM REPORT: CPT | Performed by: INTERNAL MEDICINE

## 2023-10-16 PROCEDURE — 80048 BASIC METABOLIC PNL TOTAL CA: CPT | Performed by: INTERNAL MEDICINE

## 2023-10-16 PROCEDURE — 85027 COMPLETE CBC AUTOMATED: CPT | Performed by: INTERNAL MEDICINE

## 2023-10-16 PROCEDURE — 99239 HOSP IP/OBS DSCHRG MGMT >30: CPT | Performed by: FAMILY MEDICINE

## 2023-10-16 PROCEDURE — 99223 1ST HOSP IP/OBS HIGH 75: CPT | Performed by: INTERNAL MEDICINE

## 2023-10-16 PROCEDURE — NC001 PR NO CHARGE: Performed by: FAMILY MEDICINE

## 2023-10-16 RX ORDER — CHLORAL HYDRATE 500 MG
1000 CAPSULE ORAL DAILY
Status: CANCELLED | OUTPATIENT
Start: 2023-10-16

## 2023-10-16 RX ORDER — IBUPROFEN 600 MG/1
600 TABLET ORAL EVERY 6 HOURS PRN
Status: CANCELLED | OUTPATIENT
Start: 2023-10-16

## 2023-10-16 RX ORDER — FERROUS SULFATE 325(65) MG
325 TABLET ORAL 2 TIMES DAILY WITH MEALS
Status: CANCELLED | OUTPATIENT
Start: 2023-10-16

## 2023-10-16 RX ORDER — DOCUSATE SODIUM 100 MG/1
100 CAPSULE, LIQUID FILLED ORAL 3 TIMES DAILY PRN
Status: CANCELLED | OUTPATIENT
Start: 2023-10-16

## 2023-10-16 RX ORDER — PANTOPRAZOLE SODIUM 40 MG/1
40 TABLET, DELAYED RELEASE ORAL EVERY MORNING
Status: CANCELLED | OUTPATIENT
Start: 2023-10-17

## 2023-10-16 RX ADMIN — PANTOPRAZOLE SODIUM 40 MG: 40 TABLET, DELAYED RELEASE ORAL at 08:39

## 2023-10-16 RX ADMIN — FERROUS SULFATE TAB 325 MG (65 MG ELEMENTAL FE) 325 MG: 325 (65 FE) TAB at 07:41

## 2023-10-16 RX ADMIN — Medication 1000 UNITS: at 08:39

## 2023-10-16 RX ADMIN — VITAM B12 100 MCG: 100 TAB at 08:39

## 2023-10-16 RX ADMIN — B-COMPLEX W/ C & FOLIC ACID TAB 1 TABLET: TAB at 08:39

## 2023-10-16 RX ADMIN — OMEGA-3 FATTY ACIDS CAP 1000 MG 1000 MG: 1000 CAP at 08:39

## 2023-10-16 NOTE — DISCHARGE SUMMARY
[address]  [facility] 1975[facility] 53 [facility] MRN: [id number]  Unit/Bed#: -Tory Encounter: [id number]  Primary Care Provider: DAVE Garcia   Date and time admitted to hospital: 10/15/2023  4:13 PM    * Heart block AV second degree  Assessment & Plan  History of intermittent dizziness for months  Initially had outpatient Holter done following which she had event monitor placed  Sent in by cardiology as noted to have sinus pause/second-degree AV block on event monitoring. Cardiology consulted  Patient needs pacemaker placement  Cont telemetry monitoring  Labs noted no electrolyte abnormalities. Troponins negative    S/p recent Echo in 9/2023 WNL  S/p recent TFT's in euthyroid state    ** DW cardiology ; equipment for pacemaker not available until 10/18/2023. Recommendation for transfer to [facility]. SHARI (obstructive sleep apnea)  Assessment & Plan  Has been non compliant with CPAP for 4 yrs    Morbid obesity ([address])  Assessment & Plan  Lifestyle/dietary modification  S/p gastric bypass  Used to be on Liraglutide for weight management    Discharging Physician / Practitioner: Jenn Parra MD  PCP: Domonique Healy, [facility]  Admission Date:   Admission Orders (From admission, onward)       Ordered        10/15/23 [facility]                          Discharge Date: 10/17/23    Disposition:      LESLIE chaudhry    Reason for Admission: lightheadedness    Discharge Diagnoses:     Please see assessment and plan section above for further details regarding discharge diagnoses.      Medical Problems       Resolved Problems  Date Reviewed: 10/15/2023   None         Consultations During Hospital Stay:  cardiology      Medication Adjustments and Discharge Medications:  Summary of Medication Adjustments made as a result of this hospitalization: see med rec  Medication Dosing Tapers - Please refer to Discharge Medication List for details on any medication dosing tapers (if applicable to patient). Medications being temporarily held (include recommended restart time): see med rec  Discharge Medication List: See after visit summary for reconciled discharge medications. Diet Recommendations at Discharge:  Diet -        Diet Orders   (From admission, onward)                 Start     Ordered    10/16/23 1308  Diet Cardiovascular; Sodium 2 GM  Diet effective now        References:    Adult Nutrition Support Algorithm    RD Therapeutic Diet Order Protocol   Question Answer Comment   Diet Type Cardiovascular    Cardiac Sodium 2 GM    RD to adjust diet per protocol? Yes        10/16/23 [address] Course:     Ryder Sherman is a 53 y.o. female patient who originally presented to the hospital on 10/15/2023 due to intermittent dizziness. Initially had outpatient Holter done following which she had an event monitor placed. Sent in by cardiology as noted to have sinus pause/second-degree AV block on event monitoring. Cardiology consulted, initial plan for pacemaker placement here at [facility] however equipment not available until 10/18/2023. Cardiology recommended transfer to [address] [place] [address]. Patient has been accepted by the hospitalist/cardiology service at [facility]. EDILIA to [place]    Condition at Discharge: fair     Discharge Day Visit / Exam:     Vitals: Blood Pressure: 127/80 (10/16/23 1439)  Pulse: 65 (10/16/23 1439)  Temperature: 98.5 °F (36.9 °C) (10/16/23 1439)  Temp Source: Oral (10/15/23 1620)  Respirations: 18 (10/16/23 1439)  Height: 5' 5" (165.1 cm) (10/15/23 1730)  Weight - Scale: 114 kg (252 lb) (10/15/23 1730)  SpO2: 99 % (10/16/23 1439)  Exam:   Physical Exam  Constitutional:       General: She is not in acute distress. Appearance: She is obese. She is not toxic-appearing. HENT:      Mouth/Throat:      Mouth: Mucous membranes are moist.      Pharynx: Oropharynx is clear.    Cardiovascular: Rate and Rhythm: Regular rhythm. Bradycardia present. Pulses: Normal pulses. Pulmonary:      Effort: Pulmonary effort is normal. No respiratory distress. Breath sounds: Normal breath sounds. Abdominal:      General: Abdomen is flat. Bowel sounds are normal.      Palpations: Abdomen is soft. Musculoskeletal:      Right lower leg: No edema. Left lower leg: No edema. Neurological:      General: No focal deficit present. Mental Status: She is alert and oriented to person, place, and time. Goals of Care Discussions:  Code Status at Discharge: Level 1 - Full Code      Discharge instructions/Information to patient and family:   See after visit summary section titled Discharge Instructions for information provided to patient and family. Discharge Statement:  I spent 40 minutes discharging the patient. This time was spent on the day of discharge. I had direct contact with the patient on the day of discharge. Greater than 50% of the total time was spent examining patient, answering all patient questions, arranging and discussing plan of care with patient as well as directly providing post-discharge instructions. Additional time then spent on discharge activities.     ** Please Note: This note has been constructed using a voice recognition system **

## 2023-10-16 NOTE — CONSULTS
Consultation - Cardiology   Payton Rivera 53 y.o. female MRN: [id number]  Unit/Bed#: MS Greta-Tory Encounter: [id number]  10/16/23  1:46 PM    Assessment/ Plan:  CHB  Episodes noted of event monitor with sinus pauses of up to 3.4 sec  Plan for PPM placement, likely tomorrow. Risks/benefits/alternatives discussed with patient. Patient agreeable to pacemaker placement. Continue telemetry monitoring. Avoid AV blocking agents. 2.  Second-degree AV block Mobitz 1  Noted on event monitor  See plan above    3. SHARI  Patient does not use CPAP regularly. History of Present Illness   Physician Requesting Consult: Jenn Parra MD    Reason for Consult / Principal Problem: Heart block, bradycardia, sinus pause    HPI: Payton Rivera is a 53y.o. year old female with PMH of SHARI and gastric bypass who presents with abnormal event monitor. Patient was evaluated in the cardiology office for episodic hypotensetion and 2nd degree AVB type I. She wore 2 week zio patch which found Second degree AV block mobitz 1 as well as episode of complete heart block with pauses up to 3.4seconds. Patient was advised to go to the ED for further monitoring and PPM placement on 10/13/23. Patient notes that she is comfortable at this time. She notes intermittent dizziness. She denies any syncopal episodes. Inpatient consult to Cardiology  Consult performed by: DAVE Wesley  Consult ordered by: DO KRISTIN Nichols event monitor  13 day Zio cardiac monitor 9/18/23:  Patient had a min HR of 27 bpm, max HR of 171 bpm, and avg HR of 73 bpm. Predominant underlying rhythm was Sinus Rhythm. Slight P wave morphology changes were noted. 3 Supraventricular Tachycardia runs occurred, the run with the fastest interval lasting 6 beats with a max rate of 169 bpm, the longest lasting 6 beats with an avg rate of 150 bpm. 1 Pause occurred lasting 3.4 secs (18 bpm). Pause occurred due to High Grade AV Block.  1 episode(s) of AV Block (High Grade) occurred, lasting a total of 2 secs. Second Degree AV Block-Mobitz I (Wenckebach) was present. Isolated SVEs were rare (<1.0%), SVE Couplets were rare (<1.0%), and SVE Triplets were rare (<1.0%). Isolated VEs were rare (<1.0%, 358), VE Couplets were rare (<1.0%, 1), and VE Triplets were rare (<1.0%, 1). Ventricular Trigeminy was present. Difficulty discerning atrial activity during High Grade episode making definitive diagnosis difficult to ascertain. MD notification criteria for High Grade AV Block met - report posted prior to notification per account request (DI). I called pt. she continues to have some dizziness but no bozena syncope. I reviewed the monitor results with the patient which does show periods of complete heart block with pauses up to 3.4 seconds. Permanent pacemaker is recommended. I recommend that she go to the emergency room to be monitored until she receives a permanent pacemaker. The patient understands and agrees. Review of Systems   Constitutional:  Negative for chills, diaphoresis and fever. Respiratory:  Negative for cough, chest tightness and shortness of breath. Cardiovascular:  Negative for chest pain, palpitations and leg swelling. Gastrointestinal:  Negative for abdominal distention, blood in stool, nausea and vomiting. Genitourinary:  Negative for difficulty urinating. Musculoskeletal:  Negative for arthralgias and back pain. Neurological:  Positive for dizziness. Negative for syncope, light-headedness and headaches. Psychiatric/Behavioral:  Negative for agitation and confusion. The patient is not nervous/anxious.         Historical Information   Past Medical History:   Diagnosis Date    Anemia     History of transfusion     Hypercholesterolemia     Sleep apnea     Vitamin deficiency      Past Surgical History:   Procedure Laterality Date    ECTOPIC PREGNANCY SURGERY      EYE SURGERY      GASTRECTOMY SLEEVE LAPAROSCOPIC      MASS EXCISION N/A 8/1/2023    Procedure: EXCISION  BIOPSY LESION/MASS BACK;  Surgeon: Riya Hermosillo DO;  [facility];  Service: General    HI LAPAROSCOPY W/RMVL ADNEXAL STRUCTURES Bilateral 9/28/2021    Procedure: SALPINGECTOMY, LAPAROSCOPIC;  Surgeon: Young Young MD;  [facility];  Service: Gynecology    HI LAPS W/VAG HYSTERECT 250 GM/&RMVL TUBE&/OVARIES N/A 9/28/2021    Procedure: HYSTERECTOMY LAPAROSCOPIC ASSISTED VAGINAL (LAVH), LYSIS OF ADHESIONS;  Surgeon: Young Young MD;  [facility];  Service: Gynecology     Social History     Substance and Sexual Activity   Alcohol Use Never     Social History     Substance and Sexual Activity   Drug Use No     Social History     Tobacco Use   Smoking Status Never    Passive exposure: Never   Smokeless Tobacco Never       Family History:   Family History   Problem Relation Age of Onset    Brain cancer Mother     Cancer Father     Asthma Brother     Diabetes Maternal Grandmother     Cancer Maternal Grandmother     Hypertension Paternal Grandmother        Meds/Allergies   all current active meds have been reviewed  Allergies   Allergen Reactions    Penicillin G Other (See Comments)    Pollen Extract Sneezing       Objective   Vitals: Blood pressure 129/82, pulse 58, temperature 98.1 °F (36.7 °C), resp. rate 18, height 5' 5" (1.651 m), weight 114 kg (252 lb), last menstrual period 09/12/2021, SpO2 99 %. , Body mass index is 41.93 kg/m².,   Orthostatic Blood Pressures      Flowsheet Row Most Recent Value   Blood Pressure 129/82 filed at 10/16/2023 1153            Systolic (14PWT), XNU:334 , Min:110 , AFE:184     Diastolic (58KTX), FLAQUITO:11, Min:68, Max:84        Intake/Output Summary (Last 24 hours) at 10/16/2023 1346  Last data filed at 10/16/2023 0900  Gross per 24 hour   Intake 0 ml   Output --   Net 0 ml       Invasive Devices       Peripheral Intravenous Line  Duration             Peripheral IV 10/15/23 Right Antecubital <1 day Physical Exam:  Physical Exam  Vitals and nursing note reviewed. Constitutional:       General: She is not in acute distress. Appearance: She is well-developed. HENT:      Head: Normocephalic and atraumatic. Eyes:      Conjunctiva/sclera: Conjunctivae normal.   Neck:      Vascular: No carotid bruit. Cardiovascular:      Rate and Rhythm: Normal rate and regular rhythm. Heart sounds: Normal heart sounds. No murmur heard. Pulmonary:      Effort: Pulmonary effort is normal. No respiratory distress. Breath sounds: Normal breath sounds. Abdominal:      Palpations: Abdomen is soft. Tenderness: There is no abdominal tenderness. Musculoskeletal:         General: No swelling. Cervical back: Neck supple. Right lower leg: No edema. Left lower leg: No edema. Skin:     General: Skin is warm and dry. Capillary Refill: Capillary refill takes less than 2 seconds. Neurological:      Mental Status: She is alert and oriented to person, place, and time.    Psychiatric:         Mood and Affect: Mood normal.          Lab Results:     Cardiac Profile:   Results from last 7 days   Lab Units 10/15/23  2043 10/15/23  1903 10/15/23  1622   HS TNI 0HR ng/L  --   --  <2   HS TNI 2HR ng/L  --  <2  --    HS TNI 4HR ng/L <2  --   --        CBC with diff:   Results from last 7 days   Lab Units 10/16/23  0523 10/15/23  1622   WBC Thousand/uL 4.63 4.90   HEMOGLOBIN g/dL 12.8 13.1   HEMATOCRIT % 39.8 41.0   MCV fL 83 84   PLATELETS Thousands/uL 226 239   RBC Million/uL 4.80 4.91   MCH pg 26.7* 26.7*   MCHC g/dL 32.2 32.0   RDW % 13.3 13.3   MPV fL 9.5 9.6   NRBC AUTO /100 WBCs  --  0         CMP:   Results from last 7 days   Lab Units 10/16/23  0523 10/15/23  1622   POTASSIUM mmol/L 3.6 3.8   CHLORIDE mmol/L 105 106   CO2 mmol/L 28 31   BUN mg/dL 12 13   CREATININE mg/dL 0.65 0.74   CALCIUM mg/dL 9.0 9.2   AST U/L  --  16   ALT U/L  --  9   ALK PHOS U/L  --  54   EGFR ml/min/1.73sq m 106 96

## 2023-10-16 NOTE — PLAN OF CARE
Problem: PAIN - ADULT  Goal: Verbalizes/displays adequate comfort level or baseline comfort level  Description: Interventions:  - Encourage patient to monitor pain and request assistance  - Assess pain using appropriate pain scale  - Administer analgesics based on type and severity of pain and evaluate response  - Implement non-pharmacological measures as appropriate and evaluate response  - Consider cultural and social influences on pain and pain management  - Notify physician/advanced practitioner if interventions unsuccessful or patient reports new pain  Outcome: Progressing     Problem: INFECTION - ADULT  Goal: Absence or prevention of progression during hospitalization  Description: INTERVENTIONS:  - Assess and monitor for signs and symptoms of infection  - Monitor lab/diagnostic results  - Monitor all insertion sites, i.e. indwelling lines, tubes, and drains  - Monitor endotracheal if appropriate and nasal secretions for changes in amount and color  - [place] appropriate cooling/warming therapies per order  - Administer medications as ordered  - Instruct and encourage patient and family to use good hand hygiene technique  - Identify and instruct in appropriate isolation precautions for identified infection/condition  Outcome: Progressing  Goal: Absence of fever/infection during neutropenic period  Description: INTERVENTIONS:  - Monitor WBC    Outcome: Progressing     Problem: SAFETY ADULT  Goal: Patient will remain free of falls  Description: INTERVENTIONS:  - Educate patient/family on patient safety including physical limitations  - Instruct patient to call for assistance with activity   - Consult OT/PT to assist with strengthening/mobility   - Keep Call bell within reach  - Keep bed low and locked with side rails adjusted as appropriate  - Keep care items and personal belongings within reach  - Initiate and maintain comfort rounds  - Make Fall Risk Sign visible to staff  - Offer Toileting every 2 Hours, in advance of need  - Obtain necessary fall risk management equipment: nonskid socks  - Apply yellow socks and bracelet for high fall risk patients  - Consider moving patient to room near nurses station  Outcome: Progressing  Goal: Maintain or return to baseline ADL function  Description: INTERVENTIONS:  -  Assess patient's ability to carry out ADLs; assess patient's baseline for ADL function and identify physical deficits which impact ability to perform ADLs (bathing, care of mouth/teeth, toileting, grooming, dressing, etc.)  - Assess/evaluate cause of self-care deficits   - Assess range of motion  - Assess patient's mobility; develop plan if impaired  - Assess patient's need for assistive devices and provide as appropriate  - Encourage maximum independence but intervene and supervise when necessary  - Involve family in performance of ADLs  - Assess for home care needs following discharge   - Consider OT consult to assist with ADL evaluation and planning for discharge  - Provide patient education as appropriate  Outcome: Progressing  Goal: Maintains/Returns to pre admission functional level  Description: INTERVENTIONS:  - Perform BMAT or MOVE assessment daily.   - Set and communicate daily mobility goal to care team and patient/family/caregiver. - Collaborate with rehabilitation services on mobility goals if consulted  - Perform Range of Motion 4 times a day. - Reposition patient every 4 hours.   - Dangle patient 4 times a day  - Stand patient 4 times a day  - Ambulate patient 4 times a day  - Out of bed to chair 3 times a day   - Out of bed for meals 3 times a day  - Out of bed for toileting  - Record patient progress and toleration of activity level   Outcome: Progressing     Problem: DISCHARGE PLANNING  Goal: Discharge to home or other facility with appropriate resources  Description: INTERVENTIONS:  - Identify barriers to discharge w/patient and caregiver  - Arrange for needed discharge resources and transportation as appropriate  - Identify discharge learning needs (meds, wound care, etc.)  - Arrange for interpretive services to assist at discharge as needed  - Refer to Case Management Department for coordinating discharge planning if the patient needs post-hospital services based on physician/advanced practitioner order or complex needs related to functional status, cognitive ability, or social support system  Outcome: Progressing     Problem: Knowledge Deficit  Goal: Patient/family/caregiver demonstrates understanding of disease process, treatment plan, medications, and discharge instructions  Description: Complete learning assessment and assess knowledge base.   Interventions:  - Provide teaching at level of understanding  - Provide teaching via preferred learning methods  Outcome: Progressing

## 2023-10-16 NOTE — ASSESSMENT & PLAN NOTE
History of intermittent dizziness for months  Initially had outpatient Holter done following which she had event monitor placed  Sent in by cardiology as noted to have sinus pause/second-degree AV block on event monitoring. Cardiology consulted  Patient needs pacemaker placement  Cont telemetry monitoring  Labs noted no electrolyte abnormalities. Troponins negative    S/p recent Echo in 9/2023 WNL  S/p recent TFT's in euthyroid state    ** DW cardiology ; equipment for pacemaker not available until 10/18/2023. Recommendation for transfer to [facility].

## 2023-10-16 NOTE — ASSESSMENT & PLAN NOTE
History of intermittent dizziness for months  Initially had outpatient Holter done following which she had event monitor placed  Sent in by cardiology as noted to have sinus pause/second-degree AV block on event monitoring. Cardiology consulted  Patient currently n.p.o. for possible pacemaker placement  Cont telemetry monitoring  Labs noted no electrolyte abnormalities.   Troponins negative    S/p recent Echo in 9/2023 WNL  S/p recent TFT's in euthyroid state

## 2023-10-16 NOTE — PROGRESS NOTES
[address]  Progress Note  Name: Bertha Lopez  MRN: [id number]  Unit/Bed#: -01 I Date of Admission: 10/15/2023   Date of Service: 10/16/2023 I Hospital Day: 1    Assessment/Plan   * Heart block AV second degree  Assessment & Plan  History of intermittent dizziness for months  Initially had outpatient Holter done following which she had event monitor placed  Sent in by cardiology as noted to have sinus pause/second-degree AV block on event monitoring. Cardiology consulted  Patient currently n.p.o. for possible pacemaker placement  Cont telemetry monitoring  Labs noted no electrolyte abnormalities. Troponins negative    S/p recent Echo in 2023 WNL  S/p recent TFT's in euthyroid state    SHARI (obstructive sleep apnea)  Assessment & Plan  Has been non compliant with CPAP for 4 yrs    Morbid obesity ([address])  Assessment & Plan  Lifestyle/dietary modification  S/p gastric bypass  Used to be on Liraglutide for weight management         VTE Pharmacologic Prophylaxis:   Pharmacologic:  ambulatory  Mechanical VTE Prophylaxis in Place: No    Patient Centered Rounds: I have performed bedside rounds with nursing staff today. Discussions with Specialists or Other Care Team Provider: cardiology    Education and Discussions with Family / Patient: patient    Time Spent for Care: 30 minutes. More than 50% of total time spent on counseling and coordination of care as described above.     Current Length of Stay: 1 day(s)    Current Patient Status: Inpatient   Certification Statement: The patient will continue to require additional inpatient hospital stay due to needs pm    Discharge Plan: ?24-48hrs    Code Status: Level 1 - Full Code      Subjective:   HR 50-60 range  Currently denies any chest pain, shortness of breath, lightheadedness    Objective:     Vitals:   Temp (24hrs), Av.1 °F (36.7 °C), Min:97.4 °F (36.3 °C), Max:98.5 °F (36.9 °C)    Temp:  [97.4 °F (36.3 °C)-98.5 °F (36.9 °C)] 98.1 °F (36.7 °C)  HR:  [53-79] 58  Resp:  [16-19] 18  BP: (110-129)/(68-84) 129/82  SpO2:  [95 %-100 %] 99 %  Body mass index is 41.93 kg/m². Input and Output Summary (last 24 hours):     No intake or output data in the 24 hours ending 10/16/23 1326    Physical Exam:     Physical Exam  Constitutional:       General: She is not in acute distress. Appearance: She is obese. She is not toxic-appearing. HENT:      Mouth/Throat:      Mouth: Mucous membranes are moist.      Pharynx: Oropharynx is clear. Cardiovascular:      Rate and Rhythm: Normal rate and regular rhythm. Pulses: Normal pulses. Pulmonary:      Effort: Pulmonary effort is normal. No respiratory distress. Breath sounds: Normal breath sounds. Abdominal:      General: Abdomen is flat. Bowel sounds are normal.      Palpations: Abdomen is soft. Neurological:      Mental Status: She is alert and oriented to person, place, and time. Additional Data:     Labs:    Results from last 7 days   Lab Units 10/16/23  0523 10/15/23  1622   WBC Thousand/uL 4.63 4.90   HEMOGLOBIN g/dL 12.8 13.1   HEMATOCRIT % 39.8 41.0   PLATELETS Thousands/uL 226 239   NEUTROS PCT %  --  37*   LYMPHS PCT %  --  47*   MONOS PCT %  --  15*   EOS PCT %  --  1     Results from last 7 days   Lab Units 10/16/23  0523 10/15/23  1622   SODIUM mmol/L 139 141   POTASSIUM mmol/L 3.6 3.8   CHLORIDE mmol/L 105 106   CO2 mmol/L 28 31   BUN mg/dL 12 13   CREATININE mg/dL 0.65 0.74   ANION GAP mmol/L 6 4   CALCIUM mg/dL 9.0 9.2   ALBUMIN g/dL  --  4.1   TOTAL BILIRUBIN mg/dL  --  0.42   ALK PHOS U/L  --  54   ALT U/L  --  9   AST U/L  --  16   GLUCOSE RANDOM mg/dL 89 98                         * I Have Reviewed All Lab Data Listed Above. * Additional Pertinent Lab Tests Reviewed:  All Labs Within Last 24 Hours Reviewed      Recent Cultures (last 7 days):           Last 24 Hours Medication List:   Current Facility-Administered Medications   Medication Dose Route Frequency Provider Last Rate    cholecalciferol  1,000 Units Oral Daily Mancel Izard, DO      cyanocobalamin  100 mcg Oral Daily Mancel Izard, DO      ferrous sulfate  325 mg Oral Daily With Breakfast Keila Pathak, DO      fish oil  1,000 mg Oral Daily Mancel Izard, DO      multivitamin stress formula  1 tablet Oral Daily Keila Mariah Pathak, DO      pantoprazole  40 mg Oral QAM Mancel Izard, DO          Today, Patient Was Seen By: Sandeep Urbina M.D.     ** Please Note: Dictation voice to text software may have been used in the creation of this document.  **

## 2023-10-16 NOTE — UTILIZATION REVIEW
Initial Clinical Review    Admission: Date/Time/Statement:   Admission Orders (From admission, onward)       Ordered        10/15/23 [facility]  Once                          Orders Placed This Encounter   Procedures    INPATIENT ADMISSION     Standing Status:   Standing     Number of Occurrences:   1     Order Specific Question:   Level of Care     Answer:   Med Surg [16]     Order Specific Question:   Estimated length of stay     Answer:   More than 2 Midnights     Order Specific Question:   Certification     Answer:   I certify that inpatient services are medically necessary for this patient for a duration of greater than two midnights. See H&P and MD Progress Notes for additional information about the patient's course of treatment. ED Arrival Information       Expected   -    Arrival   10/15/2023 16:08    Acuity   Emergent              Means of arrival   Walk-In    Escorted by   Family Member    Service   Hospitalist    Admission type   Emergency              Arrival complaint   rapid heart rapid             Chief Complaint   Patient presents with    Evaluation of Abnormal Diagnostic Test     Pt was placed on halter monitor and got results today, advised by her provider to come in due to her HR being in the 20's. Pt reporting intermittent episodes of lightheadedness but denies complaints at this time       Initial Presentation: 53 y.o. female to the ED from home with complaints of liehgtheadedness, near syncope, wearing 2 week holter monitor and told to report to ED for sinus pause/second degree heart block. Admitted to inpatient for heart block AV, second degree. H/O SHARI, morbid obesity, gastric bypass. Has not worn CPAP for 4 years. EKG shows SR on arrival. MOnitor tele. Cardiology consult. Date: 10/16   Day 2:Continue to monitor. Plan for PPM placement. Cardiology consult:  Episodes on monitor of complete heart block/pauses. Pauses up to 3.4 sec. Plan for pacemaker placement. Avoid av blocking agents. Continue to monitor tele. Having intermittent dizziness. ED Triage Vitals   Temperature Pulse Respirations Blood Pressure SpO2   10/15/23 1620 10/15/23 1619 10/15/23 1619 10/15/23 1619 10/15/23 1619   98.5 °F (36.9 °C) 79 16 120/69 100 %      Temp Source Heart Rate Source Patient Position - Orthostatic VS BP Location FiO2 (%)   10/15/23 1620 10/15/23 1619 -- 10/15/23 1619 --   Oral Monitor  Left arm       Pain Score       10/15/23 2215       No Pain          Wt Readings from Last 1 Encounters:   10/15/23 114 kg (252 lb)     Additional Vital Signs:   te/Time Temp Pulse Resp BP MAP (mmHg) SpO2 O2 Device   10/16/23 11:53:59 98.1 °F (36.7 °C) 58 18 129/82 98 99 % --   10/16/23 07:21:48 98.1 °F (36.7 °C) 53 Abnormal  18 115/68 84 97 % --   10/16/23 02:31:19 97.4 °F (36.3 °C) Abnormal  60 18 120/73 89 95 % --   10/15/23 22:05:35 98.2 °F (36.8 °C) 68 18 120/81 94 96 % --   10/15/23 19:30:13 98.2 °F (36.8 °C) 72 19 121/83 96 96 % --   10/15/23 18:08:47 98 °F (36.7 °C) 66 -- 121/84 96 96 % --   10/15/23 18:07:26 98 °F (36.7 °C) -- -- 121/84 96 -- --   10/15/23 1730 -- 68 16 110/77 88 98 % None (Room air)   10/15/23 1620 98.5 °F (36.9 °C) -- -- -- -- -- --   10/15/23 1619 -- 79 16 120/69 -- 100 % None (Room ai     Pertinent Labs/Diagnostic Test Results:   10/15 EKG: Patient location:  ED   Previous ECG:     Previous ECG:  Compared to current     Comparison ECG info:  18 sep 2023     Similarity:  Changes noted   Interpretation:     Interpretation: non-specific    Rate:     ECG rate:  77     ECG rate assessment: normal    Rhythm:     Rhythm: sinus rhythm    Comments:      SR. No ischemia, ectopy or arrhythmia. Prolonged QT interval.     13 day Zio cardiac monitor 9/18/23:  Patient had a min HR of 27 bpm, max HR of 171 bpm, and avg HR of 73 bpm. Predominant underlying rhythm was Sinus Rhythm. Slight P wave morphology changes were noted.  3 Supraventricular Tachycardia runs occurred, the run with the fastest interval lasting 6 beats with a max rate of 169 bpm, the longest lasting 6 beats with an avg rate of 150 bpm. 1 Pause occurred lasting 3.4 secs (18 bpm). Pause occurred due to High Grade AV Block. 1 episode(s) of AV Block (High Grade) occurred, lasting a total of 2 secs. Second Degree AV Block-Mobitz I (Wenckebach) was present. Isolated SVEs were rare (<1.0%), SVE Couplets were rare (<1.0%), and SVE Triplets were rare (<1.0%). Isolated VEs were rare (<1.0%, 358), VE Couplets were rare (<1.0%, 1), and VE Triplets were rare (<1.0%, 1). Ventricular Trigeminy was present. Difficulty discerning atrial activity during High Grade episode making definitive diagnosis difficult to ascertain. MD notification criteria for High Grade AV Block met - report posted prior to notification per account request (DI).      Results from last 7 days   Lab Units 10/16/23  0523 10/15/23  1622   WBC Thousand/uL 4.63 4.90   HEMOGLOBIN g/dL 12.8 13.1   HEMATOCRIT % 39.8 41.0   PLATELETS Thousands/uL 226 239   NEUTROS ABS Thousands/µL  --  1.79*         Results from last 7 days   Lab Units 10/16/23  0523 10/15/23  1622   SODIUM mmol/L 139 141   POTASSIUM mmol/L 3.6 3.8   CHLORIDE mmol/L 105 106   CO2 mmol/L 28 31   ANION GAP mmol/L 6 4   BUN mg/dL 12 13   CREATININE mg/dL 0.65 0.74   EGFR ml/min/1.73sq m 106 96   CALCIUM mg/dL 9.0 9.2   MAGNESIUM mg/dL  --  2.0     Results from last 7 days   Lab Units 10/15/23  1622   AST U/L 16   ALT U/L 9   ALK PHOS U/L 54   TOTAL PROTEIN g/dL 7.7   ALBUMIN g/dL 4.1   TOTAL BILIRUBIN mg/dL 0.42   BILIRUBIN DIRECT mg/dL 0.13         Results from last 7 days   Lab Units 10/16/23  0523 10/15/23  1622   GLUCOSE RANDOM mg/dL 89 98         Results from last 7 days   Lab Units 10/15/23  2043 10/15/23  1903 10/15/23  1622   HS TNI 0HR ng/L  --   --  <2   HS TNI 2HR ng/L  --  <2  --    HS TNI 4HR ng/L <2  --   --        Past Medical History:   Diagnosis Date    Anemia     History of transfusion Hypercholesterolemia     Sleep apnea     Vitamin deficiency      Present on Admission:   Morbid obesity (HCC)   SHARI (obstructive sleep apnea)      Admitting Diagnosis: Dizziness [R42]  Heart block [I45.9]  Encounter for evaluation of ability to make decisions regarding care [Z02.79]  Age/Sex: 53 y.o. female  Admission Orders:  Tele  Scds   Up and oob  Scheduled Medications:  cholecalciferol, 1,000 Units, Oral, Daily  cyanocobalamin, 100 mcg, Oral, Daily  ferrous sulfate, 325 mg, Oral, Daily With Breakfast  fish oil, 1,000 mg, Oral, Daily  multivitamin stress formula, 1 tablet, Oral, Daily  pantoprazole, 40 mg, Oral, QAM      Continuous IV Infusions:     PRN Meds:       IP CONSULT TO CARDIOLOGY    Network Utilization Review Department  ATTENTION: Please call with any questions or concerns to [phone] and carefully listen to the prompts so that you are directed to the right person. All voicemails are confidential.   For Discharge needs, contact Care Management DC Support Team at [phone] opt. 2  Send all requests for admission clinical reviews, approved or denied determinations and any other requests to dedicated fax number below belonging to the campus where the patient is receiving treatment.  List of dedicated fax numbers for the Facilities:  [place] DENIALS (Administrative/Medical Necessity) [phone]   DISCHARGE SUPPORT TEAM (NETWORK) [phone] (Maternity/NICU/Pediatrics) [phone]   [address] [phone]   [facility] [address] [facility] [phone] [address] [facility] [address] [place] [address] [facility]  [address] [phone]

## 2023-10-16 NOTE — PLAN OF CARE
Problem: PAIN - ADULT  Goal: Verbalizes/displays adequate comfort level or baseline comfort level  Description: Interventions:  - Encourage patient to monitor pain and request assistance  - Assess pain using appropriate pain scale  - Administer analgesics based on type and severity of pain and evaluate response  - Implement non-pharmacological measures as appropriate and evaluate response  - Consider cultural and social influences on pain and pain management  - Notify physician/advanced practitioner if interventions unsuccessful or patient reports new pain  Outcome: Progressing     Problem: INFECTION - ADULT  Goal: Absence or prevention of progression during hospitalization  Description: INTERVENTIONS:  - Assess and monitor for signs and symptoms of infection  - Monitor lab/diagnostic results  - Monitor all insertion sites, i.e. indwelling lines, tubes, and drains  - Monitor endotracheal if appropriate and nasal secretions for changes in amount and color  - [place] appropriate cooling/warming therapies per order  - Administer medications as ordered  - Instruct and encourage patient and family to use good hand hygiene technique  - Identify and instruct in appropriate isolation precautions for identified infection/condition  Outcome: Progressing  Goal: Absence of fever/infection during neutropenic period  Description: INTERVENTIONS:  - Monitor WBC    Outcome: Progressing     Problem: SAFETY ADULT  Goal: Patient will remain free of falls  Description: INTERVENTIONS:  - Educate patient/family on patient safety including physical limitations  - Instruct patient to call for assistance with activity   - Consult OT/PT to assist with strengthening/mobility   - Keep Call bell within reach  - Keep bed low and locked with side rails adjusted as appropriate  - Keep care items and personal belongings within reach  - Initiate and maintain comfort rounds  - Make Fall Risk Sign visible to staff  - Offer Toileting every 2 Hours, in advance of need  - Initiate/Maintain bed alarm  - Obtain necessary fall risk management equipment  - Apply yellow socks and bracelet for high fall risk patients  - Consider moving patient to room near nurses station  Outcome: Progressing  Goal: Maintain or return to baseline ADL function  Description: INTERVENTIONS:  -  Assess patient's ability to carry out ADLs; assess patient's baseline for ADL function and identify physical deficits which impact ability to perform ADLs (bathing, care of mouth/teeth, toileting, grooming, dressing, etc.)  - Assess/evaluate cause of self-care deficits   - Assess range of motion  - Assess patient's mobility; develop plan if impaired  - Assess patient's need for assistive devices and provide as appropriate  - Encourage maximum independence but intervene and supervise when necessary  - Involve family in performance of ADLs  - Assess for home care needs following discharge   - Consider OT consult to assist with ADL evaluation and planning for discharge  - Provide patient education as appropriate  Outcome: Progressing  Goal: Maintains/Returns to pre admission functional level  Description: INTERVENTIONS:  - Perform BMAT or MOVE assessment daily.   - Set and communicate daily mobility goal to care team and patient/family/caregiver. - Collaborate with rehabilitation services on mobility goals if consulted  - Perform Range of Motion 3 times a day. - Reposition patient every 2 hours.   - Dangle patient 3 times a day  - Stand patient 3 times a day  - Ambulate patient 3 times a day  - Out of bed to chair 3 times a day   - Out of bed for meals 3 times a day  - Out of bed for toileting  - Record patient progress and toleration of activity level   Outcome: Progressing     Problem: DISCHARGE PLANNING  Goal: Discharge to home or other facility with appropriate resources  Description: INTERVENTIONS:  - Identify barriers to discharge w/patient and caregiver  - Arrange for needed discharge resources and transportation as appropriate  - Identify discharge learning needs (meds, wound care, etc.)  - Arrange for interpretive services to assist at discharge as needed  - Refer to Case Management Department for coordinating discharge planning if the patient needs post-hospital services based on physician/advanced practitioner order or complex needs related to functional status, cognitive ability, or social support system  Outcome: Progressing     Problem: Knowledge Deficit  Goal: Patient/family/caregiver demonstrates understanding of disease process, treatment plan, medications, and discharge instructions  Description: Complete learning assessment and assess knowledge base.   Interventions:  - Provide teaching at level of understanding  - Provide teaching via preferred learning methods  Outcome: Progressing

## 2023-10-16 NOTE — CASE MANAGEMENT
Case Management Progress Note    Patient name Ahsan Hadley  Location /-31 MRN [id number]  : 1975 Date 10/16/2023       LOS (days): 1  Geometric Mean LOS (GMLOS) (days):   Days to GMLOS:        OBJECTIVE:  Current admission status: Inpatient  Preferred Pharmacy:   [address] [place] [address]  Phone: [phone] Fax: [phone]    Primary Care Provider: DAVE Negron  Primary Insurance: [facility]  Secondary Insurance:     PROGRESS NOTE:  Patient for transfer to [facility] for pacemaker. Medical Necessity printed and placed in unit binder for transport.

## 2023-10-17 ENCOUNTER — DOCUMENTATION (OUTPATIENT)
Dept: NON INVASIVE DIAGNOSTICS | Facility: HOSPITAL | Age: 48
End: 2023-10-17

## 2023-10-17 ENCOUNTER — HOSPITAL ENCOUNTER (INPATIENT)
Facility: HOSPITAL | Age: 48
LOS: 2 days | Discharge: HOME | End: 2023-10-19
Attending: INTERNAL MEDICINE | Admitting: INTERNAL MEDICINE
Payer: COMMERCIAL

## 2023-10-17 VITALS
OXYGEN SATURATION: 95 % | DIASTOLIC BLOOD PRESSURE: 84 MMHG | BODY MASS INDEX: 41.99 KG/M2 | RESPIRATION RATE: 18 BRPM | HEIGHT: 65 IN | WEIGHT: 252 LBS | HEART RATE: 58 BPM | TEMPERATURE: 98.2 F | SYSTOLIC BLOOD PRESSURE: 101 MMHG

## 2023-10-17 DIAGNOSIS — I44.1 HEART BLOCK AV SECOND DEGREE: Primary | ICD-10-CM

## 2023-10-17 LAB
ANION GAP SERPL CALCULATED.3IONS-SCNC: 5 MMOL/L
BASOPHILS # BLD AUTO: 0.02 THOUSANDS/ÂΜL (ref 0–0.1)
BASOPHILS NFR BLD AUTO: 0 % (ref 0–1)
BUN SERPL-MCNC: 11 MG/DL (ref 5–25)
CALCIUM SERPL-MCNC: 8.6 MG/DL (ref 8.4–10.2)
CHLORIDE SERPL-SCNC: 105 MMOL/L (ref 96–108)
CO2 SERPL-SCNC: 30 MMOL/L (ref 21–32)
CREAT SERPL-MCNC: 0.77 MG/DL (ref 0.6–1.3)
EOSINOPHIL # BLD AUTO: 0.14 THOUSAND/ÂΜL (ref 0–0.61)
EOSINOPHIL NFR BLD AUTO: 3 % (ref 0–6)
ERYTHROCYTE [DISTWIDTH] IN BLOOD BY AUTOMATED COUNT: 13.4 % (ref 11.6–15.1)
GFR SERPL CREATININE-BSD FRML MDRD: 92 ML/MIN/1.73SQ M
GLUCOSE SERPL-MCNC: 97 MG/DL (ref 65–140)
HCT VFR BLD AUTO: 38.4 % (ref 34.8–46.1)
HGB BLD-MCNC: 12.4 G/DL (ref 11.5–15.4)
IMM GRANULOCYTES # BLD AUTO: 0 THOUSAND/UL (ref 0–0.2)
IMM GRANULOCYTES NFR BLD AUTO: 0 % (ref 0–2)
LYMPHOCYTES # BLD AUTO: 2.42 THOUSANDS/ÂΜL (ref 0.6–4.47)
LYMPHOCYTES NFR BLD AUTO: 49 % (ref 14–44)
MCH RBC QN AUTO: 26.8 PG (ref 26.8–34.3)
MCHC RBC AUTO-ENTMCNC: 32.3 G/DL (ref 31.4–37.4)
MCV RBC AUTO: 83 FL (ref 82–98)
MONOCYTES # BLD AUTO: 0.57 THOUSAND/ÂΜL (ref 0.17–1.22)
MONOCYTES NFR BLD AUTO: 12 % (ref 4–12)
NEUTROPHILS # BLD AUTO: 1.76 THOUSANDS/ÂΜL (ref 1.85–7.62)
NEUTS SEG NFR BLD AUTO: 36 % (ref 43–75)
NRBC BLD AUTO-RTO: 0 /100 WBCS
PLATELET # BLD AUTO: 234 THOUSANDS/UL (ref 149–390)
PMV BLD AUTO: 9.5 FL (ref 8.9–12.7)
POTASSIUM SERPL-SCNC: 4 MMOL/L (ref 3.5–5.3)
RBC # BLD AUTO: 4.62 MILLION/UL (ref 3.81–5.12)
SODIUM SERPL-SCNC: 140 MMOL/L (ref 135–147)
WBC # BLD AUTO: 4.91 THOUSAND/UL (ref 4.31–10.16)

## 2023-10-17 PROCEDURE — 85025 COMPLETE CBC W/AUTO DIFF WBC: CPT | Performed by: FAMILY MEDICINE

## 2023-10-17 PROCEDURE — 99223 1ST HOSP IP/OBS HIGH 75: CPT | Performed by: INTERNAL MEDICINE

## 2023-10-17 PROCEDURE — 80048 BASIC METABOLIC PNL TOTAL CA: CPT | Performed by: FAMILY MEDICINE

## 2023-10-17 RX ORDER — ACETAMINOPHEN 325 MG/1
650 TABLET ORAL EVERY 6 HOURS PRN
Status: DISCONTINUED | OUTPATIENT
Start: 2023-10-17 | End: 2023-10-19 | Stop reason: HOSPADM

## 2023-10-17 RX ORDER — PANTOPRAZOLE SODIUM 40 MG/1
40 TABLET, DELAYED RELEASE ORAL EVERY MORNING
Status: DISCONTINUED | OUTPATIENT
Start: 2023-10-17 | End: 2023-10-19 | Stop reason: HOSPADM

## 2023-10-17 RX ORDER — IBUPROFEN 600 MG/1
600 TABLET ORAL EVERY 6 HOURS PRN
Status: DISCONTINUED | OUTPATIENT
Start: 2023-10-17 | End: 2023-10-19 | Stop reason: HOSPADM

## 2023-10-17 RX ORDER — DOCUSATE SODIUM 100 MG/1
100 CAPSULE, LIQUID FILLED ORAL 3 TIMES DAILY PRN
Status: DISCONTINUED | OUTPATIENT
Start: 2023-10-17 | End: 2023-10-19 | Stop reason: HOSPADM

## 2023-10-17 RX ORDER — CHLORAL HYDRATE 500 MG
1000 CAPSULE ORAL DAILY
Status: DISCONTINUED | OUTPATIENT
Start: 2023-10-17 | End: 2023-10-19 | Stop reason: HOSPADM

## 2023-10-17 RX ADMIN — ACETAMINOPHEN 650 MG: 325 TABLET, FILM COATED ORAL at 18:13

## 2023-10-17 NOTE — PLAN OF CARE
Problem: PAIN - ADULT  Goal: Verbalizes/displays adequate comfort level or baseline comfort level  Description: Interventions:  - Encourage patient to monitor pain and request assistance  - Assess pain using appropriate pain scale  - Administer analgesics based on type and severity of pain and evaluate response  - Implement non-pharmacological measures as appropriate and evaluate response  - Consider cultural and social influences on pain and pain management  - Notify physician/advanced practitioner if interventions unsuccessful or patient reports new pain  Outcome: Progressing     Problem: INFECTION - ADULT  Goal: Absence or prevention of progression during hospitalization  Description: INTERVENTIONS:  - Assess and monitor for signs and symptoms of infection  - Monitor lab/diagnostic results  - Monitor all insertion sites, i.e. indwelling lines, tubes, and drains  - Monitor endotracheal if appropriate and nasal secretions for changes in amount and color  - [place] appropriate cooling/warming therapies per order  - Administer medications as ordered  - Instruct and encourage patient and family to use good hand hygiene technique  - Identify and instruct in appropriate isolation precautions for identified infection/condition  Outcome: Progressing  Goal: Absence of fever/infection during neutropenic period  Description: INTERVENTIONS:  - Monitor WBC    Outcome: Progressing     Problem: SAFETY ADULT  Goal: Patient will remain free of falls  Description: INTERVENTIONS:  - Educate patient/family on patient safety including physical limitations  - Instruct patient to call for assistance with activity   - Consult OT/PT to assist with strengthening/mobility   - Keep Call bell within reach  - Keep bed low and locked with side rails adjusted as appropriate  - Keep care items and personal belongings within reach  - Initiate and maintain comfort rounds  - Make Fall Risk Sign visible to staff  - Offer Toileting every 2 Hours, in advance of need  - Initiate/Maintain bed alarm  - Obtain necessary fall risk management equipment  - Apply yellow socks and bracelet for high fall risk patients  - Consider moving patient to room near nurses station  Outcome: Progressing  Goal: Maintain or return to baseline ADL function  Description: INTERVENTIONS:  -  Assess patient's ability to carry out ADLs; assess patient's baseline for ADL function and identify physical deficits which impact ability to perform ADLs (bathing, care of mouth/teeth, toileting, grooming, dressing, etc.)  - Assess/evaluate cause of self-care deficits   - Assess range of motion  - Assess patient's mobility; develop plan if impaired  - Assess patient's need for assistive devices and provide as appropriate  - Encourage maximum independence but intervene and supervise when necessary  - Involve family in performance of ADLs  - Assess for home care needs following discharge   - Consider OT consult to assist with ADL evaluation and planning for discharge  - Provide patient education as appropriate  Outcome: Progressing  Goal: Maintains/Returns to pre admission functional level  Description: INTERVENTIONS:  - Perform BMAT or MOVE assessment daily.   - Set and communicate daily mobility goal to care team and patient/family/caregiver. - Collaborate with rehabilitation services on mobility goals if consulted  - Perform Range of Motion 3 times a day. - Reposition patient every 2 hours.   - Dangle patient 3 times a day  - Stand patient 3 times a day  - Ambulate patient 3 times a day  - Out of bed to chair 3 times a day   - Out of bed for meals 3 times a day  - Out of bed for toileting  - Record patient progress and toleration of activity level   Outcome: Progressing     Problem: DISCHARGE PLANNING  Goal: Discharge to home or other facility with appropriate resources  Description: INTERVENTIONS:  - Identify barriers to discharge w/patient and caregiver  - Arrange for needed discharge resources and transportation as appropriate  - Identify discharge learning needs (meds, wound care, etc.)  - Arrange for interpretive services to assist at discharge as needed  - Refer to Case Management Department for coordinating discharge planning if the patient needs post-hospital services based on physician/advanced practitioner order or complex needs related to functional status, cognitive ability, or social support system  Outcome: Progressing     Problem: Knowledge Deficit  Goal: Patient/family/caregiver demonstrates understanding of disease process, treatment plan, medications, and discharge instructions  Description: Complete learning assessment and assess knowledge base.   Interventions:  - Provide teaching at level of understanding  - Provide teaching via preferred learning methods  Outcome: Progressing

## 2023-10-17 NOTE — H&P
[facility]  H&P  Name: Flash Story 53 y.o. female I MRN: [id number]  Unit/Bed#: -12 I Date of Admission: 10/17/2023   Date of Service: 10/17/2023 I Hospital Day: 0      Assessment/Plan   * Heart block AV second degree  Assessment & Plan  Patient presents with intermittent symptoms of dizziness for the past several months. This was initially worked up by Holter monitor. Unfortunately work-up consistent with second-degree heart block on event monitoring. Patient transferred here for EP assessment duration of sinus pause/second-degree AV block. Follow-up on electrolytes. S/p recent Echo in 9/2023 WNL  S/p recent TFT's in euthyroid state  Cardiology had recommended transfer here for EP assessment/intervention for potential pacemaker placement. Morbid obesity ([address])  Assessment & Plan  Life style modification. Patient with history of laparoscopic gastric sleeve    Hyperlipidemia  Assessment & Plan  Outpatient follow-up           VTE Prophylaxis:  amb   / sequential compression device   Code Status: fc  POLST: There is no POLST form on file for this patient (pre-hospital)      Anticipated Length of Stay:  Patient will be admitted on an Inpatient basis with an anticipated length of stay of  > 2 midnights. Justification for Hospital Stay: needs eval of bradyarrythmia    Total Time for Visit, including Counseling / Coordination of Care:  54 .  Greater than 50% of this total time spent on direct patient counseling and coordination of care. Chief Complaint:   Dizziness    History of Present Illness:    Flash Story is a 53 y.o. female who presents on October 15, 2023 due to intermittent symptoms of dizziness. Patient was initially followed as outpatient with a Holter monitor. She subsequently was sent to the emergency room for reported sinus pause/second-degree heart block. Patient was initially assessed at [facility].   After evaluation by [facility], [facility]/s Dr stringer she was recommended transfer here for EP intervention. Review of Systems:    Review of Systems   HENT:  Negative for congestion. Respiratory:  Negative for cough, choking, chest tightness and shortness of breath. Neurological:  Positive for dizziness, light-headedness and headaches. All other systems reviewed and are negative. Past Medical and Surgical History:     Past Medical History:   Diagnosis Date    Anemia     History of transfusion     Hypercholesterolemia     Sleep apnea     Vitamin deficiency        Past Surgical History:   Procedure Laterality Date    ECTOPIC PREGNANCY SURGERY      EYE SURGERY      GASTRECTOMY SLEEVE LAPAROSCOPIC      MASS EXCISION N/A 8/1/2023    Procedure: EXCISION  BIOPSY LESION/MASS BACK;  Surgeon: Malina Vera DO;  Location: [facility];  Service: General    VA LAPAROSCOPY W/RMVL ADNEXAL STRUCTURES Bilateral 9/28/2021    Procedure: SALPINGECTOMY, LAPAROSCOPIC;  Surgeon: Uzair Landry MD;  Location: [facility];  Service: Gynecology    VA LAPS W/VAG HYSTERECT 250 GM/&RMVL TUBE&/OVARIES N/A 9/28/2021    Procedure: HYSTERECTOMY LAPAROSCOPIC ASSISTED VAGINAL (LAVH), LYSIS OF ADHESIONS;  Surgeon: Uzair Landry MD;  Location: [facility];  Service: Gynecology       Meds/Allergies:    Prior to Admission medications    Medication Sig Start Date End Date Taking?  Authorizing Provider   acetaminophen (TYLENOL) 500 mg tablet Take 2 tablets (1,000 mg total) by mouth every 6 (six) hours as needed for mild pain 8/25/23   Matt Pearson PA-C   Biotin 1 MG CAPS Take by mouth    Historical Provider, MD   cholecalciferol (VITAMIN D3) 25 mcg (1,000 units) tablet Take 1 tablet (1,000 Units total) by mouth daily 3/23/23 9/26/23  DAVE Nina   cyanocobalamin (VITAMIN B-12) 100 mcg tablet Take by mouth daily    Historical Provider, MD   Diclofenac Sodium (VOLTAREN) 1 % Apply 2 g topically 4 (four) times a day 8/25/23   Matt Pearson MECHE   docusate sodium (COLACE) 100 mg capsule Take 1 capsule (100 mg total) by mouth 3 (three) times a day as needed for constipation (while taking narcotic pain medication) 8/1/23   López Briot DO   ferrous sulfate 324 (65 Fe) mg Take 1 tablet (324 mg total) by mouth 3 (three) times a day before meals 9/28/21   Mane Vela MD   ibuprofen (MOTRIN) 600 mg tablet Take 1 tablet (600 mg total) by mouth every 6 (six) hours as needed for mild pain 8/25/23   Betsy Dao PA-C   Insulin Pen Needle (BD Pen Needle Sarita 2nd Gen) 32G X 4 MM MISC Use in the morning 6/19/23 9/17/23  DAVE Valencia   lidocaine (LIDODERM) 5 % Apply 1 patch topically over 12 hours every 24 hours Remove & Discard patch within 12 hours or as directed by MD 8/25/23   Betsy Dao PA-C   liraglutide Leol Rather) injection Inject 0.3 mL (1.8 mg total) under the skin daily  Patient not taking: Reported on 9/26/2023 8/29/23 9/28/23  DAVE Valencia   multivitamin (THERAGRAN) TABS Take 1 tablet by mouth daily    Historical Provider, MD   Omega-3 Fatty Acids (fish oil) 1,000 mg Take 1,000 mg by mouth daily    Historical Provider, MD   pantoprazole (PROTONIX) 40 mg tablet Take 40 mg by mouth every morning 4/22/23   Historical Provider, MD     I have reviewed home medications with patient personally. Allergies:    Allergies   Allergen Reactions    Penicillin G Other (See Comments)    Pollen Extract Sneezing       Social History:     Marital Status: /Civil Union     Patient Pre-hospital Living Situation: home  Patient Pre-hospital Level of Mobility: amb  Patient Pre-hospital Diet Restrictions: denies  Substance Use History:   Social History     Substance and Sexual Activity   Alcohol Use Never     Social History     Tobacco Use   Smoking Status Never    Passive exposure: Never   Smokeless Tobacco Never     Social History     Substance and Sexual Activity   Drug Use No       Family History:    non-contributory    Physical Exam:     Vitals:   Blood Pressure: 124/79 (10/17/23 1042)  Pulse: 60 (10/17/23 1042)  Temperature: 98 °F (36.7 °C) (10/17/23 1042)  Temp Source: Oral (10/17/23 1042)  SpO2: 96 % (10/17/23 1042)    Physical Exam  Constitutional:       Appearance: Normal appearance. Cardiovascular:      Rate and Rhythm: Normal rate and regular rhythm. Pulmonary:      Effort: Pulmonary effort is normal.      Breath sounds: Normal breath sounds. Abdominal:      General: Abdomen is flat. Palpations: Abdomen is soft. Musculoskeletal:         General: No swelling or deformity. Cervical back: Normal range of motion. Skin:     General: Skin is warm. Coloration: Skin is not jaundiced. Neurological:      General: No focal deficit present. Mental Status: She is alert and oriented to person, place, and time. Psychiatric:         Mood and Affect: Mood normal.         Behavior: Behavior normal.             Additional Data:     Lab Results: I have personally reviewed pertinent reports. Results from last 7 days   Lab Units 10/17/23  0514   WBC Thousand/uL 4.91   HEMOGLOBIN g/dL 12.4   HEMATOCRIT % 38.4   PLATELETS Thousands/uL 234   NEUTROS PCT % 36*   LYMPHS PCT % 49*   MONOS PCT % 12   EOS PCT % 3     Results from last 7 days   Lab Units 10/17/23  0514 10/16/23  0523 10/15/23  1622   SODIUM mmol/L 140   < > 141   POTASSIUM mmol/L 4.0   < > 3.8   CHLORIDE mmol/L 105   < > 106   CO2 mmol/L 30   < > 31   BUN mg/dL 11   < > 13   CREATININE mg/dL 0.77   < > 0.74   ANION GAP mmol/L 5   < > 4   CALCIUM mg/dL 8.6   < > 9.2   ALBUMIN g/dL  --   --  4.1   TOTAL BILIRUBIN mg/dL  --   --  0.42   ALK PHOS U/L  --   --  54   ALT U/L  --   --  9   AST U/L  --   --  16   GLUCOSE RANDOM mg/dL 97   < > 98    < > = values in this interval not displayed. Imaging: I have personally reviewed pertinent reports.       No orders to display           ** Please Note: This note has been constructed using a voice recognition system.  **

## 2023-10-17 NOTE — QUICK NOTE
Patient was briefly seen, full formal consult to follow tomorrow. Patient was transferred for EP evaluation given Zio monitor that showed complete heart block and symptoms of lightheadedness and dizziness. 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 She was sinus rhythm when she pressed to the activator. We would prefer more information before committing her to a device. Therefore, will start with tilt table test tomorrow, will keep patient fasting for this. We have also asked her to make sure she is keeping a log of what she does here so that we can correlate this with what we see on telemetry. Brief review of telemetry today showed that she did drop some beats, when discussed with nursing staff they did report that they checked on the patient when she would have bradycardia/dropped beats and she was resting in bed. She was sleeping just prior to the time of our interview today.

## 2023-10-17 NOTE — PLAN OF CARE
Problem: PAIN - ADULT  Goal: Verbalizes/displays adequate comfort level or baseline comfort level  Description: Interventions:  - Encourage patient to monitor pain and request assistance  - Assess pain using appropriate pain scale  - Administer analgesics based on type and severity of pain and evaluate response  - Implement non-pharmacological measures as appropriate and evaluate response  - Consider cultural and social influences on pain and pain management  - Notify physician/advanced practitioner if interventions unsuccessful or patient reports new pain  Outcome: Progressing     Problem: INFECTION - ADULT  Goal: Absence or prevention of progression during hospitalization  Description: INTERVENTIONS:  - Assess and monitor for signs and symptoms of infection  - Monitor lab/diagnostic results  - Monitor all insertion sites, i.e. indwelling lines, tubes, and drains  - Monitor endotracheal if appropriate and nasal secretions for changes in amount and color  - [place] appropriate cooling/warming therapies per order  - Administer medications as ordered  - Instruct and encourage patient and family to use good hand hygiene technique  - Identify and instruct in appropriate isolation precautions for identified infection/condition  Outcome: Progressing  Goal: Absence of fever/infection during neutropenic period  Description: INTERVENTIONS:  - Monitor WBC    Outcome: Progressing     Problem: SAFETY ADULT  Goal: Patient will remain free of falls  Description: INTERVENTIONS:  - Educate patient/family on patient safety including physical limitations  - Instruct patient to call for assistance with activity   - Consult OT/PT to assist with strengthening/mobility   - Keep Call bell within reach  - Keep bed low and locked with side rails adjusted as appropriate  - Keep care items and personal belongings within reach  - Initiate and maintain comfort rounds  - Make Fall Risk Sign visible to staff  - Offer Toileting every  Hours, in advance of need  - Initiate/Maintain alarm  - Obtain necessary fall risk management equipment:   - Apply yellow socks and bracelet for high fall risk patients  - Consider moving patient to room near nurses station  Outcome: Progressing  Goal: Maintain or return to baseline ADL function  Description: INTERVENTIONS:  -  Assess patient's ability to carry out ADLs; assess patient's baseline for ADL function and identify physical deficits which impact ability to perform ADLs (bathing, care of mouth/teeth, toileting, grooming, dressing, etc.)  - Assess/evaluate cause of self-care deficits   - Assess range of motion  - Assess patient's mobility; develop plan if impaired  - Assess patient's need for assistive devices and provide as appropriate  - Encourage maximum independence but intervene and supervise when necessary  - Involve family in performance of ADLs  - Assess for home care needs following discharge   - Consider OT consult to assist with ADL evaluation and planning for discharge  - Provide patient education as appropriate  Outcome: Progressing  Goal: Maintains/Returns to pre admission functional level  Description: INTERVENTIONS:  - Perform BMAT or MOVE assessment daily.   - Set and communicate daily mobility goal to care team and patient/family/caregiver. - Collaborate with rehabilitation services on mobility goals if consulted  - Perform Range of Motion  times a day. - Reposition patient every  hours.   - Dangle patient  times a day  - Stand patient  times a day  - Ambulate patient  times a day  - Out of bed to chair  times a day   - Out of bed for meals  times a day  - Out of bed for toileting  - Record patient progress and toleration of activity level   Outcome: Progressing     Problem: DISCHARGE PLANNING  Goal: Discharge to home or other facility with appropriate resources  Description: INTERVENTIONS:  - Identify barriers to discharge w/patient and caregiver  - Arrange for needed discharge resources and transportation as appropriate  - Identify discharge learning needs (meds, wound care, etc.)  - Arrange for interpretive services to assist at discharge as needed  - Refer to Case Management Department for coordinating discharge planning if the patient needs post-hospital services based on physician/advanced practitioner order or complex needs related to functional status, cognitive ability, or social support system  Outcome: Progressing     Problem: Knowledge Deficit  Goal: Patient/family/caregiver demonstrates understanding of disease process, treatment plan, medications, and discharge instructions  Description: Complete learning assessment and assess knowledge base.   Interventions:  - Provide teaching at level of understanding  - Provide teaching via preferred learning methods  Outcome: Progressing

## 2023-10-17 NOTE — ASSESSMENT & PLAN NOTE
Patient presents with intermittent symptoms of dizziness for the past several months. This was initially worked up by Holter monitor. Unfortunately work-up consistent with second-degree heart block on event monitoring. Patient transferred here for EP assessment duration of sinus pause/second-degree AV block. Follow-up on electrolytes. S/p recent Echo in 9/2023 WNL  S/p recent TFT's in euthyroid state  Cardiology had recommended transfer here for EP assessment/intervention for potential pacemaker placement.

## 2023-10-18 ENCOUNTER — TELEPHONE (OUTPATIENT)
Dept: FAMILY MEDICINE CLINIC | Facility: CLINIC | Age: 48
End: 2023-10-18

## 2023-10-18 ENCOUNTER — APPOINTMENT (INPATIENT)
Dept: NON INVASIVE DIAGNOSTICS | Facility: HOSPITAL | Age: 48
End: 2023-10-18
Payer: COMMERCIAL

## 2023-10-18 LAB
ALBUMIN SERPL BCP-MCNC: 3.4 G/DL (ref 3.5–5)
ALP SERPL-CCNC: 46 U/L (ref 34–104)
ALT SERPL W P-5'-P-CCNC: 8 U/L (ref 7–52)
ANION GAP SERPL CALCULATED.3IONS-SCNC: 5 MMOL/L
AST SERPL W P-5'-P-CCNC: 13 U/L (ref 13–39)
BASOPHILS # BLD AUTO: 0.02 THOUSANDS/ÂΜL (ref 0–0.1)
BASOPHILS NFR BLD AUTO: 0 % (ref 0–1)
BILIRUB SERPL-MCNC: 0.27 MG/DL (ref 0.2–1)
BUN SERPL-MCNC: 10 MG/DL (ref 5–25)
CALCIUM ALBUM COR SERPL-MCNC: 8.7 MG/DL (ref 8.3–10.1)
CALCIUM SERPL-MCNC: 8.2 MG/DL (ref 8.4–10.2)
CHLORIDE SERPL-SCNC: 105 MMOL/L (ref 96–108)
CO2 SERPL-SCNC: 29 MMOL/L (ref 21–32)
CREAT SERPL-MCNC: 0.54 MG/DL (ref 0.6–1.3)
EOSINOPHIL # BLD AUTO: 0.17 THOUSAND/ÂΜL (ref 0–0.61)
EOSINOPHIL NFR BLD AUTO: 3 % (ref 0–6)
ERYTHROCYTE [DISTWIDTH] IN BLOOD BY AUTOMATED COUNT: 13.3 % (ref 11.6–15.1)
GFR SERPL CREATININE-BSD FRML MDRD: 112 ML/MIN/1.73SQ M
GLUCOSE SERPL-MCNC: 97 MG/DL (ref 65–140)
HCT VFR BLD AUTO: 38.6 % (ref 34.8–46.1)
HGB BLD-MCNC: 12.6 G/DL (ref 11.5–15.4)
IMM GRANULOCYTES # BLD AUTO: 0 THOUSAND/UL (ref 0–0.2)
IMM GRANULOCYTES NFR BLD AUTO: 0 % (ref 0–2)
LYMPHOCYTES # BLD AUTO: 2.54 THOUSANDS/ÂΜL (ref 0.6–4.47)
LYMPHOCYTES NFR BLD AUTO: 52 % (ref 14–44)
MAX DIASTOLIC BP: 100 MMHG
MAX HEART RATE: 93 BPM
MAX PREDICTED HEART RATE: 173 BPM
MAX. SYSTOLIC BP: 130 MMHG
MCH RBC QN AUTO: 27.2 PG (ref 26.8–34.3)
MCHC RBC AUTO-ENTMCNC: 32.6 G/DL (ref 31.4–37.4)
MCV RBC AUTO: 83 FL (ref 82–98)
MONOCYTES # BLD AUTO: 0.45 THOUSAND/ÂΜL (ref 0.17–1.22)
MONOCYTES NFR BLD AUTO: 9 % (ref 4–12)
NEUTROPHILS # BLD AUTO: 1.82 THOUSANDS/ÂΜL (ref 1.85–7.62)
NEUTS SEG NFR BLD AUTO: 36 % (ref 43–75)
NRBC BLD AUTO-RTO: 0 /100 WBCS
PLATELET # BLD AUTO: 234 THOUSANDS/UL (ref 149–390)
PMV BLD AUTO: 9.8 FL (ref 8.9–12.7)
POTASSIUM SERPL-SCNC: 3.6 MMOL/L (ref 3.5–5.3)
PROT SERPL-MCNC: 6.2 G/DL (ref 6.4–8.4)
PROTOCOL NAME: NORMAL
RBC # BLD AUTO: 4.63 MILLION/UL (ref 3.81–5.12)
REASON FOR TERMINATION: NORMAL
SODIUM SERPL-SCNC: 139 MMOL/L (ref 135–147)
TARGET HR FORMULA: NORMAL
TEST INDICATION: NORMAL
TIME IN EXERCISE PHASE: NORMAL
WBC # BLD AUTO: 5 THOUSAND/UL (ref 4.31–10.16)

## 2023-10-18 PROCEDURE — 99233 SBSQ HOSP IP/OBS HIGH 50: CPT | Performed by: INTERNAL MEDICINE

## 2023-10-18 PROCEDURE — 80053 COMPREHEN METABOLIC PANEL: CPT | Performed by: INTERNAL MEDICINE

## 2023-10-18 PROCEDURE — 85025 COMPLETE CBC W/AUTO DIFF WBC: CPT | Performed by: INTERNAL MEDICINE

## 2023-10-18 PROCEDURE — 93660 TILT TABLE EVALUATION: CPT

## 2023-10-18 RX ORDER — POTASSIUM CHLORIDE 20 MEQ/1
20 TABLET, EXTENDED RELEASE ORAL ONCE
Status: COMPLETED | OUTPATIENT
Start: 2023-10-18 | End: 2023-10-18

## 2023-10-18 RX ADMIN — POTASSIUM CHLORIDE 20 MEQ: 1500 TABLET, EXTENDED RELEASE ORAL at 16:39

## 2023-10-18 RX ADMIN — PANTOPRAZOLE SODIUM 40 MG: 40 TABLET, DELAYED RELEASE ORAL at 08:44

## 2023-10-18 NOTE — UTILIZATION REVIEW
Initial Clinical Review    Admission: Date/Time/Statement:   Admission Orders (From admission, onward)       Ordered        10/17/23 1055  Inpatient Admission  Once                          Orders Placed This Encounter   Procedures    Inpatient Admission     Standing Status:   Standing     Number of Occurrences:   1     Order Specific Question:   Level of Care     Answer:   Level 2 Stepdown / HOT [14]     Order Specific Question:   Estimated length of stay     Answer:   More than 2 Midnights     Order Specific Question:   Certification     Answer:   I certify that inpatient services are medically necessary for this patient for a duration of greater than two midnights. See H&P and MD Progress Notes for additional information about the patient's course of treatment. Initial Presentation: 53 y.o. female with PMHx of HLD, morbid obesity s/p gastric sleeve presents to [facility] tx'd from [address] [place] [address] Road where she presented with intermittent symptoms of dizziness for the past several months. Pt initially had w/u with a el monitor which showed a 2nd degree heart block and transferred to [facility] for EP eval and further tx. S/p recent Echo in 9/2023 WNL. S/p recent TFT's in euthyroid state. Date: 10/18  Day 2:   EP note -- Pt likely has elevated vagal tone which is driving these events, and it is reassuring that they have not correlated to her symptoms. Furthermore, she has a h/o gastric sleeve and notes she has quite a difficult time staying hydrated. As stated in the past, there is not a strong indication that her symptoms are r/t an electrophysiologic phenomenon. Most likely it involves dehydration. Further evaluation is necessary. A tilt test will be ordered for tomorrow.   Further planning based on results of that test.        ED Triage Vitals   Temperature Pulse Respirations Blood Pressure SpO2   10/17/23 1042 10/17/23 1042 10/17/23 1452 10/17/23 1042 10/17/23 1042   98 °F (36.7 °C) 60 16 124/79 96 %      Temp Source Heart Rate Source Patient Position - Orthostatic VS BP Location FiO2 (%)   10/17/23 1042 -- -- -- --   Oral          Pain Score       10/17/23 1053       No Pain          Wt Readings from Last 1 Encounters:   10/17/23 114 kg (251 lb)     Additional Vital Signs:   Date/Time Temp Pulse Resp BP MAP (mmHg) SpO2 O2 Device   10/18/23 11:29:28 97.9 °F (36.6 °C) 51 Abnormal  20 101/58 72 98 % --   10/18/23 0900 -- -- -- -- -- -- None (Room air)   10/18/23 08:43:08 -- 62 18 111/68 82 96 % --   10/18/23 02:28:02 -- 59 -- 115/73 87 96 % --   10/17/23 22:33:16 98.2 °F (36.8 °C) 66 -- 120/76 91 93 % --   10/17/23 19:09:48 -- 55 -- 121/78 92 97 % --   10/17/23 1533 -- -- -- -- -- 96 % --   10/17/23 1500 -- 62 -- 110/76 87 96 % --   10/17/23 14:52:44 98 °F (36.7 °C) 65 16 110/76 87 99 % --   10/17/23 10:42:37 98 °F (36.7 °C) 60 -- 124/79 94 96 % --       Pertinent Labs/Diagnostic Test Results:   EKG 10/17:  Normal sinus rhythm  Cannot rule out Anterior infarct , age undetermined  Abnormal ECG  When compared with ECG of 18-SEP-2023 06:27,  Nonspecific T wave abnormality, worse in Anterolateral leads  QT has lengthened    Results from last 7 days   Lab Units 10/18/23  0447 10/17/23  0514 10/16/23  0523 10/15/23  1622   WBC Thousand/uL 5.00 4.91 4.63 4.90   HEMOGLOBIN g/dL 12.6 12.4 12.8 13.1   HEMATOCRIT % 38.6 38.4 39.8 41.0   PLATELETS Thousands/uL 234 234 226 239   NEUTROS ABS Thousands/µL 1.82* 1.76*  --  1.79*     Results from last 7 days   Lab Units 10/18/23  0447 10/17/23  0514 10/16/23  0523 10/15/23  1622   SODIUM mmol/L 139 140 139 141   POTASSIUM mmol/L 3.6 4.0 3.6 3.8   CHLORIDE mmol/L 105 105 105 106   CO2 mmol/L 29 30 28 31   ANION GAP mmol/L 5 5 6 4   BUN mg/dL 10 11 12 13   CREATININE mg/dL 0.54* 0.77 0.65 0.74   EGFR ml/min/1.73sq m 112 92 106 96   CALCIUM mg/dL 8.2* 8.6 9.0 9.2   MAGNESIUM mg/dL  --   --   --  2.0     Results from last 7 days   Lab Units 10/18/23  0447 10/15/23  1622   AST U/L 13 16   ALT U/L 8 9   ALK PHOS U/L 46 54   TOTAL PROTEIN g/dL 6.2* 7.7   ALBUMIN g/dL 3.4* 4.1   TOTAL BILIRUBIN mg/dL 0.27 0.42   BILIRUBIN DIRECT mg/dL  --  0.13     Results from last 7 days   Lab Units 10/18/23  0447 10/17/23  0514 10/16/23  0523 10/15/23  1622   GLUCOSE RANDOM mg/dL 97 97 89 98     Results from last 7 days   Lab Units 10/15/23  2043 10/15/23  1903 10/15/23  1622   HS TNI 0HR ng/L  --   --  <2   HS TNI 2HR ng/L  --  <2  --    HS TNI 4HR ng/L <2  --   --          Past Medical History:   Diagnosis Date    Anemia     History of transfusion     Hypercholesterolemia     Sleep apnea     Vitamin deficiency      Present on Admission:   Heart block AV second degree   Morbid obesity ([address])   Hyperlipidemia    Admitting Diagnosis: Heart block AV second degree  Age/Sex: 53 y.o. female  Admission Orders:  Scheduled Medications:  fish oil, 1,000 mg, Oral, Daily  pantoprazole, 40 mg, Oral, QAM    PRN Meds:  acetaminophen, 650 mg, Oral, Q6H PRN 10/17 x1  docusate sodium, 100 mg, Oral, TID PRN  ibuprofen, 600 mg, Oral, Q6H PRN        Network Utilization Review Department  ATTENTION: Please call with any questions or concerns to [phone] and carefully listen to the prompts so that you are directed to the right person. All voicemails are confidential.   For Discharge needs, contact Care Management DC Support Team at [phone] opt. 2  Send all requests for admission clinical reviews, approved or denied determinations and any other requests to dedicated fax number below belonging to the campus where the patient is receiving treatment.  List of dedicated fax numbers for the Facilities:  [place] DENIALS (Administrative/Medical Necessity) [phone]   DISCHARGE SUPPORT TEAM (NETWORK) [id number] (Maternity/NICU/Pediatrics) [phone]   [address] [facility] [place] Dami Mcallister [phone]   [address] [facility] [address] [address] [facility] [address] [facility] [address] [phone]

## 2023-10-18 NOTE — OCCUPATIONAL THERAPY NOTE
Occupational Therapy Cancellation Note       10/18/23 0805   OT Last Visit   OT Visit Date 10/18/23   Note Type   Note type Evaluation; Cancelled Session   Cancel Reasons Other   Additional Comments OT orders received, and chart reviewed. Per chart review, pt awaiting cardiology consult for possible pacemaker placement. Will hold OT evaluation at this time and continue to follow pt as appropriate once surgical plan is determined.      Hailee Gardner MS, OTR/L

## 2023-10-18 NOTE — CASE MANAGEMENT
Case Management Assessment & Discharge Planning Note    Patient name Devon Zelaya  Location /-31 MRN [id number]  : 1975 Date 10/18/2023       Current Admission Date: 10/17/2023  Current Admission Diagnosis:Heart block AV second degree   Patient Active Problem List    Diagnosis Date Noted    Heart block AV second degree 10/15/2023    Lightheadedness 2023    Encounter for screening colonoscopy 07/10/2023    Lump of skin of back 07/10/2023    Morbid obesity ([address]) 2022    SHARI (obstructive sleep apnea) 2021    Bradycardia 07/15/2021    Iron deficiency anemia 2021    History of PCOS 04/10/2020    Encounter for surveillance of contraceptive pills 2019    S/P laparoscopic sleeve gastrectomy 10/17/2018    Cyst of ovary 2017    Hyperlipidemia 2017    Irregular bleeding 2017    Vitamin D deficiency 2017      LOS (days): 1  Geometric Mean LOS (GMLOS) (days):   Days to GMLOS:     OBJECTIVE:    Risk of Unplanned Readmission Score: 8.74         Current admission status: Inpatient       Preferred Pharmacy:   [facility] [address] EVERT OL[facility] [address] [address] [place] [facility]  [address]  [place] [address]  Phone: [phone] Fax: [phone]    Primary Care Provider: DAVE Cheung    Primary Insurance: [place] [facility] HEALTHCARE  Secondary Insurance:     ASSESSMENT:  [place] Health Proxies    There are no active Health Care Proxies on file. Readmission Root Cause  30 Day Readmission: No    Patient Information  Admitted from[de-identified] Home  Mental Status: Alert  During Assessment patient was accompanied by: Not accompanied during assessment  Assessment information provided by[de-identified] Patient  Primary Caregiver: Self  Support Systems: [address] of Residence: Other (specify in comment box) ([place])  What city do you live in?: [place] entry access options.  Select all that apply.: Stairs  Number of steps to enter home.: 5  Do the steps have railings?: Yes  Type of Current Residence: 2 story home  Upon entering residence, is there a bedroom on the main floor (no further steps)?: No  A bedroom is located on the following floor levels of residence (select all that apply):: 2nd Floor  Upon entering residence, is there a bathroom on the main floor (no further steps)?: No  Indicate which floors of current residence have a bathroom (select all the apply):: 2nd Floor  Number of steps to 2nd floor from main floor: One Flight  In the last 12 months, was there a time when you were not able to pay the mortgage or rent on time?: No  In the last 12 months, was there a time when you did not have a steady place to sleep or slept in a shelter (including now)?: No  Homeless/housing insecurity resource given?: N/A  Living Arrangements: Other (Comment) (Lives with children.)  Is patient a ?: No    Activities of Daily Living Prior to Admission  Functional Status: Independent  Completes ADLs independently?: Yes  Ambulates independently?: Yes  Does patient use assisted devices?: No  Does patient currently own DME?: No  Does patient have a history of Outpatient Therapy (PT/OT)?: Yes  Does the patient have a history of Short-Term Rehab?: No  Does patient have a history of HHC?: No  Does patient currently have [address]?: No         Patient Information Continued  Income Source: Employed  Does patient have prescription coverage?: Yes  Within the past 12 months, you worried that your food would run out before you got the money to buy more.: Never true  Within the past 12 months, the food you bought just didn't last and you didn't have money to get more.: Never true  Food insecurity resource given?: N/A  Does patient receive dialysis treatments?: No  Does patient have a history of substance abuse?: No  Does patient have a history of Mental Health Diagnosis?: No         Means of Transportation  Means of Transport to [facility][de-identified] Drives Self  In the past 12 months, has lack of transportation kept you from medical appointments or from getting medications?: No  In the past 12 months, has lack of transportation kept you from meetings, work, or from getting things needed for daily living?: No  Was application for public transport provided?: N/A        DISCHARGE DETAILS:    Discharge planning discussed with[de-identified] Patient at bedside. Freedom of Choice: Yes  Comments - Freedom of Choice: PT/OT pending  CM contacted family/caregiver?: No- see comments (Pt alert and oriented and able to make own medical decisions.)  Were Treatment Team discharge recommendations reviewed with patient/caregiver?: Yes  Did patient/caregiver verbalize understanding of patient care needs?: Yes  Were patient/caregiver advised of the risks associated with not following Treatment Team discharge recommendations?: Yes    Contacts  Patient Contacts: Patient  Relationship to Patient[de-identified] Other (Comment) (Self)  Contact Method: In Person  Reason/Outcome: Continuity of Care, Discharge [address]         Is the patient interested in [facility] at discharge?: No    DME Referral Provided  Referral made for DME?: No    Other Referral/Resources/Interventions Provided:  Referral Comments: No referrals made at this time. PT/OT evals pending. Additional Comments: CM met with pt at bedside and introduced self and role. Pt reports residing in a 2-story home with her children. Pt reports being employed and fully ambulatory prior to admission. No CM needs identified at this time.

## 2023-10-18 NOTE — PHYSICAL THERAPY NOTE
Physical Therapy Cancellation Note         10/18/23 0800   PT Last Visit   PT Visit Date 10/18/23   Note Type   Note type Cancelled Session; Evaluation   Cancel Reasons Other   Additional Comments PT orders received and chart reviewed. Per chart review, pt is awaiting cardiology consult for possible pacemaker implant. PT will continue to follow and see pt when surgical plan in place.      Yariel Salamanca PT, DPT

## 2023-10-18 NOTE — ASSESSMENT & PLAN NOTE
Patient presents with intermittent symptoms of dizziness for the past several months. This was initially worked up by Holter monitor. Unfortunately work-up consistent with second-degree heart block on event monitoring. Patient transferred here for EP assessment duration of sinus pause/second-degree AV block. Follow-up on electrolytes. S/p recent Echo in 9/2023 WNL  S/p recent TFT's in euthyroid state  Note EP assessment. Preliminarily, symptoms did not correlate with AV block as per EP. We will follow-up on further studies including tilt table as per EP.

## 2023-10-18 NOTE — CONSULTS
Consultation - Electrophysiology - Cardiology  Marcellus Galaviz 53 y.o. female MRN: [id number]  Unit/Bed#: -01 Encounter: [id number]      Inpatient consult to Electrophysiology  Consult performed by: Osvaldo Vieira PA-C  Consult ordered by: Keiry Metcalf DO        History of Present Illness   Physician Requesting Consult: Keiry Metcalf DO  Reason for Consult / Principal Problem: dizziness, heart block    Assessment/Plan   Lightheadedness/dizziness  Brief complete heart block (3 seconds) on ZioPatch September 2023  2:1 AV block  SHARI - not on CPAP  Gastric bypass     Her symptoms of dizziness do not appear to correlate with episodes of AV block. She had 2nd degree AV block while she was sleeping. She has not worn her CPAP since her gastric bypass surgery and has not had a recent sleep study. She likely has elevated vagal tone contributing to these arrhythmias. It is unclear what her episodes of dizziness are related to. She has prior gastric bypass surgery and stated she has trouble staying hydrated which may contribute to her symptoms. Given the above, we have recommended a tilt table test as well as nocturnal pulse ox testing. Further recommendations will be made pending these results. HPI: Marcellus Galaviz is a 53 y.o. female with a history of SHARI not on CPAP and prior gastric bypass surgery who underwent outpatient evaluation for dizziness. Brief complete heart block was noted on ZioPatch and therefore she was directed to the hospital for admission. She has a history of dizziness and has been evaluated by Dr. Margarito Gottron as an outpatient. She has worn multiple monitors which have been unrevealing. Brief 2-1 AV block has been noted. She reports episodes of dizziness that occur periodically. It is sometimes related to standing up but other times occurs unrelated to her position. More recently, she wore a Zio patch in September 2023.   There is 1 episode of brief complete heart block lasting 3.4 seconds. She also had 2-1 AV block and runs of paroxysmal atrial tachycardia. Given complete heart block, she was referred for admission. Her symptom episodes did not correspond with episodes of heart block. On telemetry, she had episodes of 2:1 AV block. Currently, she is in sinus rhythm. She denies chest pain, syncope, presyncope, orthopnea, or PND. She has a history of gastric bypass surgery. Prior to the surgery, she wore a CPAP for SHARI. She has not been retested since surgery and does not routinely wear her CPAP. Patient denies chest pain/heaviness/tightness/pressure, palpitations, shortness of breath, orthopnea, presyncope, syncope, or N/V.    TELE: sinus rhythm HR 70s, review of telemetry overnight reveals brief periods of 2:1 AV block    EKG 10/15/2023: normal sinus rhythm HR 77 bpm     ZioPatch 9/18/2023 - 10/2/2023: min HR of 27 bpm, max HR of 171 bpm, and avg HR of 73 bpm. Predominant underlying rhythm was Sinus Rhythm. Slight P wave morphology changes were noted. 3 Supraventricular Tachycardia runs occurred, the run with the fastest interval lasting 6 beats with a max rate of 169 bpm, the longest lasting 6 beats with an avg rate of 150 bpm. 1 Pause occurred lasting 3.4 secs (18 bpm). Pause occurred due to High Grade AV Block. 1 episode(s) of AV Block (High Grade) occurred, lasting a total of 2 secs. Second Degree AV Block-Mobitz I (Wenckebach) was present. Isolated SVEs were rare (<1.0%), SVE Couplets were rare (<1.0%), and SVE Triplets were rare (<1.0%). Isolated VEs were rare (<1.0%, 358), VE Couplets were rare (<1.0%, 1), and VE Triplets were rare (<1.0%, 1). Ventricular Trigeminy was present. Difficulty discerning atrial activity during High Grade episode making definitive diagnosis difficult to ascertain. MD notification criteria for High Grade AV Block met - report posted prior to notification per account request (DI).           Echo 9/11/2023:    Left Ventricle: Left ventricular cavity size is normal. Wall thickness is normal. The left ventricular ejection fraction is 60%. Systolic function is normal. Wall motion is normal. Diastolic function is normal.    Mitral Valve: There is trace regurgitation. Tricuspid Valve: There is mild regurgitation. 48 Hour Holter 9/11/2023:  The patient had predominantly sinus rhythm. Heart rate varied from 53 bpm to 146 bpm.  The patient’s average heart rate was 76 bpm.    The patient had a holter monitor tracing done for 47 hours and 59 minutes. The patient had 31 PVCs. The patient had 405 supraventricular ectopic beats. There were 2 runs, the longest consisting of 4 beats. The longest R/R interval was 2.4 seconds. There were multiple episodes of non conducted P waves, consider second degree HB Type 2. Review of Systems   Constitutional:  Negative for fatigue. HENT: Negative. Eyes: Negative. Respiratory:  Negative for chest tightness, shortness of breath and wheezing. Cardiovascular:  Negative for chest pain. Gastrointestinal:  Negative for abdominal pain, nausea and vomiting. Endocrine: Negative. Genitourinary: Negative. Musculoskeletal:  Negative for back pain. Skin:  Negative for rash. Allergic/Immunologic: Negative. Neurological:  Positive for dizziness and light-headedness. Negative for syncope. Hematological: Negative. Psychiatric/Behavioral: Negative.          Historical Information   Past Medical History:   Diagnosis Date    Anemia     History of transfusion     Hypercholesterolemia     Sleep apnea     Vitamin deficiency        Past Surgical History:   Procedure Laterality Date    ECTOPIC PREGNANCY SURGERY      EYE SURGERY      GASTRECTOMY SLEEVE LAPAROSCOPIC      MASS EXCISION N/A 8/1/2023    Procedure: EXCISION  BIOPSY LESION/MASS BACK;  Surgeon: Bassam Marshall DO;  Location: [facility];  Service: General    GA LAPAROSCOPY W/RMVL ADNEXAL STRUCTURES Bilateral 9/28/2021    Procedure: SALPINGECTOMY, LAPAROSCOPIC;  Surgeon: Dallas Menezes MD;  [facility];  Service: Gynecology    OR LAPS W/VAG HYSTERECT 250 GM/&RMVL TUBE&/OVARIES N/A 9/28/2021    Procedure: HYSTERECTOMY LAPAROSCOPIC ASSISTED VAGINAL (LAVH), LYSIS OF ADHESIONS;  Surgeon: Dallas Menezes MD;  [facility];  Service: Gynecology       Social History     Substance and Sexual Activity   Alcohol Use Never     Social History     Substance and Sexual Activity   Drug Use No     Social History     Tobacco Use   Smoking Status Never    Passive exposure: Never   Smokeless Tobacco Never       Family History   Problem Relation Age of Onset    Brain cancer Mother     Cancer Father     Asthma Brother     Diabetes Maternal Grandmother     Cancer Maternal Grandmother     Hypertension Paternal Grandmother        Meds/Allergies   Hospital Medications:   Current Facility-Administered Medications   Medication Dose Route Frequency    acetaminophen (TYLENOL) tablet 650 mg  650 mg Oral Q6H PRN    docusate sodium (COLACE) capsule 100 mg  100 mg Oral TID PRN    fish oil capsule 1,000 mg  1,000 mg Oral Daily    ibuprofen (MOTRIN) tablet 600 mg  600 mg Oral Q6H PRN    pantoprazole (PROTONIX) EC tablet 40 mg  40 mg Oral QAM    potassium chloride (K-DUR,KLOR-CON) CR tablet 20 mEq  20 mEq Oral Once       Home Medications:   Medications Prior to Admission   Medication    acetaminophen (TYLENOL) 500 mg tablet    Biotin 1 MG CAPS    cholecalciferol (VITAMIN D3) 25 mcg (1,000 units) tablet    cyanocobalamin (VITAMIN B-12) 100 mcg tablet    Diclofenac Sodium (VOLTAREN) 1 %    docusate sodium (COLACE) 100 mg capsule    ferrous sulfate 324 (65 Fe) mg    ibuprofen (MOTRIN) 600 mg tablet    Insulin Pen Needle (BD Pen Needle Sarita 2nd Gen) 32G X 4 MM MISC    lidocaine (LIDODERM) 5 %    liraglutide (Saxenda) injection    multivitamin (THERAGRAN) TABS    Omega-3 Fatty Acids (fish oil) 1,000 mg    pantoprazole (PROTONIX) 40 mg tablet       Allergies   Allergen Reactions Penicillin G Other (See Comments)    Pollen Extract Sneezing       Objective   Vitals: Blood pressure 118/80, pulse (!) 53, temperature 97.9 °F (36.6 °C), resp. rate 16, height 5' 5" (1.651 m), weight 114 kg (251 lb), last menstrual period 09/12/2021, SpO2 98 %. Orthostatic Blood Pressures      Flowsheet Row Most Recent Value   Blood Pressure 118/80 filed at 10/18/2023 1306            Intake/Output Summary (Last 24 hours) at 10/18/2023 1520  Last data filed at 10/18/2023 0900  Gross per 24 hour   Intake 0 ml   Output --   Net 0 ml       Invasive Devices       Peripheral Intravenous Line  Duration             Peripheral IV 10/15/23 Right Antecubital 2 days                    Physical Exam   GEN: NAD  SKIN: warm, dry without significant lesions or rashes  HEENT: NCAT, PERRL, EOMs intact  NECK: supple, no JVD appreciated  CARDIOVASCULAR: RRR  LUNGS: CTA bilaterally without wheezes, rhonchi, or rales  ABDOMEN: Soft, nontender, nondistended. EXTREMITIES/VASCULAR: perfused without clubbing, cyanosis, or LE edema b/l  PSYCH: Normal mood and affect  NEURO: CN ll-Xll grossly intact    Lab Results: I have personally reviewed pertinent lab results. Results from last 7 days   Lab Units 10/18/23  0447 10/17/23  0514 10/16/23  0523   WBC Thousand/uL 5.00 4.91 4.63   HEMOGLOBIN g/dL 12.6 12.4 12.8   HEMATOCRIT % 38.6 38.4 39.8   PLATELETS Thousands/uL 234 234 226     Results from last 7 days   Lab Units 10/18/23  0447 10/17/23  0514 10/16/23  0523   POTASSIUM mmol/L 3.6 4.0 3.6   CHLORIDE mmol/L 105 105 105   CO2 mmol/L 29 30 28   BUN mg/dL 10 11 12   CREATININE mg/dL 0.54* 0.77 0.65   CALCIUM mg/dL 8.2* 8.6 9.0         Results from last 7 days   Lab Units 10/15/23  1622   MAGNESIUM mg/dL 2.0       Imaging: I have personally reviewed pertinent reports.       VTE Prophylaxis: Sequential compression device (Venodyne)

## 2023-10-18 NOTE — UTILIZATION REVIEW
NOTIFICATION OF ADMISSION DISCHARGE   This is a Notification of Discharge from [address] [facility]. Please be advised that this patient has been discharge from our facility. Below you will find the admission and discharge date and time including the patient’s disposition. UTILIZATION REVIEW CONTACT:  Sharmin De La Torre  Utilization   Network Utilization Review Department  Phone: [phone] x carefully listen to the prompts. All voicemails are confidential.  Email: [email or web address]     ADMISSION INFORMATION  PRESENTATION DATE: 10/15/2023  4:13 PM  OBERVATION ADMISSION DATE:   INPATIENT ADMISSION DATE: 10/15/23  5:25 PM   DISCHARGE DATE: 10/17/2023  9:58 AM   DISPOSITION:[facility] Utilization Review Department  ATTENTION: Please call with any questions or concerns to [phone] and carefully listen to the prompts so that you are directed to the right person. All voicemails are confidential.   For Discharge needs, contact Care Management DC Support Team at [phone] opt. 2  Send all requests for admission clinical reviews, approved or denied determinations and any other requests to dedicated fax number below belonging to the campus where the patient is receiving treatment.  List of dedicated fax numbers for the Facilities:  [place] DENIALS (Administrative/Medical Necessity) [phone]   DISCHARGE SUPPORT TEAM (Network) [phone] [address] [facility] (Maternity/NICU/Pediatrics) [phone] [address] [facility] [address] [place] [address] [address] [phone]

## 2023-10-19 VITALS
BODY MASS INDEX: 41.82 KG/M2 | RESPIRATION RATE: 16 BRPM | HEIGHT: 65 IN | TEMPERATURE: 98.2 F | WEIGHT: 251 LBS | HEART RATE: 59 BPM | OXYGEN SATURATION: 95 % | DIASTOLIC BLOOD PRESSURE: 59 MMHG | SYSTOLIC BLOOD PRESSURE: 102 MMHG

## 2023-10-19 PROCEDURE — NC001 PR NO CHARGE: Performed by: STUDENT IN AN ORGANIZED HEALTH CARE EDUCATION/TRAINING PROGRAM

## 2023-10-19 PROCEDURE — 99223 1ST HOSP IP/OBS HIGH 75: CPT | Performed by: STUDENT IN AN ORGANIZED HEALTH CARE EDUCATION/TRAINING PROGRAM

## 2023-10-19 PROCEDURE — 99239 HOSP IP/OBS DSCHRG MGMT >30: CPT | Performed by: INTERNAL MEDICINE

## 2023-10-19 PROCEDURE — 93660 TILT TABLE EVALUATION: CPT | Performed by: INTERNAL MEDICINE

## 2023-10-19 RX ORDER — POTASSIUM CHLORIDE 20 MEQ/1
40 TABLET, EXTENDED RELEASE ORAL ONCE
Status: COMPLETED | OUTPATIENT
Start: 2023-10-19 | End: 2023-10-19

## 2023-10-19 RX ADMIN — POTASSIUM CHLORIDE 40 MEQ: 1500 TABLET, EXTENDED RELEASE ORAL at 12:38

## 2023-10-19 NOTE — ASSESSMENT & PLAN NOTE
Patient presents with intermittent symptoms of dizziness for the past several months. This was initially worked up by Holter monitor. Unfortunately work-up consistent with second-degree heart block on event monitoring. Patient transferred here for EP assessment duration of sinus pause/second-degree AV block. Follow-up on electrolytes. S/p recent Echo in 9/2023 WNL  S/p recent TFT's in euthyroid state  Discussed with EP. Heart block appears to be during sleep. No intervention recommended given symptoms are discordant with bradycardia arrhythmia. Discussed with electrophysiology service. Recommend wearing compression stockings, maintain hydration in the setting of gastric sleeve, continue with increased salt intake, follow-up with sleep study. Patient reports that she has been on CPAP previously but has not been compliant with her device. Will need close follow-up for with GI and sleep lab.

## 2023-10-19 NOTE — UTILIZATION REVIEW
Continued Stay Review    Date: 10-19-23                           Current Patient Class: inpatient  Current Level of Care: med surg    HPI:48 y.o. female initially admitted on 10-17-23     Assessment/Plan:     Electrophysiology consult completed:  had  overnight pulse ox to evaluate for sleep apnea. Suspect apnea more than hypoxia. Symptoms  of dizziness do not correlate with noted 2nd degree block. While in the hospital 3 episodes have occurred while sleeping. She is scheduled fot tilt table test.  Pacer implant not required. Obtain PT/OT evaluations today.        Vital Signs:   Date/Time Temp Pulse Resp BP MAP (mmHg) SpO2 O2 Device   10/19/23 07:09:37 97.8 °F (36.6 °C) 54 Abnormal  -- 118/77 91 96 % --   10/19/23 02:21:35 -- 58 -- 116/73 87 95 % --   10/18/23 21:43:26 97.7 °F (36.5 °C) 60 -- 138/79 99 98 % --   10/18/23 2016 -- -- -- -- -- -- None (Room air)   10/18/23 19:54:50 98.2 °F (36.8 °C) 59 -- 117/75 89 96 % --   10/18/23 1306 -- 53 Abnormal  16 118/80 -- -- --   10/18/23 1300 -- 53 Abnormal  16 118/80 -- -- --   10/18/23 11:29:28 97.9 °F (36.6 °C) 51 Abnormal  20 101/58 72 98 % --   10/18/23 0900 -- -- -- -- -- -- None (Room air)   10/18/23 08:43:08 -- 62 18 111/68 82 96 % --   10/18/23 02:28:02 -- 59 -- 115/73 87 96 % --       Pertinent Labs/Diagnostic Results:       Results from last 7 days   Lab Units 10/18/23  0447 10/17/23  0514 10/16/23  0523 10/15/23  1622   WBC Thousand/uL 5.00 4.91 4.63 4.90   HEMOGLOBIN g/dL 12.6 12.4 12.8 13.1   HEMATOCRIT % 38.6 38.4 39.8 41.0   PLATELETS Thousands/uL 234 234 226 239   NEUTROS ABS Thousands/µL 1.82* 1.76*  --  1.79*         Results from last 7 days   Lab Units 10/18/23  0447 10/17/23  0514 10/16/23  0523 10/15/23  1622   SODIUM mmol/L 139 140 139 141   POTASSIUM mmol/L 3.6 4.0 3.6 3.8   CHLORIDE mmol/L 105 105 105 106   CO2 mmol/L 29 30 28 31   ANION GAP mmol/L 5 5 6 4   BUN mg/dL 10 11 12 13   CREATININE mg/dL 0.54* 0.77 0.65 0.74   EGFR ml/min/1.73sq m 112 92 106 96   CALCIUM mg/dL 8.2* 8.6 9.0 9.2   MAGNESIUM mg/dL  --   --   --  2.0     Results from last 7 days   Lab Units 10/18/23  0447 10/15/23  1622   AST U/L 13 16   ALT U/L 8 9   ALK PHOS U/L 46 54   TOTAL PROTEIN g/dL 6.2* 7.7   ALBUMIN g/dL 3.4* 4.1   TOTAL BILIRUBIN mg/dL 0.27 0.42   BILIRUBIN DIRECT mg/dL  --  0.13         Results from last 7 days   Lab Units 10/18/23  0447 10/17/23  0514 10/16/23  0523 10/15/23  1622   GLUCOSE RANDOM mg/dL 97 97 89 98           Results from last 7 days   Lab Units 10/15/23  2043 10/15/23  1903 10/15/23  1622   HS TNI 0HR ng/L  --   --  <2   HS TNI 2HR ng/L  --  <2  --    HS TNI 4HR ng/L <2  --   --        Medications:   Scheduled Medications:  fish oil, 1,000 mg, Oral, Daily  pantoprazole, 40 mg, Oral, QAM      Continuous IV Infusions:     PRN Meds:  acetaminophen, 650 mg, Oral, Q6H PRN  docusate sodium, 100 mg, Oral, TID PRN  ibuprofen, 600 mg, Oral, Q6H PRN        Discharge Plan: to be determined     Network Utilization Review Department  ATTENTION: Please call with any questions or concerns to [phone] and carefully listen to the prompts so that you are directed to the right person. All voicemails are confidential.   For Discharge needs, contact Care Management DC Support Team at [phone] opt. 2  Send all requests for admission clinical reviews, approved or denied determinations and any other requests to dedicated fax number below belonging to the campus where the patient is receiving treatment.  List of dedicated fax numbers for the Facilities:  [place] DENIALS (Administrative/Medical Necessity) [phone]   DISCHARGE SUPPORT TEAM (NETWORK) [id number] [facility] (Maternity/NICU/Pediatrics) [address] [place] [address] [phone] [address] [address] [facility] [address] [facility] [address] [phone]

## 2023-10-19 NOTE — PROGRESS NOTES
Progress Note - Electrophysiology - Cardiology  Maxwell Gray 53 y.o. female MRN: [id number]  Unit/Bed#: -01 Encounter: [id number]      Assessment:  Lightheadedness/dizziness  Brief complete heart block (3 seconds) on ZioPatch September 2023  2:1 AV block during hours of sleep  SHARI - not on CPAP  Gastric bypass    Plan:  -- Tilt table test negative  -- overnight pulse ox unable to be completed last night - reattempt tonight  -- 2:1 AV block/CHB on tele today while sleeping, now sinus rhythm  -- will await test results and discuss plan with patient      Subjective/Objective   Subjective: Maxwell Gray is a 53 y.o. female with a history of SHARI not on CPAP and prior gastric bypass surgery who underwent outpatient evaluation for dizziness. Brief complete heart block was noted on ZioPatch and therefore she was directed to the hospital for admission. On telemetry, she was noted to have 2:1 AV block while sleeping. She has occasional dizziness. She underwent tilt table test as well as overnight pulse ox. Currently, she is feeling ok. She denies chest pain, SOB, lightheadedness, dizziness, orthopnea, or PND. TELE: sinus rhythm, 2:1 AV block while sleeping this morning    Objective:  Vitals: /68   Pulse 67   Temp 98 °F (36.7 °C)   Resp 16   Ht 5' 5" (1.651 m)   Wt 114 kg (251 lb)   LMP 09/12/2021 (Exact Date)   SpO2 96%   BMI 41.77 kg/m²     Vitals:    10/18/23 1300 10/18/23 1306   Weight: 114 kg (251 lb) 114 kg (251 lb)     Orthostatic Blood Pressures      Flowsheet Row Most Recent Value   Blood Pressure 120/68 filed at 10/19/2023 1118              Intake/Output Summary (Last 24 hours) at 10/19/2023 1415  Last data filed at 10/19/2023 0900  Gross per 24 hour   Intake 0 ml   Output --   Net 0 ml       Invasive Devices       Peripheral Intravenous Line  Duration             Peripheral IV 10/19/23 Dorsal (posterior); Right Forearm <1 day                    Scheduled Meds:  Current Facility-Administered Medications   Medication Dose Route Frequency Provider Last Rate    acetaminophen  650 mg Oral Q6H PRN Hetul Greene, DO      docusate sodium  100 mg Oral TID PRN Hetul Greene, DO      fish oil  1,000 mg Oral Daily Hetul Greene, DO      ibuprofen  600 mg Oral Q6H PRN Hetul Greene, DO      pantoprazole  40 mg Oral QAM Hetul Greene, DO       Continuous Infusions:   PRN Meds:.  acetaminophen    docusate sodium    ibuprofen    Review of Systems:  Review of Systems   Constitutional: Negative for malaise/fatigue. HENT: Negative. Eyes: Negative. Cardiovascular:  Negative for chest pain, orthopnea, palpitations and syncope. Respiratory:  Negative for shortness of breath and wheezing. Endocrine: Negative. Hematologic/Lymphatic: Negative. Skin:  Negative for rash. Musculoskeletal: Negative. Gastrointestinal:  Negative for abdominal pain, nausea and vomiting. Genitourinary: Negative. Neurological: Negative. Psychiatric/Behavioral: Negative. ROS as noted above, otherwise 12 point review of systems was performed and is negative. Physical Exam:   GEN: NAD  SKIN: warm, dry without significant lesions or rashes  HEENT: NCAT  NECK: supple, no JVD appreciated  CARDIOVASCULAR: RRR, normal S1, S2 without murmurs, rubs, or gallops   LUNGS: CTA bilaterally without wheezes, rhonchi, or rales  ABDOMEN: Soft, nontender, nondistended. EXTREMITIES/VASCULAR: perfused without clubbing, cyanosis, or LE edema b/l  PSYCH: Normal mood and affect  NEURO: CN ll-Xll grossly intact    Lab Results: I have personally reviewed pertinent lab results.     Results from last 7 days   Lab Units 10/18/23  0447 10/17/23  0514 10/16/23  0523   WBC Thousand/uL 5.00 4.91 4.63   HEMOGLOBIN g/dL 12.6 12.4 12.8   HEMATOCRIT % 38.6 38.4 39.8   PLATELETS Thousands/uL 234 234 226     Results from last 7 days   Lab Units 10/18/23  0447 10/17/23  0514 10/16/23  0523   POTASSIUM mmol/L 3.6 4.0 3.6   CHLORIDE mmol/L 105 105 105   CO2 mmol/L 29 30 28   BUN mg/dL 10 11 12   CREATININE mg/dL 0.54* 0.77 0.65   CALCIUM mg/dL 8.2* 8.6 9.0         Results from last 7 days   Lab Units 10/15/23  1622   MAGNESIUM mg/dL 2.0       Imaging: I have personally reviewed pertinent reports. No results found for this or any previous visit.       VTE Pharmacologic Prophylaxis: Sequential compression device (Venodyne)   VTE Mechanical Prophylaxis: sequential compression device

## 2023-10-19 NOTE — PHYSICAL THERAPY NOTE
Physical Therapy Screen    Patient Name: Thomas Schuler    IQJSF'M Date: 10/19/2023     Problem List  Principal Problem:    Heart block AV second degree  Active Problems:    Hyperlipidemia    Morbid obesity ([address])       Past Medical History  Past Medical History:   Diagnosis Date    Anemia     History of transfusion     Hypercholesterolemia     Sleep apnea     Vitamin deficiency         Past Surgical History  Past Surgical History:   Procedure Laterality Date    ECTOPIC PREGNANCY SURGERY      EYE SURGERY      GASTRECTOMY SLEEVE LAPAROSCOPIC      MASS EXCISION N/A 8/1/2023    Procedure: EXCISION  BIOPSY LESION/MASS BACK;  Surgeon: Malina Vera DO;  Location: [facility];  Service: General    DC LAPAROSCOPY W/RMVL ADNEXAL STRUCTURES Bilateral 9/28/2021    Procedure: SALPINGECTOMY, LAPAROSCOPIC;  Surgeon: Uzair Landry MD;  Location: [facility];  Service: Gynecology    DC LAPS W/VAG HYSTERECT 250 GM/&RMVL TUBE&/OVARIES N/A 9/28/2021    Procedure: HYSTERECTOMY LAPAROSCOPIC ASSISTED VAGINAL (LAVH), LYSIS OF ADHESIONS;  Surgeon: Uzair Landry MD;  Location: [facility];  Service: Gynecology           10/19/23 1200   PT Last Visit   PT Visit Date 10/19/23   Note Type   Note type Screen   Assessment   Assessment PT orders received and chart reviewed. Upon arrival, pt self-ambulating in room independently w/o AD. Pt with no questions or concerns regarding d/c home; appears to be functioning at/ near baseline mobility levels and reports no mobility needs at this time. Pt with no further acute inpatient PT needs at this time- please re-consult if needed.      Jarett Rodriguez PT, DPT

## 2023-10-19 NOTE — DISCHARGE INSTR - AVS FIRST PAGE
Follow-up with sleep study for titration of CPAP.   Follow-up with GI regarding gastric sleeve  Continue with fluid and salt intake  Compression stockings

## 2023-10-19 NOTE — DISCHARGE SUMMARY
[address] [facility]  [facility] 1975[facility] 53 [facility] MRN: [id number]  Unit/Bed#: [facility] Encounter: [id number]  Primary Care Provider: DAVE Alaniz   Date and time admitted to hospital: 10/17/2023 10:43 AM    * Heart block AV second degree  Assessment & Plan  Patient presents with intermittent symptoms of dizziness for the past several months. This was initially worked up by Holter monitor. Unfortunately work-up consistent with second-degree heart block on event monitoring. Patient transferred here for EP assessment duration of sinus pause/second-degree AV block. Follow-up on electrolytes. S/p recent Echo in 9/2023 WNL  S/p recent TFT's in euthyroid state  Discussed with EP. Heart block appears to be during sleep. No intervention recommended given symptoms are discordant with bradycardia arrhythmia. Discussed with electrophysiology service. Recommend wearing compression stockings, maintain hydration in the setting of gastric sleeve, continue with increased salt intake, follow-up with sleep study. Patient reports that she has been on CPAP previously but has not been compliant with her device. Will need close follow-up for with GI and sleep lab. Morbid obesity ([address])  Assessment & Plan  Life style modification. Patient with history of laparoscopic gastric sleeve    Hyperlipidemia  Assessment & Plan  Outpatient follow-up              Medical Problems       Resolved Problems  Date Reviewed: 10/15/2023   None         Admission Date:   Admission Orders (From admission, onward)       Ordered        10/17/23 1055  Inpatient Admission  Once                            Admitting Diagnosis: Heart block AV second degree    HPI: This a very pleasant 27-year-old female who presents to the hospital as a transfer for assessment of lightheadedness/dizziness. Prior to transfer here patient has been worked up with a Zio patch for complete heart block. Patient was noted to have a 2-1 AV block on monitor. She has multiple chronic comorbidities including obstructive sleep apnea noncompliant with CPAP and history of gastric sleeve. She presents for further assessment of dizziness and consideration for pacemaker initially. Procedures Performed: No orders of the defined types were placed in this encounter. Summary of Hospital Course: Upon further review with EP it was noted that her symptoms of dizziness were discordant with arrhythmia . she does not report symptoms of lightheadedness or dizziness during the episodes of bradycardia. Consequently, further investigation was initiated by the EP team.  She had a tilt table test performed which was negative. Patient acknowledges that she does not wear her CPAP routinely. She reports that she gets significant dry skin associated with the symptoms. We did discuss the benefit of CPAP given the nature of the bradycardia reported on the monitor at bedtime. At this point in time, EP is recommending compression stockings, increase salt intake and fluid intake, follow-up with GI regarding GI symptoms/gastric sleeve history and follow-up for repeat sleep study to titrate CPAP machine. Patient reports that she did lose significant amount of weight after the sleeve. Hopefully, this would translate into a lower CPAP setting that is more tolerable to her. Condition at Discharge: fair         Discharge instructions/Information to patient and family:   See after visit summary for information provided to patient and family. Provisions for Follow-Up Care:  See after visit summary for information related to follow-up care and any pertinent home health orders. PCP: DAVE Walker    Disposition: Home    Planned Readmission: No    Discharge Statement   I spent 85 minutes discharging the patient. This time was spent on the day of discharge. I had direct contact with the patient on the day of discharge. Additional documentation is required if more than 30 minutes were spent on discharge. Discharge Medications:  See after visit summary for reconciled discharge medications provided to patient and family.

## 2023-10-19 NOTE — OCCUPATIONAL THERAPY NOTE
Occupational Therapy cx        Patient Name: Maxwell Gray  PJWZV'P Date: 10/19/2023       10/19/23 1003   OT Last Visit   OT Visit Date 10/19/23   Note Type   Note type Screen   Additional Comments OT orders received and chart reviewed. Found pt performing FM indl'y from bathroom, reports she was able to complete all bathroom tasks w/o assistance.          Selena Mathews, MOT, OTR/L

## 2023-10-20 ENCOUNTER — TELEPHONE (OUTPATIENT)
Dept: FAMILY MEDICINE CLINIC | Facility: CLINIC | Age: 48
End: 2023-10-20

## 2023-10-20 ENCOUNTER — TRANSITIONAL CARE MANAGEMENT (OUTPATIENT)
Dept: FAMILY MEDICINE CLINIC | Facility: CLINIC | Age: 48
End: 2023-10-20

## 2023-10-20 NOTE — TELEPHONE ENCOUNTER
TCM and follow up needed       DO Hermes Weeks CRNP  Thank you for allowing us to participate in the care of your patient, Marcellus Galaviz, who was hospitalized from 10/17/2023 through 10/19/2023 with the admitting diagnosis of dizziness    This a very pleasant 81-year-old female who presents to the hospital as a transfer for assessment of lightheadedness/dizziness. Prior to transfer here patient has been worked up with a Zio patch for complete heart block. Patient was noted to have a 2-1 AV block on monitor. She has multiple chronic comorbidities including obstructive sleep apnea noncompliant with CPAP and history of gastric sleeve. She presents for further assessment of dizziness and consideration for pacemaker initially. Summary of Hospital Course: Upon further review with EP it was noted that her symptoms of dizziness were discordant with arrhythmia . Abhi Taveras ...she does not report symptoms of lightheadedness or dizziness during the episodes of bradycardia. Consequently, further investigation was initiated by the EP team.  She had a tilt table test performed that was negative. Patient acknowledges that she does not wear her CPAP routinely. She reports that she gets significant dry skin associated with the symptoms. We did discuss the benefit of CPAP given the nature of the bradycardia reported on the monitor. At this point time, EP is recommending compression stockings, increase salt intake and fluid intake, follow-up with GI regarding GI symptoms/gastric sleeve history and follow-up for repeat sleep study to titrate CPAP machine. Patient reports that she did lose significant amount of weight after the sleeve. Hopefully, this would translate into a lower CPAP setting that is more tolerable to her. If you have any additional questions or would like to discuss further, please feel free to contact me.     Keiry Metcalf, [address] [facility] Internal Medicine, Hospitalist  [phone]

## 2023-10-20 NOTE — UTILIZATION REVIEW
NOTIFICATION OF ADMISSION DISCHARGE   This is a Notification of Discharge from [address]. Please be advised that this patient has been discharge from our facility. Below you will find the admission and discharge date and time including the patient’s disposition. UTILIZATION REVIEW CONTACT:  Ashley Traylor  Utilization   Network Utilization Review Department  Phone: [phone] x carefully listen to the prompts. All voicemails are confidential.  Email: [email or web address]     ADMISSION INFORMATION  PRESENTATION DATE: 10/17/2023 10:43 AM  OBERVATION ADMISSION DATE:   INPATIENT ADMISSION DATE: 10/17/23 10:43 AM   DISCHARGE DATE: 10/19/2023  6:44 PM   DISPOSITION:Home/Self Care    Network Utilization Review Department  ATTENTION: Please call with any questions or concerns to [phone] and carefully listen to the prompts so that you are directed to the right person. All voicemails are confidential.   For Discharge needs, contact Care Management DC Support Team at [phone] opt. 2  Send all requests for admission clinical reviews, approved or denied determinations and any other requests to dedicated fax number below belonging to the campus where the patient is receiving treatment.  List of dedicated fax numbers for the Facilities:  [place] DENIALS (Administrative/Medical Necessity) [phone]   DISCHARGE SUPPORT TEAM (Network) [phone] [address] [facility] (Maternity/NICU/Pediatrics) [phone]   [address] [facility] [address] [place] [address] [phone]   [facility] [phone]   [address] [place] [address]  [address]  [phone]

## 2023-10-24 ENCOUNTER — OFFICE VISIT (OUTPATIENT)
Dept: FAMILY MEDICINE CLINIC | Facility: CLINIC | Age: 48
End: 2023-10-24
Payer: COMMERCIAL

## 2023-10-24 VITALS
HEIGHT: 65 IN | TEMPERATURE: 98.3 F | OXYGEN SATURATION: 98 % | HEART RATE: 74 BPM | WEIGHT: 256 LBS | BODY MASS INDEX: 42.65 KG/M2 | SYSTOLIC BLOOD PRESSURE: 114 MMHG | DIASTOLIC BLOOD PRESSURE: 86 MMHG

## 2023-10-24 DIAGNOSIS — Z98.84 STATUS POST BARIATRIC SURGERY: ICD-10-CM

## 2023-10-24 DIAGNOSIS — R42 DIZZINESS: ICD-10-CM

## 2023-10-24 DIAGNOSIS — R53.1 GENERALIZED WEAKNESS: ICD-10-CM

## 2023-10-24 DIAGNOSIS — R53.83 OTHER FATIGUE: ICD-10-CM

## 2023-10-24 DIAGNOSIS — R94.31 ABNORMAL EKG: ICD-10-CM

## 2023-10-24 DIAGNOSIS — I95.1 ORTHOSTATIC HYPOTENSION: ICD-10-CM

## 2023-10-24 DIAGNOSIS — I44.1 HEART BLOCK AV SECOND DEGREE: Primary | ICD-10-CM

## 2023-10-24 DIAGNOSIS — R00.1 BRADYCARDIA: ICD-10-CM

## 2023-10-24 PROBLEM — R03.1 LOW BLOOD PRESSURE, NOT HYPOTENSION: Status: ACTIVE | Noted: 2023-10-24

## 2023-10-24 PROCEDURE — 99496 TRANSJ CARE MGMT HIGH F2F 7D: CPT | Performed by: NURSE PRACTITIONER

## 2023-10-24 NOTE — PROGRESS NOTES
Assessment & Plan   Pt is a 53 yr old female   Presents in office for TCM   She was admitted to the hospital post z patch monitoring that showed AV blocks and initially was admitted for pacemaker placement   However when transferred to [place] she was seen by another cardiologist who fel that she is too young for the issues that she is presenting and noted that most of her pauses and blocks are happening while she is sleeping. The placed procedure on hold with plan to have a sleep study done ASAP also to see nutritionist for better nutritional intake and look into absorption issues. Referral to weight management given and she is to follow up with bariatric surgery as she is post Bariatric surgery in the past    She is to increase protein intake Na intake and hydration   I will see her back after sleep study and after seen by nutritionist at weight management. She is to hold weight loss meds for now. 1. Heart block AV second degree  -     Diagnostic Sleep Study; Future; Expected date: 10/25/2023  -     Lipid panel; Future    2. Other fatigue  -     Diagnostic Sleep Study; Future; Expected date: 10/25/2023  -     Ambulatory Referral to Nutrition Services; Future    3. Generalized weakness  -     Diagnostic Sleep Study; Future; Expected date: 10/25/2023  -     Ambulatory Referral to Nutrition Services; Future    4. Bradycardia  -     Diagnostic Sleep Study; Future; Expected date: 10/25/2023    5. Dizziness  -     Diagnostic Sleep Study; Future; Expected date: 10/25/2023  -     Ambulatory Referral to Nutrition Services; Future    6. Abnormal EKG  -     Diagnostic Sleep Study; Future; Expected date: 10/25/2023    7. Orthostatic hypotension  -     Diagnostic Sleep Study; Future; Expected date: 10/25/2023    8. Status post bariatric surgery  -     Ambulatory Referral to Nutrition Services; Future      BMI Counseling: Body mass index is 42.6 kg/m².  The BMI is above normal. Nutrition recommendations include decreasing portion sizes, encouraging healthy choices of fruits and vegetables, decreasing fast food intake, consuming healthier snacks, limiting drinks that contain sugar, moderation in carbohydrate intake, increasing intake of lean protein, reducing intake of saturated and trans fat and reducing intake of cholesterol. Exercise recommendations include vigorous physical activity 75 minutes/week, exercising 3-5 times per week and strength training exercises. No pharmacotherapy was ordered. Patient referred to PCP. Rationale for BMI follow-up plan is due to patient being overweight or obese. Subjective     Transitional Care Management Review:   Abeba Jernigan is a 53 y.o. female here for TCM follow up. During the TCM phone call patient stated:  TCM Call       Date and time call was made  10/20/2023  8:47 AM    Hospital care reviewed  Records reviewed    Patient was hospitialized at  [address] [place] [address]    Date of Admission  10/15/23    Date of discharge  10/19/23    Diagnosis  heart block    Disposition  Home    Were the patients medications reviewed and updated  Yes    Current Symptoms  None          TCM Call       Post hospital issues  None    Scheduled for follow up? Yes    Patients specialists  Cardiologist    I have advised the patient to call PCP with any new or worsening symptoms  Lars ferrara          Pt is a 53 yr old female   Presents in office for TCM   She was admitted to the hospital post z patch monitoring that showed AV blocks and initially was admitted for pacemaker placement   However when transferred to [place] she was seen by another cardiologist who fel that she is too young for the issues that she is presenting and noted that most of her pauses and blocks are happening while she is sleeping.    The placed procedure on hold with plan to have a sleep study done ASAP also to see nutritionist for better nutritional intake and look into absorption issues. Referral to weight management given and she is to follow up with bariatric surgery as she is post Bariatric surgery in the past    She is to increase protein intake Na intake and hydration   I will see her back after sleep study and after seen by nutritionist at weight management. She is to hold weight loss meds for now. Review of Systems   Constitutional:  Positive for fatigue and unexpected weight change (Difficulty with weight loss despite treatment). Negative for fever. HENT:  Negative for congestion and postnasal drip. Eyes: Negative. Respiratory:  Negative for cough and shortness of breath. Complains of some dyspnea on exertion at times   Cardiovascular:  Positive for palpitations. Negative for chest pain and leg swelling. Complains of low blood pressures lately and feeling fatigued and weak at times  Low HR   Hear blocks   Here post hospital discharged   TCM    Gastrointestinal:  Negative for abdominal distention, abdominal pain, nausea and vomiting. Endocrine:        History of PCOS   Genitourinary:  Negative for difficulty urinating and flank pain. Musculoskeletal:  Positive for arthralgias, back pain and myalgias. Skin:  Negative for rash. Allergic/Immunologic: Negative for environmental allergies. Neurological:  Positive for weakness and light-headedness. Hematological:  Negative for adenopathy. Psychiatric/Behavioral:  Negative for sleep disturbance and suicidal ideas. The patient is nervous/anxious. Objective     /86 (BP Location: Right arm, Patient Position: Sitting, Cuff Size: Adult)   Pulse 74   Temp 98.3 °F (36.8 °C)   Ht 5' 5" (1.651 m)   Wt 116 kg (256 lb)   LMP 09/12/2021 (Exact Date)   SpO2 98%   BMI 42.60 kg/m²      Physical Exam  Vitals and nursing note reviewed. Constitutional:       Appearance: Normal appearance. HENT:      Head: Atraumatic. Eyes:      Extraocular Movements: Extraocular movements intact. Cardiovascular:      Rate and Rhythm: Normal rate and regular rhythm. Pulses: Normal pulses. Heart sounds: Normal heart sounds. Pulmonary:      Breath sounds: Normal breath sounds. Abdominal:      Palpations: Abdomen is soft. Musculoskeletal:      Cervical back: Normal range of motion. Right lower leg: No edema. Left lower leg: No edema. Skin:     General: Skin is warm. Neurological:      Mental Status: She is alert and oriented to person, place, and time. Psychiatric:         Mood and Affect: Mood normal.         Behavior: Behavior normal.       Medications have been reviewed by provider in current encounter  Comprehensive metabolic panel  Order: [id number]  Status: Final result       Visible to patient: Yes (seen)       Next appt: 03/21/2024 at 03:00 PM in Gynecology Mariah Cross MD)    0 Result Notes             Component Ref Range & Units 10/18/23  4:47 AM 10/17/23  5:14 AM 10/16/23  5:23 AM 10/15/23  4:22 PM 10/15/23  4:22 PM 9/18/23  2:40 AM 6/12/23  3:19 PM   Sodium 135 - 147 mmol/L 139 140 139  141 139 138   Potassium 3.5 - 5.3 mmol/L 3.6 4.0 3.6  3.8 4.2 4.5   Chloride 96 - 108 mmol/L 105 105 105  106 106 102   CO2 21 - 32 mmol/L 29 30 28  31 29 30   ANION GAP mmol/L 5 5 6  4 4 6 R   BUN 5 - 25 mg/dL 10 11 12  13 13 12   Creatinine 0.60 - 1.30 mg/dL 0.54 Low  0.77 CM 0.65 CM  0.74 CM 0.71 CM 0.68 CM   Comment: Standardized to IDMS reference method   Glucose 65 - 140 mg/dL 97 97 CM 89 CM  98 CM 84 CM    Comment: If the patient is fasting, the ADA then defines impaired fasting glucose as > 100 mg/dL and diabetes as > or equal to 123 mg/dL. Calcium 8.4 - 10.2 mg/dL 8.2 Low  8.6 9.0  9.2 9.0 9.2   Corrected Calcium 8.3 - 10.1 mg/dL 8.7         AST 13 - 39 U/L 13   16   12 Low    ALT 7 - 52 U/L 8   9 CM   8 CM   Comment: Specimen collection should occur prior to Sulfasalazine administration due to the potential for falsely depressed results.    Alkaline Phosphatase 34 - 104 U/L 46   54   54   Total Protein 6.4 - 8.4 g/dL 6.2 Low    7.7   7.5   Albumin 3.5 - 5.0 g/dL 3.4 Low    4.1   4.1   Total Bilirubin 0.20 - 1.00 mg/dL 0.27   0.42 CM   0.52 CM   Comment: Use of this assay is not recommended for patients undergoing treatment with eltrombopag due to the potential for falsely elevated results. N-acetyl-p-benzoquinone imine (metabolite of Acetaminophen) will generate erroneously low results in samples for patients that have taken an overdose of Acetaminophen.    eGFR ml/min/1.73sq m 112 92 106           Contains abnormal data CBC and differential  Order: [id number]  Status: Final result       Visible to patient: Yes (seen)       Next appt: 03/21/2024 at 03:00 PM in Gynecology Sherron Barton MD)    0 Result Notes             Component Ref Range & Units 10/18/23  4:47 AM 10/17/23  5:14 AM 10/16/23  5:23 AM 10/15/23  4:22 PM 9/18/23  2:40 AM 6/12/23  3:19 PM 3/3/23 12:28 PM   WBC 4.31 - 10.16 Thousand/uL 5.00 4.91 4.63 4.90 7.67 5.62 5.25   RBC 3.81 - 5.12 Million/uL 4.63 4.62 4.80 4.91 4.85 4.95 4.91   Hemoglobin 11.5 - 15.4 g/dL 12.6 12.4 12.8 13.1 13.1 13.1 12.9   Hematocrit 34.8 - 46.1 % 38.6 38.4 39.8 41.0 40.3 41.3 41.2   MCV 82 - 98 fL 83 83 83 84 83 83 84   MCH 26.8 - 34.3 pg 27.2 26.8 26.7 Low  26.7 Low  27.0 26.5 Low  26.3 Low    MCHC 31.4 - 37.4 g/dL 32.6 32.3 32.2 32.0 32.5 31.7 31.3 Low    RDW 11.6 - 15.1 % 13.3 13.4 13.3 13.3 13.6 13.7 13.4   MPV 8.9 - 12.7 fL 9.8 9.5 9.5 9.6 9.5 9.6 9.6   Platelets 941 - 920 Thousands/uL 234 234 226 239 255 261 278   nRBC /100 WBCs 0 0  0 0 0 0   Neutrophils Relative 43 - 75 % 36 Low  36 Low   37 Low  48 46 47   Immat GRANS % 0 - 2 % 0 0  0 0 0 0   Lymphocytes Relative 14 - 44 % 52 High  49 High   47 High  38 37 38   Monocytes Relative 4 - 12 % 9 12  15 High  12 13 High  11   Eosinophils Relative 0 - 6 % 3 3  1 2 4 3   Basophils Relative 0 - 1 % 0 0  0 0 0 1   Neutrophils Absolute 1.85 - 7.62 Thousands/µL 1.82 Low  1.76 Low   1.79 Low  3.67 2.55 2.49   Immature Grans Absolute 0.00 - 0.20 Thousand/uL 0.00 0.00  0.01 0.02 0.01 0.01   Lymphocytes Absolute 0.60 - 4.47 Thousands/µL 2.54 2.42  2.28 2.88 2.08 1.98   Monocytes Absolute 0.17 - 1.22 Thousand/µL 0.45 0.57  0.75 0.93 0.74 0.58   Eosinophils Absolute 0.00 - 0.61 Thousand/µL 0.17 0.14  0.05 0.14 0.22 0.16   Basophils Absolute 0.00 - 0.10 Thousands/µL 0.02 0.02  0.02 0.03 0.02 0.03              Specimen Collected: 10/18/23  4:47 AM Last Resulted: 10/18/23  5:09 AM             DAVE Hernández

## 2023-10-25 ENCOUNTER — TELEPHONE (OUTPATIENT)
Dept: FAMILY MEDICINE CLINIC | Facility: CLINIC | Age: 48
End: 2023-10-25

## 2023-10-25 DIAGNOSIS — Z98.84 STATUS POST BARIATRIC SURGERY: Primary | ICD-10-CM

## 2023-10-25 NOTE — TELEPHONE ENCOUNTER
Please let her know nutritionist referred to weight management team so I am adding referral   She needs to reach out to the surgical team that did her surgery also     DAVE Lindquist  Based off your note:  (Other ( Must enter Comment ). post baritaric surgery needs consult for better intake and tolerance of ceration foods)  - This should be an AMB REF to Weight Management. Their dietician specialize in Bariatric patients.

## 2023-10-26 ENCOUNTER — TELEPHONE (OUTPATIENT)
Dept: SLEEP CENTER | Facility: CLINIC | Age: 48
End: 2023-10-26

## 2023-10-26 NOTE — TELEPHONE ENCOUNTER
----- Message from Leonidas Braun DO sent at 10/26/2023  8:28 AM EDT -----  approved  ----- Message -----  From: Priscilla Curran  Sent: 10/26/2023   8:13 AM EDT  To: Leonidas Braun DO; #    This Diagnostic sleep study needs approval.     If approved please sign and return to clerical pool. If denied please include reasons why. Also provide alternative testing if warranted. Please sign and return to clerical pool.

## 2023-10-27 ENCOUNTER — TELEPHONE (OUTPATIENT)
Dept: FAMILY MEDICINE CLINIC | Facility: CLINIC | Age: 48
End: 2023-10-27

## 2023-10-27 DIAGNOSIS — R53.1 GENERALIZED WEAKNESS: ICD-10-CM

## 2023-10-27 DIAGNOSIS — R42 DIZZINESS: ICD-10-CM

## 2023-10-27 DIAGNOSIS — R53.83 OTHER FATIGUE: ICD-10-CM

## 2023-10-27 DIAGNOSIS — R00.1 BRADYCARDIA: ICD-10-CM

## 2023-10-27 DIAGNOSIS — I44.1 HEART BLOCK AV SECOND DEGREE: Primary | ICD-10-CM

## 2023-10-27 DIAGNOSIS — Z86.69 HISTORY OF SLEEP APNEA: ICD-10-CM

## 2023-10-27 DIAGNOSIS — I95.1 ORTHOSTATIC HYPOTENSION: ICD-10-CM

## 2023-10-27 NOTE — TELEPHONE ENCOUNTER
Diagnostic sleep study denied   They are requesting home study to be done  first and be abnormal before they approve diagnostic labs sleep study   I did explain to them that pt did have sleep apnea in the past and she will need follow up study as she did lose weight and I did explain to them that she is having heart blocks which are noted while she is sleeping   Explained to the medical director that this were recommendations on discharge   Still denied     So plan is   [address] IF ABNORMAL DIAGNOSTIC can be ordered then

## 2023-10-30 ENCOUNTER — TELEPHONE (OUTPATIENT)
Dept: SLEEP CENTER | Facility: CLINIC | Age: 48
End: 2023-10-30

## 2023-10-30 NOTE — TELEPHONE ENCOUNTER
----- Message from Martha Ge MD sent at 10/29/2023  6:01 PM EDT -----  approved  ----- Message -----  From: Nancy Vasquez  Sent: 10/27/2023   1:24 PM EDT  To: Sleep Medicine [facility] Provider    This Home sleep study needs approval.     If approved please sign and return to clerical pool. If denied please include reasons why. Also provide alternative testing if warranted. Please sign and return to clerical pool.

## 2023-10-30 NOTE — TELEPHONE ENCOUNTER
----- Message from Meryle Alberta, MD sent at 10/29/2023  6:01 PM EDT -----  approved  ----- Message -----  From: Jay Joaquin  Sent: 10/27/2023   1:24 PM EDT  To: Sleep Medicine [facility] Provider    This Home sleep study needs approval.     If approved please sign and return to clerical pool. If denied please include reasons why. Also provide alternative testing if warranted. Please sign and return to clerical pool.
none

## 2023-10-31 ENCOUNTER — HOSPITAL ENCOUNTER (OUTPATIENT)
Dept: SLEEP CENTER | Facility: CLINIC | Age: 48
Discharge: HOME | End: 2023-10-31
Payer: COMMERCIAL

## 2023-10-31 DIAGNOSIS — R53.1 GENERALIZED WEAKNESS: ICD-10-CM

## 2023-10-31 DIAGNOSIS — R42 DIZZINESS: ICD-10-CM

## 2023-10-31 DIAGNOSIS — R53.83 OTHER FATIGUE: ICD-10-CM

## 2023-10-31 DIAGNOSIS — I95.1 ORTHOSTATIC HYPOTENSION: ICD-10-CM

## 2023-10-31 DIAGNOSIS — Z86.69 HISTORY OF SLEEP APNEA: ICD-10-CM

## 2023-10-31 DIAGNOSIS — R00.1 BRADYCARDIA: ICD-10-CM

## 2023-10-31 DIAGNOSIS — I44.1 HEART BLOCK AV SECOND DEGREE: ICD-10-CM

## 2023-10-31 PROCEDURE — G0399 HOME SLEEP TEST/TYPE 3 PORTA: HCPCS

## 2023-10-31 NOTE — PROGRESS NOTES
Home Sleep Study Documentation    HOME STUDY DEVICE: Noxturnal no                                           Maliha G3 yes      Pre-Sleep Home Study:    Set-up and instructions performed by: Cory George performed demonstration for Patient: yes    Return demonstration performed by Patient: yes    Written instructions provided to Patient: yes    Patient signed consent form: yes        Post-Sleep Home Study:    Additional comments by Patient: None    Home Sleep Study Failed:no:    Failure reason: N/A    Reported or Detected: N/A    Scored by: BENITA Summers

## 2023-11-05 PROCEDURE — 95806 SLEEP STUDY UNATT&RESP EFFT: CPT | Performed by: INTERNAL MEDICINE

## 2023-11-05 NOTE — RESULT ENCOUNTER NOTE
Please add her for virtual visit so I can order her CPAP and discuss results and will need to refer to sleep medicine     IMPRESSION:  1. Mild obstructive sleep apnea with an ELLIS of 5.2 events per hour. Respiratory events were predominantly in supine position with supine ELLIS 9.9 versus nonsupine ELLIS of 1.3 events per hour. 2.  Baseline oxygenation adequate. Respiratory event related hypoxemia with oxygen saturation less than 90% for 3.9 minutes. Oxygen keiry of 86%. RECOMMENDATION:  Mild obstructive sleep apnea necessitates treatment as patient has significant cardiac comorbidities. Treatment options include auto CPAP with pressure range 5-15 cm H2O and oral mandibular advancement device. Alternative treatment option includes positional device (such as Zzoma) to avoid sleeping in supine position. Recommend consultation with sleep medicine to further discuss treatment options.           Tamanna Yates MD

## 2023-11-06 ENCOUNTER — TELEMEDICINE (OUTPATIENT)
Dept: FAMILY MEDICINE CLINIC | Facility: CLINIC | Age: 48
End: 2023-11-06
Payer: COMMERCIAL

## 2023-11-06 DIAGNOSIS — G47.33 OBSTRUCTIVE SLEEP APNEA SYNDROME: Primary | ICD-10-CM

## 2023-11-06 LAB
DME PARACHUTE DELIVERY DATE REQUESTED: NORMAL
DME PARACHUTE ITEM DESCRIPTION: NORMAL
DME PARACHUTE ORDER STATUS: NORMAL
DME PARACHUTE SUPPLIER NAME: NORMAL
DME PARACHUTE SUPPLIER PHONE: NORMAL

## 2023-11-06 PROCEDURE — 99213 OFFICE O/P EST LOW 20 MIN: CPT | Performed by: NURSE PRACTITIONER

## 2023-11-06 NOTE — PROGRESS NOTES
Assessment/Plan:    Pt presents via virtual follow to discuss sleep study   Will need CPAP and humidification   Will need follow up with sleep specialist   CPAP orders placed   She is to keep me posted   She is to set up appt with sleep specialist and follow up in 3 months       IMPRESSION:  1. Mild obstructive sleep apnea with an ELLIS of 5.2 events per hour. Respiratory events were predominantly in supine position with supine ELLIS 9.9 versus nonsupine ELLIS of 1.3 events per hour. 2.  Baseline oxygenation adequate. Respiratory event related hypoxemia with oxygen saturation less than 90% for 3.9 minutes. Oxygen keiry of 86%. RECOMMENDATION:  Mild obstructive sleep apnea necessitates treatment as patient has significant cardiac comorbidities. Treatment options include auto CPAP with pressure range 5-15 cm H2O and oral mandibular advancement device. Alternative treatment option includes positional device (such as Zzoma) to avoid sleeping in supine position. Recommend consultation with sleep medicine to further discuss treatment options. Problem List Items Addressed This Visit    None  Visit Diagnoses       Obstructive sleep apnea syndrome    -  Primary    IMPRESSION:  1. Mild obstructive sleep apnea with an ELLIS of 5.2 events per hour. Relevant Orders    Ambulatory Referral to Sleep Medicine              Subjective:      Patient ID: Abeba Jernigan is a 53 y.o. female.     Pt presents via virtual follow to discuss sleep study   Will need CPAP and humidification   Will need follow up with sleep specialist   CPAP orders placed   She is to keep me posted   She is to set up appt with sleep specialist and follow up in 3 months         The following portions of the patient's history were reviewed and updated as appropriate:   Past Medical History:  She has a past medical history of Anemia, History of transfusion, Hypercholesterolemia, Sleep apnea, and Vitamin deficiency. ,  _______________________________________________________________________  Medical Problems:  does not have any pertinent problems on file.,  _______________________________________________________________________  Past Surgical History:   has a past surgical history that includes Ectopic pregnancy surgery; Eye surgery; GASTRECTOMY SLEEVE LAPAROSCOPIC; pr laps w/vag hysterect 250 gm/&rmvl tube&/ovaries (N/A, 9/28/2021); pr laparoscopy w/rmvl adnexal structures (Bilateral, 9/28/2021); and Mass excision (N/A, 8/1/2023). ,  _______________________________________________________________________  Family History:  family history includes Asthma in her brother; Brain cancer in her mother; Cancer in her father and maternal grandmother; Diabetes in her maternal grandmother; Hypertension in her paternal grandmother.,  _______________________________________________________________________  Social History:   reports that she has never smoked. She has never been exposed to tobacco smoke. She has never used smokeless tobacco. She reports that she does not drink alcohol and does not use drugs. ,  _______________________________________________________________________  Allergies:  is allergic to penicillin g and pollen extract. .  _______________________________________________________________________  Current Outpatient Medications   Medication Sig Dispense Refill    acetaminophen (TYLENOL) 500 mg tablet Take 2 tablets (1,000 mg total) by mouth every 6 (six) hours as needed for mild pain 40 tablet 0    Biotin 1 MG CAPS Take by mouth      cholecalciferol (VITAMIN D3) 25 mcg (1,000 units) tablet Take 1 tablet (1,000 Units total) by mouth daily 90 tablet 1    cyanocobalamin (VITAMIN B-12) 100 mcg tablet Take by mouth daily      docusate sodium (COLACE) 100 mg capsule Take 1 capsule (100 mg total) by mouth 3 (three) times a day as needed for constipation (while taking narcotic pain medication) (Patient not taking: Reported on 10/24/2023) 30 capsule 0    ferrous sulfate 324 (65 Fe) mg Take 1 tablet (324 mg total) by mouth 3 (three) times a day before meals 90 tablet 0    ibuprofen (MOTRIN) 600 mg tablet Take 1 tablet (600 mg total) by mouth every 6 (six) hours as needed for mild pain 30 tablet 0    Insulin Pen Needle (BD Pen Needle Sarita 2nd Gen) 32G X 4 MM MISC Use in the morning 100 each 1    multivitamin (THERAGRAN) TABS Take 1 tablet by mouth daily      Omega-3 Fatty Acids (fish oil) 1,000 mg Take 1,000 mg by mouth daily      pantoprazole (PROTONIX) 40 mg tablet Take 40 mg by mouth every morning       No current facility-administered medications for this visit.     _______________________________________________________________________  Review of Systems   Constitutional:  Positive for fatigue and unexpected weight change (Difficulty with weight loss despite treatment). Negative for fever. HENT:  Negative for congestion and postnasal drip. Eyes: Negative. Respiratory:  Negative for cough and shortness of breath. Complains of some dyspnea on exertion at times   Cardiovascular:  Positive for palpitations. Negative for chest pain and leg swelling. Noted AV blocks and needed sleep study as they were happening at night   Here to discuss sleep study    Gastrointestinal:  Negative for abdominal distention, abdominal pain, nausea and vomiting. Endocrine:        History of PCOS   Genitourinary:  Negative for difficulty urinating and flank pain. Musculoskeletal:  Positive for arthralgias, back pain and myalgias. Skin:  Negative for rash. Allergic/Immunologic: Negative for environmental allergies. Neurological:  Positive for weakness and light-headedness. Hematological:  Negative for adenopathy. Psychiatric/Behavioral:  Negative for sleep disturbance and suicidal ideas. The patient is nervous/anxious. Objective: There were no vitals filed for this visit.   There is no height or weight on file to calculate BMI. Physical Exam  Vitals and nursing note reviewed. Constitutional:       Appearance: Normal appearance. Comments: BMI 42.60   HENT:      Head: Atraumatic. Pulmonary:      Effort: Pulmonary effort is normal.   Musculoskeletal:      Right lower leg: No edema. Left lower leg: No edema. Neurological:      Mental Status: She is alert and oriented to person, place, and time.    Psychiatric:         Mood and Affect: Mood normal.         Behavior: Behavior normal.

## 2023-11-10 ENCOUNTER — TELEPHONE (OUTPATIENT)
Dept: SLEEP CENTER | Facility: CLINIC | Age: 48
End: 2023-11-10

## 2023-11-10 NOTE — TELEPHONE ENCOUNTER
----- Message from Jesica Wright DO sent at 11/10/2023  8:22 AM EST -----  approved  ----- Message -----  From: Cheli Hernandez  Sent: 10/27/2023   1:23 PM EST  To: Sleep Medicine [facility] Provider    This Home sleep study needs approval.     If approved please sign and return to clerical pool. If denied please include reasons why. Also provide alternative testing if warranted. Please sign and return to clerical pool.

## 2023-12-04 PROBLEM — K91.2 POSTSURGICAL MALABSORPTION: Status: ACTIVE | Noted: 2023-12-04

## 2023-12-04 PROBLEM — K21.9 GERD (GASTROESOPHAGEAL REFLUX DISEASE): Status: ACTIVE | Noted: 2023-12-04

## 2023-12-04 PROBLEM — Z48.815 ENCOUNTER FOR SURGICAL AFTERCARE FOLLOWING SURGERY OF DIGESTIVE SYSTEM: Status: ACTIVE | Noted: 2023-12-04

## 2023-12-04 NOTE — ASSESSMENT & PLAN NOTE
-Take protonix 40mg consistently daily   -Advised avoidance of food triggers and gastric irritants, follow anti-reflux diet and lifestyle measures  -EGD to assess esophagus and r/o Aguilar's - referral to GI as she needs colon screening as well

## 2023-12-04 NOTE — ASSESSMENT & PLAN NOTE
-s/p Vertical Sleeve Gastrectomy with Dr. Diana Gray in 2018. Struggling with weight regain. Recommend increase healthy kcals, protein, fiber, water, exercise, and consult with [facility]. Needs surveillance EGD to assess esophagus and r/o Aguilar's and also needs Colonoscopy - referral to Dr. Joseph bustamante. Initial: 300lbs  Current: 259lbs  Carlos: 219lbs  Current BMI is Body mass index is 42.44 kg/m². Tolerating a regular diet-yes  Eating at least 60 grams of protein per day-no  Following 30/60 minute rule with liquids-no  Drinking at least 64 ounces of fluid per day-no  Drinking carbonated beverages-no  Sufficient exercise-no  Using NSAIDs regularly-no  Using nicotine-no  Using alcohol-no.  Advised about the risks of alcohol s/p bariatric surgery and recommend avoiding all alcohol

## 2023-12-04 NOTE — ASSESSMENT & PLAN NOTE
-Avoid fried foods and trans fat, limit saturated fats and refined carbohydrates  -Increase fish/omega 3 FA consumption  -Increase physical activity  -obtain lipid panel  -on fish oil

## 2023-12-04 NOTE — ASSESSMENT & PLAN NOTE
-At risk for malabsorption of vitamins/minerals secondary to malabsorption and restriction of intake from bariatric surgery  -NOT Currently taking adequate postop bariatric surgery vitamin supplementation: inconsistently takes biotin - start Elier MVI and calcium citrate or carbonate 12-1500mg daily    -Obtain CBC/Metabolic panel  -Patient received education about the importance of adhering to a lifelong supplementation regimen to avoid vitamin/mineral deficiencies

## 2023-12-05 ENCOUNTER — CONSULT (OUTPATIENT)
Dept: BARIATRICS | Facility: CLINIC | Age: 48
End: 2023-12-05
Payer: COMMERCIAL

## 2023-12-05 ENCOUNTER — OFFICE VISIT (OUTPATIENT)
Dept: FAMILY MEDICINE CLINIC | Facility: CLINIC | Age: 48
End: 2023-12-05
Payer: COMMERCIAL

## 2023-12-05 VITALS
BODY MASS INDEX: 43.49 KG/M2 | HEIGHT: 65 IN | TEMPERATURE: 98.4 F | SYSTOLIC BLOOD PRESSURE: 104 MMHG | WEIGHT: 261 LBS | DIASTOLIC BLOOD PRESSURE: 76 MMHG | HEART RATE: 81 BPM | OXYGEN SATURATION: 96 %

## 2023-12-05 VITALS
DIASTOLIC BLOOD PRESSURE: 80 MMHG | HEIGHT: 66 IN | BODY MASS INDEX: 41.62 KG/M2 | WEIGHT: 259 LBS | HEART RATE: 67 BPM | RESPIRATION RATE: 14 BRPM | SYSTOLIC BLOOD PRESSURE: 130 MMHG

## 2023-12-05 DIAGNOSIS — Z12.11 COLON CANCER SCREENING: ICD-10-CM

## 2023-12-05 DIAGNOSIS — K91.2 POSTSURGICAL MALABSORPTION: ICD-10-CM

## 2023-12-05 DIAGNOSIS — E66.01 MORBID OBESITY (HCC): Primary | ICD-10-CM

## 2023-12-05 DIAGNOSIS — Z48.815 ENCOUNTER FOR SURGICAL AFTERCARE FOLLOWING SURGERY OF DIGESTIVE SYSTEM: Primary | ICD-10-CM

## 2023-12-05 DIAGNOSIS — G47.33 OSA (OBSTRUCTIVE SLEEP APNEA): ICD-10-CM

## 2023-12-05 DIAGNOSIS — R42 LIGHTHEADEDNESS: ICD-10-CM

## 2023-12-05 DIAGNOSIS — Z98.84 STATUS POST BARIATRIC SURGERY: ICD-10-CM

## 2023-12-05 DIAGNOSIS — E78.2 MIXED HYPERLIPIDEMIA: ICD-10-CM

## 2023-12-05 DIAGNOSIS — K21.9 GERD (GASTROESOPHAGEAL REFLUX DISEASE): ICD-10-CM

## 2023-12-05 DIAGNOSIS — M54.41 ACUTE BILATERAL LOW BACK PAIN WITH BILATERAL SCIATICA: ICD-10-CM

## 2023-12-05 DIAGNOSIS — M54.42 ACUTE BILATERAL LOW BACK PAIN WITH BILATERAL SCIATICA: ICD-10-CM

## 2023-12-05 DIAGNOSIS — E66.01 OBESITY, CLASS III, BMI 40-49.9 (MORBID OBESITY) (HCC): ICD-10-CM

## 2023-12-05 PROCEDURE — 99213 OFFICE O/P EST LOW 20 MIN: CPT | Performed by: NURSE PRACTITIONER

## 2023-12-05 PROCEDURE — 99205 OFFICE O/P NEW HI 60 MIN: CPT | Performed by: PHYSICIAN ASSISTANT

## 2023-12-05 NOTE — ASSESSMENT & PLAN NOTE
This has resolved. She continues to follow through with cardiology. She had to improve her protein intake and hydration as well as adjust her sleep apnea, which was done with her sleep study.

## 2023-12-05 NOTE — PROGRESS NOTES
Assessment/Plan:       1. Morbid obesity ([address])    2. BMI 40.0-44.9, adult ([address])    3. Postsurgical malabsorption    4. SHARI (obstructive sleep apnea)    5. Acute bilateral low back pain with bilateral sciatica  Comments:  did have physical therapy    6. Lightheadedness        Low back pain. I reviewed her MRI results with her. She is returning to work as of tomorrow, 12/06/2023, but she needs clearance and limitations. I completed her work limitation form noting her limitations of lifting no more than 25 pounds, need for frequent rest periods, and need to change positions as she cannot stand or sit for prolonged periods. She feels better after physical therapy. Follow-up  I will see her back in 3 to 4 months with repeat labs. BMI Counseling: Body mass index is 43.43 kg/m². The BMI is above normal. Nutrition recommendations include decreasing portion sizes, encouraging healthy choices of fruits and vegetables, decreasing fast food intake, consuming healthier snacks, limiting drinks that contain sugar, moderation in carbohydrate intake, increasing intake of lean protein, reducing intake of saturated and trans fat and reducing intake of cholesterol. Exercise recommendations include vigorous physical activity 75 minutes/week, exercising 3-5 times per week and strength training exercises. No pharmacotherapy was ordered. Rationale for BMI follow-up plan is due to patient being overweight or obese. Subjective:      Patient ID: Paloma Sullivan is a 53 y.o. female who presents in office for follow-up on morbid obesity. Morbid obesity. Her BMI is 43.43 kg/m2. She was started on Saxenda, which she is currently on back order. She also was told to discontinue due to cardiac issues. Palpitations and lightheadedness. She has a history of palpitations and lightheadedness, which has improved. She is following up with cardiology. Sleep apnea. The patient has yet to receive her supplies. Low back pain. Her low back pain continues to be an issue for her. She requested her paperwork to be filled out for her job with some limitations. The following portions of the patient's history were reviewed and updated as appropriate:   Past Medical History:  She has a past medical history of Anemia, History of transfusion, Hypercholesterolemia, Sleep apnea, and Vitamin deficiency. ,  _______________________________________________________________________  Medical Problems:  does not have any pertinent problems on file.,  _______________________________________________________________________  Past Surgical History:   has a past surgical history that includes Ectopic pregnancy surgery; Eye surgery; GASTRECTOMY SLEEVE LAPAROSCOPIC; pr laps w/vag hysterect 250 gm/&rmvl tube&/ovaries (N/A, 9/28/2021); pr laparoscopy w/rmvl adnexal structures (Bilateral, 9/28/2021); and Mass excision (N/A, 8/1/2023). ,  _______________________________________________________________________  Family History:  family history includes Asthma in her brother; Brain cancer in her mother; Cancer in her father and maternal grandmother; Diabetes in her maternal grandmother; Hypertension in her paternal grandmother.,  _______________________________________________________________________  Social History:   reports that she has never smoked. She has never been exposed to tobacco smoke. She has never used smokeless tobacco. She reports that she does not drink alcohol and does not use drugs. ,  _______________________________________________________________________  Allergies:  is allergic to penicillin g and pollen extract. .  _______________________________________________________________________  Current Outpatient Medications   Medication Sig Dispense Refill    acetaminophen (TYLENOL) 500 mg tablet Take 2 tablets (1,000 mg total) by mouth every 6 (six) hours as needed for mild pain 40 tablet 0    Biotin 1 MG CAPS Take by mouth cyanocobalamin (VITAMIN B-12) 100 mcg tablet Take by mouth daily      ferrous sulfate 324 (65 Fe) mg Take 1 tablet (324 mg total) by mouth 3 (three) times a day before meals 90 tablet 0    multivitamin (THERAGRAN) TABS Take 1 tablet by mouth daily      Omega-3 Fatty Acids (fish oil) 1,000 mg Take 1,000 mg by mouth daily      pantoprazole (PROTONIX) 40 mg tablet Take 40 mg by mouth every morning      cholecalciferol (VITAMIN D3) 25 mcg (1,000 units) tablet Take 1 tablet (1,000 Units total) by mouth daily 90 tablet 1    Insulin Pen Needle (BD Pen Needle Sarita 2nd Gen) 32G X 4 MM MISC Use in the morning 100 each 1     No current facility-administered medications for this visit.     _______________________________________________________________________  Review of Systems:  Constitutional: Positive for weight gain. HENT: Negative for sore throat. Cardiological: Negative for chest pain, palpitations, or lower extremity edema. Pulmonary: Negative for shortness of breath. Positive for sleep apnea. Gastrointestinal: Negative for urinary or bowel complaints. Positive for distention. Musculoskeletal: Positive for low back pain. Objective:  Vitals:    12/05/23 1031   BP: 104/76   BP Location: Left arm   Patient Position: Sitting   Cuff Size: Large   Pulse: 81   Temp: 98.4 °F (36.9 °C)   SpO2: 96%   Weight: 118 kg (261 lb)   Height: 5' 5" (1.651 m)     Body mass index is 43.43 kg/m². Physical Exam  Constitutional:       Appearance: She is obese with a BMI of 43.43 kg/m2 and she has gained weight. HENT:      Head: Normocephalic and atraumatic. Right Ear: Tympanic membrane normal.      Left Ear: Tympanic membrane normal.   Eyes:      Pupils: Pupils are equal, round, and reactive to light. Cardiovascular:      Rate and Rhythm: Normal rate. Pulmonary:      Effort: Pulmonary effort is normal.   Abdominal:      Palpations: Her abdomen is softly distended.       Auscultation: Positive for bowel sounds. Musculoskeletal:      Cervical back: Normal range of motion. Skin:     General: Skin is warm. I have personally reviewed results with the patient.     Telemedicine on 11/06/2023   Component Date Value    Supplier Name 11/06/2023 [employer] - [address]     Supplier Phone Number 11/06/2023 [phone]     Order Status 11/06/2023 Pending Further Review     Delivery Request Date 11/06/2023 11/06/2023     Item Description 11/06/2023 CPAP Machine, Resmed S10 Auto-CPAP     Item Description 11/06/2023 PAP Mask, Nasal Pillow, Resmed Airfit P10, Complete, 1 per 3 months     Item Description 11/06/2023 PAP Mask Interface Cushion, Nasal Pillow, 2 per 1 month     Item Description 11/06/2023 PAP Headgear, 1 per 6 months     Item Description 11/06/2023 PAP Humidifier, Heated     Item Description 11/06/2023 Disposable PAP Filter, 2 per 1 month     Item Description 11/06/2023 Non-Disposable PAP Filter, 1 per 6 months     Item Description 11/06/2023 PAP Machine Tubing, Heated, 1 per 3 months     Item Description 11/06/2023 PAP Monitoring Modem     Item Description 11/06/2023 Nasal Pillows Included, Nasal Pillow Mask, 2 per 1 month     Item Description 11/06/2023 Humidifier Water Chamber, 1 per 6 months    Admission on 10/17/2023, Discharged on 10/19/2023   Component Date Value    Sodium 10/18/2023 139     Potassium 10/18/2023 3.6     Chloride 10/18/2023 105     CO2 10/18/2023 29     ANION GAP 10/18/2023 5     BUN 10/18/2023 10     Creatinine 10/18/2023 0.54 (L)     Glucose 10/18/2023 97     Calcium 10/18/2023 8.2 (L)     Corrected Calcium 10/18/2023 8.7     AST 10/18/2023 13     ALT 10/18/2023 8     Alkaline Phosphatase 10/18/2023 46     Total Protein 10/18/2023 6.2 (L)     Albumin 10/18/2023 3.4 (L)     Total Bilirubin 10/18/2023 0.27     eGFR 10/18/2023 112     WBC 10/18/2023 5.00     RBC 10/18/2023 4.63     Hemoglobin 10/18/2023 12.6     Hematocrit 10/18/2023 38.6     MCV 10/18/2023 83     MCH 10/18/2023 27.2     MCHC 10/18/2023 32.6     RDW 10/18/2023 13.3     MPV 10/18/2023 9.8     Platelets 28/50/6472 234     nRBC 10/18/2023 0     Neutrophils Relative 10/18/2023 36 (L)     Immat GRANS % 10/18/2023 0     Lymphocytes Relative 10/18/2023 52 (H)     Monocytes Relative 10/18/2023 9     Eosinophils Relative 10/18/2023 3     Basophils Relative 10/18/2023 0     Neutrophils Absolute 10/18/2023 1.82 (L)     Immature Grans Absolute 10/18/2023 0.00     Lymphocytes Absolute 10/18/2023 2.54     Monocytes Absolute 10/18/2023 0.45     Eosinophils Absolute 10/18/2023 0.17     Basophils Absolute 10/18/2023 0.02     Protocol Name 10/18/2023 TILT TABLE     Time In Exercise Phase 10/18/2023 00:45:00     MAX.  SYSTOLIC BP 55/00/8209 427     Max Diastolic Bp 85/89/1775 653     Max Heart Rate 10/18/2023 93     Max Predicted Heart Rate 10/18/2023 173     Reason for Termination 10/18/2023 Protocol Complete     Test Indication 10/18/2023 heart block/ dizziness     Target Hr Formular 10/18/2023 (220 - Age)*100%    Admission on 10/15/2023, Discharged on 10/17/2023   Component Date Value    Ventricular Rate 10/15/2023 77     Atrial Rate 10/15/2023 77     WA Interval 10/15/2023 128     QRSD Interval 10/15/2023 74     QT Interval 10/15/2023 622     QTC Interval 10/15/2023 703     P Axis 10/15/2023 65     QRS [place] 10/15/2023 58     T Wave [place] 10/15/2023 84     WBC 10/15/2023 4.90     RBC 10/15/2023 4.91     Hemoglobin 10/15/2023 13.1     Hematocrit 10/15/2023 41.0     MCV 10/15/2023 84     MCH 10/15/2023 26.7 (L)     MCHC 10/15/2023 32.0     RDW 10/15/2023 13.3     MPV 10/15/2023 9.6     Platelets 09/53/9058 239     nRBC 10/15/2023 0     Neutrophils Relative 10/15/2023 37 (L)     Immat GRANS % 10/15/2023 0     Lymphocytes Relative 10/15/2023 47 (H)     Monocytes Relative 10/15/2023 15 (H)     Eosinophils Relative 10/15/2023 1     Basophils Relative 10/15/2023 0     Neutrophils Absolute 10/15/2023 1.79 (L)     Immature Grans Absolute 10/15/2023 0.01     Lymphocytes Absolute 10/15/2023 2.28     Monocytes Absolute 10/15/2023 0.75     Eosinophils Absolute 10/15/2023 0.05     Basophils Absolute 10/15/2023 0.02     Sodium 10/15/2023 141     Potassium 10/15/2023 3.8     Chloride 10/15/2023 106     CO2 10/15/2023 31     ANION GAP 10/15/2023 4     BUN 10/15/2023 13     Creatinine 10/15/2023 0.74     Glucose 10/15/2023 98     Calcium 10/15/2023 9.2     eGFR 10/15/2023 96     Total Bilirubin 10/15/2023 0.42     Bilirubin, Direct 10/15/2023 0.13     Alkaline Phosphatase 10/15/2023 54     AST 10/15/2023 16     ALT 10/15/2023 9     Total Protein 10/15/2023 7.7     Albumin 10/15/2023 4.1     hs TnI 0hr 10/15/2023 <2     Magnesium 10/15/2023 2.0     hs TnI 2hr 10/15/2023 <2     Delta 2hr hsTnI 10/15/2023      hs TnI 4hr 10/15/2023 <2     Delta 4hr hsTnI 10/15/2023      Sodium 10/16/2023 139     Potassium 10/16/2023 3.6     Chloride 10/16/2023 105     CO2 10/16/2023 28     ANION GAP 10/16/2023 6     BUN 10/16/2023 12     Creatinine 10/16/2023 0.65     Glucose 10/16/2023 89     Calcium 10/16/2023 9.0     eGFR 10/16/2023 106     WBC 10/16/2023 4.63     RBC 10/16/2023 4.80     Hemoglobin 10/16/2023 12.8     Hematocrit 10/16/2023 39.8     MCV 10/16/2023 83     MCH 10/16/2023 26.7 (L)     MCHC 10/16/2023 32.2     RDW 10/16/2023 13.3     Platelets 76/51/6449 226     MPV 10/16/2023 9.5     WBC 10/17/2023 4.91     RBC 10/17/2023 4.62     Hemoglobin 10/17/2023 12.4     Hematocrit 10/17/2023 38.4     MCV 10/17/2023 83     MCH 10/17/2023 26.8     MCHC 10/17/2023 32.3     RDW 10/17/2023 13.4     MPV 10/17/2023 9.5     Platelets 99/49/2208 234     nRBC 10/17/2023 0     Neutrophils Relative 10/17/2023 36 (L)     Immat GRANS % 10/17/2023 0     Lymphocytes Relative 10/17/2023 49 (H)     Monocytes Relative 10/17/2023 12     Eosinophils Relative 10/17/2023 3     Basophils Relative 10/17/2023 0     Neutrophils Absolute 10/17/2023 1.76 (L)     Immature Grans Absolute 10/17/2023 0.00     Lymphocytes Absolute 10/17/2023 2.42     Monocytes Absolute 10/17/2023 0.57     Eosinophils Absolute 10/17/2023 0.14     Basophils Absolute 10/17/2023 0.02     Sodium 10/17/2023 140     Potassium 10/17/2023 4.0     Chloride 10/17/2023 105     CO2 10/17/2023 30     ANION GAP 10/17/2023 5     BUN 10/17/2023 11     Creatinine 10/17/2023 0.77     Glucose 10/17/2023 97     Calcium 10/17/2023 8.6     eGFR 10/17/2023 92    Admission on 09/18/2023, Discharged on 09/18/2023   Component Date Value    WBC 09/18/2023 7.67     RBC 09/18/2023 4.85     Hemoglobin 09/18/2023 13.1     Hematocrit 09/18/2023 40.3     MCV 09/18/2023 83     MCH 09/18/2023 27.0     MCHC 09/18/2023 32.5     RDW 09/18/2023 13.6     MPV 09/18/2023 9.5     Platelets 71/89/2567 255     nRBC 09/18/2023 0     Neutrophils Relative 09/18/2023 48     Immat GRANS % 09/18/2023 0     Lymphocytes Relative 09/18/2023 38     Monocytes Relative 09/18/2023 12     Eosinophils Relative 09/18/2023 2     Basophils Relative 09/18/2023 0     Neutrophils Absolute 09/18/2023 3.67     Immature Grans Absolute 09/18/2023 0.02     Lymphocytes Absolute 09/18/2023 2.88     Monocytes Absolute 09/18/2023 0.93     Eosinophils Absolute 09/18/2023 0.14     Basophils Absolute 09/18/2023 0.03     Sodium 09/18/2023 139     Potassium 09/18/2023 4.2     Chloride 09/18/2023 106     CO2 09/18/2023 29     ANION GAP 09/18/2023 4     BUN 09/18/2023 13     Creatinine 09/18/2023 0.71     Glucose 09/18/2023 84     Calcium 09/18/2023 9.0     eGFR 09/18/2023 102     hs TnI 0hr 09/18/2023 <2     Preg, Serum 09/18/2023 Negative     Ventricular Rate 09/18/2023 60     Atrial Rate 09/18/2023 60     LA Interval 09/18/2023 112     QRSD Interval 09/18/2023 80     QT Interval 09/18/2023 432     QTC Interval 09/18/2023 432     P Axis 09/18/2023 17     QRS [place] 09/18/2023 31     T Wave [place] 09/18/2023 37     Ventricular Rate 09/18/2023 61     Atrial Rate 09/18/2023 61     LA Interval 09/18/2023 116     QRSD Interval 09/18/2023 80     QT Interval 09/18/2023 432     QTC Interval 09/18/2023 434     P Axis 09/18/2023 25     QRS [place]is 09/18/2023 28     T Wave [place] 09/18/2023 37     Ventricular Rate 09/18/2023 61     Atrial Rate 09/18/2023 61     [place] Interval 09/18/2023 128     QRSD Interval 09/18/2023 78     QT Interval 09/18/2023 432     QTC Interval 09/18/2023 434     P Axis 09/18/2023 76     QRS [place] 09/18/2023 48     T Wave [place] 09/18/2023 53     Ventricular Rate 09/18/2023 64     Atrial Rate 09/18/2023 64     TN Interval 09/18/2023 128     QRSD Interval 09/18/2023 82     QT Interval 09/18/2023 434     QTC Interval 09/18/2023 447     P Axis 09/18/2023 67     QRS [place] 09/18/2023 36     T Wave [place] 09/18/2023 46     Miscellaneous Lab Test R* 09/18/2023 see written report     Cortisol, Random 09/18/2023 10.2     TSH 3RD GENERATON 09/18/2023 2.861    Hospital Outpatient Visit on 09/11/2023   Component Date Value    PV Peak S [place] 09/11/2023 0.18     LA size 09/11/2023 4.1     Triscuspid Valve Regurgi* 09/11/2023 17.0     Tricuspid valve peak reg* 09/11/2023 2.07     Tricuspid valve peak E w* 09/11/2023 0.11     LVPWd 09/11/2023 0.70     Left Atrium Area-systoli* 09/11/2023 20.5     Left Atrium Area-systoli* 09/11/2023 14.9     MV E' Tissue Velocity Se* 09/11/2023 10     MV E' Tissue Velocity La* 09/11/2023 12     Tricuspid annular plane * 09/11/2023 2.90     TR Peak [place] 09/11/2023 2.1     IVSd 09/11/2023 4.32     LV DIASTOLIC VOLUME (MOD* 03/74/2378 92     LEFT VENTRICLE SYSTOLIC * 95/90/6453 37     Left ventricular stroke * 09/11/2023 55.00     LA length (A2C) 09/11/2023 4.90     LVIDd 09/11/2023 4.50     IVS 09/11/2023 0.7     LVIDS 09/11/2023 3.10     FS 09/11/2023 31     LA volume (BP) 09/11/2023 57     Asc Ao 09/11/2023 3.3     Ao root 09/11/2023 3.10     RVID d 09/11/2023 2.8     MV valve area p 1/2 meth* 09/11/2023 4.58     E wave deceleration time 09/11/2023 164     MV Peak E [place] 09/11/2023 62     MV Peak A [place] 09/11/2023 0.47     PV Peak D [place] 09/11/2023 0.15     RAA A4C 09/11/2023 14.1     MV stenosis pressure 1/2* 09/11/2023 48     LVSV, 2D 09/11/2023 55     Pulm vein S/D ratio 09/11/2023 1.20     LV EF 09/11/2023 60    Admission on 08/01/2023, Discharged on 08/01/2023   Component Date Value    Case Report 08/01/2023                      Value:Surgical Pathology Report                         Case: [id number]                                   Authorizing Provider:  Marcia Hickey DO   Collected:           08/01/2023 0815              Ordering Location:     [facility] [facility] Received:            08/01/2023 1019                                     Operating Room                                                               Pathologist:           Kristy Moreno MD                                                             Specimen:    Soft Tissue, Lipoma, Lipoma of back                                                        Final Diagnosis 08/01/2023                      Value: This result contains rich text formatting which cannot be displayed here. Additional Information 08/01/2023                      Value: This result contains rich text formatting which cannot be displayed here. Gross Description 08/01/2023                      Value: This result contains rich text formatting which cannot be displayed here.     Clinical Information 08/01/2023                      Value:Lipoma of back   Lab on 06/12/2023   Component Date Value    Sodium 06/12/2023 138     Potassium 06/12/2023 4.5     Chloride 06/12/2023 102     CO2 06/12/2023 30     ANION GAP 06/12/2023 6     BUN 06/12/2023 12     Creatinine 06/12/2023 0.68     Glucose, Fasting 06/12/2023 93     Calcium 06/12/2023 9.2     AST 06/12/2023 12 (L)     ALT 06/12/2023 8     Alkaline Phosphatase 06/12/2023 54     Total Protein 06/12/2023 7.5     Albumin 06/12/2023 4.1     Total Bilirubin 06/12/2023 0.52     eGFR 06/12/2023 105     WBC 06/12/2023 5.62     RBC 06/12/2023 4.95     Hemoglobin 06/12/2023 13.1     Hematocrit 06/12/2023 41.3     MCV 06/12/2023 83     MCH 06/12/2023 26.5 (L)     MCHC 06/12/2023 31.7     RDW 06/12/2023 13.7     MPV 06/12/2023 9.6     Platelets 24/44/4307 261     nRBC 06/12/2023 0     Neutrophils Relative 06/12/2023 46     Immat GRANS % 06/12/2023 0     Lymphocytes Relative 06/12/2023 37     Monocytes Relative 06/12/2023 13 (H)     Eosinophils Relative 06/12/2023 4     Basophils Relative 06/12/2023 0     Neutrophils Absolute 06/12/2023 2.55     Immature Grans Absolute 06/12/2023 0.01     Lymphocytes Absolute 06/12/2023 2.08     Monocytes Absolute 06/12/2023 0.74     Eosinophils Absolute 06/12/2023 0.22     Basophils Absolute 06/12/2023 0.02     TSH 3RD GENERATON 06/12/2023 1.167     Cholesterol 06/12/2023 229 (H)     Triglycerides 06/12/2023 211 (H)     HDL, Direct 06/12/2023 48 (L)     LDL Calculated 06/12/2023 139 (H)     Non-HDL-Chol (CHOL-HDL) 06/12/2023 181     Color, UA 06/12/2023 Light Yellow     Clarity, UA 06/12/2023 Clear     Specific Gravity, UA 06/12/2023 1.024     pH, UA 06/12/2023 6.5     Leukocytes, UA 06/12/2023 Negative     Nitrite, UA 06/12/2023 Negative     Protein, UA 06/12/2023 Negative     Glucose, UA 06/12/2023 Negative     Ketones, UA 06/12/2023 Negative     Urobilinogen, UA 06/12/2023 <2.0     Bilirubin, UA 06/12/2023 Negative     Occult Blood, UA 06/12/2023 Negative     Vit D, 25-Hydroxy 06/12/2023 27.7 (L)       ontains abnormal data CBC and differential  Order: [id number]  Status: Final result       Visible to patient: Yes (seen)       Next appt:  Today at 02:00 PM in [facility] Sherrell Nguyen PA-C)    0 Result Notes            Component  Ref Range & Units 10/18/23  4:47 AM 10/17/23  5:14 AM 10/16/23  5:23 AM 10/15/23  4:22 PM 9/18/23  2:40 AM 6/12/23  3:19 PM 3/3/23 12:28 PM   WBC  4.31 - 10.16 Thousand/uL 5.00 4.91 4.63 4.90 7.67 5.62 5.25   RBC  3.81 - 5.12 Million/uL 4.63 4.62 4.80 4.91 4.85 4.95 4.91 Hemoglobin  11.5 - 15.4 g/dL 12.6 12.4 12.8 13.1 13.1 13.1 12.9   Hematocrit  34.8 - 46.1 % 38.6 38.4 39.8 41.0 40.3 41.3 41.2   MCV  82 - 98 fL 83 83 83 84 83 83 84   MCH  26.8 - 34.3 pg 27.2 26.8 26.7 Low  26.7 Low  27.0 26.5 Low  26.3 Low    MCHC  31.4 - 37.4 g/dL 32.6 32.3 32.2 32.0 32.5 31.7 31.3 Low    RDW  11.6 - 15.1 % 13.3 13.4 13.3 13.3 13.6 13.7 13.4   MPV  8.9 - 12.7 fL 9.8 9.5 9.5 9.6 9.5 9.6 9.6   Platelets  482 - 204 Thousands/uL 234 234 226 239 255 261 278   nRBC  /100 WBCs 0 0  0 0 0 0   Neutrophils Relative  43 - 75 % 36 Low  36 Low   37 Low  48 46 47   Immat GRANS %  0 - 2 % 0 0  0 0 0 0   Lymphocytes Relative  14 - 44 % 52 High  49 High   47 High  38 37 38   Monocytes Relative  4 - 12 % 9 12  15 High  12 13 High  11   Eosinophils Relative  0 - 6 % 3 3  1 2 4 3   Basophils Relative  0 - 1 % 0 0  0 0 0 1   Neutrophils Absolute  1.85 - 7.62 Thousands/µL 1.82 Low  1.76 Low   1.79 Low  3.67 2.55 2.49   Immature Grans Absolute  0.00 - 0.20 Thousand/uL 0.00 0.00  0.01 0.02 0.01 0.01   Lymphocytes Absolute  0.60 - 4.47 Thousands/µL 2.54 2.42  2.28 2.88 2.08 1.98   Monocytes Absolute  0.17 - 1.22 Thousand/µL 0.45 0.57  0.75 0.93 0.74 0.58   Eosinophils Absolute  0.00 - 0.61 Thousand/µL 0.17 0.14  0.05 0.14 0.22 0.16   Basophils Absolute  0.00 - 0.10 Thousands/µL 0.02 0.02  0.02 0.03 0.02 0.03              Specimen Collected: 10/18/23  4:47 AM Last Resulted: 10/18/23  5:09 AM         Contains abnormal data Comprehensive metabolic panel  Order: [id number]  Status: Final result       Visible to patient: Yes (seen)       Next appt:  Today at 02:00 PM in [address] [place] [address],[address] Tristin Redding PA-C)    0 Result Notes            Component  Ref Range & Units 10/18/23  4:47 AM 10/17/23  5:14 AM 10/16/23  5:23 AM 10/15/23  4:22 PM 10/15/23  4:22 PM 9/18/23  2:40 AM 6/12/23  3:19 PM   Sodium  135 - 147 mmol/L 139 140 139  141 139 138   Potassium  3.5 - 5.3 mmol/L 3.6 4.0 3.6  3.8 4.2 4.5   Chloride  96 - 108 mmol/L 105 105 105 106 106 102   CO2  21 - 32 mmol/L 29 30 28  31 29 30   ANION GAP  mmol/L 5 5 6  4 4 6 R   BUN  5 - 25 mg/dL 10 11 12  13 13 12   Creatinine  0.60 - 1.30 mg/dL 0.54 Low  0.77 CM 0.65 CM  0.74 CM 0.71 CM 0.68 CM   Comment: Standardized to IDMS reference method   Glucose  65 - 140 mg/dL 97 97 CM 89 CM  98 CM 84 CM    Comment: If the patient is fasting, the ADA then defines impaired fasting glucose as > 100 mg/dL and diabetes as > or equal to 123 mg/dL. Calcium  8.4 - 10.2 mg/dL 8.2 Low  8.6 9.0  9.2 9.0 9.2   Corrected Calcium  8.3 - 10.1 mg/dL 8.7         AST  13 - 39 U/L 13   16   12 Low    ALT  7 - 52 U/L 8   9 CM   8 CM   Comment: Specimen collection should occur prior to Sulfasalazine administration due to the potential for falsely depressed results. Alkaline Phosphatase  34 - 104 U/L 46   54   54   Total Protein  6.4 - 8.4 g/dL 6.2 Low    7.7   7.5   Albumin  3.5 - 5.0 g/dL 3.4 Low    4.1   4.1   Total Bilirubin  0.20 - 1.00 mg/dL 0.27   0.42 CM   0.52 CM   Comment: Use of this assay is not recommended for patients undergoing treatment with eltrombopag due to the potential for falsely elevated results. N-acetyl-p-benzoquinone imine (metabolite of Acetaminophen) will generate erroneously low results in samples for patients that have taken an overdose of Acetaminophen. eGFR  ml/min/1.73sq  112 92 106  96 10       Study Result    Narrative & Impression   MRI LUMBAR SPINE WITHOUT CONTRAST     INDICATION: Pain, radiculopathy     COMPARISON: X-ray lumbar spine 4/19/2023     TECHNIQUE:  Multiplanar, multisequence imaging of the lumbar spine was performed. .        IMAGE QUALITY:  Diagnostic     FINDINGS:     VERTEBRAL BODIES:  There are 5 lumbar type vertebral bodies. There is preserved normal lumbar lordosis. There is Modic type I degenerative edema in the T12 anterior superior corner. No suspicious discrete lesion. SACRUM:  Normal signal within the sacrum.  No evidence of insufficiency or stress fracture. DISTAL CORD AND CONUS:  Normal size and signal within the distal cord and conus. PARASPINAL SOFT TISSUES:  Paraspinal soft tissues are unremarkable. LOWER THORACIC DISC SPACES: There is disc bulge and mild canal stenosis at level T11-12. LUMBAR DISC SPACES:     L1-L2: Mild disc bulge. Mild facet arthropathy. No canal or foraminal stenosis. L2-L3: No disc bulge. Mild facet arthropathy. No canal or significant foraminal stenosis. L3-L4: No significant disc bulge. Moderate facet arthropathy. Mild ligamentum flavum thickening. There is no significant canal stenosis. Mild left foraminal narrowing. L4-L5: Left eccentric disc bulge. Moderate facet arthropathy. Mild canal stenosis. Mild bilateral foraminal narrowing. L5-S1: No disc bulge. Mild to moderate facet arthropathy. No canal or foraminal stenosis. OTHER FINDINGS:  None. IMPRESSION:     1. Disc bulge with mild canal stenosis at level T11-12.  2.  Mild noncompressive lumbar degenerative change as detailed. Workstation performed: [id number]        Transcribed for DAVE Garcia, by Corey Reeves/Micki [address] [place] [address] [employer] on 12/05/23 at 1:08 PM. Powered by [employer].

## 2023-12-05 NOTE — ASSESSMENT & PLAN NOTE
I have ordered her supplies and have the supplier's information so I will reach out to them. In the meantime, she will continue her vitamins and hydrate well.

## 2023-12-05 NOTE — ASSESSMENT & PLAN NOTE
I will continue to hold her Saxenda because it is backordered and requires tapering up over time and by the time I taper her up, they are running out of supply. So at this point, she will follow low-fat, low cholesterol diet, increase activity, exercise, and reduce junk food. The patient will follow up with her bariatric surgeon. They have a nutritionist on board that can probably help her out since she is also postsurgical bariatric patient.

## 2023-12-05 NOTE — PATIENT INSTRUCTIONS
GOALS:   Continued/Maintain healthy weight loss with good nutrition intakes. Adequate hydration with at least 64oz. fluid intake. Normal vitamin and mineral levels. Exercise as tolerated. Relyte Electrolyte powder     Follow-up in 1 year. We kindly ask that your arrive 15 minutes before your scheduled appointment time with your provider to allow our staff to room you, get your vital signs and update your chart. Follow diet as discussed. Get lab work done in the next 2 weeks. You have been given a lab slip today. Please call the office if you need a replacement. It is recommended to check with your insurance BEFORE getting labs done to make sure they are covered by your policy. Also, please check with your PCP and other providers before getting labs to avoid duplicate labs. Make sure to HOLD any multivitamins that may contain biotin and any biotin supplements FOR 5 DAYS before any labs since it can affect the results. Follow vitamin and mineral recommendations as reviewed with you. Call our office if you have any problems with abdominal pain especially associated with fever, chills, nausea, vomiting or any other concerns. All  Post-bariatric surgery patients should be aware that very small quantities of any alcohol can cause impairment and it is very possible not to feel the effect. The effect can be in the system for several hours. It is also a stomach irritant. It is advised to AVOID alcohol, Nonsteroidal antiinflammatory drugs (NSAIDS) and nicotine of all forms . Any of these can cause stomach irritation/pain.

## 2023-12-05 NOTE — PROGRESS NOTES
Assessment/Plan:    Encounter for surgical aftercare following surgery of digestive system  -s/p Vertical Sleeve Gastrectomy with Dr. Laly Reeves in 2018. Struggling with weight regain. Recommend increase healthy kcals, protein, fiber, water, exercise, and consult with [facility]. Needs surveillance EGD to assess esophagus and r/o Aguilar's and also needs Colonoscopy - referral to Dr. Martha bustamante. Initial: 300lbs  Current: 259lbs  Carlos: 219lbs  Current BMI is Body mass index is 42.44 kg/m². Tolerating a regular diet-yes  Eating at least 60 grams of protein per day-no  Following 30/60 minute rule with liquids-no  Drinking at least 64 ounces of fluid per day-no  Drinking carbonated beverages-no  Sufficient exercise-no  Using NSAIDs regularly-no  Using nicotine-no  Using alcohol-no.  Advised about the risks of alcohol s/p bariatric surgery and recommend avoiding all alcohol      Postsurgical malabsorption  -At risk for malabsorption of vitamins/minerals secondary to malabsorption and restriction of intake from bariatric surgery  -NOT Currently taking adequate postop bariatric surgery vitamin supplementation: inconsistently takes biotin - start Elier MVI and calcium citrate or carbonate 12-1500mg daily    -Obtain CBC/Metabolic panel  -Patient received education about the importance of adhering to a lifelong supplementation regimen to avoid vitamin/mineral deficiencies       SHARI (obstructive sleep apnea)  -Recent home sleep study revealed mild SHARI - continue f/u with sleep medicine      Hyperlipidemia  -Avoid fried foods and trans fat, limit saturated fats and refined carbohydrates  -Increase fish/omega 3 FA consumption  -Increase physical activity  -obtain lipid panel  -on fish oil    GERD (gastroesophageal reflux disease)  -Take protonix 40mg consistently daily   -Advised avoidance of food triggers and gastric irritants, follow anti-reflux diet and lifestyle measures  -EGD to assess esophagus and r/o Aguilar's - referral to GI as she needs colon screening as well       Diagnoses and all orders for this visit:    Encounter for surgical aftercare following surgery of digestive system    Postsurgical malabsorption  -     CBC and differential; Future  -     Comprehensive metabolic panel; Future  -     Folate; Future  -     Hemoglobin A1C; Future  -     Iron Panel (Includes Ferritin, Iron Sat%, Iron, and TIBC); Future  -     Zinc; Future  -     Vitamin D 25 hydroxy; Future  -     Vitamin B12; Future  -     Vitamin B1, whole blood; Future  -     Vitamin A; Future  -     PTH, intact; Future  -     Lipid panel; Future    SHARI (obstructive sleep apnea)    Mixed hyperlipidemia  -     Lipid panel; Future    GERD (gastroesophageal reflux disease)  -     Ambulatory referral to Gastroenterology; Future    Status post bariatric surgery  -     Ambulatory Referral to Weight Management    Obesity, Class III, BMI 40-49.9 (morbid obesity) (HCC)  -     Hemoglobin A1C; Future  -     Lipid panel; Future    Colon cancer screening  -     Ambulatory referral to Gastroenterology; Future          Subjective:      Patient ID: Jody Fisher is a 53 y.o. female. -s/p Vertical Sleeve Gastrectomy with Dr. Lisandra Loo in 2018. Presents to the office today to establish care and for weight regain. She lost just over 80lbs s/p surgery and then slowly regained about 50lbs. She was doing very well on saxenda from June -October and lost about 10lbs but since then has been unable to obtain the medication. She notes heartburn - severe at times - triggered by certain foods and is not consistent with taking protonix 40mg daily. Feels restriction with small portions. BM every 2-3 days - takes apple juice. Limited water. Does not want revision surgery. Present with daughter Ino Garcia. She has 3 older kids. Works as supervisor at [employer].     Diet Recall:   B - skips  Snack - sometimes protein shake (premier) or banana  L - bagel w/ cream cheese  Snack - chips  D - cereal or take out chinese food or pasta    Fluids - 1-1/2 protein shake daily, 1/2 soda, 16oz water or less, apple juice    The following portions of the patient's history were reviewed and updated as appropriate: allergies, current medications, past family history, past medical history, past social history, past surgical history and problem list.    Review of Systems   Constitutional:  Positive for unexpected weight change (weight regain). Negative for chills and fever. HENT:  Negative for trouble swallowing. Respiratory:  Negative for cough and shortness of breath. Cardiovascular:  Negative for chest pain and palpitations. Gastrointestinal:  Negative for abdominal pain, constipation, diarrhea, nausea and vomiting. Neurological:  Negative for dizziness. Psychiatric/Behavioral:          Denies anxiety and depression         Objective:      /80 (BP Location: Left arm, Patient Position: Sitting, Cuff Size: Large)   Pulse 67   Resp 14   Ht 5' 5.5" (1.664 m)   Wt 117 kg (259 lb)   LMP 09/12/2021 (Exact Date)   BMI 42.44 kg/m²     Colonoscopy-Not Completed - GI referral placed       Physical Exam  Vitals reviewed. Constitutional:       General: She is not in acute distress. Appearance: She is well-developed. HENT:      Head: Normocephalic and atraumatic. Eyes:      General: No scleral icterus. Cardiovascular:      Rate and Rhythm: Normal rate and regular rhythm. Pulmonary:      Effort: Pulmonary effort is normal. No respiratory distress. Abdominal:      General: There is no distension. Palpations: Abdomen is soft. Tenderness: There is no abdominal tenderness. Skin:     General: Skin is warm and dry. Neurological:      Mental Status: She is alert. Psychiatric:         Mood and Affect: Mood normal.         Behavior: Behavior normal.           BARRIERS: none identified    GOALS:   Continued/Maintain healthy weight loss with good nutrition intakes.   Adequate hydration with at least 64oz. fluid intake. Normal vitamin and mineral levels. Exercise as tolerated. Relyte Electrolyte powder     Follow-up in 1 year. We kindly ask that your arrive 15 minutes before your scheduled appointment time with your provider to allow our staff to room you, get your vital signs and update your chart. Follow diet as discussed. Get lab work done in the next 2 weeks. You have been given a lab slip today. Please call the office if you need a replacement. It is recommended to check with your insurance BEFORE getting labs done to make sure they are covered by your policy. Also, please check with your PCP and other providers before getting labs to avoid duplicate labs. Make sure to HOLD any multivitamins that may contain biotin and any biotin supplements FOR 5 DAYS before any labs since it can affect the results. Follow vitamin and mineral recommendations as reviewed with you. Call our office if you have any problems with abdominal pain especially associated with fever, chills, nausea, vomiting or any other concerns. All  Post-bariatric surgery patients should be aware that very small quantities of any alcohol can cause impairment and it is very possible not to feel the effect. The effect can be in the system for several hours. It is also a stomach irritant. It is advised to AVOID alcohol, Nonsteroidal antiinflammatory drugs (NSAIDS) and nicotine of all forms . Any of these can cause stomach irritation/pain.

## 2023-12-21 ENCOUNTER — OFFICE VISIT (OUTPATIENT)
Dept: GASTROENTEROLOGY | Facility: CLINIC | Age: 48
End: 2023-12-21
Payer: COMMERCIAL

## 2023-12-21 VITALS
TEMPERATURE: 97.4 F | HEIGHT: 65 IN | HEART RATE: 68 BPM | RESPIRATION RATE: 16 BRPM | OXYGEN SATURATION: 98 % | DIASTOLIC BLOOD PRESSURE: 86 MMHG | WEIGHT: 261 LBS | BODY MASS INDEX: 43.49 KG/M2 | SYSTOLIC BLOOD PRESSURE: 128 MMHG

## 2023-12-21 DIAGNOSIS — Z12.11 COLON CANCER SCREENING: ICD-10-CM

## 2023-12-21 DIAGNOSIS — R19.8 FULLNESS OF ABDOMEN: ICD-10-CM

## 2023-12-21 DIAGNOSIS — R10.13 EPIGASTRIC PAIN: Primary | ICD-10-CM

## 2023-12-21 DIAGNOSIS — Z12.11 ENCOUNTER FOR SCREENING COLONOSCOPY: ICD-10-CM

## 2023-12-21 DIAGNOSIS — K21.9 GERD (GASTROESOPHAGEAL REFLUX DISEASE): ICD-10-CM

## 2023-12-21 PROCEDURE — 99203 OFFICE O/P NEW LOW 30 MIN: CPT | Performed by: PHYSICIAN ASSISTANT

## 2023-12-21 NOTE — PROGRESS NOTES
Consultation - Electrophysiology-Cardiology (EP)   Emerald Cee 48 y.o. female MRN: [id number]  Unit/Bed#:  Encounter: [id number]      1. Atrioventricular block, Mobitz type 2  POCT ECG      2. Postsurgical malabsorption        3. SHARI (obstructive sleep apnea)        4. Heart block AV second degree        5. Mixed hyperlipidemia        6. S/P laparoscopic sleeve gastrectomy        7. History of PCOS        8. Bradycardia        9. Morbid obesity (HCC)              Consults  Physician Requesting Consult: PCP  Reason for Consult / Principal Problem: Heart block AV second degree, abnormal EKG      Summary of my recommendation for the patient  Patient has obesity, post gastric sleeve surgery, current BMI 44  She does have symptoms suggestive of sleep apnea    Rhythm disturbances during sleep with elevated vagal tone, longest pause being 3.4 seconds during sleep  Patient is not using her CPAP as she has not received replacement    At the current time no indication for pacemaker    Patient is advised long-term monitoring with a loop or assert  She is going to decide and let us know when she wants to proceed with implant    At the current time she is working with her bariatric team to go on a liquid diet and reset her weight management            Clinical conditions  Lightheadedness and dizziness  Brief complete heart block of 3 seconds on Zio patch in September 2023  2:1 AV block  Obstructive sleep apnea not on CPAP  Gastric bypass          Assessment/Plan     Lightheadedness and dizziness  Brief complete heart block of 3 seconds on Zio patch in September 2023  2:1 AV block    Patient has obesity, post gastric sleeve surgery, current BMI 44  She does have symptoms suggestive of sleep apnea    Rhythm disturbances during sleep with elevated vagal tone, longest pause being 3.4 seconds during sleep  Patient is not using her CPAP as she has not received replacement    At the current time no indication for  pacemaker    Patient is advised long-term monitoring with a loop or assert  She is going to decide and let us know when she wants to proceed with implant    At the current time she is working with her bariatric team to go on a liquid diet and reset her weight management          Obstructive sleep apnea not on CPAP  Gastric bypass  Patient initially lost weight but has regained a lot of weight  She is following up with bariatric clinic  She is supposed to go on a liquid diet and reset her weight loss regimen              History of Present Illness   HPI: Emerald Cee is a 48 y.o. year old female has been referred to me by PCP for evaluation and management of bradycardia    The patient has significant medical illnesses which include  Lightheadedness and dizziness  Brief complete heart block of 3 seconds on Zio patch in September 2023  2:1 AV block  Obstructive sleep apnea not on CPAP  Gastric bypass    The patient is not complaining of anginal-like chest pain, orthopnea, PND  She is not complaining of palpitation, presyncope or syncope  She has regained weight  There is exertional intolerance  There is some chronic leg swelling    All low heart rates have been noted incidentally  There has been no symptom rhythm correlation with bradycardia  Most of the episodes have been noted during sleep      Historical Information   Past Medical History:   Diagnosis Date    Anemia     History of transfusion     Hypercholesterolemia     Sleep apnea     Vitamin deficiency      Past Surgical History:   Procedure Laterality Date    ECTOPIC PREGNANCY SURGERY      EYE SURGERY      GASTRECTOMY SLEEVE LAPAROSCOPIC      MASS EXCISION N/A 8/1/2023    Procedure: EXCISION  BIOPSY LESION/MASS BACK;  Surgeon: Lawson Motta DO;  Location: [facility];  Service: General    CO LAPAROSCOPY W/RMVL ADNEXAL STRUCTURES Bilateral 9/28/2021    Procedure: SALPINGECTOMY, LAPAROSCOPIC;  Surgeon: Pietro Hanson MD;  Location[facility];  Service:  Gynecology    WA LAPS W/VAG HYSTERECT 250 GM/&RMVL TUBE&/OVARIES N/A 2021    Procedure: HYSTERECTOMY LAPAROSCOPIC ASSISTED VAGINAL (LAVH), LYSIS OF ADHESIONS;  Surgeon: Pietro Hanson MD;  Location[facility];  Service: Gynecology     Social History     Substance and Sexual Activity   Alcohol Use Never     Social History     Substance and Sexual Activity   Drug Use No     Social History     Tobacco Use   Smoking Status Never    Passive exposure: Never   Smokeless Tobacco Never     Social History     Socioeconomic History    Marital status: /Civil Union     Spouse name: Not on file    Number of children: Not on file    Years of education: Not on file    Highest education level: Not on file   Occupational History    Not on file   Tobacco Use    Smoking status: Never     Passive exposure: Never    Smokeless tobacco: Never   Vaping Use    Vaping status: Never Used   Substance and Sexual Activity    Alcohol use: Never    Drug use: No    Sexual activity: Yes     Partners: Male   Other Topics Concern    Not on file   Social History Narrative    Most recent tobacco use screenin2019    Do you currently or have you served in the [email or web address]: No     Social Determinants of Health     Financial Resource Strain: Not on file   Food Insecurity: No Food Insecurity (10/18/2023)    Hunger Vital Sign     Worried About Running Out of Food in the Last Year: Never true     Ran Out of Food in the Last Year: Never true   Transportation Needs: No Transportation Needs (10/18/2023)    PRAPARE - Transportation     Lack of Transportation (Medical): No     Lack of Transportation (Non-Medical): No   Physical Activity: Not on file   Stress: Not on file   Social Connections: Not on file   Intimate Partner Violence: Not on file   Housing Stability: Unknown (10/18/2023)    Housing Stability Vital Sign     Unable to Pay for Housing in the Last Year: No     Number of Places Lived in the Last Year: Not on file     Unstable Housing  in the Last Year: No     .  Family History:  Family History   Problem Relation Age of Onset    Brain cancer Mother     Cancer Father     Asthma Brother     Diabetes Maternal Grandmother     Cancer Maternal Grandmother     Hypertension Paternal Grandmother          Meds/Allergies      No current facility-administered medications for this visit.        (Not in a hospital admission)      Allergies   Allergen Reactions    Penicillin G Other (See Comments)    Pollen Extract Sneezing           Objective   Vitals: Visit Vitals  LMP 09/12/2021 (Exact Date)   OB Status Hysterectomy   Smoking Status Never      There were no vitals filed for this visit.[unfilled]    Invasive Devices       None                     ROS  Review of Systems   All other systems reviewed and are negative.  As described in my history of present illness        PHYSICAL EXAM  Physical Exam  Vitals reviewed.   Constitutional:       General: She is not in acute distress.     Appearance: Normal appearance. She is obese. She is not ill-appearing.   HENT:      Head: Normocephalic and atraumatic.      Right Ear: External ear normal.      Left Ear: External ear normal.      Nose: Nose normal.      Mouth/Throat:      Comments: Posterior pharynx is crowded  Eyes:      General: No scleral icterus.     Extraocular Movements: Extraocular movements intact.      Conjunctiva/sclera: Conjunctivae normal.      Pupils: Pupils are equal, round, and reactive to light.   Neck:      Comments: Large thick neck  Cardiovascular:      Rate and Rhythm: Normal rate and regular rhythm.      Pulses: Normal pulses.      Heart sounds: Normal heart sounds. No murmur heard.  Pulmonary:      Effort: Pulmonary effort is normal. No respiratory distress.      Breath sounds: Normal breath sounds. No wheezing.   Abdominal:      General: Bowel sounds are normal. There is no distension.      Tenderness: There is no abdominal tenderness.      Comments: Central obesity present   Musculoskeletal:          General: Swelling present. No tenderness or deformity.      Cervical back: No rigidity.   Skin:     Coloration: Skin is not jaundiced.      Findings: No bruising.   Neurological:      Mental Status: She is alert and oriented to person, place, and time. Mental status is at baseline.      Motor: No weakness.   Psychiatric:         Mood and Affect: Mood normal.         Behavior: Behavior normal.         Thought Content: Thought content normal.         Judgment: Judgment normal.             LAB RESULTS:    CBC:  WBC   Date Value Ref Range Status   10/18/2023 5.00 4.31 - 10.16 Thousand/uL Final     Hemoglobin   Date Value Ref Range Status   10/18/2023 12.6 11.5 - 15.4 g/dL Final     Hematocrit   Date Value Ref Range Status   10/18/2023 38.6 34.8 - 46.1 % Final     MCV   Date Value Ref Range Status   10/18/2023 83 82 - 98 fL Final     Platelets   Date Value Ref Range Status   10/18/2023 234 149 - 390 Thousands/uL Final     RBC   Date Value Ref Range Status   10/18/2023 4.63 3.81 - 5.12 Million/uL Final     MCH   Date Value Ref Range Status   10/18/2023 27.2 26.8 - 34.3 pg Final     MCHC   Date Value Ref Range Status   10/18/2023 32.6 31.4 - 37.4 g/dL Final     RDW   Date Value Ref Range Status   10/18/2023 13.3 11.6 - 15.1 % Final     MPV   Date Value Ref Range Status   10/18/2023 9.8 8.9 - 12.7 fL Final     nRBC   Date Value Ref Range Status   10/18/2023 0 /100 WBCs Final       CMP:  Potassium   Date Value Ref Range Status   10/18/2023 3.6 3.5 - 5.3 mmol/L Final     Chloride   Date Value Ref Range Status   10/18/2023 105 96 - 108 mmol/L Final     CO2   Date Value Ref Range Status   10/18/2023 29 21 - 32 mmol/L Final     BUN   Date Value Ref Range Status   10/18/2023 10 5 - 25 mg/dL Final     Creatinine   Date Value Ref Range Status   10/18/2023 0.54 (L) 0.60 - 1.30 mg/dL Final     Comment:     Standardized to IDMS reference method     Calcium   Date Value Ref Range Status   10/18/2023 8.2 (L) 8.4 - 10.2 mg/dL Final      AST   Date Value Ref Range Status   10/18/2023 13 13 - 39 U/L Final     ALT   Date Value Ref Range Status   10/18/2023 8 7 - 52 U/L Final     Comment:     Specimen collection should occur prior to Sulfasalazine administration due to the potential for falsely depressed results.      Alkaline Phosphatase   Date Value Ref Range Status   10/18/2023 46 34 - 104 U/L Final     eGFR   Date Value Ref Range Status   10/18/2023 112 ml/min/1.73sq m Final        Magnesium:   Magnesium   Date Value Ref Range Status   10/15/2023 2.0 1.9 - 2.7 mg/dL Final        A1C:  Hemoglobin A1C   Date Value Ref Range Status   03/03/2023 5.6 Normal 3.8-5.6%; PreDiabetic 5.7-6.4%; Diabetic >=6.5%; Glycemic control for adults with diabetes <7.0% % Final   01/06/2020 5.6 <5.7 % Final     Comment:     Reference Range  Non-diabetic                     <5.7  Pre-diabetic                     5.7-6.4  Diabetic                         >=6.5  ADA target for diabetic control  <=7        TSH:  TSH 3RD GENERATON   Date Value Ref Range Status   09/18/2023 2.861 0.450 - 4.500 uIU/mL Final     Comment:     The recommended reference ranges for TSH during pregnancy are as follows:   First trimester 0.1 to 2.5 uIU/mL   Second trimester  0.2 to 3.0 uIU/mL   Third trimester 0.3 to 3.0 uIU/m    Note: Normal ranges may not apply to patients who are transgender, non-binary, or whose legal sex, sex at birth, and gender identity differ.  Adult TSH (3rd generation) reference range follows the recommended guidelines of the American [employer] Association, January, 2020.        PT/INR:  Protime   Date Value Ref Range Status   07/03/2021 14.6 (H) 11.6 - 14.5 seconds Final     INR   Date Value Ref Range Status   07/03/2021 1.19 0.84 - 1.19 Final       Lipid Panel:  Cholesterol   Date Value Ref Range Status   06/12/2023 229 (H) See Comment mg/dL Final     Comment:     Cholesterol:         Pediatric <18 Years        Desirable          <170 mg/dL      Borderline High     170-199 mg/dL      High               >=200 mg/dL        Adult >=18 Years            Desirable        <200 mg/dL      Borderline High  200-239 mg/dL      High             >239 mg/dL       Triglycerides   Date Value Ref Range Status   06/12/2023 211 (H) See Comment mg/dL Final     Comment:     Triglyceride:     0-9Y            <75mg/dL     10Y-17Y         <90 mg/dL       >=18Y     Normal          <150 mg/dL     Borderline High 150-199 mg/dL     High            200-499 mg/dL        Very High       >499 mg/dL    Specimen collection should occur prior to Metamizole administration due to the potential for falsely depressed results.     HDL, Direct   Date Value Ref Range Status   06/12/2023 48 (L) >=50 mg/dL Final     Non-HDL-Chol (CHOL-HDL)   Date Value Ref Range Status   06/12/2023 181 mg/dl Final       Troponin:  Troponin I   Date Value Ref Range Status   07/19/2021 <0.02 <=0.04 ng/mL Final     Comment:     [employer] Chemistry analyzer 99% cutoff is > 0.04 ng/mL in network labs     o cTnI 99% cutoff is useful only when applied to patients in the clinical setting of myocardial ischemia   o cTnI 99% cutoff should be interpreted in the context of clinical history, ECG findings and possibly cardiac imaging to establish correct diagnosis.   o cTnI 99% cutoff may be suggestive but clearly not indicative of a coronary event without the clinical setting of myocardial ischemia.           Imaging:    EKG:    10/15/2023        DELMAR:  No results found for this or any previous visit.      Echocardiogram:  Results for orders placed during the hospital encounter of 09/11/23    Echo complete w/ contrast if indicated    Interpretation Summary    Left Ventricle: Left ventricular cavity size is normal. Wall thickness is normal. The left ventricular ejection fraction is 60%. Systolic function is normal. Wall motion is normal. Diastolic function is normal.    Mitral Valve: There is trace regurgitation.    Tricuspid Valve: There is mild  regurgitation.      Stress Test:   No results found for this or any previous visit.      Cardiac Catheterization:  No results found for this or any previous visit.      HOLTER MONITOR: 24 HOUR/48 HOUR MONITORS  Results for orders placed during the hospital encounter of 23    Holter monitor    Interpretation Summary  PT NAME: Emerald Cee  : 1975  AGE: 47 y.o.  GENDER: female  MRN: [id number]   PROCEDURE: Holter monitor        INDICATIONS:  Bradycardia    Impression  The patient had predominantly sinus rhythm.  Heart rate varied from 53 bpm to 146 bpm.  The patient’s average heart rate was 76 bpm.  The patient had a holter monitor tracing done for 47 hours and 59 minutes.  The patient had 31 PVCs.  The patient had 405 supraventricular ectopic beats. There were 2 runs, the longest consisting of 4 beats.  The longest R/R interval was 2.4 seconds.  There were multiple episodes of non conducted P waves, consider second degree HB Type 2.      AMB Extended Holter Monitor: Zio XT/AT or BioTel    Zio AT  2023 - 10/02/2023   Agustin Kimball MD  10/13/2023  1:26 PM EDT       13 day Zio cardiac monitor 23:  Patient had a min HR of 27 bpm, max HR of 171 bpm, and avg HR of 73 bpm. Predominant underlying rhythm was Sinus Rhythm. Slight P wave morphology changes were noted. 3 Supraventricular Tachycardia runs occurred, the run with the fastest interval lasting 6 beats with a max rate of 169 bpm, the longest lasting 6 beats with an avg rate of 150 bpm. 1 Pause occurred lasting 3.4 secs (18 bpm). Pause occurred due to High Grade AV Block. 1 episode(s) of AV Block (High Grade) occurred, lasting a total of 2 secs. Second Degree AV Block-Mobitz I (Wenckebach) was present. Isolated SVEs were rare (<1.0%), SVE Couplets were rare (<1.0%), and SVE Triplets were rare (<1.0%). Isolated VEs were rare (<1.0%, 358), VE Couplets were rare (<1.0%, 1), and VE Triplets were rare (<1.0%, 1). Ventricular Trigeminy was  present. Difficulty discerning atrial activity during High Grade episode making definitive diagnosis difficult to ascertain. MD notification criteria for High Grade AV Block met - report posted prior to notification per account request (DI).     I called pt. she continues to have some dizziness but no bozena syncope.  I reviewed the monitor results with the patient which does show periods of complete heart block with pauses up to 3.4 seconds.  Permanent pacemaker is recommended.  I recommend that she go to the emergency room to be monitored until she receives a permanent pacemaker.  The patient understands and agrees.                 Tilt Table  10/18/2023    PROCEDURE:  Supine blood pressure and heart rate taken.  Patient placed in 70 degree upright position blood pressure and heart rate monitored for 45 minutes before returning to supine position due to end of protocol.      Supine pre-tilt blood pressure was 120/82 with a heart rate of 55 bpm.  Upon immediate upright tilt, patient complained of brain fog and heart racing. Blood pressure was 120/90 with a heart rate of 86 bpm.   Throughout the upright tilt phase the patient reported fogginess, headache, feeling hot/warm, and lower back pain. During this time there were no prognostically significant changes in heart rate, rhythm, or blood pressure.     The heart rhythm was sinus throughout. Syncope did not occur.  There was no evidence of AV-block.      SUMMARY:  Normal tilt-table test. There were no prognostically significant changes in monitored parameters during upright tilt. There were no rhythm abnormalities.         Sleep Study:    Home Study  11/04/2023    IMPRESSION:  1.  Mild obstructive sleep apnea with an ELLIS of 5.2 events per hour. Respiratory events were predominantly in supine position with supine ELLIS 9.9 versus nonsupine ELLIS of 1.3 events per hour.  2.  Baseline oxygenation adequate.  Respiratory event related hypoxemia with oxygen saturation less  than 90% for 3.9 minutes. Oxygen keiry of 86%.       RECOMMENDATION:  Mild obstructive sleep apnea necessitates treatment as patient has significant cardiac comorbidities.  Treatment options include auto CPAP with pressure range 5-15 cm H2O and oral mandibular advancement device.  Alternative treatment option includes positional device (such as Zzoma) to avoid sleeping in supine position.  Recommend consultation with sleep medicine to further discuss treatment options.          DEVICE CHECK:     No results found for this or any previous visit.         Code Status: [unfilled]  Advance Directive and Living Will:      Power of :    POLST:      Counseling / Coordination of Care  Detailed discussion done with regards to symptom rhythm correlation, no symptomatic bradycardia  At the current time monitor      Vincenzo Wu MD

## 2023-12-21 NOTE — PROGRESS NOTES
[facility] - Outpatient Consultation  Emerald Cee 48 y.o. female MRN: [id number]  Encounter: [id number]          ASSESSMENT AND PLAN:      1. Encounter for screening colonoscopy  - Schedule colonoscopy with miralax prep for initial screening  - She has no family history or alarm symptoms; will have mild constipation with poor water intake    2. GERD (gastroesophageal reflux disease)  3. Epigastric pain  4. Fullness of abdomen  - She had a gastric sleeve in 2018 and since that time has dealt with intermittent epigastric burning, fullness, and reflux symptoms that does improve with using pantoprazole as needed  - Schedule EGD for Aguilar's screening given prior sleeve as well as for evaluation of her symptoms  - Take pantoprazole 40 mg daily for the next 2 months, not PRN  - Small portions    ______________________________________________________________________    HPI:  Emerald is a 49 yo F with a PMH of  gastric sleeve in 2018 with Dr. Mayer, SHARI, HLD, bradycardia, presenting for an EGD for Aguilar's screening due to her sleeve as well as for epigastric discomfort with burning and fullness. She does get heartburn but only occasionally. She takes pantoprazole primarily as needed and not on a regular basis. She constantly feels full in the epigastric region which exacerbates her reflux and burning. Her diet is not consistent and the triggers for the fullness and burning are not consistent either. She does have mild constipation if she does not drink enough water so she is working on this right now. She is also due for a colonoscopy based on her age. She denies any family history of colon cancer or alarm symptoms.      REVIEW OF SYSTEMS:    CONSTITUTIONAL: Denies any fever, chills, rigors, and weight loss.  HEENT: No earache or tinnitus. Denies hearing loss or visual disturbances.  CARDIOVASCULAR: No chest pain or palpitations.   RESPIRATORY: Denies any cough, hemoptysis, shortness of  breath or dyspnea on exertion.  GASTROINTESTINAL: As noted in the History of Present Illness.   GENITOURINARY: No problems with urination. Denies any hematuria or dysuria.  NEUROLOGIC: No dizziness or vertigo, denies headaches.   MUSCULOSKELETAL: Denies any muscle or joint pain.   SKIN: Denies skin rashes or itching.   ENDOCRINE: Denies excessive thirst. Denies intolerance to heat or cold.  PSYCHOSOCIAL: Denies depression or anxiety. Denies any recent memory loss.       Historical Information   Past Medical History:   Diagnosis Date    Anemia     History of transfusion     Hypercholesterolemia     Sleep apnea     Vitamin deficiency      Past Surgical History:   Procedure Laterality Date    ECTOPIC PREGNANCY SURGERY      EYE SURGERY      GASTRECTOMY SLEEVE LAPAROSCOPIC      MASS EXCISION N/A 8/1/2023    Procedure: EXCISION  BIOPSY LESION/MASS BACK;  Surgeon: Lawson Motta DO;  Location: [facility];  Service: General    VA LAPAROSCOPY W/RMVL ADNEXAL STRUCTURES Bilateral 9/28/2021    Procedure: SALPINGECTOMY, LAPAROSCOPIC;  Surgeon: Pietro Hanson MD;  Location: [facility];  Service: Gynecology    VA LAPS W/VAG HYSTERECT 250 GM/&RMVL TUBE&/OVARIES N/A 9/28/2021    Procedure: HYSTERECTOMY LAPAROSCOPIC ASSISTED VAGINAL (LAVH), LYSIS OF ADHESIONS;  Surgeon: Pietro Hanson MD;  Location: [facility];  Service: Gynecology     Social History   Social History     Substance and Sexual Activity   Alcohol Use Never     Social History     Substance and Sexual Activity   Drug Use No     Social History     Tobacco Use   Smoking Status Never    Passive exposure: Never   Smokeless Tobacco Never     Family History   Problem Relation Age of Onset    Brain cancer Mother     Cancer Father     Asthma Brother     Diabetes Maternal Grandmother     Cancer Maternal Grandmother     Hypertension Paternal Grandmother        Meds/Allergies       Current Outpatient Medications:     acetaminophen (TYLENOL) 500 mg tablet    Biotin 1 MG CAPS     "cholecalciferol (VITAMIN D3) 25 mcg (1,000 units) tablet    cyanocobalamin (VITAMIN B-12) 100 mcg tablet    ferrous sulfate 324 (65 Fe) mg    Insulin Pen Needle (BD Pen Needle Sarita 2nd Gen) 32G X 4 MM MISC    multivitamin (THERAGRAN) TABS    Omega-3 Fatty Acids (fish oil) 1,000 mg    pantoprazole (PROTONIX) 40 mg tablet    Allergies   Allergen Reactions    Penicillin G Other (See Comments)    Pollen Extract Sneezing           Objective     Blood pressure 128/86, pulse 68, temperature (!) 97.4 °F (36.3 °C), temperature source Temporal, resp. rate 16, height 5' 5\" (1.651 m), weight 118 kg (261 lb), last menstrual period 09/12/2021, SpO2 98%. Body mass index is 43.43 kg/m².        PHYSICAL EXAM:      General Appearance:   Alert, cooperative, no distress   HEENT:   Normocephalic, atraumatic, anicteric.     Neck:  Supple, symmetrical, trachea midline   Lungs:   Clear to auscultation bilaterally; no rales, rhonchi or wheezing; respirations unlabored    Heart::   Regular rate and rhythm; no murmur, rub, or gallop.   Abdomen:   Soft, non-tender, non-distended; normal bowel sounds; no masses, no organomegaly    Genitalia:   Deferred    Rectal:   Deferred    Extremities:  No cyanosis, clubbing or edema    Pulses:  2+ and symmetric    Skin:  No jaundice, rashes, or lesions    Lymph nodes:  No palpable cervical lymphadenopathy        Lab Results:   No visits with results within 1 Day(s) from this visit.   Latest known visit with results is:   Telemedicine on 11/06/2023   Component Date Value    Supplier Name 11/06/2023 [employer]     Supplier Phone Number 11/06/2023 [phone]     Order Status 11/06/2023 Pending Further Review     Delivery Request Date 11/06/2023 11/06/2023     Item Description 11/06/2023 CPAP Machine, Resmed S10 Auto-CPAP     Item Description 11/06/2023 PAP Mask, Nasal Pillow, Resmed Airfit P10, Complete, 1 per 3 months     Item Description 11/06/2023 PAP Mask Interface Cushion, " Nasal Pillow, 2 per 1 month     Item Description 11/06/2023 PAP Headgear, 1 per 6 months     Item Description 11/06/2023 PAP Humidifier, Heated     Item Description 11/06/2023 Disposable PAP Filter, 2 per 1 month     Item Description 11/06/2023 Non-Disposable PAP Filter, 1 per 6 months     Item Description 11/06/2023 PAP Machine Tubing, Heated, 1 per 3 months     Item Description 11/06/2023 PAP Monitoring Modem     Item Description 11/06/2023 Nasal Pillows Included, Nasal Pillow Mask, 2 per 1 month     Item Description 11/06/2023 Humidifier Water Chamber, 1 per 6 months          Radiology Results:   No results found.

## 2023-12-21 NOTE — PATIENT INSTRUCTIONS
Scheduled date of EGD/colonoscopy (as of today):1/19/24  Physician performing EGD/colonoscopy:Sierra  Location of EGD/colonoscopy:[facility]  Desired bowel prep reviewed with patient:Javier/Miralax  Instructions reviewed with patient by:Kael dodge  Clearances:   none

## 2023-12-22 ENCOUNTER — CONSULT (OUTPATIENT)
Dept: CARDIOLOGY CLINIC | Facility: CLINIC | Age: 48
End: 2023-12-22
Payer: COMMERCIAL

## 2023-12-22 ENCOUNTER — TELEPHONE (OUTPATIENT)
Dept: FAMILY MEDICINE CLINIC | Facility: CLINIC | Age: 48
End: 2023-12-22

## 2023-12-22 DIAGNOSIS — E66.01 MORBID OBESITY (HCC): ICD-10-CM

## 2023-12-22 DIAGNOSIS — R00.1 BRADYCARDIA: ICD-10-CM

## 2023-12-22 DIAGNOSIS — Z98.84 S/P LAPAROSCOPIC SLEEVE GASTRECTOMY: ICD-10-CM

## 2023-12-22 DIAGNOSIS — G47.33 OSA (OBSTRUCTIVE SLEEP APNEA): ICD-10-CM

## 2023-12-22 DIAGNOSIS — K91.2 POSTSURGICAL MALABSORPTION: ICD-10-CM

## 2023-12-22 DIAGNOSIS — I44.1 ATRIOVENTRICULAR BLOCK, MOBITZ TYPE 2: Primary | ICD-10-CM

## 2023-12-22 DIAGNOSIS — Z87.42 HISTORY OF PCOS: ICD-10-CM

## 2023-12-22 DIAGNOSIS — E78.2 MIXED HYPERLIPIDEMIA: ICD-10-CM

## 2023-12-22 DIAGNOSIS — I44.1 HEART BLOCK AV SECOND DEGREE: ICD-10-CM

## 2023-12-22 LAB

## 2023-12-22 PROCEDURE — 93000 ELECTROCARDIOGRAM COMPLETE: CPT | Performed by: INTERNAL MEDICINE

## 2023-12-22 PROCEDURE — 99213 OFFICE O/P EST LOW 20 MIN: CPT | Performed by: INTERNAL MEDICINE

## 2023-12-22 NOTE — TELEPHONE ENCOUNTER
Patient called [facility] to give additional information and they told her they were waiting on her sleep study in order to send her supplies.

## 2023-12-22 NOTE — LETTER
December 26, 2023     [facility]  [address]  [place] [address]    Patient: Emerald Cee   YOB: 1975   Date of Visit: 12/22/2023       Dear Dr. Mann:    Thank you for referring Emerald Cee to me for evaluation. Below are my notes for this consultation.    If you have questions, please do not hesitate to call me. I look forward to following your patient along with you.         Sincerely,        Vincenzo Wu MD        CC: MD Vincenzo Espinal MD  12/26/2023  8:46 AM  Sign when Signing Visit   Consultation - Electrophysiology-Cardiology (EP)   Emerald Cee 48 y.o. female MRN: [id number]  Unit/Bed#:  Encounter: [id number]      1. Atrioventricular block, Mobitz type 2  POCT ECG      2. Postsurgical malabsorption        3. SHARI (obstructive sleep apnea)        4. Heart block AV second degree        5. Mixed hyperlipidemia        6. S/P laparoscopic sleeve gastrectomy        7. History of PCOS        8. Bradycardia        9. Morbid obesity (HCC)              Consults  Physician Requesting Consult: PCP  Reason for Consult / Principal Problem: Heart block AV second degree, abnormal EKG      Summary of my recommendation for the patient  Patient has obesity, post gastric sleeve surgery, current BMI 44  She does have symptoms suggestive of sleep apnea    Rhythm disturbances during sleep with elevated vagal tone, longest pause being 3.4 seconds during sleep  Patient is not using her CPAP as she has not received replacement    At the current time no indication for pacemaker    Patient is advised long-term monitoring with a loop or assert  She is going to decide and let us know when she wants to proceed with implant    At the current time she is working with her bariatric team to go on a liquid diet and reset her weight management            Clinical conditions  Lightheadedness and dizziness  Brief complete heart block of 3 seconds on Zio patch in September  2023  2:1 AV block  Obstructive sleep apnea not on CPAP  Gastric bypass          Assessment/Plan     Lightheadedness and dizziness  Brief complete heart block of 3 seconds on Zio patch in September 2023  2:1 AV block    Patient has obesity, post gastric sleeve surgery, current BMI 44  She does have symptoms suggestive of sleep apnea    Rhythm disturbances during sleep with elevated vagal tone, longest pause being 3.4 seconds during sleep  Patient is not using her CPAP as she has not received replacement    At the current time no indication for pacemaker    Patient is advised long-term monitoring with a loop or assert  She is going to decide and let us know when she wants to proceed with implant    At the current time she is working with her bariatric team to go on a liquid diet and reset her weight management          Obstructive sleep apnea not on CPAP  Gastric bypass  Patient initially lost weight but has regained a lot of weight  She is following up with bariatric clinic  She is supposed to go on a liquid diet and reset her weight loss regimen              History of Present Illness   HPI: Emerald Cee is a 48 y.o. year old female has been referred to me by PCP for evaluation and management of bradycardia    The patient has significant medical illnesses which include  Lightheadedness and dizziness  Brief complete heart block of 3 seconds on Zio patch in September 2023  2:1 AV block  Obstructive sleep apnea not on CPAP  Gastric bypass    The patient is not complaining of anginal-like chest pain, orthopnea, PND  She is not complaining of palpitation, presyncope or syncope  She has regained weight  There is exertional intolerance  There is some chronic leg swelling    All low heart rates have been noted incidentally  There has been no symptom rhythm correlation with bradycardia  Most of the episodes have been noted during sleep      Historical Information   Past Medical History:   Diagnosis Date   • Anemia    •  History of transfusion    • Hypercholesterolemia    • Sleep apnea    • Vitamin deficiency      Past Surgical History:   Procedure Laterality Date   • ECTOPIC PREGNANCY SURGERY     • EYE SURGERY     • GASTRECTOMY SLEEVE LAPAROSCOPIC     • MASS EXCISION N/A 2023    Procedure: EXCISION  BIOPSY LESION/MASS BACK;  Surgeon: Lawson Motta DO;  Location: [facility];  Service: General   • AL LAPAROSCOPY W/RMVL ADNEXAL STRUCTURES Bilateral 2021    Procedure: SALPINGECTOMY, LAPAROSCOPIC;  Surgeon: Pietro Hanson MD;  Location[facility];  Service: Gynecology   • AL LAPS W/VAG HYSTERECT 250 GM/&RMVL TUBE&/OVARIES N/A 2021    Procedure: HYSTERECTOMY LAPAROSCOPIC ASSISTED VAGINAL (LAVH), LYSIS OF ADHESIONS;  Surgeon: Pietro Hanson MD;  Location[facility];  Service: Gynecology     Social History     Substance and Sexual Activity   Alcohol Use Never     Social History     Substance and Sexual Activity   Drug Use No     Social History     Tobacco Use   Smoking Status Never   • Passive exposure: Never   Smokeless Tobacco Never     Social History     Socioeconomic History   • Marital status: /Civil Union     Spouse name: Not on file   • Number of children: Not on file   • Years of education: Not on file   • Highest education level: Not on file   Occupational History   • Not on file   Tobacco Use   • Smoking status: Never     Passive exposure: Never   • Smokeless tobacco: Never   Vaping Use   • Vaping status: Never Used   Substance and Sexual Activity   • Alcohol use: Never   • Drug use: No   • Sexual activity: Yes     Partners: Male   Other Topics Concern   • Not on file   Social History Narrative    Most recent tobacco use screenin2019    Do you currently or have you served in the [place] [employer]: No     Social Determinants of Health     Financial Resource Strain: Not on file   Food Insecurity: No Food Insecurity (10/18/2023)    Hunger Vital Sign    • Worried About Running Out of Food in the Last  Year: Never true    • Ran Out of Food in the Last Year: Never true   Transportation Needs: No Transportation Needs (10/18/2023)    PRAPARE - Transportation    • Lack of Transportation (Medical): No    • Lack of Transportation (Non-Medical): No   Physical Activity: Not on file   Stress: Not on file   Social Connections: Not on file   Intimate Partner Violence: Not on file   Housing Stability: Unknown (10/18/2023)    Housing Stability Vital Sign    • Unable to Pay for Housing in the Last Year: No    • Number of Places Lived in the Last Year: Not on file    • Unstable Housing in the Last Year: No     .  Family History:  Family History   Problem Relation Age of Onset   • Brain cancer Mother    • Cancer Father    • Asthma Brother    • Diabetes Maternal Grandmother    • Cancer Maternal Grandmother    • Hypertension Paternal Grandmother          Meds/Allergies      No current facility-administered medications for this visit.        (Not in a hospital admission)      Allergies   Allergen Reactions   • Penicillin G Other (See Comments)   • Pollen Extract Sneezing           Objective   Vitals: Visit Vitals  LMP 09/12/2021 (Exact Date)   OB Status Hysterectomy   Smoking Status Never      There were no vitals filed for this visit.[unfilled]    Invasive Devices       None                     ROS  Review of Systems   All other systems reviewed and are negative.  As described in my history of present illness        PHYSICAL EXAM  Physical Exam  Vitals reviewed.   Constitutional:       General: She is not in acute distress.     Appearance: Normal appearance. She is obese. She is not ill-appearing.   HENT:      Head: Normocephalic and atraumatic.      Right Ear: External ear normal.      Left Ear: External ear normal.      Nose: Nose normal.      Mouth/Throat:      Comments: Posterior pharynx is crowded  Eyes:      General: No scleral icterus.     Extraocular Movements: Extraocular movements intact.      Conjunctiva/sclera:  Conjunctivae normal.      Pupils: Pupils are equal, round, and reactive to light.   Neck:      Comments: Large thick neck  Cardiovascular:      Rate and Rhythm: Normal rate and regular rhythm.      Pulses: Normal pulses.      Heart sounds: Normal heart sounds. No murmur heard.  Pulmonary:      Effort: Pulmonary effort is normal. No respiratory distress.      Breath sounds: Normal breath sounds. No wheezing.   Abdominal:      General: Bowel sounds are normal. There is no distension.      Tenderness: There is no abdominal tenderness.      Comments: Central obesity present   Musculoskeletal:         General: Swelling present. No tenderness or deformity.      Cervical back: No rigidity.   Skin:     Coloration: Skin is not jaundiced.      Findings: No bruising.   Neurological:      Mental Status: She is alert and oriented to person, place, and time. Mental status is at baseline.      Motor: No weakness.   Psychiatric:         Mood and Affect: Mood normal.         Behavior: Behavior normal.         Thought Content: Thought content normal.         Judgment: Judgment normal.             LAB RESULTS:    CBC:  WBC   Date Value Ref Range Status   10/18/2023 5.00 4.31 - 10.16 Thousand/uL Final     Hemoglobin   Date Value Ref Range Status   10/18/2023 12.6 11.5 - 15.4 g/dL Final     Hematocrit   Date Value Ref Range Status   10/18/2023 38.6 34.8 - 46.1 % Final     MCV   Date Value Ref Range Status   10/18/2023 83 82 - 98 fL Final     Platelets   Date Value Ref Range Status   10/18/2023 234 149 - 390 Thousands/uL Final     RBC   Date Value Ref Range Status   10/18/2023 4.63 3.81 - 5.12 Million/uL Final     MCH   Date Value Ref Range Status   10/18/2023 27.2 26.8 - 34.3 pg Final     MCHC   Date Value Ref Range Status   10/18/2023 32.6 31.4 - 37.4 g/dL Final     RDW   Date Value Ref Range Status   10/18/2023 13.3 11.6 - 15.1 % Final     MPV   Date Value Ref Range Status   10/18/2023 9.8 8.9 - 12.7 fL Final     nRBC   Date Value Ref  Range Status   10/18/2023 0 /100 WBCs Final       CMP:  Potassium   Date Value Ref Range Status   10/18/2023 3.6 3.5 - 5.3 mmol/L Final     Chloride   Date Value Ref Range Status   10/18/2023 105 96 - 108 mmol/L Final     CO2   Date Value Ref Range Status   10/18/2023 29 21 - 32 mmol/L Final     BUN   Date Value Ref Range Status   10/18/2023 10 5 - 25 mg/dL Final     Creatinine   Date Value Ref Range Status   10/18/2023 0.54 (L) 0.60 - 1.30 mg/dL Final     Comment:     Standardized to IDMS reference method     Calcium   Date Value Ref Range Status   10/18/2023 8.2 (L) 8.4 - 10.2 mg/dL Final     AST   Date Value Ref Range Status   10/18/2023 13 13 - 39 U/L Final     ALT   Date Value Ref Range Status   10/18/2023 8 7 - 52 U/L Final     Comment:     Specimen collection should occur prior to Sulfasalazine administration due to the potential for falsely depressed results.      Alkaline Phosphatase   Date Value Ref Range Status   10/18/2023 46 34 - 104 U/L Final     eGFR   Date Value Ref Range Status   10/18/2023 112 ml/min/1.73sq m Final        Magnesium:   Magnesium   Date Value Ref Range Status   10/15/2023 2.0 1.9 - 2.7 mg/dL Final        A1C:  Hemoglobin A1C   Date Value Ref Range Status   03/03/2023 5.6 Normal 3.8-5.6%; PreDiabetic 5.7-6.4%; Diabetic >=6.5%; Glycemic control for adults with diabetes <7.0% % Final   01/06/2020 5.6 <5.7 % Final     Comment:     Reference Range  Non-diabetic                     <5.7  Pre-diabetic                     5.7-6.4  Diabetic                         >=6.5  ADA target for diabetic control  <=7        TSH:  TSH 3RD GENERATON   Date Value Ref Range Status   09/18/2023 2.861 0.450 - 4.500 uIU/mL Final     Comment:     The recommended reference ranges for TSH during pregnancy are as follows:   First trimester 0.1 to 2.5 uIU/mL   Second trimester  0.2 to 3.0 uIU/mL   Third trimester 0.3 to 3.0 uIU/m    Note: Normal ranges may not apply to patients who are transgender, non-binary, or  whose legal sex, sex at birth, and gender identity differ.  Adult TSH (3rd generation) reference range follows the recommended guidelines of the American [employer] Association, January, 2020.        PT/INR:  Protime   Date Value Ref Range Status   07/03/2021 14.6 (H) 11.6 - 14.5 seconds Final     INR   Date Value Ref Range Status   07/03/2021 1.19 0.84 - 1.19 Final       Lipid Panel:  Cholesterol   Date Value Ref Range Status   06/12/2023 229 (H) See Comment mg/dL Final     Comment:     Cholesterol:         Pediatric <18 Years        Desirable          <170 mg/dL      Borderline High    170-199 mg/dL      High               >=200 mg/dL        Adult >=18 Years            Desirable        <200 mg/dL      Borderline High  200-239 mg/dL      High             >239 mg/dL       Triglycerides   Date Value Ref Range Status   06/12/2023 211 (H) See Comment mg/dL Final     Comment:     Triglyceride:     0-9Y            <75mg/dL     10Y-17Y         <90 mg/dL       >=18Y     Normal          <150 mg/dL     Borderline High 150-199 mg/dL     High            200-499 mg/dL        Very High       >499 mg/dL    Specimen collection should occur prior to Metamizole administration due to the potential for falsely depressed results.     HDL, Direct   Date Value Ref Range Status   06/12/2023 48 (L) >=50 mg/dL Final     Non-HDL-Chol (CHOL-HDL)   Date Value Ref Range Status   06/12/2023 181 mg/dl Final       Troponin:  Troponin I   Date Value Ref Range Status   07/19/2021 <0.02 <=0.04 ng/mL Final     Comment:     [employer] Chemistry analyzer 99% cutoff is > 0.04 ng/mL in network labs     o cTnI 99% cutoff is useful only when applied to patients in the clinical setting of myocardial ischemia   o cTnI 99% cutoff should be interpreted in the context of clinical history, ECG findings and possibly cardiac imaging to establish correct diagnosis.   o cTnI 99% cutoff may be suggestive but clearly not indicative of a coronary event without the clinical  setting of myocardial ischemia.           Imaging:    EKG:    10/15/2023        DELMAR:  No results found for this or any previous visit.      Echocardiogram:  Results for orders placed during the hospital encounter of 23    Echo complete w/ contrast if indicated    Interpretation Summary  •  Left Ventricle: Left ventricular cavity size is normal. Wall thickness is normal. The left ventricular ejection fraction is 60%. Systolic function is normal. Wall motion is normal. Diastolic function is normal.  •  Mitral Valve: There is trace regurgitation.  •  Tricuspid Valve: There is mild regurgitation.      Stress Test:   No results found for this or any previous visit.      Cardiac Catheterization:  No results found for this or any previous visit.      HOLTER MONITOR: 24 HOUR/48 HOUR MONITORS  Results for orders placed during the hospital encounter of 23    Holter monitor    Interpretation Summary  PT NAME: Emerald Cee  : 1975  AGE: 47 y.o.  GENDER: female  MRN: [id number]   PROCEDURE: Holter monitor        INDICATIONS:  Bradycardia    Impression  The patient had predominantly sinus rhythm.  Heart rate varied from 53 bpm to 146 bpm.  The patient’s average heart rate was 76 bpm.  The patient had a holter monitor tracing done for 47 hours and 59 minutes.  The patient had 31 PVCs.  The patient had 405 supraventricular ectopic beats. There were 2 runs, the longest consisting of 4 beats.  The longest R/R interval was 2.4 seconds.  There were multiple episodes of non conducted P waves, consider second degree HB Type 2.      AMB Extended Holter Monitor: Zio XT/AT or BioTel    Zio AT  2023 - 10/02/2023   Agustin Kimball MD  10/13/2023  1:26 PM EDT       13 day Zio cardiac monitor 23:  Patient had a min HR of 27 bpm, max HR of 171 bpm, and avg HR of 73 bpm. Predominant underlying rhythm was Sinus Rhythm. Slight P wave morphology changes were noted. 3 Supraventricular Tachycardia runs occurred,  the run with the fastest interval lasting 6 beats with a max rate of 169 bpm, the longest lasting 6 beats with an avg rate of 150 bpm. 1 Pause occurred lasting 3.4 secs (18 bpm). Pause occurred due to High Grade AV Block. 1 episode(s) of AV Block (High Grade) occurred, lasting a total of 2 secs. Second Degree AV Block-Mobitz I (Wenckebach) was present. Isolated SVEs were rare (<1.0%), SVE Couplets were rare (<1.0%), and SVE Triplets were rare (<1.0%). Isolated VEs were rare (<1.0%, 358), VE Couplets were rare (<1.0%, 1), and VE Triplets were rare (<1.0%, 1). Ventricular Trigeminy was present. Difficulty discerning atrial activity during High Grade episode making definitive diagnosis difficult to ascertain. MD notification criteria for High Grade AV Block met - report posted prior to notification per account request (DI).     I called pt. she continues to have some dizziness but no bozena syncope.  I reviewed the monitor results with the patient which does show periods of complete heart block with pauses up to 3.4 seconds.  Permanent pacemaker is recommended.  I recommend that she go to the emergency room to be monitored until she receives a permanent pacemaker.  The patient understands and agrees.                 Tilt Table  10/18/2023    PROCEDURE:  Supine blood pressure and heart rate taken.  Patient placed in 70 degree upright position blood pressure and heart rate monitored for 45 minutes before returning to supine position due to end of protocol.      Supine pre-tilt blood pressure was 120/82 with a heart rate of 55 bpm.  Upon immediate upright tilt, patient complained of brain fog and heart racing. Blood pressure was 120/90 with a heart rate of 86 bpm.   Throughout the upright tilt phase the patient reported fogginess, headache, feeling hot/warm, and lower back pain. During this time there were no prognostically significant changes in heart rate, rhythm, or blood pressure.     The heart rhythm was sinus  throughout. Syncope did not occur.  There was no evidence of AV-block.      SUMMARY:  Normal tilt-table test. There were no prognostically significant changes in monitored parameters during upright tilt. There were no rhythm abnormalities.         Sleep Study:    Home Study  11/04/2023    IMPRESSION:  1.  Mild obstructive sleep apnea with an ELLIS of 5.2 events per hour. Respiratory events were predominantly in supine position with supine ELLIS 9.9 versus nonsupine ELLIS of 1.3 events per hour.  2.  Baseline oxygenation adequate.  Respiratory event related hypoxemia with oxygen saturation less than 90% for 3.9 minutes. Oxygen keiry of 86%.       RECOMMENDATION:  Mild obstructive sleep apnea necessitates treatment as patient has significant cardiac comorbidities.  Treatment options include auto CPAP with pressure range 5-15 cm H2O and oral mandibular advancement device.  Alternative treatment option includes positional device (such as Zzoma) to avoid sleeping in supine position.  Recommend consultation with sleep medicine to further discuss treatment options.          DEVICE CHECK:     No results found for this or any previous visit.         Code Status: [unfilled]  Advance Directive and Living Will:      Power of :    POLST:      Counseling / Coordination of Care  Detailed discussion done with regards to symptom rhythm correlation, no symptomatic bradycardia  At the current time monitor      Vincenzo Wu MD

## 2024-01-04 LAB

## 2024-01-05 ENCOUNTER — TELEPHONE (OUTPATIENT)
Dept: FAMILY MEDICINE CLINIC | Facility: CLINIC | Age: 49
End: 2024-01-05

## 2024-01-05 NOTE — TELEPHONE ENCOUNTER
"Voicemail received \"Lynn, this is Lisbeth calling from [employer] [facility] regarding patient Mira Cee date of birth October 2, 1975. We are looking to get as to obtain some additional clarification on restrictions for Mira in regards to at work accommodations. If you can please give our office a call back. Our number is [phone] and we are option #3. Thank you.\"    I called this number back and followe the prompts . Lisbeth was currently unavailable. Message left we attempted to contact her back to clarify.     "

## 2024-03-08 ENCOUNTER — OFFICE VISIT (OUTPATIENT)
Dept: FAMILY MEDICINE CLINIC | Facility: CLINIC | Age: 49
End: 2024-03-08
Payer: COMMERCIAL

## 2024-03-08 VITALS
TEMPERATURE: 98.4 F | WEIGHT: 273 LBS | BODY MASS INDEX: 45.48 KG/M2 | OXYGEN SATURATION: 96 % | SYSTOLIC BLOOD PRESSURE: 126 MMHG | HEART RATE: 73 BPM | DIASTOLIC BLOOD PRESSURE: 84 MMHG | HEIGHT: 65 IN

## 2024-03-08 DIAGNOSIS — Z12.11 SCREENING FOR COLON CANCER: ICD-10-CM

## 2024-03-08 DIAGNOSIS — K59.09 OTHER CONSTIPATION: ICD-10-CM

## 2024-03-08 DIAGNOSIS — D50.8 OTHER IRON DEFICIENCY ANEMIA: ICD-10-CM

## 2024-03-08 DIAGNOSIS — G47.33 OSA (OBSTRUCTIVE SLEEP APNEA): ICD-10-CM

## 2024-03-08 DIAGNOSIS — I44.1 HEART BLOCK AV SECOND DEGREE: ICD-10-CM

## 2024-03-08 DIAGNOSIS — E66.01 MORBID OBESITY (HCC): ICD-10-CM

## 2024-03-08 DIAGNOSIS — Z98.84 S/P LAPAROSCOPIC SLEEVE GASTRECTOMY: ICD-10-CM

## 2024-03-08 DIAGNOSIS — E55.9 VITAMIN D DEFICIENCY: ICD-10-CM

## 2024-03-08 DIAGNOSIS — K21.9 GASTROESOPHAGEAL REFLUX DISEASE WITHOUT ESOPHAGITIS: Primary | ICD-10-CM

## 2024-03-08 PROCEDURE — 99214 OFFICE O/P EST MOD 30 MIN: CPT | Performed by: NURSE PRACTITIONER

## 2024-03-08 RX ORDER — PANTOPRAZOLE SODIUM 40 MG/1
40 TABLET, DELAYED RELEASE ORAL EVERY MORNING
Qty: 90 TABLET | Refills: 0 | Status: SHIPPED | OUTPATIENT
Start: 2024-03-08 | End: 2024-06-06

## 2024-03-08 RX ORDER — DIPHENOXYLATE HYDROCHLORIDE AND ATROPINE SULFATE 2.5; .025 MG/1; MG/1
1 TABLET ORAL DAILY
Qty: 90 TABLET | Refills: 0 | Status: SHIPPED | OUTPATIENT
Start: 2024-03-08 | End: 2024-06-06

## 2024-03-08 RX ORDER — CHLORAL HYDRATE 500 MG
1000 CAPSULE ORAL DAILY
Qty: 90 CAPSULE | Refills: 0 | Status: SHIPPED | OUTPATIENT
Start: 2024-03-08 | End: 2024-06-06

## 2024-03-08 RX ORDER — HYDROCORTISONE 25 MG/G
CREAM TOPICAL 2 TIMES DAILY
Qty: 28 G | Refills: 1 | Status: SHIPPED | OUTPATIENT
Start: 2024-03-08 | End: 2024-03-18

## 2024-03-08 RX ORDER — VITAMIN B COMPLEX
1000 TABLET ORAL DAILY
Qty: 90 TABLET | Refills: 0 | Status: SHIPPED | OUTPATIENT
Start: 2024-03-08 | End: 2024-06-06

## 2024-03-08 NOTE — PROGRESS NOTES
Assessment/Plan:       1. Gastroesophageal reflux disease without esophagitis  Comments:  not compliant with diet  Orders:  -     pantoprazole (PROTONIX) 40 mg tablet; Take 1 tablet (40 mg total) by mouth every morning    2. Screening for colon cancer  -     Cologuard    3. Other constipation  -     hydrocortisone (ANUSOL-HC) 2.5 % rectal cream; Apply topically 2 (two) times a day for 10 days    4. SHARI (obstructive sleep apnea)  Comments:  not using CPAP daily   discussed concerns with cardaic issues in the past as a result    5. S/P laparoscopic sleeve gastrectomy    6. Morbid obesity (HCC)  Comments:  post bariatric surgery   gainaing weights   not compliant with diet recommnedations  Orders:  -     Omega-3 Fatty Acids (fish oil) 1,000 mg; Take 1 capsule (1,000 mg total) by mouth daily  -     multivitamin (THERAGRAN) TABS; Take 1 tablet by mouth daily    7. Other iron deficiency anemia    8. Vitamin D deficiency  -     cholecalciferol (VITAMIN D3) 25 mcg (1,000 units) tablet; Take 1 tablet (1,000 Units total) by mouth daily    9. Heart block AV second degree  Comments:  missed follow up with Cardiology she needs to be seen        GERD.  We discussed today the importance of using her CPAP.   I will call her with the results.    Health maintenance.  She is due for blood work.   I encouraged her to get her blood work done sooner than later and then I will call her with the results.  I have ordered a Cologuard for her and reminded her to get her mammogram done.   Refills on her medications were provided.    Cardiac Issues.  We emphasized the importance of consistent CPAP use to manage her sleep apnea and prevent further cardiac complications. Follow up and not missing MD appts     Follow-up  The patient will follow up in 3 months.      Depression Screening and Follow-up Plan: Patient was screened for depression during today's encounter. They screened negative with a PHQ-2 score of 0.               Subjective:       "Patient ID: Emerald Cee is a 48 y.o. female.    GERD.  We discussed today the importance of using her CPAP.   I will call her with the results.    Health maintenance.  She is due for blood work.   I encouraged her to get her blood work done sooner than later and then I will call her with the results.  I have ordered a Cologuard for her and reminded her to get her mammogram done.   Refills on her medications were provided.    Cardiac Issues.  We emphasized the importance of consistent CPAP use to manage her sleep apnea and prevent further cardiac complications.    Follow-up  The patient will follow up in 3 months.    The following portions of the patient's history were reviewed and updated as appropriate: allergies, current medications, past family history, past medical history, past social history, past surgical history, and problem list.    Review of Systems  Review of Systems.  Constitutional: Positive for fatigue and weight gain.  HENT: Negative for congestion, postnasal drip or sinus pressure.  Respiratory: Negative for cough.   Cardiovascular: Positive for mild edema. Negative for dyspnea, chest pain or palpitations.  Gastrointestinal: Negative for diarrhea, nausea, vomiting or any bowel issues.   discomfort. Negative for vomiting. Negative for hematochezia.  Genitourinary: Negative for urinating issues or pelvic pressure.   Musculoskeletal: Positive for arthralgias and general myalgias. Negative for flank pain..  Skin: Positive for dry skin. Negative for rashes.   Neurological: Positive for intermittent headaches.   Lymphatic: Negative for adenopathy.  Psychiatric/Behavioral: Negative for anxiety, sleep issues or suicidal ideations.       Objective:  /84 (BP Location: Right arm, Patient Position: Sitting, Cuff Size: Large)   Pulse 73   Temp 98.4 °F (36.9 °C)   Ht 5' 5\" (1.651 m)   Wt 124 kg (273 lb)   LMP 09/12/2021 (Exact Date)   SpO2 96%   BMI 45.43 kg/m²          Physical Exam  Physical " Exam.  Vital Signs: Vital signs are stable. Afebrile.   Blood pressure is 126/84 mmHg.  Heart rate is 73 bpm.  BMI 45.43 kg/m².  Constitutional:  Alert, oriented x 3.     Appearance: She is well-developed.   HENT:      Head: Normocephalic and atraumatic.      Right Ear: Tympanic membrane normal.      Left Ear: Tympanic membrane normal.      Mouth/Throat:      Pharynx: Uvula midline.   Eyes:      Pupils: Pupils are equal, round, and reactive to light.   Cardiovascular:      Rate and Rhythm: Normal rate and regular rhythm.  Pulmonary: Lungs are clear decrease to the bases.     Effort: Pulmonary effort is normal.   Abdominal:      Palpations: Abdomen is soft, nondistended and with bowel sounds x4.  Musculoskeletal: Trace pedal edema.     Cervical back: Normal range of motion.   Skin:     General: Skin is warm. Dry skin.  Psychiatric: Normal mood and behavior.            Transcribed for DAVE Fountain, by Brissa Rankin on 03/09/24 at 9:17 PM. Powered by Dragon [employer]ient [employer].

## 2024-03-18 NOTE — PROGRESS NOTES
[address] [facility]  Progress Note  Name: Steve Hilliard  MRN: [id number]  Unit/Bed#: -87 I Date of Admission: 10/17/2023   Date of Service: 10/18/2023 I Hospital Day: 1    Assessment/Plan   * Heart block AV second degree  Assessment & Plan  Patient presents with intermittent symptoms of dizziness for the past several months. This was initially worked up by Holter monitor. Unfortunately work-up consistent with second-degree heart block on event monitoring. Patient transferred here for EP assessment duration of sinus pause/second-degree AV block. Follow-up on electrolytes. S/p recent Echo in 2023 WNL  S/p recent TFT's in euthyroid state  Note EP assessment. Preliminarily, symptoms did not correlate with AV block as per EP. We will follow-up on further studies including tilt table as per EP. Morbid obesity ([address])  Assessment & Plan  Life style modification. Patient with history of laparoscopic gastric sleeve    Hyperlipidemia  Assessment & Plan  Outpatient follow-up             VTE Pharmacologic Prophylaxis:   Pharmacologic: Enoxaparin (Lovenox)  Mechanical VTE Prophylaxis in Place: No    Patient Centered Rounds: I have performed bedside rounds with nursing staff today. Time Spent for Care:  54 . More than 50% of total time spent on counseling and coordination of care as described above. Current Length of Stay: 1 day(s)    Current Patient Status: Inpatient     Code Status: Level 1 - Full Code      Subjective:   nad    Objective:     Vitals:   Temp (24hrs), Av.1 °F (36.7 °C), Min:98 °F (36.7 °C), Max:98.2 °F (36.8 °C)    Temp:  [98 °F (36.7 °C)-98.2 °F (36.8 °C)] 98.2 °F (36.8 °C)  HR:  [55-66] 62  Resp:  [16-18] 18  BP: (110-124)/(68-79) 111/68  SpO2:  [93 %-99 %] 96 %  Body mass index is 41.77 kg/m².      Input and Output Summary (last 24 hours):     No intake or output data in the 24 hours ending 10/18/23 0901    Physical Exam:     Physical GI Follow up Note  3/18/2024      CHIEF COMPLAINT:  No chief complaint on file. SUBJECTIVE:    No acute events overnight  Abdominal pain improved  Afebrile  O2 weaned off  Denies nausea or vomiting      PROBLEM LIST:                  Patient Active Problem List   Diagnosis    Arthralgia of multiple joints    Benign hypertensive heart disease    Dyslipidemia    Elevated uric acid in blood    Excessive daytime sleepiness    Obesity (BMI 30-39. 9)    Hypertensive heart disease with heart failure (CMD)    Unspecified atrial fibrillation (CMD)    Annual physical exam    Elevated PSA    Positive anti-CCP test    Seropositive rheumatoid arthritis (CMD)    Hypertensive kidney disease with stage 3 chronic kidney disease (CMD)    CKD (chronic kidney disease) stage 3, GFR 30-59 ml/min (CMD)    Hyperuricemia    Hyperparathyroidism, secondary renal (CMD)    Vitamin D deficiency    Acute kidney injury superimposed on chronic kidney disease (CMD)    Hyponatremia    Dyspnea    Perianal abscess    Gastroesophageal reflux disease without esophagitis    Bandemia    Left arm swelling    Current use of long term anticoagulation    SOB (shortness of breath)    Aspiration pneumonia of both lower lobes (CMD)    Sepsis with acute hypoxic respiratory failure without septic shock (CMD)    Abdominal pain    Hypokalemia    Nausea and vomiting       HISTORIES:    ALLERGIES:   Allergen Reactions    Penicillins ANAPHYLAXIS     Past Medical History:   Diagnosis Date    AF (atrial fibrillation) (CMD)     Chronic pain     Elevated PSA     Hyperlipidemia     Hyperparathyroidism (CMD)     Hypertensive heart disease with heart failure (CMD)     Hyperuricemia     Morbid obesity with BMI of 40.0-44.9, adult (CMD)     Nausea vomiting and diarrhea     Prediabetes     Seropositive rheumatoid arthritis (CMD)     Stage 3a chronic kidney disease (CMD)     Vitamin D deficiency      Past Surgical History:   Procedure Laterality Date    Knee Exam  Constitutional:       Appearance: Normal appearance. HENT:      Head: Normocephalic and atraumatic. Cardiovascular:      Rate and Rhythm: Normal rate and regular rhythm. Heart sounds: No murmur heard. Pulmonary:      Effort: No respiratory distress. Breath sounds: No wheezing. Abdominal:      General: Abdomen is flat. Palpations: Abdomen is soft. Neurological:      General: No focal deficit present. Mental Status: She is alert and oriented to person, place, and time. Psychiatric:         Mood and Affect: Mood normal.         Additional Data:     Labs:    Results from last 7 days   Lab Units 10/18/23  0447   WBC Thousand/uL 5.00   HEMOGLOBIN g/dL 12.6   HEMATOCRIT % 38.6   PLATELETS Thousands/uL 234   NEUTROS PCT % 36*   LYMPHS PCT % 52*   MONOS PCT % 9   EOS PCT % 3     Results from last 7 days   Lab Units 10/18/23  0447   POTASSIUM mmol/L 3.6   CHLORIDE mmol/L 105   CO2 mmol/L 29   BUN mg/dL 10   CREATININE mg/dL 0.54*   CALCIUM mg/dL 8.2*   ALK PHOS U/L 46   ALT U/L 8   AST U/L 13           *    Recent Cultures (last 7 days):           Last 24 Hours Medication List:   Current Facility-Administered Medications   Medication Dose Route Frequency Provider Last Rate    acetaminophen  650 mg Oral Q6H PRN Hetul Greene, DO      docusate sodium  100 mg Oral TID PRN Hetul Greene, DO      fish oil  1,000 mg Oral Daily Hetul Greene, DO      ibuprofen  600 mg Oral Q6H PRN Hetul Greene, DO      pantoprazole  40 mg Oral QAM Hetalyce Greene, DO          Today, Patient Was Seen By: Melida Willams DO    ** Please Note: Dictation voice to text software may have been used in the creation of this document.  ** scope,diagnostic Right      Social History     Tobacco Use    Smoking status: Former     Types: Cigarettes    Smokeless tobacco: Never   Substance Use Topics    Alcohol use: Yes     Drug Use:    Yes           Frequency: 3    per week       Special: Marijuana       Family History   Problem Relation Age of Onset    Patient is unaware of any medical problems Mother     Patient is unaware of any medical problems Father           INPATIENT MEDICATIONS:  Current Facility-Administered Medications   Medication    metoPROLOL tartrate (LOPRESSOR) tablet 25 mg    fluticasone-vilanterol (BREO ELLIPTA) 100-25 MCG/ACT inhaler 1 puff    Potassium Standard Replacement Protocol (Levels 3.5 and lower)    metoCLOPramide (REGLAN) injection 5 mg    prochlorperazine (COMPAZINE) injection 5 mg    [Held by provider] metoPROLOL (LOPRESSOR) injection 2.5 mg    ondansetron (ZOFRAN) injection 4 mg    morphine injection 1 mg    [Held by provider] furosemide (LASIX INJECT) injection 60 mg    bisacodyl (DULCOLAX) suppository 10 mg    pantoprazole (PROTONIX INJECT) injection 40 mg    ipratropium-albuterol (DUONEB) 0.5-2.5 (3) MG/3ML nebulizer solution 3 mL    HYDROcodone-acetaminophen (NORCO) 5-325 MG per tablet 1 tablet    guaiFENesin (MUCINEX) ER tablet 1,200 mg    predniSONE (DELTASONE) tablet 40 mg    docusate sodium-sennosides (SENOKOT S) 50-8.6 MG 2 tablet    sodium chloride (NORMAL SALINE) 0.9 % bolus 500 mL    sodium chloride 0.9 % flush bag 25 mL    sodium chloride 0.9 % injection 2 mL    acetaminophen (TYLENOL) tablet 650 mg    Or    acetaminophen (TYLENOL) suppository 650 mg    polyethylene glycol (MIRALAX) packet 17 g    atorvastatin (LIPITOR) tablet 40 mg    sodium chloride (NORMAL SALINE) 0.9 % bolus 500 mL    famotidine (PEPCID) tablet 20 mg    rivaroxaban (XARELTO) tablet 15 mg       REVIEW OF SYSTEMS:    All other systems are reviewed and are negative except as documented in the history of present illness.     PHYSICAL EXAM: "  Vital Signs: Blood pressure 104/73, pulse 72, temperature 97 Â°F (36.1 Â°C), resp. rate 18, height 5' 7"" (1.702 m), weight 104.2 kg (229 lb 11.5 oz), SpO2 95 %. General: The patient is well developed, well nourished, in no acute distress, appears stated age. Neurologic: Alert. Normal mood and affect  Head: Normocephalic. Eyes: Normal conjunctivae and sclerae. HEENT: Oropharynx clear  Neck: Symmetric without swelling or tenderness. Respiratory: Respiratory effort normal.   Cardiovascular: Regular rate and rhythm. Extremities: No swelling or tenderness. Gastrointestinal:  Soft and nontender.       LABORATORY DATA and Imaging:   Recent Results (from the past 24 hour(s))   GLUCOSE, BEDSIDE - POINT OF CARE    Collection Time: 03/17/24  7:57 PM   Result Value Ref Range    GLUCOSE, BEDSIDE - POINT OF CARE 124 (H) 70 - 99 mg/dL   Basic Metabolic Panel    Collection Time: 03/18/24  6:37 AM   Result Value Ref Range    Fasting Status      Sodium 135 135 - 145 mmol/L    Potassium 4.2 3.4 - 5.1 mmol/L    Chloride 96 (L) 97 - 110 mmol/L    Carbon Dioxide 27 21 - 32 mmol/L    Anion Gap 16 7 - 19 mmol/L    Glucose 101 (H) 70 - 99 mg/dL    BUN 60 (H) 6 - 20 mg/dL    Creatinine 2.24 (H) 0.67 - 1.17 mg/dL    Glomerular Filtration Rate 30 (L) >=60    BUN/Cr 27 (H) 7 - 25    Calcium 9.4 8.4 - 10.2 mg/dL   Magnesium    Collection Time: 03/18/24  6:37 AM   Result Value Ref Range    Magnesium 2.2 1.7 - 2.4 mg/dL   CBC No Differential    Collection Time: 03/18/24  6:37 AM   Result Value Ref Range    WBC 17.9 (H) 4.2 - 11.0 K/mcL    RBC 4.21 (L) 4.50 - 5.90 mil/mcL    HGB 13.7 13.0 - 17.0 g/dL    HCT 40.6 39.0 - 51.0 %    MCV 96.4 78.0 - 100.0 fl    MCH 32.5 26.0 - 34.0 pg    MCHC 33.7 32.0 - 36.5 g/dL     140 - 450 K/mcL    RDW-CV 12.8 11.0 - 15.0 %    RDW-SD 45.0 39.0 - 50.0 fL    NRBC 0 <=0 /100 WBC         XR CHEST AP OR PA   Final Result   FINDINGS\IMPRESSION:      SINCE PRIOR COMPARISON EXAMINATION(S) , THE FOLLOWING " INTERVAL   CHANGE/OBSERVATIONS NOTED:   Persistent mild basilar opacities improved when compared to prior   examination   Central pulmonary vasculature upper limits of normal in size without   evident interstitial/alveolar edema pattern . Moderate cardiomegaly   There is no identifiable pneumothorax/pneumomediastinum                  Electronically Signed by: Celestina Luna MD    Signed on: 3/14/2024 9:27 AM    Workstation ID: [id number]      CTA ABDOMEN PELVIS   Final Result      1. No evidence of mesenteric artery occlusion. 2.   Hepatic steatosis. 3.   Patchy peribronchovascular groundglass opacities at the lung bases   suggesting infection/inflammation versus aspiration bronchiolitis. 4.   Other findings as above. Electronically Signed by: Gordon Booth M.D. Signed on: 3/13/2024 6:02 PM    Workstation ID: [id number]      XR ABDOMEN 1 VIEW   Final Result      1. No evidence of bowel obstruction. Electronically Signed by: Carole Buckley MD    Signed on: 3/11/2024 12:00 PM    Workstation ID: [id number]      NM LUNG PERFUSION IMAGING   Final Result   Technically limited study (perfusion only). No evidence of pulmonary embolism. Radiation Dosimetry:   The radiopharmaceutical used for this exam delivers approximately 0.4   mSv/mCi (40 mRem/mCi)   Source:  RADIATION DOSE ESTIMATES TO ADULTS AND CHILDREN, [facility];   Effective dose RADAR               Electronically Signed by: Ramona Garzon MD    Signed on: 3/11/2024 1:00 PM    Workstation ID: [id number]      XR CHEST AP OR PA   Final Result   Impression:   Cardiac prominence. Evaluation somewhat limited secondary to underaeration. Basilar regions of atelectasis. Electronically Signed by: Marc Florian MD    Signed on: 3/10/2024 3:57 PM    Workstation ID: [id number]      CT ABDOMEN PELVIS WO CONTRAST   Final Result   No acute abdominal findings.       Right greater than left basilar parenchymal opacities concerning for   pneumonia/aspiration. Descending and sigmoid diverticulosis. Mild prostatomegaly. Electronically Signed by: Aspen Blake MD    Signed on: 3/10/2024 1:46 PM    Workstation ID: [id number] [email or web address]      US LIVER GALLBLADDER PANCREAS (RUQ)   Final Result   Pancreas not well visualized due to overlying bowel gas. Otherwise, no sonographic findings to explain patient's right upper   quadrant pain. Electronically Signed by: Luis Briceñokshire    Signed on: 3/9/2024 11:16 AM    Workstation ID: [id number]      XR CHEST PA OR AP 1 VIEW   Final Result   1. Borderline central congestion. Minimal perihilar opacities. Right lung   base atelectasis. Electronically Signed by: Sable Felty, MD    Signed on: 3/8/2024 7:26 AM    Workstation ID: [id number]              ASSESSMENT/PLAN:    75M with h/o of atrial fibrillation on on Xarelto, seropositive RA, gout, obesity, stage IIIa CKD. Patient admitted with SOB, A fib w RVR, COPD exacerbation with possible CHF exacerbation. GI consulted today for abd pain and NV. He has abd pain in ruq with significant tenderness. Also some in epigastric area. Also nv which started today. He has not had problems like this before. No diarrhea or changed in stools but has chronic constipation. He has a big belly but says it's usually pretty big but not this big.       US abdomen - normal     CT AP without contrast - pneumonia vs aspiration, colon tics, no acute findings     KUB - no obstruction or ileus      - having upper abd pain with nv and negative us/ct this admission   - abd pain/n/v and sob  - improving   - bilirubin 1.3- had CT with gb distension- no bdd, US March 9 normal  - recommend egd when okay from cards/pulm standpoint   - cta was repeated due to abd pain - fatty liver, lung opacities, no evidence of mesenteric ischemia  - ppi bid, antiemetics for nausea - adjusted doses   - miralax 1-2x daily for c/o constipation   - creatinine is increasing - defer to primary/renal to sort this out  - on Xarelto   - Discussed with Dr Kane Ye- who saw pt today from cardiology- ok to proceed with egd  Pt now on RA- off O2  Plan for tomorrow- pt agreeable  The risks of the egd (including the potentially life threatening risks of bleeding, perforation, missed lesions and sedation), benefits and alternatives were discussed with the patient in detail. The patient expressed understanding and agrees to proceed. Thank you for involving me in the care of this patient.     Jay Jay Washington MD

## 2024-03-19 ENCOUNTER — LAB (OUTPATIENT)
Dept: LAB | Facility: HOSPITAL | Age: 49
End: 2024-03-19
Payer: COMMERCIAL

## 2024-03-19 DIAGNOSIS — K91.2 POSTSURGICAL MALABSORPTION: ICD-10-CM

## 2024-03-19 DIAGNOSIS — I44.1 HEART BLOCK AV SECOND DEGREE: ICD-10-CM

## 2024-03-19 DIAGNOSIS — E78.2 MIXED HYPERLIPIDEMIA: ICD-10-CM

## 2024-03-19 DIAGNOSIS — E66.01 OBESITY, CLASS III, BMI 40-49.9 (MORBID OBESITY) (HCC): ICD-10-CM

## 2024-03-19 LAB
25(OH)D3 SERPL-MCNC: 16.7 NG/ML (ref 30–100)
ALBUMIN SERPL BCP-MCNC: 4.2 G/DL (ref 3.5–5)
ALP SERPL-CCNC: 70 U/L (ref 34–104)
ALT SERPL W P-5'-P-CCNC: 10 U/L (ref 7–52)
ANION GAP SERPL CALCULATED.3IONS-SCNC: 8 MMOL/L (ref 4–13)
AST SERPL W P-5'-P-CCNC: 14 U/L (ref 13–39)
BASOPHILS # BLD AUTO: 0.03 THOUSANDS/ÂΜL (ref 0–0.1)
BASOPHILS NFR BLD AUTO: 1 % (ref 0–1)
BILIRUB SERPL-MCNC: 0.55 MG/DL (ref 0.2–1)
BUN SERPL-MCNC: 9 MG/DL (ref 5–25)
CALCIUM SERPL-MCNC: 9.1 MG/DL (ref 8.4–10.2)
CHLORIDE SERPL-SCNC: 101 MMOL/L (ref 96–108)
CHOLEST SERPL-MCNC: 248 MG/DL
CO2 SERPL-SCNC: 29 MMOL/L (ref 21–32)
CREAT SERPL-MCNC: 0.62 MG/DL (ref 0.6–1.3)
EOSINOPHIL # BLD AUTO: 0.12 THOUSAND/ÂΜL (ref 0–0.61)
EOSINOPHIL NFR BLD AUTO: 2 % (ref 0–6)
ERYTHROCYTE [DISTWIDTH] IN BLOOD BY AUTOMATED COUNT: 13.2 % (ref 11.6–15.1)
EST. AVERAGE GLUCOSE BLD GHB EST-MCNC: 120 MG/DL
FERRITIN SERPL-MCNC: 19 NG/ML (ref 11–307)
FOLATE SERPL-MCNC: 12.9 NG/ML
GFR SERPL CREATININE-BSD FRML MDRD: 107 ML/MIN/1.73SQ M
GLUCOSE P FAST SERPL-MCNC: 86 MG/DL (ref 65–99)
HBA1C MFR BLD: 5.8 %
HCT VFR BLD AUTO: 43.9 % (ref 34.8–46.1)
HDLC SERPL-MCNC: 54 MG/DL
HGB BLD-MCNC: 14 G/DL (ref 11.5–15.4)
IMM GRANULOCYTES # BLD AUTO: 0.02 THOUSAND/UL (ref 0–0.2)
IMM GRANULOCYTES NFR BLD AUTO: 0 % (ref 0–2)
IRON SATN MFR SERPL: 16 % (ref 15–50)
IRON SERPL-MCNC: 60 UG/DL (ref 50–212)
LDLC SERPL CALC-MCNC: 140 MG/DL (ref 0–100)
LYMPHOCYTES # BLD AUTO: 1.69 THOUSANDS/ÂΜL (ref 0.6–4.47)
LYMPHOCYTES NFR BLD AUTO: 27 % (ref 14–44)
MCH RBC QN AUTO: 26.7 PG (ref 26.8–34.3)
MCHC RBC AUTO-ENTMCNC: 31.9 G/DL (ref 31.4–37.4)
MCV RBC AUTO: 84 FL (ref 82–98)
MONOCYTES # BLD AUTO: 0.79 THOUSAND/ÂΜL (ref 0.17–1.22)
MONOCYTES NFR BLD AUTO: 13 % (ref 4–12)
NEUTROPHILS # BLD AUTO: 3.51 THOUSANDS/ÂΜL (ref 1.85–7.62)
NEUTS SEG NFR BLD AUTO: 57 % (ref 43–75)
NONHDLC SERPL-MCNC: 194 MG/DL
NRBC BLD AUTO-RTO: 0 /100 WBCS
PLATELET # BLD AUTO: 259 THOUSANDS/UL (ref 149–390)
PMV BLD AUTO: 9.9 FL (ref 8.9–12.7)
POTASSIUM SERPL-SCNC: 4.1 MMOL/L (ref 3.5–5.3)
PROT SERPL-MCNC: 7.7 G/DL (ref 6.4–8.4)
PTH-INTACT SERPL-MCNC: 58 PG/ML (ref 12–88)
RBC # BLD AUTO: 5.24 MILLION/UL (ref 3.81–5.12)
SODIUM SERPL-SCNC: 138 MMOL/L (ref 135–147)
TIBC SERPL-MCNC: 383 UG/DL (ref 250–450)
TRIGL SERPL-MCNC: 270 MG/DL
UIBC SERPL-MCNC: 323 UG/DL (ref 155–355)
VIT B12 SERPL-MCNC: 324 PG/ML (ref 180–914)
WBC # BLD AUTO: 6.16 THOUSAND/UL (ref 4.31–10.16)

## 2024-03-19 PROCEDURE — 82607 VITAMIN B-12: CPT

## 2024-03-19 PROCEDURE — 83550 IRON BINDING TEST: CPT

## 2024-03-19 PROCEDURE — 82746 ASSAY OF FOLIC ACID SERUM: CPT

## 2024-03-19 PROCEDURE — 83540 ASSAY OF IRON: CPT

## 2024-03-19 PROCEDURE — 84630 ASSAY OF ZINC: CPT

## 2024-03-19 PROCEDURE — 83036 HEMOGLOBIN GLYCOSYLATED A1C: CPT

## 2024-03-19 PROCEDURE — 84590 ASSAY OF VITAMIN A: CPT

## 2024-03-19 PROCEDURE — 83970 ASSAY OF PARATHORMONE: CPT

## 2024-03-19 PROCEDURE — 82306 VITAMIN D 25 HYDROXY: CPT

## 2024-03-19 PROCEDURE — 80053 COMPREHEN METABOLIC PANEL: CPT

## 2024-03-19 PROCEDURE — 85025 COMPLETE CBC W/AUTO DIFF WBC: CPT

## 2024-03-19 PROCEDURE — 80061 LIPID PANEL: CPT

## 2024-03-19 PROCEDURE — 84425 ASSAY OF VITAMIN B-1: CPT

## 2024-03-19 PROCEDURE — 82728 ASSAY OF FERRITIN: CPT

## 2024-03-19 PROCEDURE — 36415 COLL VENOUS BLD VENIPUNCTURE: CPT

## 2024-03-20 ENCOUNTER — TELEMEDICINE (OUTPATIENT)
Dept: FAMILY MEDICINE CLINIC | Facility: CLINIC | Age: 49
End: 2024-03-20
Payer: COMMERCIAL

## 2024-03-20 ENCOUNTER — TELEPHONE (OUTPATIENT)
Dept: BARIATRICS | Facility: CLINIC | Age: 49
End: 2024-03-20

## 2024-03-20 DIAGNOSIS — D50.8 OTHER IRON DEFICIENCY ANEMIA: ICD-10-CM

## 2024-03-20 DIAGNOSIS — R79.89 LOW VITAMIN B12 LEVEL: ICD-10-CM

## 2024-03-20 DIAGNOSIS — K91.2 POSTSURGICAL MALABSORPTION: Primary | ICD-10-CM

## 2024-03-20 DIAGNOSIS — E78.2 MIXED HYPERLIPIDEMIA: Primary | ICD-10-CM

## 2024-03-20 DIAGNOSIS — E55.9 VITAMIN D DEFICIENCY: ICD-10-CM

## 2024-03-20 DIAGNOSIS — R73.03 PREDIABETES: ICD-10-CM

## 2024-03-20 DIAGNOSIS — R79.0 LOW FERRITIN: ICD-10-CM

## 2024-03-20 PROCEDURE — 99213 OFFICE O/P EST LOW 20 MIN: CPT | Performed by: NURSE PRACTITIONER

## 2024-03-20 RX ORDER — ERGOCALCIFEROL 1.25 MG/1
50000 CAPSULE ORAL
Qty: 24 CAPSULE | Refills: 0 | Status: SHIPPED | OUTPATIENT
Start: 2024-03-21

## 2024-03-20 NOTE — TELEPHONE ENCOUNTER
Left message on patient voice mail informing of Vit D prescription and need for B12 supplement. Sent message via MY CHART also.

## 2024-03-20 NOTE — PROGRESS NOTES
Virtual Regular Visit    Verification of patient location:    Patient is located at Home in the following state in which I hold an active license [place]      Assessment/Plan:    Problem List Items Addressed This Visit       Hyperlipidemia - Primary     We reviewed the blood work today, 03/33/2024 which indicates that not being compliant with the recommendations.   Elevated result from her blood work is concerning.   Discussed the possibility of initiating cholesterol medication.  However, she insists that she is starting her usual diet. and will monitor her intake, aiming to follow a low-fat, low-cholesterol diet.         Vitamin D deficiency     Reinforced taking her vitamin D supplements.         Iron deficiency anemia     Other Visit Diagnoses       Prediabetes        elevated diabetic panel          Weight gain.  It is concerning that she is gaining weight.   She has already undergone bariatric surgery, and I do not believe she will be a candidate for another procedure.   Therefore, we conducted a nutritional consultation in the office.   She already consults with a nutritionist as part of her bariatric surgery care.   I recommended that she continue to follow up with them and I had ordered blood work and at that time.    Prediabetic   She is now prediabetic, as indicated by the increased HbA1c levels.   She was already prediabetic, prior to the bariatric surgery.  We discussed adopting a low-fat, low-cholesterol, and low-carb diet.   Reinforced taking her bariatric vitamins.   She committed to meal prepping, monitoring her caloric intake, avoiding high-fat and high-cholesterol foods, and adhering to her bariatric dietary recommendations starting today.  We plan to repeat the blood work in 3 months to assess her progress.    Health maintenance.  The bariatric surgery team has also addressed her vitamin deficiencies.     Follow-up.  The patient will follow up in 3 months. She is instructed to contact the office if  she has any questions.       Reason for visit is No chief complaint on file.       Encounter provider DAVE Fountain    Provider located at [address]  [place] [id number] [phone]      Recent Visits  Date Type Provider Dept   03/20/24 Telemedicine DAVE Fountain [facility]   Showing recent visits within past 7 days and meeting all other requirements  Future Appointments  No visits were found meeting these conditions.  Showing future appointments within next 150 days and meeting all other requirements       The patient was identified by name and date of birth. Emerald Cee was informed that this is a telemedicine visit and that the visit is being conducted through the Epic Embedded platform. She agrees to proceed..  My office door was closed. No one else was in the room.  She acknowledged consent and understanding of privacy and security of the video platform. The patient has agreed to participate and understands they can discontinue the visit at any time.    Patient is aware this is a billable service.     Subjective  Emerald Cee is a 48 y.o. female who presents to review her last blood work result via telehealth consultation.     The patient was seen in the office a few weeks ago.    Cardiac issues.  She has an underlying history of cardiac issues with bradycardia.    GERD.  She also has a history of gastroesophageal reflux disease.    Weight gain.  She recently started gaining weight.     She is in the post-operative phase of bariatric surgery.     HPI     Past Medical History:   Diagnosis Date    Anemia     History of transfusion     Hypercholesterolemia     Sleep apnea     Vitamin deficiency        Past Surgical History:   Procedure Laterality Date    ECTOPIC PREGNANCY SURGERY      EYE SURGERY      GASTRECTOMY SLEEVE LAPAROSCOPIC      MASS EXCISION N/A 8/1/2023    Procedure: EXCISION  BIOPSY LESION/MASS BACK;   Surgeon: Lawson Motta DO;  Location: [facility];  Service: General    MN LAPAROSCOPY W/RMVL ADNEXAL STRUCTURES Bilateral 9/28/2021    Procedure: SALPINGECTOMY, LAPAROSCOPIC;  Surgeon: Pietro Hanson MD;  Location[facility];  Service: Gynecology    MN LAPS W/VAG HYSTERECT 250 GM/&RMVL TUBE&/OVARIES N/A 9/28/2021    Procedure: HYSTERECTOMY LAPAROSCOPIC ASSISTED VAGINAL (LAVH), LYSIS OF ADHESIONS;  Surgeon: Pietro Hanson MD;  Location[facility];  Service: Gynecology       Current Outpatient Medications   Medication Sig Dispense Refill    acetaminophen (TYLENOL) 500 mg tablet Take 2 tablets (1,000 mg total) by mouth every 6 (six) hours as needed for mild pain 40 tablet 0    Biotin 1 MG CAPS Take by mouth      cholecalciferol (VITAMIN D3) 25 mcg (1,000 units) tablet Take 1 tablet (1,000 Units total) by mouth daily 90 tablet 0    cyanocobalamin (VITAMIN B-12) 100 mcg tablet Take by mouth daily      ergocalciferol (VITAMIN D2) 50,000 units Take 1 capsule (50,000 Units total) by mouth 2 (two) times a week with meals 24 capsule 0    ferrous sulfate 324 (65 Fe) mg Take 1 tablet (324 mg total) by mouth 3 (three) times a day before meals 90 tablet 0    hydrocortisone (ANUSOL-HC) 2.5 % rectal cream Apply topically 2 (two) times a day for 10 days 28 g 1    Insulin Pen Needle (BD Pen Needle Sarita 2nd Gen) 32G X 4 MM MISC Use in the morning 100 each 1    multivitamin (THERAGRAN) TABS Take 1 tablet by mouth daily 90 tablet 0    Omega-3 Fatty Acids (fish oil) 1,000 mg Take 1 capsule (1,000 mg total) by mouth daily 90 capsule 0    pantoprazole (PROTONIX) 40 mg tablet Take 1 tablet (40 mg total) by mouth every morning 90 tablet 0     No current facility-administered medications for this visit.        Allergies   Allergen Reactions    Penicillin G Other (See Comments)    Pollen Extract Sneezing       Review of Systems   Constitutional:  Positive for fatigue and unexpected weight change (positive for weight gain).   HENT:   Negative for congestion and sinus pain.    Respiratory:  Negative for cough and shortness of breath.    Cardiovascular:  Negative for chest pain and palpitations.   Gastrointestinal:  Negative for abdominal distention, abdominal pain, nausea and vomiting.   Endocrine:        Discuss elevated HbA1C   Prediabetes    Genitourinary:  Negative for difficulty urinating and flank pain.   Skin:  Negative for rash.   Neurological:  Positive for headaches.       Video Exam    There were no vitals filed for this visit.    Physical Exam  Constitutional:       Appearance: She is well-developed.   HENT:      Head: Normocephalic and atraumatic.      Right Ear: Tympanic membrane normal.      Left Ear: Tympanic membrane normal.      Mouth/Throat:      Pharynx: Uvula midline.   Eyes:      Pupils: Pupils are equal, round, and reactive to light.   Cardiovascular:      Rate and Rhythm: Normal rate.   Pulmonary:      Effort: Pulmonary effort is normal.   Abdominal:      Palpations: Abdomen is soft.   Musculoskeletal:      Cervical back: Normal range of motion.   Skin:     General: Skin is warm.Physical Exam  Constitutional:       Appearance: She is well-developed.   HENT:      Head: Normocephalic and atraumatic.      Right Ear: Tympanic membrane normal.      Left Ear: Tympanic membrane normal.      Mouth/Throat:      Pharynx: Uvula midline.   Eyes:      Pupils: Pupils are equal, round, and reactive to light.   Cardiovascular:      Rate and Rhythm: Normal rate.   Pulmonary:      Effort: Pulmonary effort is normal.   Abdominal:      Palpations: Abdomen is soft.   Musculoskeletal:      Cervical back: Normal range of motion.   Skin:     General: Skin is warm.    Visit Time  Total Visit Duration: 25 min     Transcribed by Arely Zavala on 03/22/2024.

## 2024-03-20 NOTE — RESULT ENCOUNTER NOTE
Please advise patient of her labs:    -You have a vitamin D deficiency. Please start taking the vitamin D deficiency prescription that I am sending for 50,000IU 2x/week with FOOD x 12 weeks - take with food for best absorption. We will repeat vitamin D lab in about 4 months.    - Your Vitamin B12 levels are mildly low - please take an additional 1,000mcg sublingual B12 daily x 3 months. This can be found inexpensively over the counter.     -Ferritin (iron stores) are trending back down and low - please follow up with Heme and discuss repeat iron infusions again if symptomatic and continue with oral iron

## 2024-03-20 NOTE — RESULT ENCOUNTER NOTE
Set up virtual to discuss   Her cholesterol is up   Triglycerides up   Sugars are up   Not taking supplements   I need to discuss

## 2024-03-22 DIAGNOSIS — R22.2 LUMP OF SKIN OF BACK: ICD-10-CM

## 2024-03-22 RX ORDER — TRAMADOL HYDROCHLORIDE 50 MG/1
TABLET ORAL
Qty: 12 TABLET | Refills: 0 | OUTPATIENT
Start: 2024-03-22

## 2024-03-22 NOTE — ASSESSMENT & PLAN NOTE
We reviewed the blood work today, 03/33/2024 which indicates that not being compliant with the recommendations.   Elevated result from her blood work is concerning.   Discussed the possibility of initiating cholesterol medication.  However, she insists that she is starting her usual diet. and will monitor her intake, aiming to follow a low-fat, low-cholesterol diet.

## 2024-03-23 LAB — VIT B1 BLD-SCNC: 90.6 NMOL/L (ref 66.5–200)

## 2024-03-24 LAB — COLOGUARD RESULT REPORTABLE: NEGATIVE

## 2024-03-25 LAB — VIT A SERPL-MCNC: 34.4 UG/DL (ref 20.1–62)

## 2024-03-26 ENCOUNTER — ANNUAL EXAM (OUTPATIENT)
Dept: GYNECOLOGY | Facility: CLINIC | Age: 49
End: 2024-03-26
Payer: COMMERCIAL

## 2024-03-26 VITALS
HEIGHT: 65 IN | SYSTOLIC BLOOD PRESSURE: 132 MMHG | WEIGHT: 268 LBS | BODY MASS INDEX: 44.65 KG/M2 | DIASTOLIC BLOOD PRESSURE: 84 MMHG

## 2024-03-26 DIAGNOSIS — Z01.419 ENCOUNTER FOR GYNECOLOGICAL EXAMINATION WITHOUT ABNORMAL FINDING: ICD-10-CM

## 2024-03-26 DIAGNOSIS — Z12.31 SCREENING MAMMOGRAM FOR BREAST CANCER: Primary | ICD-10-CM

## 2024-03-26 LAB — ZINC SERPL-MCNC: 62 UG/DL (ref 44–115)

## 2024-03-26 PROCEDURE — 99396 PREV VISIT EST AGE 40-64: CPT | Performed by: OBSTETRICS & GYNECOLOGY

## 2024-03-26 NOTE — PROGRESS NOTES
Assessment:    Normal breast and GYN exam  Negative Cologuard 2024  Did not get mammogram last year  [facility] bilateral salpingectomy 10/21  Gastric sleeve    Plan: Rx mammogram.  Continue healthy diet and exercise.  Recommend daily Kegels.    Subjective:       Patient ID: Emerald Cee is a 48 y.o. female presents to the office for annual exam with no complaints.  Status post hysterectomy and denies any pelvic pain vaginal bleeding or dyspareunia.  Denies any breast bowel or bladder issues.  Patient did not go for mammogram last year.  States she would go this year.  No change in family history.  Medications reviewed.        Review of Systems   Constitutional: Negative.  Negative for fatigue, fever and unexpected weight change.   HENT: Negative.     Eyes: Negative.    Respiratory: Negative.  Negative for chest tightness, shortness of breath, wheezing and stridor.    Cardiovascular: Negative.  Negative for chest pain, palpitations and leg swelling.   Gastrointestinal: Negative.  Negative for abdominal pain, blood in stool, diarrhea, nausea, rectal pain and vomiting.   Endocrine: Negative.    Genitourinary:  Negative for dysuria, frequency, vaginal bleeding, vaginal discharge and vaginal pain.   Musculoskeletal: Negative.    Skin: Negative.    Allergic/Immunologic: Negative.    Neurological: Negative.    Hematological: Negative.    Psychiatric/Behavioral: Negative.     All other systems reviewed and are negative.        Objective:      LMP 2021 (Exact Date)          Physical Exam  Constitutional:       Appearance: She is well-developed. She is obese.   Cardiovascular:      Rate and Rhythm: Normal rate and regular rhythm.      Heart sounds: Normal heart sounds.   Pulmonary:      Effort: Pulmonary effort is normal. No respiratory distress.      Breath sounds: No stridor. No wheezing or rales.   Chest:      Chest wall: No tenderness.   Breasts:     Breasts are symmetrical.      Right: No inverted  nipple, mass, nipple discharge, skin change or tenderness.      Left: No inverted nipple, mass, nipple discharge, skin change or tenderness.   Abdominal:      General: Bowel sounds are normal. There is no distension.      Palpations: Abdomen is soft. There is no mass.      Tenderness: There is no abdominal tenderness. There is no guarding or rebound.      Hernia: No hernia is present. There is no hernia in the left inguinal area.   Genitourinary:     Labia:         Right: No rash, tenderness, lesion or injury.         Left: No rash, tenderness, lesion or injury.       Vagina: Normal. No signs of injury and foreign body. No vaginal discharge, erythema, tenderness or bleeding.      Adnexa:         Right: No mass, tenderness or fullness.          Left: No mass, tenderness or fullness.        Rectum: No mass, tenderness, anal fissure, external hemorrhoid or internal hemorrhoid. Normal anal tone.      Comments: Urethral meatus normal.  Cuff well supported.  No cystocele rectocele.  Excellent muscle tone on Kegel attempt. cervix and uterus absent  Lymphadenopathy:      Lower Body: No right inguinal adenopathy. No left inguinal adenopathy.   Psychiatric:         Behavior: Behavior normal.         Thought Content: Thought content normal.         Judgment: Judgment normal.

## 2024-05-08 ENCOUNTER — OFFICE VISIT (OUTPATIENT)
Dept: FAMILY MEDICINE CLINIC | Facility: CLINIC | Age: 49
End: 2024-05-08
Payer: COMMERCIAL

## 2024-05-08 VITALS
BODY MASS INDEX: 45.98 KG/M2 | HEIGHT: 65 IN | WEIGHT: 276 LBS | OXYGEN SATURATION: 97 % | TEMPERATURE: 97.7 F | SYSTOLIC BLOOD PRESSURE: 140 MMHG | DIASTOLIC BLOOD PRESSURE: 90 MMHG | HEART RATE: 68 BPM

## 2024-05-08 DIAGNOSIS — R63.5 WEIGHT GAIN: ICD-10-CM

## 2024-05-08 DIAGNOSIS — I10 PRIMARY HYPERTENSION: Primary | ICD-10-CM

## 2024-05-08 PROCEDURE — 99214 OFFICE O/P EST MOD 30 MIN: CPT | Performed by: NURSE PRACTITIONER

## 2024-05-08 RX ORDER — LISINOPRIL 2.5 MG/1
2.5 TABLET ORAL DAILY
Qty: 90 TABLET | Refills: 0 | Status: SHIPPED | OUTPATIENT
Start: 2024-05-08 | End: 2024-08-06

## 2024-05-08 NOTE — PROGRESS NOTES
Assessment/Plan:    Pt is a 48 yr old female   Presents in office with recent concerns of headaches and neck pain with elevated BP. She reached out to provider this past weekend with elevated BP in the 140s to 150s (syst)  /100s. She recently has gained weight, she has been eating junk food as per her friend that is on the phone with her and her daughter.   She has history of bariatric surgery - she has fallen of her diet and gained over 20 lbs.  She used to be on Saxenda was out of stock so she was off for the last few months - would like to restart.   Explained to her  that as long as they don't require prior auth I will resend  and she needs to follow up with Weight management to continue and discuss alternative weight management modalities.   She needs to stop with the junk foods, eat bariatric protein diet , increase exercise and activity , hydrate better and limit calorie intake.   I am starting low dose Lisinopril 2.5 for now and if needed she can take additional dose . I will see her back in 4 weeks.   She is to also follow up with her bariatric team.      Problem List Items Addressed This Visit    None  Visit Diagnoses       Primary hypertension    -  Primary    Relevant Medications    lisinopril (ZESTRIL) 2.5 mg tablet    liraglutide (SAXENDA) injection    Weight gain        Relevant Medications    liraglutide (SAXENDA) injection              Subjective:      Patient ID: Emerald Cee is a 48 y.o. female.    Pt is a 48 yr old female   Presents in office with recent concerns of headaches and neck pain with elevated BP. She reached out to provider this past weekend with elevated BP in the 140s to 150s (syst)  /100s. She recently has gained weight, she has been eating junk food as per her friend that is on the phone with her and her daughter.   She has history of bariatric surgery - she has fallen of her diet and gained over 20 lbs.  She used to be on Saxenda was out of stock so she was off for the last few  months - would like to restart.         The following portions of the patient's history were reviewed and updated as appropriate:   Past Medical History:  She has a past medical history of Anemia, History of transfusion, Hypercholesterolemia, Sleep apnea, and Vitamin deficiency.,  _______________________________________________________________________  Medical Problems:  does not have any pertinent problems on file.,  _______________________________________________________________________  Past Surgical History:   has a past surgical history that includes Ectopic pregnancy surgery; Eye surgery; GASTRECTOMY SLEEVE LAPAROSCOPIC; pr laps w/vag hysterect 250 gm/&rmvl tube&/ovaries (N/A, 9/28/2021); pr laparoscopy w/rmvl adnexal structures (Bilateral, 9/28/2021); and Mass excision (N/A, 8/1/2023).,  _______________________________________________________________________  Family History:  family history includes Asthma in her brother; Brain cancer in her mother; Cancer in her father and maternal grandmother; Diabetes in her maternal grandmother; Hypertension in her paternal grandmother.,  _______________________________________________________________________  Social History:   reports that she has never smoked. She has never been exposed to tobacco smoke. She has never used smokeless tobacco. She reports that she does not drink alcohol and does not use drugs.,  _______________________________________________________________________  Allergies:  is allergic to penicillin g and pollen extract..  _______________________________________________________________________  Current Outpatient Medications   Medication Sig Dispense Refill    acetaminophen (TYLENOL) 500 mg tablet Take 2 tablets (1,000 mg total) by mouth every 6 (six) hours as needed for mild pain 40 tablet 0    Biotin 1 MG CAPS Take by mouth      cyanocobalamin (VITAMIN B-12) 100 mcg tablet Take by mouth daily      ergocalciferol (VITAMIN D2) 50,000 units Take 1  capsule (50,000 Units total) by mouth 2 (two) times a week with meals 24 capsule 0    ferrous sulfate 324 (65 Fe) mg Take 1 tablet (324 mg total) by mouth 3 (three) times a day before meals 90 tablet 0    liraglutide (SAXENDA) injection Inject 0.1 mL (0.6 mg total) under the skin daily 9 mL 0    lisinopril (ZESTRIL) 2.5 mg tablet Take 1 tablet (2.5 mg total) by mouth daily 90 tablet 0    multivitamin (THERAGRAN) TABS Take 1 tablet by mouth daily 90 tablet 0    Omega-3 Fatty Acids (fish oil) 1,000 mg Take 1 capsule (1,000 mg total) by mouth daily 90 capsule 0    pantoprazole (PROTONIX) 40 mg tablet Take 1 tablet (40 mg total) by mouth every morning 90 tablet 0    cholecalciferol (VITAMIN D3) 25 mcg (1,000 units) tablet Take 1 tablet (1,000 Units total) by mouth daily (Patient not taking: Reported on 3/26/2024) 90 tablet 0    hydrocortisone (ANUSOL-HC) 2.5 % rectal cream Apply topically 2 (two) times a day for 10 days 28 g 1    Insulin Pen Needle (BD Pen Needle Sarita 2nd Gen) 32G X 4 MM MISC Use in the morning 100 each 1     No current facility-administered medications for this visit.     _______________________________________________________________________  Review of Systems   Constitutional:  Positive for fatigue and unexpected weight change (weight gain).   HENT:  Negative for congestion, rhinorrhea and sinus pressure.    Eyes: Negative.    Respiratory:  Negative for cough and shortness of breath.    Cardiovascular:  Negative for chest pain and palpitations.        Elevated Bps    Gastrointestinal:  Negative for abdominal distention, abdominal pain, nausea and vomiting.   Endocrine:        Positive for weight gain post bariatric surgery   Genitourinary:  Negative for difficulty urinating and flank pain.   Musculoskeletal:  Negative for arthralgias and myalgias.   Skin:  Negative for rash.   Allergic/Immunologic: Positive for environmental allergies.   Neurological:  Positive for headaches (with elevated BPs).  "  Hematological:  Negative for adenopathy.   Psychiatric/Behavioral:  Negative for sleep disturbance and suicidal ideas. The patient is not nervous/anxious.          Objective:  Vitals:    05/08/24 0925   BP: 140/90   BP Location: Right arm   Patient Position: Sitting   Cuff Size: Large   Pulse: 68   Temp: 97.7 °F (36.5 °C)   SpO2: 97%   Weight: 125 kg (276 lb)   Height: 5' 5\" (1.651 m)     Body mass index is 45.93 kg/m².     Physical Exam  Vitals and nursing note reviewed.   Constitutional:       Appearance: She is obese.      Comments: BMI 45.93   HENT:      Head: Atraumatic.   Eyes:      Extraocular Movements: Extraocular movements intact.   Cardiovascular:      Rate and Rhythm: Normal rate and regular rhythm.      Pulses: Normal pulses.      Heart sounds: Normal heart sounds.      Comments: Elevated BP   Pulmonary:      Breath sounds: Normal breath sounds.   Abdominal:      Palpations: Abdomen is soft.   Musculoskeletal:      Cervical back: Normal range of motion.      Right lower leg: No edema.      Left lower leg: No edema.   Skin:     General: Skin is warm.   Neurological:      Mental Status: She is alert and oriented to person, place, and time.   Psychiatric:         Mood and Affect: Mood normal.         Behavior: Behavior normal.       I have spent a total time of 45 min  minutes on 05/11/24 in caring for this patient including Risks and benefits of tx options, Instructions for management, Patient and family education, Importance of tx compliance, Risk factor reductions, Counseling / Coordination of care, Reviewing / ordering tests, medicine, procedures  , and diet management  .   "

## 2024-05-10 DIAGNOSIS — R63.5 WEIGHT GAIN: ICD-10-CM

## 2024-05-10 DIAGNOSIS — E66.01 CLASS 3 SEVERE OBESITY DUE TO EXCESS CALORIES WITH SERIOUS COMORBIDITY AND BODY MASS INDEX (BMI) OF 40.0 TO 44.9 IN ADULT (HCC): ICD-10-CM

## 2024-05-10 DIAGNOSIS — I10 PRIMARY HYPERTENSION: ICD-10-CM

## 2024-05-10 RX ORDER — PEN NEEDLE, DIABETIC 32GX 5/32"
NEEDLE, DISPOSABLE MISCELLANEOUS DAILY
Qty: 100 EACH | Refills: 1 | Status: SHIPPED | OUTPATIENT
Start: 2024-05-10 | End: 2024-08-08

## 2024-05-10 NOTE — TELEPHONE ENCOUNTER
Pt calling for     Liraglutide (saxenda) to be sent to    [facility] [place]     Pt stated they are expecting stock next week

## 2024-05-14 ENCOUNTER — TELEPHONE (OUTPATIENT)
Dept: FAMILY MEDICINE CLINIC | Facility: CLINIC | Age: 49
End: 2024-05-14

## 2024-05-14 NOTE — TELEPHONE ENCOUNTER
Emerald Cee (Key: [id number])  Rx #: [id number]  Saxenda 18MG/3ML pen-injectors  Form  [employer] Electronic Prior Authorization Form (2017 NCPDP)

## 2024-05-17 NOTE — TELEPHONE ENCOUNTER
PA for liraglutide (SAXENDA) injection     Submitted via    []CMM-KEY   [x]myhub-Case ID # [id number]   []Faxed to plan   []Other website   []Phone call Case ID #     Office notes sent, clinical questions answered. Awaiting determination    Turnaround time for your insurance to make a decision on your Prior Authorization can take 7-21 business days.

## 2024-05-20 ENCOUNTER — TELEPHONE (OUTPATIENT)
Dept: CARDIOLOGY CLINIC | Facility: CLINIC | Age: 49
End: 2024-05-20

## 2024-05-20 NOTE — TELEPHONE ENCOUNTER
Caller: Emerald Cee     Doctor: Dr. Jay &     Reason for call: patient called because she is faxing over documents that need to be signed and faxed back. These documents are for work, she is unsure which provider needs to be the one that signs since she has has seen both providers only 1x but it is in regards to her High blood pressure, she will be following up on Thursday 5.23.24 for this. And would also like a paper copy once signed. She is willing to  at either Location. Please give patient a call if ready prior to Thursday.     Call back#: [phone]    What needs to be faxed: Once pt faxes over work documents we should be faxing back to [phone]    ATTN to: BELKIS HENRY ( Workforce Accomodation Specialist)    Fax#: [phone]

## 2024-05-21 NOTE — TELEPHONE ENCOUNTER
Called and lvm for pt to discuss her medical documents that was faxed over to the office for  to fill out.     Partial of pt documents has been fill out and signed by .     stated for pt PCP to review and sign the rest of the documents.

## 2024-05-22 ENCOUNTER — TELEPHONE (OUTPATIENT)
Dept: FAMILY MEDICINE CLINIC | Facility: CLINIC | Age: 49
End: 2024-05-22

## 2024-05-24 ENCOUNTER — TELEPHONE (OUTPATIENT)
Age: 49
End: 2024-05-24

## 2024-05-24 NOTE — TELEPHONE ENCOUNTER
Caller: Emerald (patient)     Doctor: Dr Kimball    Reason for call:  Patient is stating that according to company dealing with her FMLA, she needs to have disability marked due to comments that Dr Kimball notated on paperwork. Please call her back ASAP.     Call back#: [phone]

## 2024-06-10 ENCOUNTER — OFFICE VISIT (OUTPATIENT)
Dept: BARIATRICS | Facility: CLINIC | Age: 49
End: 2024-06-10
Payer: COMMERCIAL

## 2024-06-10 VITALS
HEART RATE: 66 BPM | HEIGHT: 66 IN | SYSTOLIC BLOOD PRESSURE: 126 MMHG | BODY MASS INDEX: 43.62 KG/M2 | DIASTOLIC BLOOD PRESSURE: 80 MMHG | RESPIRATION RATE: 16 BRPM | WEIGHT: 271.4 LBS

## 2024-06-10 DIAGNOSIS — Z98.84 S/P LAPAROSCOPIC SLEEVE GASTRECTOMY: ICD-10-CM

## 2024-06-10 DIAGNOSIS — R73.03 PREDIABETES: ICD-10-CM

## 2024-06-10 DIAGNOSIS — E66.01 MORBID OBESITY (HCC): ICD-10-CM

## 2024-06-10 DIAGNOSIS — G47.33 OSA (OBSTRUCTIVE SLEEP APNEA): Primary | ICD-10-CM

## 2024-06-10 PROCEDURE — 99214 OFFICE O/P EST MOD 30 MIN: CPT | Performed by: FAMILY MEDICINE

## 2024-06-10 RX ORDER — MECLIZINE HYDROCHLORIDE 25 MG/1
1 TABLET ORAL EVERY 8 HOURS PRN
COMMUNITY
Start: 2024-05-12

## 2024-06-10 NOTE — PROGRESS NOTES
Assessment/Plan:  Emerald was seen today for consult.    Diagnoses and all orders for this visit:    SHARI (obstructive sleep apnea)  -     Semaglutide-Weight Management (WEGOVY) 0.25 MG/0.5ML; Inject 0.5 mL (0.25 mg total) under the skin once a week    S/P laparoscopic sleeve gastrectomy    Morbid obesity (HCC)  -     Semaglutide-Weight Management (WEGOVY) 0.25 MG/0.5ML; Inject 0.5 mL (0.25 mg total) under the skin once a week    Prediabetes  -     Semaglutide-Weight Management (WEGOVY) 0.25 MG/0.5ML; Inject 0.5 mL (0.25 mg total) under the skin once a week      Gym - has a personal trained  Contraception : has partial hysterectomy   Diet: low hunter plan given   - Patient counselled about the potential side effects of this medication that are listed by the drug .Patient denies personal history of pancreatitis. Patient also denies personal and family history of medullary thyroid cancer and multiple endocrine neoplasia type 2 (MEN 2 tumor).    Patient counselled to view the website for this medicine and read detailed instructions and side effects. Patient was given opportunity to ask questions and all questions were answered.  Patient confirms understanding and agrees to use the medication as prescribed.       Calorie goal handouts provided:  Encourage mindful eating, portion control, motivational interview performed to help patient reach goals   Encouraged exercise start 10 min daily goal 150 min weekly      Follow up in approximately 4 mo      Subjective:   Chief Complaint   Patient presents with    Consult     Initial visit with bariatician     Patient here to discuss weight associated problems and nutrition goals  HPI: Emerald Cee  is a 48 y.o. female with excess weight/obesity here to pursue weight management.  Patient is pursuing Conservative Program.   Most recent notes and records were reviewed.    Wt Readings from Last 10 Encounters:   06/10/24 123 kg (271 lb 6.4 oz)   05/08/24 125 kg (276 lb)  "  03/26/24 122 kg (268 lb)   03/08/24 124 kg (273 lb)   12/21/23 118 kg (261 lb)   12/05/23 117 kg (259 lb)   12/05/23 118 kg (261 lb)   10/24/23 116 kg (256 lb)   10/18/23 114 kg (251 lb)   10/15/23 114 kg (252 lb)   s/p Vertical Sleeve Gastrectomy with Dr. Mayer in 2018.  Struggling with weight regain. Recommend increase healthy kcals, protein, fiber, water, exercise, and consult with [facility].    Needs surveillance EGD to assess esophagus and r/o Aguilar's and also needs Colonoscopy - referral to Dr. Man bustamante.    Initial: 300lbs  Carlos: 219lbs  Current BMI is Body mass index is 42.44 kg/m².  Current 271lbs  Tolerating a regular diet-yes  Eating at least 60 grams of protein per day-no  Following 30/60 minute rule with liquids-no  Drinking at least 64 ounces of fluid per day-no  Drinking carbonated beverages-no  Sufficient exercise-no  Using NSAIDs regularly-no  Using nicotine-no  Using alcohol-no. Advised about the risks of alcohol s/p bariatric surgery and recommend avoiding all alcohol    Occupation: postoffice and       The following portions of the patient's history were reviewed and updated as appropriate: allergies, current medications, past family history, past medical history, past social history, past surgical history, and problem list.      Review of Systems   Constitutional: Negative for activity change. Fatigue  HENT: Negative for trouble swallowing.    Respiratory: Negative for shortness of breath.    Cardiovascular: Negative for chest pain, edema  Gastrointestinal: Negative for abdominal pain, nausea and vomiting, +acid reflux, constipation/diarrhea  Psychiatric/Behavioral: Negative for behavioral problems , anxiety or depression    Objective:  /80 (BP Location: Left arm, Patient Position: Sitting, Cuff Size: Large)   Pulse 66   Resp 16   Ht 5' 5.5\" (1.664 m)   Wt 123 kg (271 lb 6.4 oz)   LMP 09/12/2021 (Exact Date)   BMI 44.48 kg/m²   Constitutional: Well-developed, well-nourished " and Obese Body mass index is 44.48 kg/m²..  HEENT: No conjunctival pallor or jaundice.  Pulmonary: No increased work of breathing or signs of respiratory distress.  CV: well perfused, no edema  GI: Obese. Non-distended   Neuro: Oriented to person, place and time. Normal Speech. Normal gait.  Psych: Normal affect and mood. Normal thought process no delusions   Labs and Imaging  Recent labs and imaging have been personally reviewed.  Lab Results   Component Value Date    WBC 6.16 03/19/2024    HGB 14.0 03/19/2024    HCT 43.9 03/19/2024    MCV 84 03/19/2024     03/19/2024     Lab Results   Component Value Date    SODIUM 140 05/11/2024    K 3.4 (L) 05/11/2024     05/11/2024    CO2 25 05/11/2024    AGAP 12 05/11/2024    BUN 16 05/11/2024    CREATININE 0.7 05/11/2024    GLUC 184 (H) 05/11/2024    GLUF 86 03/19/2024    CALCIUM 8.8 05/11/2024    AST 15 05/11/2024    ALT 14 05/11/2024    ALKPHOS 65 05/11/2024    TP 7.7 05/11/2024    TBILI 0.4 05/11/2024    EGFR >90 05/11/2024     Lab Results   Component Value Date    HGBA1C 5.8 (H) 03/19/2024     Lab Results   Component Value Date    SPP4LGJSWQSG 2.861 09/18/2023    TSH 0.92 01/06/2020     Lab Results   Component Value Date    CHOLESTEROL 248 (H) 03/19/2024     Lab Results   Component Value Date    HDL 54 03/19/2024     Lab Results   Component Value Date    TRIG 270 (H) 03/19/2024     Lab Results   Component Value Date    LDLCALC 140 (H) 03/19/2024

## 2024-06-12 ENCOUNTER — TELEPHONE (OUTPATIENT)
Age: 49
End: 2024-06-12

## 2024-06-12 NOTE — TELEPHONE ENCOUNTER
The patient has had 2 EP consultations for lightheadedness/dizziness and brief second-degree type II AV block.  They recommend no further cardiac therapy, stay hydrated and treat sleep apnea.   -No evidence that the rhythm disturbance is playing a role in her symptoms   -All rhythm disturbances seen were during sleep and elevated vagal tone.  No pauses longer than 3 seconds during sleep   -Tilt table test was negative   Please call the patient and tell her we cannot find a cardiac reason for her symptoms.  She can see her PCP and address blood pressure issues with PCP.  Pt states will follow up   Pt verbally understand

## 2024-06-17 ENCOUNTER — TELEPHONE (OUTPATIENT)
Dept: BARIATRICS | Facility: CLINIC | Age: 49
End: 2024-06-17

## 2024-06-24 NOTE — TELEPHONE ENCOUNTER
PA for wegovy    Submitted via    []CMM-KEY    [x]Katia-Case ID # [id number]   []Faxed to plan   []Other website    []Phone call Case ID #      Office notes sent, clinical questions answered. Awaiting determination    Turnaround time for your insurance to make a decision on your Prior Authorization can take 7-21 business days.

## 2024-06-25 NOTE — TELEPHONE ENCOUNTER
PA for wegovy Approved     Date(s) approved June 24, 2024 to October 24, 2024     Case #     Patient advised by          [x] [employer] Message  [] Phone call   []LMOM  []L/M to call office as no active Communication consent on file  []Unable to leave detailed message as VM not approved on Communication consent       Pharmacy advised by    [x]Fax  []Phone call    Approval letter scanned into Media Yes

## 2024-07-15 ENCOUNTER — OFFICE VISIT (OUTPATIENT)
Dept: CARDIOLOGY CLINIC | Facility: CLINIC | Age: 49
End: 2024-07-15
Payer: COMMERCIAL

## 2024-07-15 VITALS
WEIGHT: 274 LBS | HEART RATE: 58 BPM | RESPIRATION RATE: 16 BRPM | BODY MASS INDEX: 45.65 KG/M2 | DIASTOLIC BLOOD PRESSURE: 80 MMHG | OXYGEN SATURATION: 98 % | SYSTOLIC BLOOD PRESSURE: 118 MMHG | HEIGHT: 65 IN

## 2024-07-15 DIAGNOSIS — R42 DIZZINESS: Primary | ICD-10-CM

## 2024-07-15 PROCEDURE — 99214 OFFICE O/P EST MOD 30 MIN: CPT | Performed by: INTERNAL MEDICINE

## 2024-07-15 NOTE — PROGRESS NOTES
Cardiology Follow Up    Emerald Cee  1975  [id number]  [facility] [place]  [address] [address] [phone]    Discussion/Summary:  Bradycardia - Saw EP.   SHARI s/p CPAP  S/p Gasric sleeve      Will evaluate with  a two week event monitor  She will inform us with worsening symptoms    BP is better controlled. Cont with lisinopril  Recommend adequate fluid hydration.  Recommend compliance with CPAP.    Will follow up in 6 weeks      I have informed patient that unfortunately I will be relocating and leaving this cardiology practice in the near future. I wont be able to personally review test results or any messages. Patient understands and will call our office to follow up on test results as well as confirm/schedule follow up appointments.                            Previous studies were reviewed.    Safety measures were reviewed.  Questions were entertained and answered.  Patient was advised to report any problems requiring medical attention.    Follow-up with PCP and appropriate specialist and lab work as discussed.    Return for follow up visit as scheduled or earlier, if needed.    Thank you for allowing me to participate in the care and evaluation of your patient.  Should you have any questions, please feel free to contact me.      History of Present Illness:     Emerald Cee is a 48 y.o. female who presents for follow up for cardiovascular care.  She has occasional dizziness.  Denies chest pain, chest pressure, shortness of breath, dizziness, lightheadedness, presyncope or syncope.  Denies orthopnea, PND or lower limb edema.    She has a history of sleep apnea. She also has had sleep/vagal related transient type 2 HB  Seen by EP  Felt would not benefit from pacemaker at this time    Has increased her hydration somewhat    Echo    Left Ventricle: Left ventricular cavity size is  normal. Wall thickness is normal. The left ventricular ejection fraction is 60%. Systolic function is normal. Wall motion is normal. Diastolic function is normal.    Mitral Valve: There is trace regurgitation.    Tricuspid Valve: There is mild regurgitation.      Patient Active Problem List   Diagnosis    Cyst of ovary    Hyperlipidemia    Encounter for surveillance of contraceptive pills    Irregular bleeding    S/P laparoscopic sleeve gastrectomy    Vitamin D deficiency    History of PCOS    Iron deficiency anemia    Bradycardia    SHARI (obstructive sleep apnea)    Morbid obesity (HCC)    Encounter for screening colonoscopy    Lump of skin of back    Lightheadedness    Heart block AV second degree    Low blood pressure, not hypotension    Encounter for surgical aftercare following surgery of digestive system    Postsurgical malabsorption    GERD (gastroesophageal reflux disease)    Prediabetes     Past Medical History:   Diagnosis Date    Anemia     History of transfusion     Hypercholesterolemia     Sleep apnea     Vitamin deficiency      Social History     Socioeconomic History    Marital status: /Civil Union     Spouse name: Not on file    Number of children: Not on file    Years of education: Not on file    Highest education level: Not on file   Occupational History    Not on file   Tobacco Use    Smoking status: Never     Passive exposure: Never    Smokeless tobacco: Never   Vaping Use    Vaping status: Never Used   Substance and Sexual Activity    Alcohol use: Never    Drug use: No    Sexual activity: Yes     Partners: Male   Other Topics Concern    Not on file   Social History Narrative    Most recent tobacco use screenin2019    Do you currently or have you served in the [place] [employer]: No     Social Determinants of Health     Financial Resource Strain: Not on file   Food Insecurity: No Food Insecurity (10/18/2023)    Hunger Vital Sign     Worried About Running Out of Food in the Last Year:  Never true     Ran Out of Food in the Last Year: Never true   Transportation Needs: No Transportation Needs (10/18/2023)    PRAPARE - Transportation     Lack of Transportation (Medical): No     Lack of Transportation (Non-Medical): No   Physical Activity: Not on file   Stress: Not on file   Social Connections: Unknown (6/18/2024)    Received from [employer]    Social [place]     How often do you feel lonely or isolated from those around you? (Adult - for ages 18 years and over): Not on file   Intimate Partner Violence: Not on file   Housing Stability: Unknown (10/18/2023)    Housing Stability Vital Sign     Unable to Pay for Housing in the Last Year: No     Number of Times Moved in the Last Year: Not on file     Homeless in the Last Year: No      Family History   Problem Relation Age of Onset    Brain cancer Mother     Cancer Father     Asthma Brother     Diabetes Maternal Grandmother     Cancer Maternal Grandmother     Hypertension Paternal Grandmother      Past Surgical History:   Procedure Laterality Date    ECTOPIC PREGNANCY SURGERY      EYE SURGERY      GASTRECTOMY SLEEVE LAPAROSCOPIC      MASS EXCISION N/A 8/1/2023    Procedure: EXCISION  BIOPSY LESION/MASS BACK;  Surgeon: Lawson Motta DO;  Location: [facility];  Service: General    IL LAPAROSCOPY W/RMVL ADNEXAL STRUCTURES Bilateral 9/28/2021    Procedure: SALPINGECTOMY, LAPAROSCOPIC;  Surgeon: Pietro Hanson MD;  [facility];  Service: Gynecology    IL LAPS W/VAG HYSTERECT 250 GM/&RMVL TUBE&/OVARIES N/A 9/28/2021    Procedure: HYSTERECTOMY LAPAROSCOPIC ASSISTED VAGINAL (LAVH), LYSIS OF ADHESIONS;  Surgeon: Pitero Hanson MD;  [facility];  Service: Gynecology       Current Outpatient Medications:     acetaminophen (TYLENOL) 500 mg tablet, Take 2 tablets (1,000 mg total) by mouth every 6 (six) hours as needed for mild pain, Disp: 40 tablet, Rfl: 0    Biotin 1 MG CAPS, Take by mouth, Disp: , Rfl:     cholecalciferol (VITAMIN D3) 25 mcg  "(1,000 units) tablet, Take 1 tablet (1,000 Units total) by mouth daily, Disp: 90 tablet, Rfl: 0    cyanocobalamin (VITAMIN B-12) 100 mcg tablet, Take by mouth daily, Disp: , Rfl:     ergocalciferol (VITAMIN D2) 50,000 units, Take 1 capsule (50,000 Units total) by mouth 2 (two) times a week with meals, Disp: 24 capsule, Rfl: 0    ferrous sulfate 324 (65 Fe) mg, Take 1 tablet (324 mg total) by mouth 3 (three) times a day before meals, Disp: 90 tablet, Rfl: 0    hydrocortisone (ANUSOL-HC) 2.5 % rectal cream, Apply topically 2 (two) times a day for 10 days, Disp: 28 g, Rfl: 1    Insulin Pen Needle (BD Pen Needle Sarita 2nd Gen) 32G X 4 MM MISC, Use in the morning, Disp: 100 each, Rfl: 1    lisinopril (ZESTRIL) 2.5 mg tablet, Take 1 tablet (2.5 mg total) by mouth daily, Disp: 90 tablet, Rfl: 0    multivitamin (THERAGRAN) TABS, Take 1 tablet by mouth daily, Disp: 90 tablet, Rfl: 0    Omega-3 Fatty Acids (fish oil) 1,000 mg, Take 1 capsule (1,000 mg total) by mouth daily, Disp: 90 capsule, Rfl: 0    pantoprazole (PROTONIX) 40 mg tablet, Take 1 tablet (40 mg total) by mouth every morning, Disp: 90 tablet, Rfl: 0    Semaglutide-Weight Management (WEGOVY) 0.25 MG/0.5ML, Inject 0.5 mL (0.25 mg total) under the skin once a week, Disp: 2 mL, Rfl: 0  Allergies   Allergen Reactions    Penicillin G Other (See Comments)    Pollen Extract Sneezing       Labs:  Lab Results   Component Value Date    WBC 6.16 03/19/2024    HGB 14.0 03/19/2024    HCT 43.9 03/19/2024    MCV 84 03/19/2024     03/19/2024     Lab Results   Component Value Date    CALCIUM 8.8 05/11/2024    K 3.4 (L) 05/11/2024    CO2 25 05/11/2024     05/11/2024    BUN 16 05/11/2024    CREATININE 0.7 05/11/2024     Lab Results   Component Value Date    HGBA1C 5.8 (H) 03/19/2024     No results found for: \"CHOL\"  Lab Results   Component Value Date    HDL 54 03/19/2024    HDL 48 (L) 06/12/2023    HDL 53 03/03/2023     Lab Results   Component Value Date    LDLCALC 140 " "(H) 03/19/2024    LDLCALC 139 (H) 06/12/2023    LDLCALC 151 (H) 03/03/2023     Lab Results   Component Value Date    TRIG 270 (H) 03/19/2024    TRIG 211 (H) 06/12/2023    TRIG 215 (H) 03/03/2023     No results found for: \"CHOLHDL\"    Review of Systems:  Review of Systems   Constitutional: Negative.  Negative for activity change, appetite change, fatigue and fever.   Respiratory:  Negative for chest tightness and shortness of breath.    Cardiovascular:  Negative for chest pain, palpitations and leg swelling.   Gastrointestinal:  Negative for nausea and vomiting.   Skin:  Negative for pallor.   Neurological:  Positive for dizziness. Negative for syncope, light-headedness and numbness.   Psychiatric/Behavioral:  Negative for agitation.    All other systems reviewed and are negative.      Physical Exam:  Physical Exam  Vitals and nursing note reviewed.   Constitutional:       General: She is not in acute distress.     Appearance: Normal appearance. She is not ill-appearing or diaphoretic.   HENT:      Head: Normocephalic and atraumatic.   Eyes:      Extraocular Movements: Extraocular movements intact.   Cardiovascular:      Rate and Rhythm: Normal rate and regular rhythm.      Heart sounds: No murmur heard.     No friction rub. No gallop.   Pulmonary:      Effort: No respiratory distress.      Breath sounds: Normal breath sounds.   Abdominal:      General: There is no distension.      Palpations: Abdomen is soft.   Musculoskeletal:         General: No swelling. Normal range of motion.   Skin:     General: Skin is warm and dry.      Capillary Refill: Capillary refill takes less than 2 seconds.      Coloration: Skin is not pale.   Neurological:      General: No focal deficit present.      Mental Status: She is alert and oriented to person, place, and time.      Cranial Nerves: No cranial nerve deficit.   Psychiatric:         Mood and Affect: Mood normal.         Behavior: Behavior normal.           "

## 2024-07-25 ENCOUNTER — TELEPHONE (OUTPATIENT)
Dept: OTHER | Facility: OTHER | Age: 49
End: 2024-07-25

## 2024-07-25 NOTE — TELEPHONE ENCOUNTER
Called and spoke with Gay at [facility] and she stated that they spoke with Dr. Vazquez this morning. Message was addressed.

## 2024-07-25 NOTE — TELEPHONE ENCOUNTER
Collette from [facility] called with a critical report for patient's EKG. [facility]     Requesting a call back at [phone] option 1.

## 2024-07-28 DIAGNOSIS — G47.33 OSA (OBSTRUCTIVE SLEEP APNEA): ICD-10-CM

## 2024-07-28 DIAGNOSIS — K21.9 GASTROESOPHAGEAL REFLUX DISEASE WITHOUT ESOPHAGITIS: ICD-10-CM

## 2024-07-28 DIAGNOSIS — R73.03 PREDIABETES: ICD-10-CM

## 2024-07-28 DIAGNOSIS — E66.01 MORBID OBESITY (HCC): ICD-10-CM

## 2024-07-29 RX ORDER — PANTOPRAZOLE SODIUM 40 MG/1
40 TABLET, DELAYED RELEASE ORAL EVERY MORNING
Qty: 100 TABLET | Refills: 0 | Status: SHIPPED | OUTPATIENT
Start: 2024-07-29 | End: 2024-11-06

## 2024-07-31 ENCOUNTER — CLINICAL SUPPORT (OUTPATIENT)
Dept: CARDIOLOGY CLINIC | Facility: CLINIC | Age: 49
End: 2024-07-31
Payer: COMMERCIAL

## 2024-07-31 DIAGNOSIS — R42 DIZZINESS: ICD-10-CM

## 2024-07-31 PROCEDURE — 93228 REMOTE 30 DAY ECG REV/REPORT: CPT | Performed by: INTERNAL MEDICINE

## 2024-08-01 ENCOUNTER — TELEPHONE (OUTPATIENT)
Dept: CARDIOLOGY CLINIC | Facility: CLINIC | Age: 49
End: 2024-08-01

## 2024-08-01 NOTE — TELEPHONE ENCOUNTER
"Spoke with patient, relayed Dr Gillespie's message regarding monitor results:    \"Preliminary Findings Signature Date Patient monitored for 8d 3h 2m 14 events were transmitted. 0 patient triggered; 14 auto triggered Heart Block occurred 1 time(s) the most severe 2° type 2; slowest 40 BPM PACs were not found during the monitoring period 7,871 PVCs with PVC burden of 1%     The patient was monitored for 14 days.     There were occasional PVCs.     No PACs were seen.     High heart rate was 129 bpm-sinus tachycardia.  Low heart rate was 40 bpm.  This occurred at 00: 3 1 and was a Mobitz 1 second-degree AV block.  If the patient was sleeping at the time this is an insignificant rhythm abnormality.     Conclusion:     1.  Episode of Mobitz 1 AV block shortly after midnight.  If the patient was sleeping this is insignificant  2.  Occasional PVCs  3.  Otherwise unremarkable study.\"    Verbalized understanding; no questions asked.     "

## 2024-08-01 NOTE — TELEPHONE ENCOUNTER
----- Message from Khurram Gillespie MD sent at 7/31/2024  2:27 PM EDT -----  Preliminary Findings Signature Date Patient monitored for 8d 3h 2m 14 events were transmitted. 0 patient triggered; 14 auto triggered Heart Block occurred 1 time(s) the most severe 2° type 2; slowest 40 BPM PACs were not found during the monitoring period 7,871 PVCs with PVC burden of 1%    The patient was monitored for 14 days.    There were occasional PVCs.    No PACs were seen.    High heart rate was 129 bpm-sinus tachycardia.  Low heart rate was 40 bpm.  This occurred at 00: 3 1 and was a Mobitz 1 second-degree AV block.  If the patient was sleeping at the time this is an insignificant rhythm abnormality.    Conclusion:    1.  Episode of Mobitz 1 AV block shortly after midnight.  If the patient was sleeping this is insignificant  2.  Occasional PVCs  3.  Otherwise unremarkable study.

## 2024-08-02 ENCOUNTER — TELEPHONE (OUTPATIENT)
Age: 49
End: 2024-08-02

## 2024-08-02 ENCOUNTER — TELEPHONE (OUTPATIENT)
Dept: FAMILY MEDICINE CLINIC | Facility: CLINIC | Age: 49
End: 2024-08-02

## 2024-08-02 ENCOUNTER — HOSPITAL ENCOUNTER (EMERGENCY)
Facility: HOSPITAL | Age: 49
Discharge: HOME | End: 2024-08-02
Attending: EMERGENCY MEDICINE
Payer: COMMERCIAL

## 2024-08-02 ENCOUNTER — APPOINTMENT (EMERGENCY)
Dept: ULTRASOUND IMAGING | Facility: HOSPITAL | Age: 49
End: 2024-08-02
Payer: COMMERCIAL

## 2024-08-02 ENCOUNTER — APPOINTMENT (EMERGENCY)
Dept: CT IMAGING | Facility: HOSPITAL | Age: 49
End: 2024-08-02
Payer: COMMERCIAL

## 2024-08-02 VITALS
DIASTOLIC BLOOD PRESSURE: 74 MMHG | RESPIRATION RATE: 18 BRPM | HEART RATE: 57 BPM | OXYGEN SATURATION: 96 % | SYSTOLIC BLOOD PRESSURE: 121 MMHG | HEIGHT: 65 IN | TEMPERATURE: 97.6 F | WEIGHT: 269.84 LBS | BODY MASS INDEX: 44.96 KG/M2

## 2024-08-02 DIAGNOSIS — N83.209 OVARIAN CYST: ICD-10-CM

## 2024-08-02 DIAGNOSIS — R10.9 ABDOMINAL PAIN: Primary | ICD-10-CM

## 2024-08-02 LAB
ALBUMIN SERPL BCG-MCNC: 4.1 G/DL (ref 3.5–5)
ALP SERPL-CCNC: 61 U/L (ref 34–104)
ALT SERPL W P-5'-P-CCNC: 8 U/L (ref 7–52)
ANION GAP SERPL CALCULATED.3IONS-SCNC: 8 MMOL/L (ref 4–13)
AST SERPL W P-5'-P-CCNC: 13 U/L (ref 13–39)
BACTERIA UR QL AUTO: ABNORMAL /HPF
BASOPHILS # BLD AUTO: 0.03 THOUSANDS/ÂΜL (ref 0–0.1)
BASOPHILS NFR BLD AUTO: 0 % (ref 0–1)
BILIRUB SERPL-MCNC: 0.43 MG/DL (ref 0.2–1)
BILIRUB UR QL STRIP: NEGATIVE
BUN SERPL-MCNC: 10 MG/DL (ref 5–25)
CALCIUM SERPL-MCNC: 8.8 MG/DL (ref 8.4–10.2)
CHLORIDE SERPL-SCNC: 105 MMOL/L (ref 96–108)
CLARITY UR: CLEAR
CO2 SERPL-SCNC: 26 MMOL/L (ref 21–32)
COLOR UR: YELLOW
CREAT SERPL-MCNC: 0.68 MG/DL (ref 0.6–1.3)
EOSINOPHIL # BLD AUTO: 0.23 THOUSAND/ÂΜL (ref 0–0.61)
EOSINOPHIL NFR BLD AUTO: 3 % (ref 0–6)
ERYTHROCYTE [DISTWIDTH] IN BLOOD BY AUTOMATED COUNT: 13.1 % (ref 11.6–15.1)
EXT PREGNANCY TEST URINE: NEGATIVE
EXT. CONTROL: NORMAL
GFR SERPL CREATININE-BSD FRML MDRD: 104 ML/MIN/1.73SQ M
GLUCOSE SERPL-MCNC: 110 MG/DL (ref 65–140)
GLUCOSE UR STRIP-MCNC: NEGATIVE MG/DL
HCT VFR BLD AUTO: 39.6 % (ref 34.8–46.1)
HGB BLD-MCNC: 12.5 G/DL (ref 11.5–15.4)
HGB UR QL STRIP.AUTO: NEGATIVE
IMM GRANULOCYTES # BLD AUTO: 0.01 THOUSAND/UL (ref 0–0.2)
IMM GRANULOCYTES NFR BLD AUTO: 0 % (ref 0–2)
KETONES UR STRIP-MCNC: NEGATIVE MG/DL
LEUKOCYTE ESTERASE UR QL STRIP: NEGATIVE
LIPASE SERPL-CCNC: 27 U/L (ref 11–82)
LYMPHOCYTES # BLD AUTO: 2.62 THOUSANDS/ÂΜL (ref 0.6–4.47)
LYMPHOCYTES NFR BLD AUTO: 36 % (ref 14–44)
MCH RBC QN AUTO: 26.1 PG (ref 26.8–34.3)
MCHC RBC AUTO-ENTMCNC: 31.6 G/DL (ref 31.4–37.4)
MCV RBC AUTO: 83 FL (ref 82–98)
MONOCYTES # BLD AUTO: 0.8 THOUSAND/ÂΜL (ref 0.17–1.22)
MONOCYTES NFR BLD AUTO: 11 % (ref 4–12)
MUCOUS THREADS UR QL AUTO: ABNORMAL
NEUTROPHILS # BLD AUTO: 3.51 THOUSANDS/ÂΜL (ref 1.85–7.62)
NEUTS SEG NFR BLD AUTO: 50 % (ref 43–75)
NITRITE UR QL STRIP: NEGATIVE
NON-SQ EPI CELLS URNS QL MICRO: ABNORMAL /HPF
NRBC BLD AUTO-RTO: 0 /100 WBCS
PH UR STRIP.AUTO: 5.5 [PH]
PLATELET # BLD AUTO: 290 THOUSANDS/UL (ref 149–390)
PMV BLD AUTO: 9.3 FL (ref 8.9–12.7)
POTASSIUM SERPL-SCNC: 3.4 MMOL/L (ref 3.5–5.3)
PROT SERPL-MCNC: 7.6 G/DL (ref 6.4–8.4)
PROT UR STRIP-MCNC: ABNORMAL MG/DL
RBC # BLD AUTO: 4.79 MILLION/UL (ref 3.81–5.12)
RBC #/AREA URNS AUTO: ABNORMAL /HPF
SODIUM SERPL-SCNC: 139 MMOL/L (ref 135–147)
SP GR UR STRIP.AUTO: 1.03 (ref 1–1.03)
UROBILINOGEN UR STRIP-ACNC: 2 MG/DL
WBC # BLD AUTO: 7.2 THOUSAND/UL (ref 4.31–10.16)
WBC #/AREA URNS AUTO: ABNORMAL /HPF

## 2024-08-02 PROCEDURE — 74177 CT ABD & PELVIS W/CONTRAST: CPT

## 2024-08-02 PROCEDURE — 99284 EMERGENCY DEPT VISIT MOD MDM: CPT

## 2024-08-02 PROCEDURE — 99285 EMERGENCY DEPT VISIT HI MDM: CPT

## 2024-08-02 PROCEDURE — 81025 URINE PREGNANCY TEST: CPT

## 2024-08-02 PROCEDURE — 81001 URINALYSIS AUTO W/SCOPE: CPT

## 2024-08-02 PROCEDURE — 83690 ASSAY OF LIPASE: CPT

## 2024-08-02 PROCEDURE — 85025 COMPLETE CBC W/AUTO DIFF WBC: CPT

## 2024-08-02 PROCEDURE — 36415 COLL VENOUS BLD VENIPUNCTURE: CPT

## 2024-08-02 PROCEDURE — 76856 US EXAM PELVIC COMPLETE: CPT

## 2024-08-02 PROCEDURE — 96375 TX/PRO/DX INJ NEW DRUG ADDON: CPT

## 2024-08-02 PROCEDURE — 76830 TRANSVAGINAL US NON-OB: CPT

## 2024-08-02 PROCEDURE — 96374 THER/PROPH/DIAG INJ IV PUSH: CPT

## 2024-08-02 PROCEDURE — 80053 COMPREHEN METABOLIC PANEL: CPT

## 2024-08-02 RX ORDER — MAGNESIUM HYDROXIDE/ALUMINUM HYDROXICE/SIMETHICONE 120; 1200; 1200 MG/30ML; MG/30ML; MG/30ML
30 SUSPENSION ORAL ONCE
Status: COMPLETED | OUTPATIENT
Start: 2024-08-02 | End: 2024-08-02

## 2024-08-02 RX ORDER — PANTOPRAZOLE SODIUM 40 MG/10ML
40 INJECTION, POWDER, LYOPHILIZED, FOR SOLUTION INTRAVENOUS ONCE
Status: COMPLETED | OUTPATIENT
Start: 2024-08-02 | End: 2024-08-02

## 2024-08-02 RX ORDER — ACETAMINOPHEN 10 MG/ML
1000 INJECTION, SOLUTION INTRAVENOUS ONCE
Status: COMPLETED | OUTPATIENT
Start: 2024-08-02 | End: 2024-08-02

## 2024-08-02 RX ORDER — KETOROLAC TROMETHAMINE 30 MG/ML
15 INJECTION, SOLUTION INTRAMUSCULAR; INTRAVENOUS ONCE
Status: COMPLETED | OUTPATIENT
Start: 2024-08-02 | End: 2024-08-02

## 2024-08-02 RX ADMIN — IOHEXOL 100 ML: 350 INJECTION, SOLUTION INTRAVENOUS at 06:36

## 2024-08-02 RX ADMIN — KETOROLAC TROMETHAMINE 15 MG: 30 INJECTION, SOLUTION INTRAMUSCULAR; INTRAVENOUS at 08:43

## 2024-08-02 RX ADMIN — PANTOPRAZOLE SODIUM 40 MG: 40 INJECTION, POWDER, FOR SOLUTION INTRAVENOUS at 05:10

## 2024-08-02 RX ADMIN — ACETAMINOPHEN 1000 MG: 1000 INJECTION, SOLUTION INTRAVENOUS at 05:10

## 2024-08-02 RX ADMIN — ALUMINUM HYDROXIDE, MAGNESIUM HYDROXIDE, AND SIMETHICONE 30 ML: 200; 200; 20 SUSPENSION ORAL at 05:10

## 2024-08-02 NOTE — ED PROVIDER NOTES
History  Chief Complaint   Patient presents with    Abdominal Pain     Pt reports she started wegovy 2 weeks ago. Reports Friday started with bloating and lower abd pain. Denies N/V/D. Reports LBM yesterday      The patient is a 48 y.o. female with a history of anemia, hypercholesterolemia, sleep apnea, vitamin deficiency, gastric sleeve bypass who presents to [facility] with a chief complaint of lower abdominal pain. Symptoms began Friday and have been relatively constant since onset. Her pain is currently rated as a 6/10 in severity and described as sharp without radiation. Associated symptoms include bloating. Symptoms are aggravated with oral intake and alleviating factors include none noted. The patient denies fever, chills, night sweats, chest pain, shortness of breath, cough, sputum, wheezing, vomiting, hemoptysis, hematemesis, hematochezia, melena, constipation, falls, trauma, numbness, tingling. No other reported symptoms at this time.  Patient affirms allergies to penicillin and pollen  Patient reports starting wegovy 2 weeks ago and admits to poor diet          History provided by:  Patient   used: No    Abdominal Pain  Associated symptoms: no chest pain, no chills, no cough, no dysuria, no fever, no hematuria, no shortness of breath, no sore throat and no vomiting        Prior to Admission Medications   Prescriptions Last Dose Informant Patient Reported? Taking?   Biotin 1 MG CAPS  Self Yes No   Sig: Take by mouth   Insulin Pen Needle (BD Pen Needle Sarita 2nd Gen) 32G X 4 MM MISC  Self No No   Sig: Use in the morning   Omega-3 Fatty Acids (fish oil) 1,000 mg  Self No No   Sig: Take 1 capsule (1,000 mg total) by mouth daily   Semaglutide-Weight Management (WEGOVY) 0.5 MG/0.5ML   No No   Sig: Inject 0.5 mL (0.5 mg total) under the skin once a week   acetaminophen (TYLENOL) 500 mg tablet  Self No No   Sig: Take 2 tablets (1,000 mg total) by mouth every 6 (six) hours as  needed for mild pain   cholecalciferol (VITAMIN D3) 25 mcg (1,000 units) tablet  Self No No   Sig: Take 1 tablet (1,000 Units total) by mouth daily   cyanocobalamin (VITAMIN B-12) 100 mcg tablet  Self Yes No   Sig: Take by mouth daily   ergocalciferol (VITAMIN D2) 50,000 units  Self No No   Sig: Take 1 capsule (50,000 Units total) by mouth 2 (two) times a week with meals   ferrous sulfate 324 (65 Fe) mg  Self No No   Sig: Take 1 tablet (324 mg total) by mouth 3 (three) times a day before meals   hydrocortisone (ANUSOL-HC) 2.5 % rectal cream  Self No No   Sig: Apply topically 2 (two) times a day for 10 days   lisinopril (ZESTRIL) 2.5 mg tablet  Self No No   Sig: Take 1 tablet (2.5 mg total) by mouth daily   multivitamin (THERAGRAN) TABS  Self No No   Sig: Take 1 tablet by mouth daily   pantoprazole (PROTONIX) 40 mg tablet   No No   Sig: Take 1 tablet (40 mg total) by mouth every morning      Facility-Administered Medications: None       Past Medical History:   Diagnosis Date    Anemia     History of transfusion     Hypercholesterolemia     Sleep apnea     Vitamin deficiency        Past Surgical History:   Procedure Laterality Date    ECTOPIC PREGNANCY SURGERY      EYE SURGERY      GASTRECTOMY SLEEVE LAPAROSCOPIC      MASS EXCISION N/A 8/1/2023    Procedure: EXCISION  BIOPSY LESION/MASS BACK;  Surgeon: Lawson Motta DO;  Location: [facility];  Service: General    ID LAPAROSCOPY W/RMVL ADNEXAL STRUCTURES Bilateral 9/28/2021    Procedure: SALPINGECTOMY, LAPAROSCOPIC;  Surgeon: Pietro Hanson MD;  Location[facility];  Service: Gynecology    ID LAPS W/VAG HYSTERECT 250 GM/&RMVL TUBE&/OVARIES N/A 9/28/2021    Procedure: HYSTERECTOMY LAPAROSCOPIC ASSISTED VAGINAL (LAVH), LYSIS OF ADHESIONS;  Surgeon: Pietro Hanson MD;  Location[facility];  Service: Gynecology       Family History   Problem Relation Age of Onset    Brain cancer Mother     Cancer Father     Asthma Brother     Diabetes Maternal Grandmother     Cancer  Maternal Grandmother     Hypertension Paternal Grandmother      I have reviewed and agree with the history as documented.    E-Cigarette/Vaping    E-Cigarette Use Never User      E-Cigarette/Vaping Substances    Nicotine No     THC No     CBD No     Flavoring No     Other No     Unknown No      Social History     Tobacco Use    Smoking status: Never     Passive exposure: Never    Smokeless tobacco: Never   Vaping Use    Vaping status: Never Used   Substance Use Topics    Alcohol use: Never    Drug use: No       Review of Systems   Constitutional:  Negative for chills and fever.   HENT:  Negative for ear pain and sore throat.    Eyes:  Negative for pain and visual disturbance.   Respiratory:  Negative for cough and shortness of breath.    Cardiovascular:  Negative for chest pain and palpitations.   Gastrointestinal:  Positive for abdominal pain. Negative for vomiting.   Genitourinary:  Negative for dysuria and hematuria.   Musculoskeletal:  Negative for arthralgias and back pain.   Skin:  Negative for color change and rash.   Neurological:  Negative for seizures and syncope.   All other systems reviewed and are negative.      Physical Exam  Physical Exam  Vitals reviewed.   Constitutional:       General: She is not in acute distress.     Appearance: She is obese. She is not ill-appearing or diaphoretic.   HENT:      Head: Normocephalic and atraumatic.      Mouth/Throat:      Mouth: Mucous membranes are moist.      Pharynx: No pharyngeal swelling.   Cardiovascular:      Rate and Rhythm: Normal rate.      Heart sounds: Normal heart sounds.   Pulmonary:      Effort: Pulmonary effort is normal. No respiratory distress.      Breath sounds: Normal breath sounds. No stridor. No wheezing, rhonchi or rales.   Abdominal:      General: Abdomen is protuberant. Bowel sounds are normal.      Palpations: Abdomen is soft.      Tenderness: There is abdominal tenderness in the right lower quadrant, periumbilical area, suprapubic area  and left lower quadrant.      Hernia: No hernia is present.   Skin:     General: Skin is warm and dry.      Capillary Refill: Capillary refill takes less than 2 seconds.   Neurological:      Mental Status: She is alert and oriented to person, place, and time.         Vital Signs  ED Triage Vitals [08/02/24 0425]   Temperature Pulse Respirations Blood Pressure SpO2   97.6 °F (36.4 °C) 72 20 162/83 98 %      Temp Source Heart Rate Source Patient Position - Orthostatic VS BP Location FiO2 (%)   Oral Monitor Lying Right arm --      Pain Score       6           Vitals:    08/02/24 0430 08/02/24 0500 08/02/24 0600 08/02/24 0714   BP: 162/83 133/75 135/68 117/71   Pulse: 61 69 70 61   Patient Position - Orthostatic VS: Sitting Lying Lying Lying         Visual Acuity      ED Medications  Medications   iohexol (OMNIPAQUE) 240 MG/ML solution 50 mL (has no administration in time range)   acetaminophen (Ofirmev) injection 1,000 mg (0 mg Intravenous Stopped 8/2/24 0525)   aluminum-magnesium hydroxide-simethicone (MAALOX) oral suspension 30 mL (30 mL Oral Given 8/2/24 0510)   pantoprazole (PROTONIX) injection 40 mg (40 mg Intravenous Given 8/2/24 0510)   iohexol (OMNIPAQUE) 350 MG/ML injection (MULTI-DOSE) 100 mL (100 mL Intravenous Given 8/2/24 0636)       Diagnostic Studies  Results Reviewed       Procedure Component Value Units Date/Time    Comprehensive metabolic panel [[id number]]  (Abnormal) Collected: 08/02/24 0457    Lab Status: Final result Specimen: Blood from Arm, Right Updated: 08/02/24 0523     Sodium 139 mmol/L      Potassium 3.4 mmol/L      Chloride 105 mmol/L      CO2 26 mmol/L      ANION GAP 8 mmol/L      BUN 10 mg/dL      Creatinine 0.68 mg/dL      Glucose 110 mg/dL      Calcium 8.8 mg/dL      AST 13 U/L      ALT 8 U/L      Alkaline Phosphatase 61 U/L      Total Protein 7.6 g/dL      Albumin 4.1 g/dL      Total Bilirubin 0.43 mg/dL      eGFR 104 ml/min/1.73sq m     Narrative:      National Kidney Disease  Foundation guidelines for Chronic Kidney Disease (CKD):     Stage 1 with normal or high GFR (GFR > 90 mL/min/1.73 square meters)    Stage 2 Mild CKD (GFR = 60-89 mL/min/1.73 square meters)    Stage 3A Moderate CKD (GFR = 45-59 mL/min/1.73 square meters)    Stage 3B Moderate CKD (GFR = 30-44 mL/min/1.73 square meters)    Stage 4 Severe CKD (GFR = 15-29 mL/min/1.73 square meters)    Stage 5 End Stage CKD (GFR <15 mL/min/1.73 square meters)  Note: GFR calculation is accurate only with a steady state creatinine    Lipase [[id number]]  (Normal) Collected: 08/02/24 0457    Lab Status: Final result Specimen: Blood from Arm, Right Updated: 08/02/24 0523     Lipase 27 u/L     Urine Microscopic [[id number]]  (Abnormal) Collected: 08/02/24 0447    Lab Status: Final result Specimen: Urine, Clean Catch Updated: 08/02/24 0505     RBC, UA 1-2 /hpf      WBC, UA 1-2 /hpf      Epithelial Cells Occasional /hpf      Bacteria, UA None Seen /hpf      MUCUS THREADS Innumerable    CBC and differential [[id number]]  (Abnormal) Collected: 08/02/24 0457    Lab Status: Final result Specimen: Blood from Arm, Right Updated: 08/02/24 0502     WBC 7.20 Thousand/uL      RBC 4.79 Million/uL      Hemoglobin 12.5 g/dL      Hematocrit 39.6 %      MCV 83 fL      MCH 26.1 pg      MCHC 31.6 g/dL      RDW 13.1 %      MPV 9.3 fL      Platelets 290 Thousands/uL      nRBC 0 /100 WBCs      Segmented % 50 %      Immature Grans % 0 %      Lymphocytes % 36 %      Monocytes % 11 %      Eosinophils Relative 3 %      Basophils Relative 0 %      Absolute Neutrophils 3.51 Thousands/µL      Absolute Immature Grans 0.01 Thousand/uL      Absolute Lymphocytes 2.62 Thousands/µL      Absolute Monocytes 0.80 Thousand/µL      Eosinophils Absolute 0.23 Thousand/µL      Basophils Absolute 0.03 Thousands/µL     UA w Reflex to Microscopic w Reflex to Culture [[id number]]  (Abnormal) Collected: 08/02/24 0447    Lab Status: Final result Specimen: Urine, Clean Catch Updated: 08/02/24  0455     Color, UA Yellow     Clarity, UA Clear     Specific Gravity, UA 1.031     pH, UA 5.5     Leukocytes, UA Negative     Nitrite, UA Negative     Protein, UA Trace mg/dl      Glucose, UA Negative mg/dl      Ketones, UA Negative mg/dl      Urobilinogen, UA 2.0 mg/dl      Bilirubin, UA Negative     Occult Blood, UA Negative    POCT pregnancy, urine [[id number]]  (Normal) Resulted: 08/02/24 0447    Lab Status: Final result Updated: 08/02/24 0448     EXT Preg Test, Ur Negative     Control Valid                   CT abdomen pelvis with contrast   Final Result by Andrew Ashley DO (08/02 0714)      Uterus not well seen, suspect prior hysterectomy. Multiple cystic lesions in the pelvis, nonspecific, could represent adnexal and/or peritoneal inclusion cysts. Cystic neoplasm(s) also considered. Largest measures approximately 5.5 cm in size. Pelvic    ultrasound recommended for further evaluation.      Status post gastric sleeve, no evidence of bowel obstruction.      Other findings as above.            Workstation performed: [id number]         US pelvis complete w transvaginal    (Results Pending)              Procedures  Procedures         ED Course  ED Course as of 08/02/24 0806   Fri Aug 02, 2024   0726 CT abdomen pelvis with contrast  Uterus not well seen, suspect prior hysterectomy. Multiple cystic lesions in the pelvis, nonspecific, could represent adnexal and/or peritoneal inclusion cysts. Cystic neoplasm(s) also considered. Largest measures approximately 5.5 cm in size. Pelvic   ultrasound recommended for further evaluation.     Status post gastric sleeve, no evidence of bowel obstruction.     Other findings as above.                                   SBIRT 22yo+      Flowsheet Row Most Recent Value   Initial Alcohol Screen: US AUDIT-C     1. How often do you have a drink containing alcohol? 0 Filed at: 08/02/2024 0427   2. How many drinks containing alcohol do you have on a typical day you are  drinking?  0 Filed at: 08/02/2024 0427   3a. Male UNDER 65: How often do you have five or more drinks on one occasion? 0 Filed at: 08/02/2024 0427   3b. FEMALE Any Age, or MALE 65+: How often do you have 4 or more drinks on one occassion? 0 Filed at: 08/02/2024 0427   Audit-C Score 0 Filed at: 08/02/2024 0427   JERMAINE: How many times in the past year have you...    Used an illegal drug or used a prescription medication for non-medical reasons? Never Filed at: 08/02/2024 0427                      Medical Decision Making  Abdominal exam without peritoneal signs. No evidence of acute abdomen at this time. Well appearing. Given work up, low suspicion for acute hepatobiliary disease (including acute cholecystitis or cholangitis), acute pancreatitis (neg lipase), PUD (including gastric perforation), acute infectious processes (pneumonia, hepatitis, pyelonephritis), acute appendicitis, vascular catastrophe, bowel obstruction, viscus perforation, or testicular torsion, diverticulitis. CT abdomen and pelvis showed Uterus not well seen, suspect prior hysterectomy. Multiple cystic lesions in the pelvis, nonspecific, could represent adnexal and/or peritoneal inclusion cysts. Cystic neoplasm(s) also considered. Largest measures approximately 5.5 cm in size. Pelvic   ultrasound recommended for further evaluation.  Status post gastric sleeve, no evidence of bowel obstruction.  Other findings as above.  Will obtain ultrasound of the pelvis to further evaluate.   Signed out to NPBeatriz pending ultrasound of the pelvis.    Problems Addressed:  Abdominal pain: acute illness or injury  Ovarian cyst: acute illness or injury    Amount and/or Complexity of Data Reviewed  Labs: ordered.  Radiology: ordered. Decision-making details documented in ED Course.    Risk  OTC drugs.  Prescription drug management.                 Disposition  Final diagnoses:   None     ED Disposition       None          Follow-up Information    None          Patient's Medications   Discharge Prescriptions    No medications on file       No discharge procedures on file.    PDMP Review       None            ED Provider  Electronically Signed by             Dino Khan PA-C  08/06/24 0600

## 2024-08-02 NOTE — TELEPHONE ENCOUNTER
Patient called to schedule a follow up visit from her ER visit today 8/2/24 for an ovarian cyst.  Schedule patient for 8/5/24 for 15 minutes.

## 2024-08-02 NOTE — Clinical Note
Emerald Cee was seen and treated in our emergency department on 8/2/2024.                Diagnosis:     Emerald  may return to work on return date.    She may return on this date: 08/05/2024         If you have any questions or concerns, please don't hesitate to call.      DAVE Cabrera    ______________________________           _______________          _______________  Hospital Representative                              Date                                Time

## 2024-08-02 NOTE — ED CARE HANDOFF
Emergency Department Sign Out Note        Sign out and transfer of care from Dino Khan PA-C. See Separate Emergency Department note.     The patient, Emerald Cee, was evaluated by the previous provider for abdominal pain.    Workup Completed:  Blood work, urine, CT scan recommended Ultrasound of Pelvis    ED Course / Workup Pending (followup):  Ultrasound ordered and Pending                              ED Course as of 08/02/24 0956   Fri Aug 02, 2024   0838 Ultrasound completed- awaiting on results for official read   0915 US pelvis complete w transvaginal  Left hematosalpinx and left hemorrhagic ovarian cyst or endometrioma. The structure measured 4.1 x 2.7 cm on the April 30, 2021 CT. Follow-up ultrasound recommended in 3 months.     Procedures  Medical Decision Making  49 y/o female patient received in sign out from Dino Khan PA-C and had a complete work up of abdominal pain. CT scan recommended Ultrasound of pelvis. Patient has left hemorrhagic cyst. She does have OBGYN established.     Abdominal exam is without peritoneal signs.  No evidence of acute abdomen at this time.  Well-appearing.  Given workup, low suspicion for acute biliary disease, acute pancreatitis, acute infectious process to include but not limited to pneumonia, pyelonephritis, appendicitis.  Presentation not consistent with other acute, emergent causes for abdominal pain at this time.  CBC shows no leukocytosis, anemia.  CMP negative for metabolic derangements.  Lipase negative for pancreatitis.  Advised to follow-up with OBGYN.  Return to the ER symptoms worsens or questions or concerns arise at home.      Amount and/or Complexity of Data Reviewed  Labs: ordered.  Radiology: ordered. Decision-making details documented in ED Course.    Risk  OTC drugs.  Prescription drug management.            Disposition  Final diagnoses:   Abdominal pain   Ovarian cyst     Time reflects when diagnosis was documented in both MDM as applicable and  the Disposition within this note       Time User Action Codes Description Comment    8/2/2024  9:24 AM Erin Marin Add [R10.9] Abdominal pain     8/2/2024  9:24 AM Erin Marin Add [N83.209] Ovarian cyst           ED Disposition       ED Disposition   Discharge    Condition   Stable    Date/Time   Fri Aug 2, 2024  9:24 AM    Comment   Emerald Cee discharge to home/self care.                   Follow-up Information       Follow up With Specialties Details Why Contact Info Additional Information    [facility], Nurse Practitioner   [address] [place], [place] [address] [address]  [phone]       [facility]   [facility] [address] [phone] [facility], [facility] [address] [place], [place], [address] [phone]          Patient's Medications   Discharge Prescriptions    No medications on file     No discharge procedures on file.       ED Provider  Electronically Signed by     DAVE Cabrera  08/02/24 0956

## 2024-08-02 NOTE — ED NOTES
Radiology tech in with PO contrast x2 large foam cups. Education with risk vs benefits expressed via Radiology tech, pt demonstrated understanding.     Ganga Fernandes RN  08/02/24 6405

## 2024-08-02 NOTE — DISCHARGE INSTRUCTIONS
Tylenol/Motrin   Follow up with OBGYN  Return to ER if symptoms worsen   Eucrisa Counseling: Patient may experience a mild burning sensation during topical application. Eucrisa is not approved in children less than 3 months of age.

## 2024-08-05 ENCOUNTER — OFFICE VISIT (OUTPATIENT)
Dept: GYNECOLOGY | Facility: CLINIC | Age: 49
End: 2024-08-05
Payer: COMMERCIAL

## 2024-08-05 VITALS
HEIGHT: 65 IN | BODY MASS INDEX: 44.65 KG/M2 | WEIGHT: 268 LBS | DIASTOLIC BLOOD PRESSURE: 80 MMHG | SYSTOLIC BLOOD PRESSURE: 144 MMHG

## 2024-08-05 DIAGNOSIS — N83.202 CYSTS OF BOTH OVARIES: Primary | ICD-10-CM

## 2024-08-05 DIAGNOSIS — E66.01 MORBID OBESITY (HCC): ICD-10-CM

## 2024-08-05 DIAGNOSIS — N83.201 CYSTS OF BOTH OVARIES: Primary | ICD-10-CM

## 2024-08-05 DIAGNOSIS — R73.03 PREDIABETES: ICD-10-CM

## 2024-08-05 DIAGNOSIS — G47.33 OSA (OBSTRUCTIVE SLEEP APNEA): ICD-10-CM

## 2024-08-05 PROCEDURE — 99213 OFFICE O/P EST LOW 20 MIN: CPT | Performed by: OBSTETRICS & GYNECOLOGY

## 2024-08-05 RX ORDER — SEMAGLUTIDE 0.25 MG/.5ML
INJECTION, SOLUTION SUBCUTANEOUS
Qty: 4 ML | Refills: 0 | OUTPATIENT
Start: 2024-08-05

## 2024-08-05 NOTE — PROGRESS NOTES
48-year-old G5, P3 patient presents to the office for follow-up following an emergency room visit on August 2, 2024.  Patient arrived to the emergency room complaining of severe abdominal pain and distention.  She had a abdominal pelvic ultrasound and CT scan.  It took 1 week before her distended abdomen bloating but resolved on its own.  Her bowel movements were normal during this time.    History significant for a LAVH bilateral salpingectomy October 2021.  At the time multiple pelvic adhesions were noted.  No ovarian cyst were noted at the time of surgery.  Pathology on the uterus and tubes were benign.    Pelvic ultrasound showed the left adnexa measuring 7.7 x 2.7 x 4.4 cm.  As well is a 5.8 x 4.1 x 4.3 left ovarian cyst.  They believe that the elongated structure is a fallopian tube.  As stated above her tube was removed.    A right ovarian cyst measuring 3.9 x 2.1 x 3.2 cm was also noted.  Patient is not having any dyspareunia or lower pelvic pain.    Physical exam: Abdomen soft nontender.  External genitalia normal.  Vaginal cuff well supported.  No cystocele or rectocele.  No adnexal tenderness.  Unable to feel the adnexa adequately secondary to body habitus.    Impression: Abdominal distention and bloating x 1 week; resolved.  Bilateral ovarian cysts noted on ultrasound and CT scan.  History of LAVH bilateral salpingectomy October 2021.    Plan: Check pelvic ultrasound in 3 months.  Will go for ultrasound if develops regular abdominal pain sooner.  Return to office for annual exam

## 2024-08-05 NOTE — TELEPHONE ENCOUNTER
Patient called requesting refill for Wegovy 0.5 mg. Patient made aware medication was refilled on 7/29/24 for 2 ml with 0 refills to [facility] pharmacy. Patient instructed to contact the pharmacy to obtain refills of medication. Patient verbalized understanding.

## 2024-08-05 NOTE — TELEPHONE ENCOUNTER
0.5mg sent 07/29/24   Detail Level: Zone General Sunscreen Counseling: I recommended a broad spectrum sunscreen with a SPF of 30 or higher.  I explained that SPF 30 sunscreens block approximately 97 percent of the sun's harmful rays.  Sunscreens should be applied at least 15 minutes prior to expected sun exposure and then every 2 hours after that as long as sun exposure continues. If swimming or exercising sunscreen should be reapplied every 45 minutes to an hour after getting wet or sweating.  One ounce, or the equivalent of a shot glass full of sunscreen, is adequate to protect the skin not covered by a bathing suit. I also recommended a lip balm with a sunscreen as well. Sun protective clothing can be used in lieu of sunscreen but must be worn the entire time you are exposed to the sun's rays.

## 2024-08-21 DIAGNOSIS — E66.01 MORBID OBESITY (HCC): Primary | ICD-10-CM

## 2024-09-18 DIAGNOSIS — E66.01 MORBID OBESITY (HCC): Primary | ICD-10-CM

## 2024-09-23 ENCOUNTER — TELEPHONE (OUTPATIENT)
Age: 49
End: 2024-09-23

## 2024-09-23 NOTE — TELEPHONE ENCOUNTER
Patient's daughter, Jacinda, called to advise [employer] will be faxing a form to be completed in order for power to remain available due to patient's SHARI and use of C-PAP.    [facility] bill is in the daughter's name.    Patient on the call as well, and request form to be completed ASAP.    Patient and daughter reside together.

## 2024-10-10 ENCOUNTER — TELEPHONE (OUTPATIENT)
Dept: BARIATRICS | Facility: CLINIC | Age: 49
End: 2024-10-10

## 2024-11-05 ENCOUNTER — TELEPHONE (OUTPATIENT)
Dept: BARIATRICS | Facility: CLINIC | Age: 49
End: 2024-11-05

## 2024-11-05 NOTE — TELEPHONE ENCOUNTER
Pt called back to schedule a nurse appt.  Unfortunately, the office was closed so I told pt I would have someone give her a call tomorrow

## 2024-11-05 NOTE — TELEPHONE ENCOUNTER
Jelli message sent to patient 11/1/24 came back as unread.  Left patient voicemail letting her know she needs a nurse visit so we can complete the prior authorization renewal that we have for her.

## 2024-11-08 NOTE — TELEPHONE ENCOUNTER
PATIENT STILL HAS NOT RECEIVED CALL TO SCHEDULE NURSE APPT. COULD NOT TRANSFER TO OFFICE. PLEASE CALL BACK THANK YOU.

## 2024-11-13 ENCOUNTER — CLINICAL SUPPORT (OUTPATIENT)
Dept: BARIATRICS | Facility: CLINIC | Age: 49
End: 2024-11-13

## 2024-11-13 VITALS
DIASTOLIC BLOOD PRESSURE: 82 MMHG | HEIGHT: 66 IN | WEIGHT: 244.6 LBS | BODY MASS INDEX: 39.31 KG/M2 | RESPIRATION RATE: 16 BRPM | SYSTOLIC BLOOD PRESSURE: 122 MMHG | HEART RATE: 64 BPM

## 2024-11-13 DIAGNOSIS — E66.812 OBESITY, CLASS II, BMI 35-39.9: Primary | ICD-10-CM

## 2024-11-13 DIAGNOSIS — E66.01 MORBID OBESITY (HCC): ICD-10-CM

## 2024-11-13 PROCEDURE — RECHECK

## 2024-11-13 NOTE — Clinical Note
Patient in office for [facility] weight check.  She is currently on 1.7MG dose and has 0 injections left.  She needs prescription sent in asap.  Patient denies any side effects/symptoms.  Wt Readings from Last 3 Encounters: 11/13/24 : 111 kg (244 lb 9.6 oz) 08/05/24 : 122 kg (268 lb) 08/02/24 : 122 kg (269 lb 13.5 oz)

## 2024-11-14 ENCOUNTER — TELEPHONE (OUTPATIENT)
Dept: BARIATRICS | Facility: CLINIC | Age: 49
End: 2024-11-14

## 2024-11-14 NOTE — PROGRESS NOTES
Attempted to contact patient in reference to her labs, no answer but a voicemail was left for patient to return my call.   Will refill Wegovy for 30 days, but needs sooner provider visit  Joellen or Alivia if possible

## 2024-11-14 NOTE — TELEPHONE ENCOUNTER
Wegovy 1.7 MG Approved 11/14/24-11/14/25.  Pharmacy was notified and they're getting the medication ready for the patient.  Patient co-pay $24.99.  Patient notified by phone.

## 2024-11-14 NOTE — TELEPHONE ENCOUNTER
----- Message from Carmen VELA sent at 11/13/2024  3:46 PM EST -----  Patient in office for Wegovy weight check.  She is currently on 1.7MG dose and has 0 injections left.  She needs prescription sent in asa.  Patient denies any side effects/symptoms.    Wt Readings from Last 3 Encounters:  11/13/24 : 111 kg (244 lb 9.6 oz)  08/05/24 : 122 kg (268 lb)  08/02/24 : 122 kg (269 lb 13.5 oz)

## 2024-12-18 ENCOUNTER — OFFICE VISIT (OUTPATIENT)
Dept: BARIATRICS | Facility: CLINIC | Age: 49
End: 2024-12-18
Payer: COMMERCIAL

## 2024-12-18 VITALS
WEIGHT: 240 LBS | RESPIRATION RATE: 12 BRPM | BODY MASS INDEX: 38.57 KG/M2 | HEIGHT: 66 IN | HEART RATE: 60 BPM | DIASTOLIC BLOOD PRESSURE: 80 MMHG | SYSTOLIC BLOOD PRESSURE: 126 MMHG

## 2024-12-18 DIAGNOSIS — E66.812 OBESITY, CLASS II, BMI 35-39.9: Primary | ICD-10-CM

## 2024-12-18 PROCEDURE — 99213 OFFICE O/P EST LOW 20 MIN: CPT

## 2024-12-18 NOTE — PATIENT INSTRUCTIONS
Wegovy Instructions:    - Begin Wegovy 0.25 mg subcutaneously once a week. Dose changes may occur after 4 doses if medication is tolerated. You will be assessed prior to each dose change to make sure you are tolerating the medication well.  - Please message me when you have 2 pens left from the prescription so there are no lapses in treatment.  - If you have been off the medication for more than 14 days please contact the office as you will need to restart the titration at the starting dose again to avoid significant side effects or adverse events.  - Visit wegovy.com for further information/injection instructions.   -Please eat small frequent meals to help reduce nausea. Lemon water and saltine crackers may help with this.   - Side effects of Wegovy discussed: nausea, vomiting, diarrhea, and constipation. If you experience fever, nausea/vomiting, and pain radiating to your back this may be a sign of pancreatitis. Please go to the emergency room if this occurs.  - Patient understands the side effects of the medication and proper administration. Patient agrees with the treatment plan and all questions were answered.

## 2024-12-18 NOTE — ASSESSMENT & PLAN NOTE
- Patient is pursuing Conservative Program and follow up visits with medical weight management provider  - Initial weight loss goal of 5-10% weight loss for improved health. Weight loss can improve patient's co-morbid conditions and/or prevent weight-related complications.  - Explained the importance of continuing lifestyle changes in addition to any anti-obesity medications.   - Labs reviewed from 8/24    General Recommendations:  Nutrition:  Eat breakfast daily.  Do not skip meals.      Food log (ie.) www.GuestShots.com, sparkpeople.com, loseit.com, calorieking.com, etc.     Practice mindful eating.  Be sure to set aside time to eat, eat slowly, and savor your food.     Hydration:    At least 64oz of water daily.  No sugar sweetened beverages.  No juice (eat the fruit instead).     Exercise:  Studies have shown that the ideal exercise goal is somewhere between 150 to 300 minutes of moderate intensity exercise a week.  Start with exercising 10 minutes every other day and gradually increase physical activity with a goal of at least 150 minutes of moderate intensity exercise a week, divided over at least 3 days a week.  An example of this would be exercising 30 minutes a day, 5 days a week.  Resistance training can increase muscle mass and increase our resting metabolic rate.   FULL BODY resistance training is recommended 2-3 times a week.  Do not do this on consecutive days to allow for muscle recovery.     Aim for a bare minimum 5000 steps, even on days you do not exercise.     Monitoring:   Weigh yourself daily.  If this causes undue stress, then just weigh yourself once a week.  Weigh yourself the same time of the day with the same amount of clothing on.  Preferably this should be done after waking up, before you eat, and with no clothing or minimal clothing on.     Specific Goals:  -Discussed her nutrition and lifestyle. She is encouraged to not skip meals and even take a protein shake for breakfast if she  doesn't have time. Discussed that the medication will help as a tool but she needs to follow the lifestyle changes and the sleeve protocol to see the weight loss. Encouraged to exercise 1-2 days per week to start and increase as able. Encouraged to increase her water intake to at least 64oz per day. Wegovy increased to 2.4mg and follow up in 3 months.   -Referred to surgery team for annual follow for sp sleeve.

## 2024-12-18 NOTE — PROGRESS NOTES
Assessment/Plan:     Obesity, Class II, BMI 35-39.9  - Patient is pursuing Conservative Program and follow up visits with medical weight management provider  - Initial weight loss goal of 5-10% weight loss for improved health. Weight loss can improve patient's co-morbid conditions and/or prevent weight-related complications.  - Explained the importance of continuing lifestyle changes in addition to any anti-obesity medications.   - Labs reviewed from 8/24    General Recommendations:  Nutrition:  Eat breakfast daily.  Do not skip meals.      Food log (ie.) www.TwtBks.[email or web address], sparkpeople.com, loseit.com, calorieking.com, etc.     Practice mindful eating.  Be sure to set aside time to eat, eat slowly, and savor your food.     Hydration:    At least 64oz of water daily.  No sugar sweetened beverages.  No juice (eat the fruit instead).     Exercise:  Studies have shown that the ideal exercise goal is somewhere between 150 to 300 minutes of moderate intensity exercise a week.  Start with exercising 10 minutes every other day and gradually increase physical activity with a goal of at least 150 minutes of moderate intensity exercise a week, divided over at least 3 days a week.  An example of this would be exercising 30 minutes a day, 5 days a week.  Resistance training can increase muscle mass and increase our resting metabolic rate.   FULL BODY resistance training is recommended 2-3 times a week.  Do not do this on consecutive days to allow for muscle recovery.     Aim for a bare minimum 5000 steps, even on days you do not exercise.     Monitoring:   Weigh yourself daily.  If this causes undue stress, then just weigh yourself once a week.  Weigh yourself the same time of the day with the same amount of clothing on.  Preferably this should be done after waking up, before you eat, and with no clothing or minimal clothing on.     Specific Goals:  -Discussed her nutrition and lifestyle. She is encouraged to not skip meals  and even take a protein shake for breakfast if she doesn't have time. Discussed that the medication will help as a tool but she needs to follow the lifestyle changes and the sleeve protocol to see the weight loss. Encouraged to exercise 1-2 days per week to start and increase as able. Encouraged to increase her water intake to at least 64oz per day. Wegovy increased to 2.4mg and follow up in 3 months.   -Referred to surgery team for annual follow for sp sleeve.          Emerald was seen today for follow-up.    Diagnoses and all orders for this visit:    Obesity, Class II, BMI 35-39.9  -     Semaglutide-Weight Management (WEGOVY) 2.4 MG/0.75ML; Inject 0.75 mL (2.4 mg total) under the skin once a week        Total time spent reviewing chart, interviewing patient, examining patient, discussing plan, answering all questions, and documentin minutes with >50% face-to-face time with the patient.    Follow up in approximately 3 months with Non-Surgical Physician/Advanced Practitioner.    Subjective:   Chief Complaint   Patient presents with    Follow-up     Patient takes wegovy 1.7mg and reports No side effects.       Patient ID: Emerald Cee  is a 49 y.o. female with excess weight/obesity here to pursue weight management.  Patient is pursuing Conservative Program.   Most recent notes and records were reviewed.    HPI    Wt Readings from Last 20 Encounters:   24 109 kg (240 lb)   24 111 kg (244 lb 9.6 oz)   24 122 kg (268 lb)   24 122 kg (269 lb 13.5 oz)   07/15/24 124 kg (274 lb)   06/10/24 123 kg (271 lb 6.4 oz)   24 125 kg (276 lb)   24 122 kg (268 lb)   24 124 kg (273 lb)   23 118 kg (261 lb)   23 117 kg (259 lb)   23 118 kg (261 lb)   10/24/23 116 kg (256 lb)   10/18/23 114 kg (251 lb)   10/15/23 114 kg (252 lb)   23 117 kg (257 lb)   23 117 kg (258 lb)   23 118 kg (259 lb 11.2 oz)   23 119 kg (262 lb 5.6 oz)   23 119 kg (263  "lb)       Patient presents today to medical weight management office for follow up. Doing well on the wegovy no side effects.     Weight loss medication and dose: Wegovy 1.7mg   Started weight and date: 300 lbs post sleeve 2018 / 271lbs at MW consult 6/10/2024  Current weight: 240 lbs    Difference: -31 pounds     Starting BMI: 42.4  Current BMI: 39              B- Skip  L- egg sandwich takes her all day to eat   S- nutrition fruit bars   D- Chicken wings few bites then done   S- Sugar free candy   Take out frequency: 1-3 days per week     Hydration- water with lemon not hydrating well/ drinks apple juice water down with ice   Alcohol- no   Exercise- does squats           The following portions of the patient's history were reviewed and updated as appropriate: allergies, current medications, past family history, past medical history, past social history, past surgical history, and problem list.    Family History   Problem Relation Age of Onset    Brain cancer Mother     Cancer Father     Asthma Brother     Diabetes Maternal Grandmother     Cancer Maternal Grandmother     Hypertension Paternal Grandmother         Review of Systems   Constitutional:  Negative for fatigue.   HENT:  Negative for sore throat.    Respiratory:  Negative for cough and shortness of breath.    Cardiovascular:  Negative for chest pain, palpitations and leg swelling.   Gastrointestinal:  Negative for abdominal pain, constipation, diarrhea, nausea and vomiting.   Genitourinary:  Negative for dysuria.   Musculoskeletal:  Negative for arthralgias and back pain.   Skin:  Negative for rash.   Neurological:  Negative for headaches.   Psychiatric/Behavioral:  Negative for dysphoric mood. The patient is not nervous/anxious.        Objective:  /80 (BP Location: Left arm, Patient Position: Sitting, Cuff Size: Large)   Pulse 60   Resp 12   Ht 5' 5.5\" (1.664 m)   Wt 109 kg (240 lb)   LMP 09/12/2021 (Exact Date)   BMI 39.33 kg/m²     Physical " Exam  Vitals and nursing note reviewed.   Constitutional:       Appearance: Normal appearance. She is obese.   HENT:      Head: Normocephalic.   Pulmonary:      Effort: Pulmonary effort is normal.   Neurological:      Mental Status: She is oriented to person, place, and time.   Psychiatric:         Mood and Affect: Mood normal.         Behavior: Behavior normal.         Thought Content: Thought content normal.         Judgment: Judgment normal.            Labs   Most recent labs reviewed   Lab Results   Component Value Date    SODIUM 139 08/02/2024    K 3.4 (L) 08/02/2024     08/02/2024    CO2 26 08/02/2024    AGAP 8 08/02/2024    BUN 10 08/02/2024    CREATININE 0.68 08/02/2024    GLUC 110 08/02/2024    GLUF 86 03/19/2024    CALCIUM 8.8 08/02/2024    AST 13 08/02/2024    ALT 8 08/02/2024    ALKPHOS 61 08/02/2024    TP 7.6 08/02/2024    TBILI 0.43 08/02/2024    EGFR 104 08/02/2024     Lab Results   Component Value Date    HGBA1C 5.8 (H) 03/19/2024     Lab Results   Component Value Date    RSC1EQBDLWNP 2.861 09/18/2023    TSH 0.92 01/06/2020     Lab Results   Component Value Date    CHOLESTEROL 248 (H) 03/19/2024     Lab Results   Component Value Date    HDL 54 03/19/2024     Lab Results   Component Value Date    TRIG 270 (H) 03/19/2024     Lab Results   Component Value Date    LDLCALC 140 (H) 03/19/2024

## 2024-12-19 ENCOUNTER — TELEPHONE (OUTPATIENT)
Dept: BARIATRICS | Facility: CLINIC | Age: 49
End: 2024-12-19

## 2024-12-23 ENCOUNTER — TELEPHONE (OUTPATIENT)
Dept: BARIATRICS | Facility: CLINIC | Age: 49
End: 2024-12-23

## 2025-01-09 ENCOUNTER — OFFICE VISIT (OUTPATIENT)
Age: 50
End: 2025-01-09

## 2025-01-09 VITALS
OXYGEN SATURATION: 98 % | DIASTOLIC BLOOD PRESSURE: 79 MMHG | HEART RATE: 73 BPM | TEMPERATURE: 98.3 F | SYSTOLIC BLOOD PRESSURE: 114 MMHG | BODY MASS INDEX: 38.68 KG/M2 | RESPIRATION RATE: 16 BRPM | WEIGHT: 236 LBS

## 2025-01-09 DIAGNOSIS — Z20.2 POSSIBLE EXPOSURE TO STD: Primary | ICD-10-CM

## 2025-01-09 PROCEDURE — 87591 N.GONORRHOEAE DNA AMP PROB: CPT | Performed by: OBSTETRICS & GYNECOLOGY

## 2025-01-09 PROCEDURE — 99202 OFFICE O/P NEW SF 15 MIN: CPT | Performed by: OBSTETRICS & GYNECOLOGY

## 2025-01-09 PROCEDURE — 87491 CHLMYD TRACH DNA AMP PROBE: CPT | Performed by: OBSTETRICS & GYNECOLOGY

## 2025-01-09 PROCEDURE — 81514 NFCT DS BV&VAGINITIS DNA ALG: CPT | Performed by: OBSTETRICS & GYNECOLOGY

## 2025-01-09 NOTE — PROGRESS NOTES
Patient presents to the office today requesting STD testing.  Has 1 partner and uses condoms.  States that the condom broke and would like testing today.  Also was seen for pelvic pain approximately 3 months ago and had ultrasound findings as stated below.  Presently denies any pelvic pain or discomfort.  Denies any fevers or chills.          Pelvic ultrasound August 2024 showed the uterus to be absent.  A unilocular 3.9 x 3.3 x 2.1 cm right ovarian cyst was noted.  Left ovary was tubular shaped multiseptated measuring 7.7 x 2.7 x 4.4 cm with an adjacent multiseptated 5.8 x 4.1 x 4.7 ovarian lesion.  Follow-up ultrasound was recommended at 3 months.      Patient went a LAVH bilateral salpingectomy for multiple fibroid uterus and heavy bleeding on September 28, 2021.  At the time of surgery pelvic adhesions were noted.  Ovaries appeared normal.        Physical exam: Abdomen soft nontender.  External genitalia normal.  Vagina clear.   Vaginal cuff well supported without lesions.  No adnexal tenderness.        Impression: Request for STD testing.  Bilateral ovarian cyst.  Status post LAVH bilateral salpingectomy.    Plan: Check cultures.  Check RPR and HIV.  Schedule pelvic ultrasound.

## 2025-01-10 LAB
C GLABRATA DNA VAG QL NAA+PROBE: NEGATIVE
C KRUSEI DNA VAG QL NAA+PROBE: NEGATIVE
C TRACH DNA SPEC QL NAA+PROBE: NEGATIVE
CANDIDA SP 6 PNL VAG NAA+PROBE: NEGATIVE
N GONORRHOEA DNA SPEC QL NAA+PROBE: NEGATIVE
T VAGINALIS DNA VAG QL NAA+PROBE: NEGATIVE
VAGINOSIS/ITIS DNA PNL VAG PROBE+SIG AMP: POSITIVE

## 2025-01-13 ENCOUNTER — RESULTS FOLLOW-UP (OUTPATIENT)
Dept: OBGYN CLINIC | Facility: CLINIC | Age: 50
End: 2025-01-13

## 2025-01-13 ENCOUNTER — APPOINTMENT (OUTPATIENT)
Dept: LAB | Facility: CLINIC | Age: 50
End: 2025-01-13
Payer: COMMERCIAL

## 2025-01-13 DIAGNOSIS — B96.89 BV (BACTERIAL VAGINOSIS): Primary | ICD-10-CM

## 2025-01-13 DIAGNOSIS — R79.0 LOW FERRITIN: ICD-10-CM

## 2025-01-13 DIAGNOSIS — K91.2 POSTSURGICAL MALABSORPTION: ICD-10-CM

## 2025-01-13 DIAGNOSIS — R79.89 LOW VITAMIN B12 LEVEL: ICD-10-CM

## 2025-01-13 DIAGNOSIS — E55.9 VITAMIN D DEFICIENCY: ICD-10-CM

## 2025-01-13 DIAGNOSIS — N76.0 BV (BACTERIAL VAGINOSIS): Primary | ICD-10-CM

## 2025-01-13 DIAGNOSIS — Z20.2 POSSIBLE EXPOSURE TO STD: ICD-10-CM

## 2025-01-13 LAB
25(OH)D3 SERPL-MCNC: 15.7 NG/ML (ref 30–100)
FERRITIN SERPL-MCNC: 27 NG/ML (ref 11–307)
IRON SATN MFR SERPL: 22 % (ref 15–50)
IRON SERPL-MCNC: 75 UG/DL (ref 50–212)
TIBC SERPL-MCNC: 338.8 UG/DL (ref 250–450)
TRANSFERRIN SERPL-MCNC: 242 MG/DL (ref 203–362)
UIBC SERPL-MCNC: 264 UG/DL (ref 155–355)
VIT B12 SERPL-MCNC: 259 PG/ML (ref 180–914)

## 2025-01-13 PROCEDURE — 82607 VITAMIN B-12: CPT

## 2025-01-13 PROCEDURE — 82728 ASSAY OF FERRITIN: CPT

## 2025-01-13 PROCEDURE — 83550 IRON BINDING TEST: CPT

## 2025-01-13 PROCEDURE — 87389 HIV-1 AG W/HIV-1&-2 AB AG IA: CPT

## 2025-01-13 PROCEDURE — 83540 ASSAY OF IRON: CPT

## 2025-01-13 PROCEDURE — 82306 VITAMIN D 25 HYDROXY: CPT

## 2025-01-13 PROCEDURE — 36415 COLL VENOUS BLD VENIPUNCTURE: CPT

## 2025-01-13 PROCEDURE — 86780 TREPONEMA PALLIDUM: CPT

## 2025-01-13 RX ORDER — METRONIDAZOLE 7.5 MG/G
1 GEL VAGINAL
Qty: 70 G | Refills: 1 | Status: SHIPPED | OUTPATIENT
Start: 2025-01-13

## 2025-01-13 NOTE — TELEPHONE ENCOUNTER
Back negative for chlamydia and GC but positive for BV.  Plan: Rx MetroGel vaginal cream at bedtime x 5 days

## 2025-01-14 ENCOUNTER — RESULTS FOLLOW-UP (OUTPATIENT)
Dept: BARIATRICS | Facility: CLINIC | Age: 50
End: 2025-01-14

## 2025-01-14 DIAGNOSIS — K91.2 POSTSURGICAL MALABSORPTION: Primary | ICD-10-CM

## 2025-01-14 DIAGNOSIS — R79.89 LOW VITAMIN B12 LEVEL: ICD-10-CM

## 2025-01-14 DIAGNOSIS — E55.9 VITAMIN D DEFICIENCY: ICD-10-CM

## 2025-01-14 LAB
HIV 1+2 AB+HIV1 P24 AG SERPL QL IA: NORMAL
HIV 2 AB SERPL QL IA: NORMAL
HIV1 AB SERPL QL IA: NORMAL
HIV1 P24 AG SERPL QL IA: NORMAL
TREPONEMA PALLIDUM IGG+IGM AB [PRESENCE] IN SERUM OR PLASMA BY IMMUNOASSAY: NORMAL

## 2025-01-14 RX ORDER — CYANOCOBALAMIN 1000 UG/ML
1000 INJECTION, SOLUTION INTRAMUSCULAR; SUBCUTANEOUS WEEKLY
Status: SHIPPED | OUTPATIENT
Start: 2025-01-14 | End: 2025-02-11

## 2025-01-14 RX ORDER — ERGOCALCIFEROL 1.25 MG/1
50000 CAPSULE, LIQUID FILLED ORAL
Qty: 24 CAPSULE | Refills: 0 | Status: SHIPPED | OUTPATIENT
Start: 2025-01-16

## 2025-01-14 NOTE — RESULT ENCOUNTER NOTE
-You have a vitamin D deficiency. Please start taking the vitamin D deficiency prescription that I am sending for 50,000IU 2x/week with FOOD x 12 weeks - take with food for best absorption. We will repeat vitamin D lab in about 4 months.    --Vitamin B12 is low - Please take an additional 2,000mcg sublingual B12 daily x 3 months. This can be found inexpensively OTC. I also recommend 1,000mcg IM B12 shots in the office; once weekly x 4 doses.    -Ferritin improving and now only low normal - continue with extra Vitron C with Elier MVI with 45mg iron daily

## 2025-02-04 NOTE — ASSESSMENT & PLAN NOTE
-s/p Vertical Sleeve Gastrectomy with Dr. Mayer in 2018.  She is doing very well - down 35lbs in the last 8 months and doing great on Wegovy. Continue f/u with MWM as directed and recommend regular f/u with the RD for additional support. F/u with me in 1 year.    Needs surveillance EGD to assess esophagus and r/o Aguilar's and also needs Colonoscopy - repeat referral to Dr. Conway placed - she never followed up last year- reinforced need to do routine screenings - she verbalizes understanding and repeat referral placed.    Initial: 300lbs  Current: 236.2lbs  Carlos: 219lbs  Current BMI is Body mass index is 38.71 kg/m².    Tolerating a regular diet-yes  Eating at least 60 grams of protein per day-no  Following 30/60 minute rule with liquids-no  Drinking at least 64 ounces of fluid per day-no  Drinking carbonated beverages-no  Sufficient exercise-no  Using NSAIDs regularly-no  Using nicotine-no  Using alcohol-no. Advised about the risks of alcohol s/p bariatric surgery and recommend avoiding all alcohol

## 2025-02-04 NOTE — ASSESSMENT & PLAN NOTE
-Take protonix 40mg consistently daily   -Advised avoidance of food triggers and gastric irritants, follow anti-reflux diet and lifestyle measures  -EGD to assess esophagus and r/o Aguilar's - Repeat referral to GI as she needs colon screening as well

## 2025-02-04 NOTE — ASSESSMENT & PLAN NOTE
-Mild SHARI, not wearing CPAP  -Encouraged consistent use of CPAP and follow up with sleep medicine as directed

## 2025-02-11 ENCOUNTER — OFFICE VISIT (OUTPATIENT)
Dept: BARIATRICS | Facility: CLINIC | Age: 50
End: 2025-02-11
Payer: COMMERCIAL

## 2025-02-11 VITALS
RESPIRATION RATE: 16 BRPM | HEART RATE: 57 BPM | HEIGHT: 66 IN | DIASTOLIC BLOOD PRESSURE: 78 MMHG | WEIGHT: 236.2 LBS | SYSTOLIC BLOOD PRESSURE: 118 MMHG | BODY MASS INDEX: 37.96 KG/M2

## 2025-02-11 DIAGNOSIS — K91.2 POSTSURGICAL MALABSORPTION: ICD-10-CM

## 2025-02-11 DIAGNOSIS — Z12.11 COLON CANCER SCREENING: ICD-10-CM

## 2025-02-11 DIAGNOSIS — G47.33 OSA (OBSTRUCTIVE SLEEP APNEA): ICD-10-CM

## 2025-02-11 DIAGNOSIS — R79.89 LOW VITAMIN B12 LEVEL: ICD-10-CM

## 2025-02-11 DIAGNOSIS — K21.9 GERD (GASTROESOPHAGEAL REFLUX DISEASE): ICD-10-CM

## 2025-02-11 DIAGNOSIS — E78.2 MIXED HYPERLIPIDEMIA: ICD-10-CM

## 2025-02-11 DIAGNOSIS — Z48.815 ENCOUNTER FOR SURGICAL AFTERCARE FOLLOWING SURGERY OF DIGESTIVE SYSTEM: Primary | ICD-10-CM

## 2025-02-11 PROCEDURE — 96372 THER/PROPH/DIAG INJ SC/IM: CPT

## 2025-02-11 PROCEDURE — 99214 OFFICE O/P EST MOD 30 MIN: CPT | Performed by: PHYSICIAN ASSISTANT

## 2025-02-11 RX ORDER — CYANOCOBALAMIN 1000 UG/ML
1000 INJECTION, SOLUTION INTRAMUSCULAR; SUBCUTANEOUS WEEKLY
Status: DISCONTINUED | OUTPATIENT
Start: 2025-02-11 | End: 2025-02-11

## 2025-02-11 RX ADMIN — CYANOCOBALAMIN 1000 MCG: 1000 INJECTION, SOLUTION INTRAMUSCULAR; SUBCUTANEOUS at 03:45

## 2025-02-11 RX ADMIN — CYANOCOBALAMIN 1000 MCG: 1000 INJECTION, SOLUTION INTRAMUSCULAR; SUBCUTANEOUS at 16:05

## 2025-02-11 NOTE — ASSESSMENT & PLAN NOTE
-At risk for malabsorption of vitamins/minerals secondary to malabsorption and restriction of intake from bariatric surgery  -NOT Currently taking adequate postop bariatric surgery vitamin supplementation: Prenatal MVI, Vitamin D Rx,  - start Elier MVI and calcium citrate or carbonate 12-1500mg daily    Recent repeat labs:   -You have a vitamin D deficiency. Please start taking the vitamin D deficiency prescription that I am sending for 50,000IU 2x/week with FOOD x 12 weeks - take with food for best absorption. We will repeat vitamin D lab in about 4 months.     --Vitamin B12 is low - Please take an additional 2,000mcg sublingual B12 daily x 3 months. This can be found inexpensively OTC. I also recommend 1,000mcg IM B12 shots in the office; once weekly x 4 doses.     -Ferritin improving and now only low normal - continue with extra Vitron C with Elier MVI with 45mg iron daily    -Obtain CBC/Metabolic panel in about 2 months  -Patient received education about the importance of adhering to a lifelong supplementation regimen to avoid vitamin/mineral deficiencies

## 2025-02-11 NOTE — PROGRESS NOTES
Assessment/Plan:    Encounter for surgical aftercare following surgery of digestive system  -s/p Vertical Sleeve Gastrectomy with Dr. Mayer in 2018.  She is doing very well - down 35lbs in the last 8 months and doing great on Wegovy. Continue f/u with MWM as directed and recommend regular f/u with the RD for additional support. F/u with me in 1 year.    Needs surveillance EGD to assess esophagus and r/o Aguilar's and also needs Colonoscopy - repeat referral to Dr. Conway placed - she never followed up last year- reinforced need to do routine screenings - she verbalizes understanding and repeat referral placed.    Initial: 300lbs  Current: 236.2lbs  Carlos: 219lbs  Current BMI is Body mass index is 38.71 kg/m².    Tolerating a regular diet-yes  Eating at least 60 grams of protein per day-no  Following 30/60 minute rule with liquids-no  Drinking at least 64 ounces of fluid per day-no  Drinking carbonated beverages-no  Sufficient exercise-no  Using NSAIDs regularly-no  Using nicotine-no  Using alcohol-no. Advised about the risks of alcohol s/p bariatric surgery and recommend avoiding all alcohol      SHARI (obstructive sleep apnea)  -Mild SHARI, not wearing CPAP  -Encouraged consistent use of CPAP and follow up with sleep medicine as directed          GERD (gastroesophageal reflux disease)  -Take protonix 40mg consistently daily   -Advised avoidance of food triggers and gastric irritants, follow anti-reflux diet and lifestyle measures  -EGD to assess esophagus and r/o Aguilar's - Repeat referral to GI as she needs colon screening as well      Hyperlipidemia  -Avoid fried foods and trans fat, limit saturated fats and refined carbohydrates  -Increase fish/omega 3 FA consumption  -Increase physical activity  -obtain lipid panel  -on fish oil    Postsurgical malabsorption  -At risk for malabsorption of vitamins/minerals secondary to malabsorption and restriction of intake from bariatric surgery  -NOT Currently taking adequate  postop bariatric surgery vitamin supplementation: Prenatal MVI, Vitamin D Rx,  - start Elier MVI and calcium citrate or carbonate 12-1500mg daily    Recent repeat labs:   -You have a vitamin D deficiency. Please start taking the vitamin D deficiency prescription that I am sending for 50,000IU 2x/week with FOOD x 12 weeks - take with food for best absorption. We will repeat vitamin D lab in about 4 months.     --Vitamin B12 is low - Please take an additional 2,000mcg sublingual B12 daily x 3 months. This can be found inexpensively OTC. I also recommend 1,000mcg IM B12 shots in the office; once weekly x 4 doses.     -Ferritin improving and now only low normal - continue with extra Vitron C with Elier MVI with 45mg iron daily    -Obtain CBC/Metabolic panel in about 2 months  -Patient received education about the importance of adhering to a lifelong supplementation regimen to avoid vitamin/mineral deficiencies        Diagnoses and all orders for this visit:    Encounter for surgical aftercare following surgery of digestive system    SHARI (obstructive sleep apnea)    GERD (gastroesophageal reflux disease)  -     Ambulatory Referral to Gastroenterology; Future    Mixed hyperlipidemia  -     Lipid panel; Future    Colon cancer screening  -     Ambulatory Referral to Gastroenterology; Future    Postsurgical malabsorption  -     Discontinue: cyanocobalamin injection 1,000 mcg  -     CBC and differential; Future  -     Comprehensive metabolic panel; Future  -     Folate; Future  -     Hemoglobin A1C; Future  -     Iron Panel (Includes Ferritin, Iron Sat%, Iron, and TIBC); Future  -     Vitamin A; Future  -     TSH, 3rd generation with Free T4 reflex; Future  -     PTH, intact; Future  -     Lipid panel; Future  -     Vitamin B1, whole blood; Future  -     Vitamin B12; Future  -     Vitamin D 25 hydroxy; Future  -     Zinc; Future    Low vitamin B12 level  -     Discontinue: cyanocobalamin injection 1,000 mcg  -     Vitamin B12;  "Future          Subjective:      Patient ID: Emerald Cee is a 49 y.o. female.    -s/p Vertical Sleeve Gastrectomy with Dr. Mayer in 2018.   Presents to the office today for routine follow up. Tolerating diet without issues; denies N/V, dysphagia.     She is followed by MWM - on Wegovy 2.4mg and last seen in December. She has lost 35lbs since starting Wegovy 8 months ago! Heartburn is improving but still needs protonix 40mg daily - has not had EGD yet.    She has 3 older kids. 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    Diet Recall:   B - skips or banana  L - bagel with cream cheese and honey cashews or fish   Snack - banana bread or nothing  D - chicken and rice and beans   HS - ice cream     Fluids - 48-64oz     The following portions of the patient's history were reviewed and updated as appropriate: allergies, current medications, past family history, past medical history, past social history, past surgical history and problem list.    Review of Systems   Constitutional:  Negative for chills and fever. Unexpected weight change: planned weight loss.  HENT:  Negative for trouble swallowing.    Respiratory:  Negative for cough and shortness of breath.    Cardiovascular:  Negative for chest pain and palpitations.   Gastrointestinal:  Negative for abdominal pain, constipation, diarrhea, nausea and vomiting.   Neurological:  Negative for dizziness.   Psychiatric/Behavioral:          Denies anxiety and depression         Objective:      /78 (BP Location: Left arm, Patient Position: Sitting, Cuff Size: Large)   Pulse 57   Resp 16   Ht 5' 5.5\" (1.664 m)   Wt 107 kg (236 lb 3.2 oz)   LMP 09/12/2021 (Exact Date)   BMI 38.71 kg/m²     Colonoscopy-Needs to f/u for screening asap       Physical Exam  Vitals reviewed.   Constitutional:       General: She is not in acute distress.     Appearance: She is well-developed.   HENT:      Head: Normocephalic and " atraumatic.   Eyes:      General: No scleral icterus.  Cardiovascular:      Rate and Rhythm: Normal rate and regular rhythm.   Pulmonary:      Effort: Pulmonary effort is normal. No respiratory distress.   Abdominal:      General: There is no distension.   Skin:     General: Skin is warm and dry.   Neurological:      Mental Status: She is alert.   Psychiatric:         Mood and Affect: Mood normal.         Behavior: Behavior normal.           BARRIERS: none identified    GOALS:   Continued/Maintain healthy weight loss with good nutrition intakes.  Adequate hydration with at least 64oz. fluid intake.  Normal vitamin and mineral levels.  Exercise as tolerated.    Follow-up in 1 year. We kindly ask that your arrive 15 minutes before your scheduled appointment time with your provider to allow our staff to room you, get your vital signs and update your chart.    Follow diet as discussed.      Get lab work done in the next 2 weeks.  You have been given a lab slip today.  Please call the office if you need a replacement.  It is recommended to check with your insurance BEFORE getting labs done to make sure they are covered by your policy.  Also, please check with your PCP and other providers before getting labs to avoid duplicate labs. Make sure to HOLD any multivitamins that may contain biotin and any biotin supplements FOR 5 DAYS before any labs since it can affect the results.    Follow vitamin and mineral recommendations as reviewed with you.    Call our office if you have any problems with abdominal pain especially associated with fever, chills, nausea, vomiting or any other concerns.    All  Post-bariatric surgery patients should be aware that very small quantities of any alcohol can cause impairment and it is very possible not to feel the effect. The effect can be in the system for several hours.  It is also a stomach irritant.     It is advised to AVOID alcohol, Nonsteroidal antiinflammatory drugs (NSAIDS) and  nicotine of all forms . Any of these can cause stomach irritation/pain.

## 2025-02-12 RX ORDER — CYANOCOBALAMIN 1000 UG/ML
1000 INJECTION, SOLUTION INTRAMUSCULAR; SUBCUTANEOUS WEEKLY
Status: SHIPPED | OUTPATIENT
Start: 2025-02-12 | End: 2025-03-12

## 2025-02-18 ENCOUNTER — CLINICAL SUPPORT (OUTPATIENT)
Dept: BARIATRICS | Facility: CLINIC | Age: 50
End: 2025-02-18

## 2025-02-18 DIAGNOSIS — R63.5 ABNORMAL WEIGHT GAIN: Primary | ICD-10-CM

## 2025-02-18 DIAGNOSIS — E53.8 VITAMIN B12 DEFICIENCY: ICD-10-CM

## 2025-02-18 PROCEDURE — RECHECK

## 2025-03-01 ENCOUNTER — HOSPITAL ENCOUNTER (EMERGENCY)
Facility: HOSPITAL | Age: 50
Discharge: HOME | End: 2025-03-01
Attending: STUDENT IN AN ORGANIZED HEALTH CARE EDUCATION/TRAINING PROGRAM
Payer: COMMERCIAL

## 2025-03-01 ENCOUNTER — APPOINTMENT (EMERGENCY)
Dept: RADIOLOGY | Facility: HOSPITAL | Age: 50
End: 2025-03-01
Payer: COMMERCIAL

## 2025-03-01 VITALS
HEART RATE: 80 BPM | RESPIRATION RATE: 17 BRPM | TEMPERATURE: 97.2 F | SYSTOLIC BLOOD PRESSURE: 140 MMHG | DIASTOLIC BLOOD PRESSURE: 72 MMHG | OXYGEN SATURATION: 98 %

## 2025-03-01 DIAGNOSIS — M25.512 LEFT SHOULDER PAIN: Primary | ICD-10-CM

## 2025-03-01 PROCEDURE — 73030 X-RAY EXAM OF SHOULDER: CPT

## 2025-03-01 PROCEDURE — 99283 EMERGENCY DEPT VISIT LOW MDM: CPT

## 2025-03-01 PROCEDURE — 99284 EMERGENCY DEPT VISIT MOD MDM: CPT | Performed by: STUDENT IN AN ORGANIZED HEALTH CARE EDUCATION/TRAINING PROGRAM

## 2025-03-01 NOTE — Clinical Note
Emerald Cee was seen and treated in our emergency department on 3/1/2025.                Diagnosis:     Emerald  may return to work on return date.    She may return on this date: 03/03/2025         If you have any questions or concerns, please don't hesitate to call.      Kari Castro, DO    ______________________________           _______________          _______________  Hospital Representative                              Date                                Time

## 2025-03-02 NOTE — DISCHARGE INSTRUCTIONS
Continues over-the-counter medications as needed for discomfort.  Alternate between Tylenol and motrin    You can follow-up with orthopedics for reeval.    Return for any worsening symptoms.

## 2025-03-03 ENCOUNTER — OFFICE VISIT (OUTPATIENT)
Dept: OBGYN CLINIC | Facility: CLINIC | Age: 50
End: 2025-03-03
Payer: COMMERCIAL

## 2025-03-03 VITALS — TEMPERATURE: 97.9 F | BODY MASS INDEX: 37.93 KG/M2 | WEIGHT: 236 LBS | RESPIRATION RATE: 16 BRPM | HEIGHT: 66 IN

## 2025-03-03 DIAGNOSIS — M25.512 LEFT SHOULDER PAIN: ICD-10-CM

## 2025-03-03 DIAGNOSIS — M75.42 IMPINGEMENT SYNDROME OF LEFT SHOULDER: Primary | ICD-10-CM

## 2025-03-03 PROCEDURE — 20610 DRAIN/INJ JOINT/BURSA W/O US: CPT | Performed by: STUDENT IN AN ORGANIZED HEALTH CARE EDUCATION/TRAINING PROGRAM

## 2025-03-03 PROCEDURE — 99203 OFFICE O/P NEW LOW 30 MIN: CPT | Performed by: STUDENT IN AN ORGANIZED HEALTH CARE EDUCATION/TRAINING PROGRAM

## 2025-03-03 RX ADMIN — TRIAMCINOLONE ACETONIDE 40 MG: 40 INJECTION, SUSPENSION INTRA-ARTICULAR; INTRAMUSCULAR at 15:45

## 2025-03-03 RX ADMIN — BUPIVACAINE HYDROCHLORIDE 3 ML: 5 INJECTION, SOLUTION EPIDURAL; INTRACAUDAL at 15:45

## 2025-03-03 NOTE — PROGRESS NOTES
Assessment & Plan  Left shoulder pain    Orders:    Ambulatory Referral to Orthopedic Surgery  Emerald Cee is a 49 y.o. year old female with left shoulder pain due to impingement syndrome.    We had a shared decision making discussion regarding their shoulder pain.  The etiology of the pain, physical exam findings, and imaging was reviewed with the patient.        We discussed the different treatment options, as well as the advantages and disadvantages of each.     Non operative management options such as oral anti-inflammatories, steroid injections, and physical therapy are effective in decreasing pain, improving function, and often are sufficient to improve their pain and restore function.  The risks of these non operative management options are low, and the benefit is the avoidance of surgery and improvement in pain and function.     We also discussed additional imaging to further evaluate the rotator cuff, such as ultrasound and MRI, as well as potential damage to surrounding structures including the biceps tendon  .    Finally, we discussed surgery as an option for people that do not improve sufficiently with non-operative management, this is often times dependent upon the results of advanced imaging and may include procedures such as arthroscopic debridement of the rotator cuff, possible arthroscopic treatment of any rotator cuff tears or specific treatments for the biceps tendon.  We reviewed the outcomes of surgery as well as the recovery process involved.     At this point, the patient would like to proceed with a cortisone injection and physical therapy. The patient was provided a prescription for physical therapy. An injection(s) of Kenalog and Marcaine was performed to her Left shoulder(s) for symptomatic relief of pain and inflammation. Patient tolerated the treatment(s) well. Ice and post injection protocol advised. Weightbearing activities as tolerated. She will be seen for follow-up 2 months for  re-evaluation. Patient expresses understanding and is in agreement with this treatment plan. The patient was given the opportunity to ask questions or present concerns.     Impingement syndrome of left shoulder    Orders:    Ambulatory Referral to Physical Therapy; Future         General  Chief Complaint   Patient presents with    Left Shoulder - Pain        Subjective    Emerald Cee is a right hand dominant 49 y.o. female who presents with left shoulder pain     History of Present Illness   The patient complains of left shoulder pain. The pain started 1 month ago without injury. At that time the patient describes that she woke up with bilateral shoulder pain. She states that the pain in the right shoulder resolved, however, she continues to experience left shoulder pain.     The pain is located in the anterior aspect of the shoulder.The pain is described as stiff, followed by Sharp pains with motion. The patient has not tried any treatments for their problem. The patient did report to her local ED on 3/1/2025 where she was referred for orthopedic evaluation. The pain improves with rest. The pain worsens with overhead activity and rotation.    The patient does have pain at night. The patient does have pain with overhead activity, and does not describe weakness.     The pain does not go past the elbow. The patient does not have neck pain.The shoulder does not feel unstable. The patient does not have numbness.    Ortho Sports Medicine Patient Answers  Failed to redirect to the Timeline version of the [facility] SmartLink.  Allergies   Allergen Reactions    Penicillin G Other (See Comments)    Pollen Extract Sneezing     Outpatient Encounter Medications as of 3/3/2025   Medication Sig Dispense Refill    acetaminophen (TYLENOL) 500 mg tablet Take 2 tablets (1,000 mg total) by mouth every 6 (six) hours as needed for mild pain 40 tablet 0    Biotin 1 MG CAPS Take by mouth      cyanocobalamin (VITAMIN B-12) 100 mcg tablet  Take by mouth daily      ergocalciferol (VITAMIN D2) 50,000 units Take 1 capsule (50,000 Units total) by mouth 2 (two) times a week with meals 24 capsule 0    metroNIDAZOLE (METROGEL) 0.75 % vaginal gel Insert 1 Application into the vagina daily at bedtime 70 g 1    Semaglutide-Weight Management (WEGOVY) 2.4 MG/0.75ML Inject 0.75 mL (2.4 mg total) under the skin once a week 3 mL 2    cholecalciferol (VITAMIN D3) 25 mcg (1,000 units) tablet Take 1 tablet (1,000 Units total) by mouth daily 90 tablet 0    ferrous sulfate 324 (65 Fe) mg Take 1 tablet (324 mg total) by mouth 3 (three) times a day before meals (Patient not taking: Reported on 2025) 90 tablet 0    multivitamin (THERAGRAN) TABS Take 1 tablet by mouth daily 90 tablet 0    Omega-3 Fatty Acids (fish oil) 1,000 mg Take 1 capsule (1,000 mg total) by mouth daily 90 capsule 0    pantoprazole (PROTONIX) 40 mg tablet Take 1 tablet (40 mg total) by mouth every morning 100 tablet 0    [DISCONTINUED] hydrocortisone (ANUSOL-HC) 2.5 % rectal cream Apply topically 2 (two) times a day for 10 days 28 g 1    [DISCONTINUED] Insulin Pen Needle (BD Pen Needle Sarita 2nd Gen) 32G X 4 MM MISC Use in the morning 100 each 1    [DISCONTINUED] lisinopril (ZESTRIL) 2.5 mg tablet Take 1 tablet (2.5 mg total) by mouth daily 90 tablet 0     Facility-Administered Encounter Medications as of 3/3/2025   Medication Dose Route Frequency Provider Last Rate Last Admin    cyanocobalamin injection 1,000 mcg  1,000 mcg Intramuscular Weekly    1,000 mcg at 25 0818     Past Medical History:   Diagnosis Date    Anemia     Asthma     History of transfusion     Hypercholesterolemia     Sleep apnea     Vitamin deficiency      Past Surgical History:   Procedure Laterality Date    BARIATRIC SURGERY       SECTION      ECTOPIC PREGNANCY SURGERY      EYE SURGERY      GASTRECTOMY SLEEVE LAPAROSCOPIC      MASS EXCISION N/A 2023    Procedure: EXCISION  BIOPSY LESION/MASS BACK;  Surgeon:  Lawson Motta DO;  Location: [facility];  Service: General    NM LAPAROSCOPY W/RMVL ADNEXAL STRUCTURES Bilateral 09/28/2021    Procedure: SALPINGECTOMY, LAPAROSCOPIC;  Surgeon: Pietro Hanson MD;  [facility];  Service: Gynecology    NM LAPS W/VAG HYSTERECT 250 GM/&RMVL TUBE&/OVARIES N/A 09/28/2021    Procedure: HYSTERECTOMY LAPAROSCOPIC ASSISTED VAGINAL (LAVH), LYSIS OF ADHESIONS;  Surgeon: Pietro Hanson MD;  [facility];  Service: Gynecology     Family History   Problem Relation Age of Onset    Brain cancer Mother     Cancer Father     Asthma Brother     Diabetes Maternal Grandmother     Cancer Maternal Grandmother     Hypertension Paternal Grandmother      Social History     Tobacco Use    Smoking status: Never     Passive exposure: Never    Smokeless tobacco: Never   Vaping Use    Vaping status: Never Used   Substance Use Topics    Alcohol use: Never    Drug use: Never           Objective      Vitals:    03/03/25 1600   Resp: 16   Temp: 97.9 °F (36.6 °C)     Body mass index is 38.68 kg/m².  Physical Exam    Shoulder Exam    Affected shoulder:   left    Skin: Without ecchymosis, wounds or prior incisions    Tenderness:  Bicipital Groove    Range of Motion (active/passive):  Side Active (FE/ER/IR) Passive (FE/ER/IR)   left 180/90/Tspine 180/90/Tspine   right 180/90/Tspine 180/90/Tspine       Rotator Cuff Strength:  Side Supraspinatus Infraspinatus/Teres Subscapularis   left 5/5 5/5 5/5   right 5/5 5/5 5/5       Impingement and Rotator Cuff Exam:  Neer's test for impingement Negative   Kessler' test for impingement Positive   Delmer's test Negative   Belly Press Negative     Biceps Exam:  Brooks's test for SLAP tear: Negative   Jergeson's test for biciptal pain: Negative   Speeds test for biciptal pain: Negative       Shoulder Instability Exam:  The apprehension test for anterior instability: Negative   The relocation test: Negative   The posterior load and shift test: Negative          Cervical Spine Exam:  Tenderness: none  ROM: WNL, pain free  Spurlings: Negative    Distally the patient's neurovascular status is normal.      Review of Prior Testing:    I independently interpreted the following test:     left shoulder x-ray images done on 3/1/2025:  Multiple views of the shoulder show no fractures, no dislocations. The joint spaces are well maintained.       Follow Up: Return in about 2 months (around 5/3/2025) for Recheck.    All questions answered and patient agrees with plan.    Large joint arthrocentesis: L subacromial bursa  Universal Protocol:  Consent: Verbal consent obtained.  Risks and benefits: risks, benefits and alternatives were discussed  Consent given by: patient  Patient understanding: patient states understanding of the procedure being performed  Radiology Images displayed and confirmed. If images not available, report reviewed: imaging studies available  Supporting Documentation  Indications: pain   Procedure Details  Location: shoulder - L subacromial bursa  Preparation: Patient was prepped and draped in the usual sterile fashion  Needle size: 22 G  Approach: posterolateral  Medications administered: 40 mg triamcinolone acetonide 40 mg/mL; 3 mL bupivacaine (PF) 0.5 %    Patient tolerance: patient tolerated the procedure well with no immediate complications  Dressing:  Sterile dressing applied          Scribe Attestation      I,:  Claudio Painter am acting as a scribe while in the presence of the attending physician.:       I,:  Ken Palafox MD personally performed the services described in this documentation    as scribed in my presence.:

## 2025-03-04 ENCOUNTER — CLINICAL SUPPORT (OUTPATIENT)
Dept: BARIATRICS | Facility: CLINIC | Age: 50
End: 2025-03-04
Payer: COMMERCIAL

## 2025-03-04 DIAGNOSIS — E53.8 VITAMIN B12 DEFICIENCY: Primary | ICD-10-CM

## 2025-03-04 PROCEDURE — RECHECK

## 2025-03-04 PROCEDURE — 96372 THER/PROPH/DIAG INJ SC/IM: CPT

## 2025-03-04 RX ADMIN — CYANOCOBALAMIN 1000 MCG: 1000 INJECTION, SOLUTION INTRAMUSCULAR; SUBCUTANEOUS at 08:18

## 2025-03-05 RX ORDER — TRIAMCINOLONE ACETONIDE 40 MG/ML
40 INJECTION, SUSPENSION INTRA-ARTICULAR; INTRAMUSCULAR
Status: COMPLETED | OUTPATIENT
Start: 2025-03-03 | End: 2025-03-03

## 2025-03-05 RX ORDER — BUPIVACAINE HYDROCHLORIDE 5 MG/ML
3 INJECTION, SOLUTION EPIDURAL; INTRACAUDAL
Status: COMPLETED | OUTPATIENT
Start: 2025-03-03 | End: 2025-03-03

## 2025-03-11 ENCOUNTER — CLINICAL SUPPORT (OUTPATIENT)
Dept: BARIATRICS | Facility: CLINIC | Age: 50
End: 2025-03-11
Payer: COMMERCIAL

## 2025-03-11 DIAGNOSIS — E53.8 VITAMIN B12 DEFICIENCY: Primary | ICD-10-CM

## 2025-03-11 PROCEDURE — 96372 THER/PROPH/DIAG INJ SC/IM: CPT

## 2025-03-11 PROCEDURE — RECHECK

## 2025-03-11 RX ADMIN — CYANOCOBALAMIN 1000 MCG: 1000 INJECTION, SOLUTION INTRAMUSCULAR; SUBCUTANEOUS at 08:24

## 2025-03-11 NOTE — ED PROVIDER NOTES
Time reflects when diagnosis was documented in both MDM as applicable and the Disposition within this note       Time User Action Codes Description Comment    3/1/2025  9:58 PM Kari Castro Add [M25.512] Left shoulder pain           ED Disposition       ED Disposition   Discharge    Condition   Stable    Date/Time   Sat Mar 1, 2025  9:58 PM    Comment   Emerald Cee discharge to home/self care.                   Assessment & Plan       Medical Decision Making  Differential rotator cuff injury, AC separation, cervical radiculopathy.    Patient is a well-appearing 49-year-old female present emerged apartment no acute respiratory distress.  Imaging performed on shoulder is nonacute.  Discussed overall symptomatic management return fall precautions.  Disposition discharge.  Referral given to orthopedics.    Amount and/or Complexity of Data Reviewed  Radiology: ordered and independent interpretation performed.             Medications - No data to display    ED Risk Strat Scores                            SBIRT 22yo+      Flowsheet Row Most Recent Value   Initial Alcohol Screen: US AUDIT-C     1. How often do you have a drink containing alcohol? 0 Filed at: 03/01/2025 1927   2. How many drinks containing alcohol do you have on a typical day you are drinking?  0 Filed at: 03/01/2025 1927   3b. FEMALE Any Age, or MALE 65+: How often do you have 4 or more drinks on one occassion? 0 Filed at: 03/01/2025 1927   Audit-C Score 0 Filed at: 03/01/2025 1927   JERMAINE: How many times in the past year have you...    Used an illegal drug or used a prescription medication for non-medical reasons? Never Filed at: 03/01/2025 1927                            History of Present Illness       Chief Complaint   Patient presents with    Arm Pain     Pt c/o L arm pain at the shoulder that travels down x 3 weeks. Pt denies injury.        Past Medical History:   Diagnosis Date    Anemia     Asthma     History of transfusion      Hypercholesterolemia     Sleep apnea     Vitamin deficiency       Past Surgical History:   Procedure Laterality Date    BARIATRIC SURGERY       SECTION      ECTOPIC PREGNANCY SURGERY      EYE SURGERY      GASTRECTOMY SLEEVE LAPAROSCOPIC      MASS EXCISION N/A 2023    Procedure: EXCISION  BIOPSY LESION/MASS BACK;  Surgeon: Lawson Motta DO;  Location: [facility];  Service: General    LA LAPAROSCOPY W/RMVL ADNEXAL STRUCTURES Bilateral 2021    Procedure: SALPINGECTOMY, LAPAROSCOPIC;  Surgeon: Pietro Hanson MD;  [facility];  Service: Gynecology    LA LAPS W/VAG HYSTERECT 250 GM/&RMVL TUBE&/OVARIES N/A 2021    Procedure: HYSTERECTOMY LAPAROSCOPIC ASSISTED VAGINAL (LAVH), LYSIS OF ADHESIONS;  Surgeon: Pietro Hanson MD;  [facility];  Service: Gynecology      Family History   Problem Relation Age of Onset    Brain cancer Mother     Cancer Father     Asthma Brother     Diabetes Maternal Grandmother     Cancer Maternal Grandmother     Hypertension Paternal Grandmother       Social History     Tobacco Use    Smoking status: Never     Passive exposure: Never    Smokeless tobacco: Never   Vaping Use    Vaping status: Never Used   Substance Use Topics    Alcohol use: Never    Drug use: Never      E-Cigarette/Vaping    E-Cigarette Use Never User       E-Cigarette/Vaping Substances    Nicotine No     THC No     CBD No     Flavoring No     Other No     Unknown No       I have reviewed and agree with the history as documented.     HPI    Patient is a 49-year-old female present emerged department for left arm pain that starts at the shoulder travels down her left upper extremity.  Denies any recent falls or injuries.  Patient presents due to the persistence of symptoms.  History includes GERD, PCOS and hyperlipidemia.    Review of Systems   Constitutional:  Negative for chills and fever.   HENT:  Negative for ear pain and sore throat.    Eyes:  Negative for pain and visual  disturbance.   Respiratory:  Negative for cough and shortness of breath.    Cardiovascular:  Negative for chest pain and palpitations.   Gastrointestinal:  Negative for abdominal pain and vomiting.   Genitourinary:  Negative for dysuria and hematuria.   Musculoskeletal:  Negative for arthralgias and back pain.        Left shoulder pain   Skin:  Negative for color change and rash.   Neurological:  Negative for seizures and syncope.   All other systems reviewed and are negative.          Objective       ED Triage Vitals [03/01/25 1924]   Temperature Pulse Blood Pressure Respirations SpO2 Patient Position - Orthostatic VS   (!) 97.2 °F (36.2 °C) 80 140/72 17 98 % Sitting      Temp Source Heart Rate Source BP Location FiO2 (%) Pain Score    Temporal Monitor Left arm -- --      Vitals      Date and Time Temp Pulse SpO2 Resp BP Pain Score FACES Pain Rating User   03/01/25 1924 97.2 °F (36.2 °C) 80 98 % 17 140/72 -- -- NO            Physical Exam    Results Reviewed       None            XR shoulder 2+ views LEFT   Final Interpretation by Paresh Dumont MD (03/02 1028)      No acute osseous abnormality.         Computerized Assisted Algorithm (CAA) may have been used to analyze all applicable images.         Workstation performed: [id number]             Procedures    ED Medication and Procedure Management   Prior to Admission Medications   Prescriptions Last Dose Informant Patient Reported? Taking?   Biotin 1 MG CAPS  Self Yes No   Sig: Take by mouth   Insulin Pen Needle (BD Pen Needle Sarita 2nd Gen) 32G X 4 MM MISC  Self No No   Sig: Use in the morning   Omega-3 Fatty Acids (fish oil) 1,000 mg  Self No No   Sig: Take 1 capsule (1,000 mg total) by mouth daily   Semaglutide-Weight Management (WEGOVY) 2.4 MG/0.75ML  Self No No   Sig: Inject 0.75 mL (2.4 mg total) under the skin once a week   acetaminophen (TYLENOL) 500 mg tablet  Self No No   Sig: Take 2 tablets (1,000 mg total) by mouth every 6 (six) hours as needed for mild  pain   cholecalciferol (VITAMIN D3) 25 mcg (1,000 units) tablet  Self No No   Sig: Take 1 tablet (1,000 Units total) by mouth daily   cyanocobalamin (VITAMIN B-12) 100 mcg tablet  Self Yes No   Sig: Take by mouth daily   ergocalciferol (VITAMIN D2) 50,000 units  Self No No   Sig: Take 1 capsule (50,000 Units total) by mouth 2 (two) times a week with meals   ferrous sulfate 324 (65 Fe) mg  Self No No   Sig: Take 1 tablet (324 mg total) by mouth 3 (three) times a day before meals   Patient not taking: Reported on 2/11/2025   metroNIDAZOLE (METROGEL) 0.75 % vaginal gel  Self No No   Sig: Insert 1 Application into the vagina daily at bedtime   multivitamin (THERAGRAN) TABS  Self No No   Sig: Take 1 tablet by mouth daily   pantoprazole (PROTONIX) 40 mg tablet   No No   Sig: Take 1 tablet (40 mg total) by mouth every morning      Facility-Administered Medications Last Administration Doses Remaining   cyanocobalamin injection 1,000 mcg 3/11/2025  8:24 AM 1        Discharge Medication List as of 3/1/2025  9:59 PM        CONTINUE these medications which have NOT CHANGED    Details   acetaminophen (TYLENOL) 500 mg tablet Take 2 tablets (1,000 mg total) by mouth every 6 (six) hours as needed for mild pain, Starting Fri 8/25/2023, Normal      Biotin 1 MG CAPS Take by mouth, Historical Med      cholecalciferol (VITAMIN D3) 25 mcg (1,000 units) tablet Take 1 tablet (1,000 Units total) by mouth daily, Starting Fri 3/8/2024, Until Thu 1/9/2025, Normal      cyanocobalamin (VITAMIN B-12) 100 mcg tablet Take by mouth daily, Historical Med      ergocalciferol (VITAMIN D2) 50,000 units Take 1 capsule (50,000 Units total) by mouth 2 (two) times a week with meals, Starting Thu 1/16/2025, Normal      ferrous sulfate 324 (65 Fe) mg Take 1 tablet (324 mg total) by mouth 3 (three) times a day before meals, Starting Tue 9/28/2021, Normal      metroNIDAZOLE (METROGEL) 0.75 % vaginal gel Insert 1 Application into the vagina daily at bedtime,  Starting Mon 1/13/2025, Normal      multivitamin (THERAGRAN) TABS Take 1 tablet by mouth daily, Starting Fri 3/8/2024, Until Tue 2/11/2025, Normal      Omega-3 Fatty Acids (fish oil) 1,000 mg Take 1 capsule (1,000 mg total) by mouth daily, Starting Fri 3/8/2024, Until Tue 2/11/2025, Normal      pantoprazole (PROTONIX) 40 mg tablet Take 1 tablet (40 mg total) by mouth every morning, Starting Mon 7/29/2024, Until Tue 2/11/2025, Normal      Semaglutide-Weight Management (WEGOVY) 2.4 MG/0.75ML Inject 0.75 mL (2.4 mg total) under the skin once a week, Starting Wed 12/18/2024, Normal      hydrocortisone (ANUSOL-HC) 2.5 % rectal cream Apply topically 2 (two) times a day for 10 days, Starting Fri 3/8/2024, Until Mon 7/15/2024, Normal      Insulin Pen Needle (BD Pen Needle Sarita 2nd Gen) 32G X 4 MM MISC Use in the morning, Starting Fri 5/10/2024, Until Thu 8/8/2024, Normal      lisinopril (ZESTRIL) 2.5 mg tablet Take 1 tablet (2.5 mg total) by mouth daily, Starting Wed 5/8/2024, Until Tue 8/6/2024, Normal             ED SEPSIS DOCUMENTATION   Time reflects when diagnosis was documented in both MDM as applicable and the Disposition within this note       Time User Action Codes Description Comment    3/1/2025  9:58 PM Kari Castro Add [M25.512] Left shoulder pain                  Kari Castro,   03/11/25 1637

## 2025-03-24 ENCOUNTER — HOSPITAL ENCOUNTER (OUTPATIENT)
Age: 50
Discharge: HOME | End: 2025-03-24
Payer: COMMERCIAL

## 2025-03-24 DIAGNOSIS — Z12.31 SCREENING MAMMOGRAM FOR BREAST CANCER: ICD-10-CM

## 2025-03-24 PROCEDURE — 77067 SCR MAMMO BI INCL CAD: CPT

## 2025-03-24 PROCEDURE — 77063 BREAST TOMOSYNTHESIS BI: CPT

## 2025-03-25 ENCOUNTER — OFFICE VISIT (OUTPATIENT)
Dept: BARIATRICS | Facility: CLINIC | Age: 50
End: 2025-03-25
Payer: COMMERCIAL

## 2025-03-25 VITALS
SYSTOLIC BLOOD PRESSURE: 118 MMHG | BODY MASS INDEX: 36.95 KG/M2 | DIASTOLIC BLOOD PRESSURE: 82 MMHG | OXYGEN SATURATION: 97 % | HEART RATE: 58 BPM | WEIGHT: 225.5 LBS

## 2025-03-25 DIAGNOSIS — E66.812 OBESITY, CLASS II, BMI 35-39.9: ICD-10-CM

## 2025-03-25 PROCEDURE — 99214 OFFICE O/P EST MOD 30 MIN: CPT

## 2025-03-25 NOTE — PROGRESS NOTES
Assessment/Plan:     Obesity, Class II, BMI 35-39.9  - Patient is pursuing Conservative Program and follow up visits with medical weight management provider  - Initial weight loss goal of 5-10% weight loss for improved health. Weight loss can improve patient's co-morbid conditions and/or prevent weight-related complications.  - Explained the importance of continuing lifestyle changes in addition to any anti-obesity medications.   - Labs reviewed from 1/2025    General Recommendations:  Nutrition:  Eat breakfast daily.  Do not skip meals.      Food log (ie.) www.[employer].com, sparkpeople.com, loseit.com, calorieking.com, etc.     Practice mindful eating.  Be sure to set aside time to eat, eat slowly, and savor your food.     Hydration:    At least 64oz of water daily.  No sugar sweetened beverages.  No juice (eat the fruit instead).     Exercise:  Studies have shown that the ideal exercise goal is somewhere between 150 to 300 minutes of moderate intensity exercise a week.  Start with exercising 10 minutes every other day and gradually increase physical activity with a goal of at least 150 minutes of moderate intensity exercise a week, divided over at least 3 days a week.  An example of this would be exercising 30 minutes a day, 5 days a week.  Resistance training can increase muscle mass and increase our resting metabolic rate.   FULL BODY resistance training is recommended 2-3 times a week.  Do not do this on consecutive days to allow for muscle recovery.     Aim for a bare minimum 5000 steps, even on days you do not exercise.     Monitoring:   Weigh yourself daily.  If this causes undue stress, then just weigh yourself once a week.  Weigh yourself the same time of the day with the same amount of clothing on.  Preferably this should be done after waking up, before you eat, and with no clothing or minimal clothing on.     Specific Goals:  -Discussed her nutrition and lifestyle. She is encouraged to not skip meals  and take a protein shake for breakfast if she doesn't have time. Discussed that the medication will help as a tool but she needs to follow the lifestyle changes and the sleeve protocol to see the weight loss. Encouraged to exercise 1-2 days per week to start and increase as able. Encouraged to increase her water intake to at least 64oz per day. Wegovy will remain at 2.4mg as she is doing well and has made some of the above changes and is seeing weight loss.   -Referred to surgery team for annual follow for sp sleeve.   Patient denies personal history of pancreatitis. Patient also denies personal and family history of medullary thyroid cancer and multiple endocrine neoplasia type 2 (MEN 2 tumor).          Emerald was seen today for follow-up.    Diagnoses and all orders for this visit:    Obesity, Class II, BMI 35-39.9  -     Semaglutide-Weight Management (WEGOVY) 2.4 MG/0.75ML; Inject 0.75 mL (2.4 mg total) under the skin once a week        Total time spent reviewing chart, interviewing patient, examining patient, discussing plan, answering all questions, and documentin minutes with >50% face-to-face time with the patient.    Follow up in approximately 6 months with Non-Surgical Physician/Advanced Practitioner.    Subjective:   Chief Complaint   Patient presents with    Follow-up     No issues with wegovy.        Patient ID: Emerald Cee  is a 49 y.o. female with excess weight/obesity here to pursue weight management.  Patient is pursuing Conservative Program.   Most recent notes and records were reviewed.    HPI    Wt Readings from Last 20 Encounters:   25 102 kg (225 lb 8 oz)   25 107 kg (236 lb)   25 107 kg (236 lb 3.2 oz)   25 107 kg (236 lb)   24 109 kg (240 lb)   24 111 kg (244 lb 9.6 oz)   24 122 kg (268 lb)   24 122 kg (269 lb 13.5 oz)   07/15/24 124 kg (274 lb)   06/10/24 123 kg (271 lb 6.4 oz)   24 125 kg (276 lb)   24 122 kg (268 lb)    03/08/24 124 kg (273 lb)   12/21/23 118 kg (261 lb)   12/05/23 117 kg (259 lb)   12/05/23 118 kg (261 lb)   10/24/23 116 kg (256 lb)   10/18/23 114 kg (251 lb)   10/15/23 114 kg (252 lb)   09/26/23 117 kg (257 lb)       Patient presents today to medical weight management office for follow up. -s/p Vertical Sleeve Gastrectomy with Dr. Mayer in 2018.  She is doing very well - down 35lbs in the last 8 months and doing great on Wegovy. Continue f/u with [facility] as directed and recommend regular f/u with the RD for additional support.   Initial: 300lbs  Current: 236.2lbs  Carlos: 219lbs  Current BMI is Body mass index is 38.71 kg/m².  She has been on Wegovy 2.4mg and losing weight with no side effects. She is dealing with a shoulder issue and is working with PT on that, she is hopefully to get back into the gym after she gets her shoulder injury straightened out.       Weight loss medication and dose: wegovy 2.4mg   Started weight and date: 300 lbs post sleeve 2018 / 271lbs at [facility] consult 6/10/2024/  lbs 12/2024  Current weight: 225 lbs   Difference: -15 lbs since LOV, -31 lbs over all    Starting BMI: 42.4  Current BMI: 36.9              B- Skip  L- egg sandwich takes her all day to eat   S- nutrition fruit bars   D- Chicken wings few bites then done   S- Sugar free candy   Take out frequency: 1-3 days per week      Hydration- water with lemon not hydrating well/ drinks apple juice water down with ice   Alcohol- no   Exercise- does squats         The following portions of the patient's history were reviewed and updated as appropriate: allergies, current medications, past family history, past medical history, past social history, past surgical history, and problem list.    Family History   Problem Relation Age of Onset    Brain cancer Mother     Cancer Father     Diabetes Maternal Grandmother     Cancer Maternal Grandmother     Hypertension Paternal Grandmother     Asthma Brother     Breast cancer Neg Hx          Review of Systems   Constitutional:  Negative for fatigue.   HENT:  Negative for sore throat.    Respiratory:  Negative for cough and shortness of breath.    Cardiovascular:  Negative for chest pain, palpitations and leg swelling.   Gastrointestinal:  Negative for abdominal pain, constipation, diarrhea, nausea and vomiting.   Genitourinary:  Negative for dysuria.   Musculoskeletal:  Negative for arthralgias and back pain.   Skin:  Negative for rash.   Neurological:  Negative for headaches.   Psychiatric/Behavioral:  Negative for dysphoric mood. The patient is not nervous/anxious.        Objective:  /82 (BP Location: Left arm, Patient Position: Sitting, Cuff Size: Standard)   Pulse 58   Wt 102 kg (225 lb 8 oz)   LMP 09/12/2021 (Exact Date)   SpO2 97%   BMI 36.95 kg/m²     Physical Exam  Vitals and nursing note reviewed.   Constitutional:       Appearance: Normal appearance. She is obese.   HENT:      Head: Normocephalic.   Pulmonary:      Effort: Pulmonary effort is normal.   Neurological:      Mental Status: She is oriented to person, place, and time.   Psychiatric:         Mood and Affect: Mood normal.         Behavior: Behavior normal.         Thought Content: Thought content normal.         Judgment: Judgment normal.            Labs   Most recent labs reviewed   Lab Results   Component Value Date    SODIUM 139 08/02/2024    K 3.4 (L) 08/02/2024     08/02/2024    CO2 26 08/02/2024    AGAP 8 08/02/2024    BUN 10 08/02/2024    CREATININE 0.68 08/02/2024    GLUC 110 08/02/2024    GLUF 86 03/19/2024    CALCIUM 8.8 08/02/2024    AST 13 08/02/2024    ALT 8 08/02/2024    ALKPHOS 61 08/02/2024    TP 7.6 08/02/2024    TBILI 0.43 08/02/2024    EGFR 104 08/02/2024     Lab Results   Component Value Date    HGBA1C 5.8 (H) 03/19/2024     Lab Results   Component Value Date    AWP1NGPABMPC 2.861 09/18/2023    TSH 0.92 01/06/2020     Lab Results   Component Value Date    CHOLESTEROL 248 (H) 03/19/2024     Lab  Results   Component Value Date    HDL 54 03/19/2024     Lab Results   Component Value Date    TRIG 270 (H) 03/19/2024     Lab Results   Component Value Date    LDLCALC 140 (H) 03/19/2024

## 2025-03-25 NOTE — ASSESSMENT & PLAN NOTE
- Patient is pursuing Conservative Program and follow up visits with medical weight management provider  - Initial weight loss goal of 5-10% weight loss for improved health. Weight loss can improve patient's co-morbid conditions and/or prevent weight-related complications.  - Explained the importance of continuing lifestyle changes in addition to any anti-obesity medications.   - Labs reviewed from 1/2025    General Recommendations:  Nutrition:  Eat breakfast daily.  Do not skip meals.      Food log (ie.) www.[employer].com, sparkpeople.com, loseit.com, calorieking.com, etc.     Practice mindful eating.  Be sure to set aside time to eat, eat slowly, and savor your food.     Hydration:    At least 64oz of water daily.  No sugar sweetened beverages.  No juice (eat the fruit instead).     Exercise:  Studies have shown that the ideal exercise goal is somewhere between 150 to 300 minutes of moderate intensity exercise a week.  Start with exercising 10 minutes every other day and gradually increase physical activity with a goal of at least 150 minutes of moderate intensity exercise a week, divided over at least 3 days a week.  An example of this would be exercising 30 minutes a day, 5 days a week.  Resistance training can increase muscle mass and increase our resting metabolic rate.   FULL BODY resistance training is recommended 2-3 times a week.  Do not do this on consecutive days to allow for muscle recovery.     Aim for a bare minimum 5000 steps, even on days you do not exercise.     Monitoring:   Weigh yourself daily.  If this causes undue stress, then just weigh yourself once a week.  Weigh yourself the same time of the day with the same amount of clothing on.  Preferably this should be done after waking up, before you eat, and with no clothing or minimal clothing on.     Specific Goals:  -Discussed her nutrition and lifestyle. She is encouraged to not skip meals and take a protein shake for breakfast if she  doesn't have time. Discussed that the medication will help as a tool but she needs to follow the lifestyle changes and the sleeve protocol to see the weight loss. Encouraged to exercise 1-2 days per week to start and increase as able. Encouraged to increase her water intake to at least 64oz per day. Wegovy will remain at 2.4mg as she is doing well and has made some of the above changes and is seeing weight loss.   -Referred to surgery team for annual follow for sp sleeve.   Patient denies personal history of pancreatitis. Patient also denies personal and family history of medullary thyroid cancer and multiple endocrine neoplasia type 2 (MEN 2 tumor).

## 2025-03-26 ENCOUNTER — RESULTS FOLLOW-UP (OUTPATIENT)
Dept: OBGYN CLINIC | Facility: CLINIC | Age: 50
End: 2025-03-26

## 2025-03-26 DIAGNOSIS — R92.8 ABNORMAL MAMMOGRAM OF LEFT BREAST: Primary | ICD-10-CM

## 2025-03-26 NOTE — TELEPHONE ENCOUNTER
Patient had a compression and position abnormality of the left breast according to radiology.  This was on her bilateral screening mammogram.  Left a message for the patient and a diagnostic left breast ultrasound was placed

## 2025-04-03 ENCOUNTER — OFFICE VISIT (OUTPATIENT)
Dept: GASTROENTEROLOGY | Facility: CLINIC | Age: 50
End: 2025-04-03
Payer: COMMERCIAL

## 2025-04-03 ENCOUNTER — ANNUAL EXAM (OUTPATIENT)
Age: 50
End: 2025-04-03

## 2025-04-03 VITALS
OXYGEN SATURATION: 98 % | TEMPERATURE: 97.1 F | DIASTOLIC BLOOD PRESSURE: 77 MMHG | SYSTOLIC BLOOD PRESSURE: 113 MMHG | HEART RATE: 67 BPM | RESPIRATION RATE: 16 BRPM

## 2025-04-03 VITALS
WEIGHT: 222 LBS | BODY MASS INDEX: 35.68 KG/M2 | TEMPERATURE: 97 F | DIASTOLIC BLOOD PRESSURE: 80 MMHG | HEIGHT: 66 IN | HEART RATE: 60 BPM | SYSTOLIC BLOOD PRESSURE: 118 MMHG | OXYGEN SATURATION: 97 %

## 2025-04-03 DIAGNOSIS — Z12.11 COLON CANCER SCREENING: ICD-10-CM

## 2025-04-03 DIAGNOSIS — K62.5 RECTAL BLEEDING: ICD-10-CM

## 2025-04-03 DIAGNOSIS — Z90.3 HISTORY OF SLEEVE GASTRECTOMY: Primary | ICD-10-CM

## 2025-04-03 DIAGNOSIS — K21.9 GERD (GASTROESOPHAGEAL REFLUX DISEASE): ICD-10-CM

## 2025-04-03 DIAGNOSIS — Z01.419 ENCOUNTER FOR ANNUAL ROUTINE GYNECOLOGICAL EXAMINATION: Primary | ICD-10-CM

## 2025-04-03 PROCEDURE — 99204 OFFICE O/P NEW MOD 45 MIN: CPT | Performed by: PHYSICIAN ASSISTANT

## 2025-04-03 PROCEDURE — 99396 PREV VISIT EST AGE 40-64: CPT | Performed by: OBSTETRICS & GYNECOLOGY

## 2025-04-03 RX ORDER — SODIUM CHLORIDE, SODIUM LACTATE, POTASSIUM CHLORIDE, CALCIUM CHLORIDE 600; 310; 30; 20 MG/100ML; MG/100ML; MG/100ML; MG/100ML
125 INJECTION, SOLUTION INTRAVENOUS CONTINUOUS
OUTPATIENT
Start: 2025-04-03

## 2025-04-03 NOTE — H&P (VIEW-ONLY)
Name: Emerald Cee      : 1975      MRN: [id number]  Encounter Provider: Verenice Grider PA-C  Encounter Date: 4/3/2025   Encounter department: [facility] [place]  :  Assessment & Plan  History of sleeve gastrectomy    GERD (gastroesophageal reflux disease)    Rectal bleeding    Colon cancer screening    Patient was referred for an EGD and colonoscopy.   She is s/p gastric sleeve surgery in 2018 and is on Pantoprazole 40mg po daily for her GERD.  She has not had an EGD since her surgery.  She has never had a colonoscopy.  She reports a hx of intermittent rectal bleeding which she attributes to her hemorrhoids. No family hx of colon cancer.    Will plan for EGD (she needs surveillance for the development of Aguilar's esophagus given her gastric sleeve anatomy) and colonoscopy to investigate.  Continue Pantoprazole.  Adequate water and fiber intake to avoid BM straining.    Note: She is on Wegovy and will need to hold this medication 7 days prior to the procedure.      History of Present Illness   HPI  Emerald Cee is a 49 y.o. female who presents to the office to schedule an EGD and colonoscopy.  Patient is status post a gastric sleeve surgery in 2018 and is on pantoprazole 40 mg p.o. daily for her GERD.  She has not had an EGD since her surgery.  She also has never had a colonoscopy.  She reports of intermittent rectal bleeding which she attributes to her hemorrhoids.  She reports issues with her hemorrhoids when her stool becomes hard and she does not drink enough water.  No family history of colon cancer.    I discussed informed consent with the patient for the EGD/colonoscopy. The risks/benefits of the procedure were discussed with the patient. Risks included, but not limited to, infection, bleeding, perforation were discussed. Patient was agreeable.   History obtained from: patient.      Medical History Reviewed by provider this encounter:     .  Past Medical  History   Past Medical History:   Diagnosis Date    Anemia     Asthma     History of transfusion     Hypercholesterolemia     Sleep apnea     Vitamin deficiency      Past Surgical History:   Procedure Laterality Date    BARIATRIC SURGERY       SECTION      ECTOPIC PREGNANCY SURGERY      EYE SURGERY      GASTRECTOMY SLEEVE LAPAROSCOPIC      MASS EXCISION N/A 2023    Procedure: EXCISION  BIOPSY LESION/MASS BACK;  Surgeon: Lawson Motta DO;  [facility];  Service: General    AK LAPAROSCOPY W/RMVL ADNEXAL STRUCTURES Bilateral 2021    Procedure: SALPINGECTOMY, LAPAROSCOPIC;  Surgeon: Pietro Hanson MD;  [facility];  Service: Gynecology    AK LAPS W/VAG HYSTERECT 250 GM/&RMVL TUBE&/OVARIES N/A 2021    Procedure: HYSTERECTOMY LAPAROSCOPIC ASSISTED VAGINAL (LAVH), LYSIS OF ADHESIONS;  Surgeon: Pietro Hanson MD;  [facility];  Service: Gynecology     Family History   Problem Relation Age of Onset    Brain cancer Mother     Cancer Father     Diabetes Maternal Grandmother     Cancer Maternal Grandmother     Hypertension Paternal Grandmother     Asthma Brother     Breast cancer Neg Hx       reports that she has never smoked. She has never been exposed to tobacco smoke. She has never used smokeless tobacco. She reports that she does not drink alcohol and does not use drugs.  Current Outpatient Medications   Medication Instructions    acetaminophen (TYLENOL) 1,000 mg, Oral, Every 6 hours PRN    Biotin 1 MG CAPS Take by mouth    cholecalciferol (VITAMIN D3) 1,000 Units, Oral, Daily    cyanocobalamin (VITAMIN B-12) 100 mcg tablet Daily    ergocalciferol (VITAMIN D2) 50,000 Units, Oral, 2 times weekly with meals    ferrous sulfate 324 mg, Oral, 3 times daily before meals    fish oil 1,000 mg, Oral, Daily    metroNIDAZOLE (METROGEL) 0.75 % vaginal gel 1 Application, Vaginal, Daily at bedtime    multivitamin (THERAGRAN) TABS 1 tablet, Oral, Daily    pantoprazole (PROTONIX) 40  mg, Oral, Every morning    Semaglutide-Weight Management (WEGOVY) 2.4 mg, Subcutaneous, Weekly     Allergies   Allergen Reactions    Penicillin G Other (See Comments)    Pollen Extract Sneezing      Current Outpatient Medications on File Prior to Visit   Medication Sig Dispense Refill    acetaminophen (TYLENOL) 500 mg tablet Take 2 tablets (1,000 mg total) by mouth every 6 (six) hours as needed for mild pain 40 tablet 0    Biotin 1 MG CAPS Take by mouth      cyanocobalamin (VITAMIN B-12) 100 mcg tablet Take by mouth daily      ergocalciferol (VITAMIN D2) 50,000 units Take 1 capsule (50,000 Units total) by mouth 2 (two) times a week with meals 24 capsule 0    ferrous sulfate 324 (65 Fe) mg Take 1 tablet (324 mg total) by mouth 3 (three) times a day before meals 90 tablet 0    metroNIDAZOLE (METROGEL) 0.75 % vaginal gel Insert 1 Application into the vagina daily at bedtime 70 g 1    Semaglutide-Weight Management (WEGOVY) 2.4 MG/0.75ML Inject 0.75 mL (2.4 mg total) under the skin once a week 3 mL 2    cholecalciferol (VITAMIN D3) 25 mcg (1,000 units) tablet Take 1 tablet (1,000 Units total) by mouth daily 90 tablet 0    multivitamin (THERAGRAN) TABS Take 1 tablet by mouth daily 90 tablet 0    Omega-3 Fatty Acids (fish oil) 1,000 mg Take 1 capsule (1,000 mg total) by mouth daily 90 capsule 0    pantoprazole (PROTONIX) 40 mg tablet Take 1 tablet (40 mg total) by mouth every morning 100 tablet 0     No current facility-administered medications on file prior to visit.      Social History     Tobacco Use    Smoking status: Never     Passive exposure: Never    Smokeless tobacco: Never   Vaping Use    Vaping status: Never Used   Substance and Sexual Activity    Alcohol use: Never    Drug use: Never    Sexual activity: Yes     Partners: Male     Birth control/protection: Abstinence, Condom Male, None        Objective   /80 (BP Location: Left arm, Patient Position: Sitting, Cuff Size: Standard)   Pulse 60   Temp (!) 97  "°F (36.1 °C) (Temporal)   Ht 5' 5.5\" (1.664 m)   Wt 101 kg (222 lb)   LMP 09/12/2021 (Exact Date)   SpO2 97%   BMI 36.38 kg/m²      Physical Exam  Constitutional:       Appearance: Normal appearance.   HENT:      Head: Normocephalic and atraumatic.   Cardiovascular:      Rate and Rhythm: Normal rate and regular rhythm.   Pulmonary:      Effort: Pulmonary effort is normal. No respiratory distress.      Breath sounds: Normal breath sounds.   Skin:     General: Skin is warm and dry.   Neurological:      Mental Status: She is alert and oriented to person, place, and time.   Psychiatric:         Mood and Affect: Mood normal.         Behavior: Behavior normal.         "

## 2025-04-03 NOTE — PROGRESS NOTES
Name: Emerald Cee      : 1975      MRN: [id number]  Encounter Provider: Verenice Grider PA-C  Encounter Date: 4/3/2025   Encounter department: [facility] [place]  :  Assessment & Plan  History of sleeve gastrectomy    GERD (gastroesophageal reflux disease)    Rectal bleeding    Colon cancer screening    Patient was referred for an EGD and colonoscopy.   She is s/p gastric sleeve surgery in 2018 and is on Pantoprazole 40mg po daily for her GERD.  She has not had an EGD since her surgery.  She has never had a colonoscopy.  She reports a hx of intermittent rectal bleeding which she attributes to her hemorrhoids. No family hx of colon cancer.    Will plan for EGD (she needs surveillance for the development of Aguilar's esophagus given her gastric sleeve anatomy) and colonoscopy to investigate.  Continue Pantoprazole.  Adequate water and fiber intake to avoid BM straining.    Note: She is on Wegovy and will need to hold this medication 7 days prior to the procedure.      History of Present Illness   HPI  Emerald Cee is a 49 y.o. female who presents to the office to schedule an EGD and colonoscopy.  Patient is status post a gastric sleeve surgery in 2018 and is on pantoprazole 40 mg p.o. daily for her GERD.  She has not had an EGD since her surgery.  She also has never had a colonoscopy.  She reports of intermittent rectal bleeding which she attributes to her hemorrhoids.  She reports issues with her hemorrhoids when her stool becomes hard and she does not drink enough water.  No family history of colon cancer.    I discussed informed consent with the patient for the EGD/colonoscopy. The risks/benefits of the procedure were discussed with the patient. Risks included, but not limited to, infection, bleeding, perforation were discussed. Patient was agreeable.   History obtained from: patient.      Medical History Reviewed by provider this encounter:     .  Past Medical  History   Past Medical History:   Diagnosis Date    Anemia     Asthma     History of transfusion     Hypercholesterolemia     Sleep apnea     Vitamin deficiency      Past Surgical History:   Procedure Laterality Date    BARIATRIC SURGERY       SECTION      ECTOPIC PREGNANCY SURGERY      EYE SURGERY      GASTRECTOMY SLEEVE LAPAROSCOPIC      MASS EXCISION N/A 2023    Procedure: EXCISION  BIOPSY LESION/MASS BACK;  Surgeon: Lawson Motta DO;  [facility];  Service: General    CT LAPAROSCOPY W/RMVL ADNEXAL STRUCTURES Bilateral 2021    Procedure: SALPINGECTOMY, LAPAROSCOPIC;  Surgeon: Pietro Hanson MD;  [facility];  Service: Gynecology    CT LAPS W/VAG HYSTERECT 250 GM/&RMVL TUBE&/OVARIES N/A 2021    Procedure: HYSTERECTOMY LAPAROSCOPIC ASSISTED VAGINAL (LAVH), LYSIS OF ADHESIONS;  Surgeon: Pietro Hanson MD;  [facility];  Service: Gynecology     Family History   Problem Relation Age of Onset    Brain cancer Mother     Cancer Father     Diabetes Maternal Grandmother     Cancer Maternal Grandmother     Hypertension Paternal Grandmother     Asthma Brother     Breast cancer Neg Hx       reports that she has never smoked. She has never been exposed to tobacco smoke. She has never used smokeless tobacco. She reports that she does not drink alcohol and does not use drugs.  Current Outpatient Medications   Medication Instructions    acetaminophen (TYLENOL) 1,000 mg, Oral, Every 6 hours PRN    Biotin 1 MG CAPS Take by mouth    cholecalciferol (VITAMIN D3) 1,000 Units, Oral, Daily    cyanocobalamin (VITAMIN B-12) 100 mcg tablet Daily    ergocalciferol (VITAMIN D2) 50,000 Units, Oral, 2 times weekly with meals    ferrous sulfate 324 mg, Oral, 3 times daily before meals    fish oil 1,000 mg, Oral, Daily    metroNIDAZOLE (METROGEL) 0.75 % vaginal gel 1 Application, Vaginal, Daily at bedtime    multivitamin (THERAGRAN) TABS 1 tablet, Oral, Daily    pantoprazole (PROTONIX) 40  mg, Oral, Every morning    Semaglutide-Weight Management (WEGOVY) 2.4 mg, Subcutaneous, Weekly     Allergies   Allergen Reactions    Penicillin G Other (See Comments)    Pollen Extract Sneezing      Current Outpatient Medications on File Prior to Visit   Medication Sig Dispense Refill    acetaminophen (TYLENOL) 500 mg tablet Take 2 tablets (1,000 mg total) by mouth every 6 (six) hours as needed for mild pain 40 tablet 0    Biotin 1 MG CAPS Take by mouth      cyanocobalamin (VITAMIN B-12) 100 mcg tablet Take by mouth daily      ergocalciferol (VITAMIN D2) 50,000 units Take 1 capsule (50,000 Units total) by mouth 2 (two) times a week with meals 24 capsule 0    ferrous sulfate 324 (65 Fe) mg Take 1 tablet (324 mg total) by mouth 3 (three) times a day before meals 90 tablet 0    metroNIDAZOLE (METROGEL) 0.75 % vaginal gel Insert 1 Application into the vagina daily at bedtime 70 g 1    Semaglutide-Weight Management (WEGOVY) 2.4 MG/0.75ML Inject 0.75 mL (2.4 mg total) under the skin once a week 3 mL 2    cholecalciferol (VITAMIN D3) 25 mcg (1,000 units) tablet Take 1 tablet (1,000 Units total) by mouth daily 90 tablet 0    multivitamin (THERAGRAN) TABS Take 1 tablet by mouth daily 90 tablet 0    Omega-3 Fatty Acids (fish oil) 1,000 mg Take 1 capsule (1,000 mg total) by mouth daily 90 capsule 0    pantoprazole (PROTONIX) 40 mg tablet Take 1 tablet (40 mg total) by mouth every morning 100 tablet 0     No current facility-administered medications on file prior to visit.      Social History     Tobacco Use    Smoking status: Never     Passive exposure: Never    Smokeless tobacco: Never   Vaping Use    Vaping status: Never Used   Substance and Sexual Activity    Alcohol use: Never    Drug use: Never    Sexual activity: Yes     Partners: Male     Birth control/protection: Abstinence, Condom Male, None        Objective   /80 (BP Location: Left arm, Patient Position: Sitting, Cuff Size: Standard)   Pulse 60   Temp (!) 97  "°F (36.1 °C) (Temporal)   Ht 5' 5.5\" (1.664 m)   Wt 101 kg (222 lb)   LMP 09/12/2021 (Exact Date)   SpO2 97%   BMI 36.38 kg/m²      Physical Exam  Constitutional:       Appearance: Normal appearance.   HENT:      Head: Normocephalic and atraumatic.   Cardiovascular:      Rate and Rhythm: Normal rate and regular rhythm.   Pulmonary:      Effort: Pulmonary effort is normal. No respiratory distress.      Breath sounds: Normal breath sounds.   Skin:     General: Skin is warm and dry.   Neurological:      Mental Status: She is alert and oriented to person, place, and time.   Psychiatric:         Mood and Affect: Mood normal.         Behavior: Behavior normal.         "

## 2025-04-03 NOTE — PATIENT INSTRUCTIONS
Scheduled date of EGD/colonoscopy (as of today):4/15/25  Physician performing EGD/colonoscopy:Sierra  Location of EGD/colonoscopy:[facility]  Desired bowel prep reviewed with patient:miralax/dulcolax  Instructions reviewed with patient by:Mandy DOMÍNGUEZ  Clearances:   none    Wegovy 7 day hold

## 2025-04-03 NOTE — PROGRESS NOTES
Name: Emerald Cee      : 1975      MRN: [id number]  Encounter Provider: Pietro Hanson MD  Encounter Date: 4/3/2025   Encounter department: [facility]  :  Assessment & Plan  Encounter for annual routine gynecological examination         Normal breast and GYN exam  LAVH bilateral salpingectomy 2021 for menorrhagia  Gastric sleeve  Negative Cologuard 3/24  Abnormal mammogram 2025.  Recommend diagnostic ultrasound of the left breast    Plan: Schedule diagnostic left breast ultrasound.  Recommend healthy diet and exercise. MetroGel vaginal cream x 2 more days.         Emerald Cee is a 49 y.o. female who presents to the office for annual exam complaining of vaginal discharge after recent intercourse.  Patient denies any fevers or chills.  Denies any bleeding or pelvic pain.  Was seen in the office on 2025 for possible exposure to STD.  All cultures were negative except for BV.  Patient was treated with MetroGel vaginal cream for 5 days.  Symptoms resolved.  Patient had leftover cream and has used it for the last 3 nights.  Symptoms are resolving.  Patient also used a vinegar douche and added iodine.  Status post LAVH bilateral salpingectomy  for menorrhagia with anemia.    Denies any breast problems.  Had a mammogram which showed summation affect in the left breast.  She is scheduled April 15 for a left breast diagnostic ultrasound.    Denies any bowel or bladder problems.  Does have gastric reflux and is scheduled for an upper endoscopy and colonoscopy.  Patient had a gastric sleeve and does not take her medication for her reflux.  She uses Wegovy intermittently.    No change in family history.  Medications reviewed.       Objective   LMP 2021 (Exact Date)      Physical Exam  Vitals and nursing note reviewed.   Constitutional:       General: She is not in acute distress.     Appearance: She is well-developed.   HENT:      Head:  Normocephalic and atraumatic.   Eyes:      Conjunctiva/sclera: Conjunctivae normal.   Cardiovascular:      Rate and Rhythm: Normal rate and regular rhythm.      Heart sounds: No murmur heard.  Pulmonary:      Effort: Pulmonary effort is normal. No respiratory distress.      Breath sounds: Normal breath sounds.   Chest:   Breasts:     Right: Normal.      Left: Normal.   Abdominal:      Palpations: Abdomen is soft.      Tenderness: There is no abdominal tenderness.   Genitourinary:     Adnexa: Right adnexa normal and left adnexa normal.      Comments: External genitalia normal.  Vagina filled with MetroGel cream.  General cuff well supported.  No cystocele or rectocele noted.  Cervix uterus and tubes absent.  Musculoskeletal:         General: No swelling.      Cervical back: Neck supple.   Lymphadenopathy:      Upper Body:      Right upper body: No supraclavicular, axillary or pectoral adenopathy.      Left upper body: No supraclavicular, axillary or pectoral adenopathy.   Skin:     General: Skin is warm and dry.      Capillary Refill: Capillary refill takes less than 2 seconds.   Neurological:      Mental Status: She is alert.   Psychiatric:         Mood and Affect: Mood normal.

## 2025-04-15 ENCOUNTER — ANESTHESIA EVENT (OUTPATIENT)
Dept: GASTROENTEROLOGY | Facility: HOSPITAL | Age: 50
End: 2025-04-15
Payer: COMMERCIAL

## 2025-04-15 ENCOUNTER — RESULTS FOLLOW-UP (OUTPATIENT)
Dept: FAMILY MEDICINE CLINIC | Facility: CLINIC | Age: 50
End: 2025-04-15

## 2025-04-15 ENCOUNTER — HOSPITAL ENCOUNTER (OUTPATIENT)
Dept: GASTROENTEROLOGY | Facility: HOSPITAL | Age: 50
Setting detail: OUTPATIENT SURGERY
Discharge: HOME | End: 2025-04-15
Attending: PHYSICIAN ASSISTANT
Payer: COMMERCIAL

## 2025-04-15 ENCOUNTER — ANESTHESIA (OUTPATIENT)
Dept: GASTROENTEROLOGY | Facility: HOSPITAL | Age: 50
End: 2025-04-15
Payer: COMMERCIAL

## 2025-04-15 VITALS
TEMPERATURE: 97 F | OXYGEN SATURATION: 98 % | DIASTOLIC BLOOD PRESSURE: 69 MMHG | WEIGHT: 216.49 LBS | BODY MASS INDEX: 36.07 KG/M2 | SYSTOLIC BLOOD PRESSURE: 120 MMHG | HEART RATE: 54 BPM | HEIGHT: 65 IN | RESPIRATION RATE: 16 BRPM

## 2025-04-15 DIAGNOSIS — Z12.11 COLON CANCER SCREENING: ICD-10-CM

## 2025-04-15 DIAGNOSIS — Z90.3 HISTORY OF SLEEVE GASTRECTOMY: ICD-10-CM

## 2025-04-15 DIAGNOSIS — K21.9 GERD (GASTROESOPHAGEAL REFLUX DISEASE): ICD-10-CM

## 2025-04-15 PROCEDURE — 88305 TISSUE EXAM BY PATHOLOGIST: CPT | Performed by: PATHOLOGY

## 2025-04-15 PROCEDURE — 43239 EGD BIOPSY SINGLE/MULTIPLE: CPT | Performed by: INTERNAL MEDICINE

## 2025-04-15 PROCEDURE — 45385 COLONOSCOPY W/LESION REMOVAL: CPT | Performed by: INTERNAL MEDICINE

## 2025-04-15 RX ORDER — LIDOCAINE HYDROCHLORIDE 20 MG/ML
INJECTION, SOLUTION EPIDURAL; INFILTRATION; INTRACAUDAL; PERINEURAL AS NEEDED
Status: DISCONTINUED | OUTPATIENT
Start: 2025-04-15 | End: 2025-04-15

## 2025-04-15 RX ORDER — PROPOFOL 10 MG/ML
INJECTION, EMULSION INTRAVENOUS AS NEEDED
Status: DISCONTINUED | OUTPATIENT
Start: 2025-04-15 | End: 2025-04-15

## 2025-04-15 RX ORDER — SODIUM CHLORIDE, SODIUM LACTATE, POTASSIUM CHLORIDE, CALCIUM CHLORIDE 600; 310; 30; 20 MG/100ML; MG/100ML; MG/100ML; MG/100ML
125 INJECTION, SOLUTION INTRAVENOUS CONTINUOUS
Status: DISCONTINUED | OUTPATIENT
Start: 2025-04-15 | End: 2025-04-19 | Stop reason: HOSPADM

## 2025-04-15 RX ADMIN — LIDOCAINE HYDROCHLORIDE 100 MG: 20 INJECTION, SOLUTION EPIDURAL; INFILTRATION; INTRACAUDAL; PERINEURAL at 13:07

## 2025-04-15 RX ADMIN — PROPOFOL 50 MG: 10 INJECTION, EMULSION INTRAVENOUS at 13:12

## 2025-04-15 RX ADMIN — PROPOFOL 150 MG: 10 INJECTION, EMULSION INTRAVENOUS at 13:07

## 2025-04-15 RX ADMIN — SODIUM CHLORIDE, SODIUM LACTATE, POTASSIUM CHLORIDE, AND CALCIUM CHLORIDE 125 ML/HR: .6; .31; .03; .02 INJECTION, SOLUTION INTRAVENOUS at 12:11

## 2025-04-15 RX ADMIN — PROPOFOL 50 MG: 10 INJECTION, EMULSION INTRAVENOUS at 13:09

## 2025-04-15 RX ADMIN — PROPOFOL 50 MG: 10 INJECTION, EMULSION INTRAVENOUS at 13:15

## 2025-04-15 NOTE — ANESTHESIA PREPROCEDURE EVALUATION
Procedure:  EGD  COLONOSCOPY    Relevant Problems   ANESTHESIA (within normal limits)      CARDIO   (+) Heart block AV second degree   (+) Hyperlipidemia      ENDO (within normal limits)      GI/HEPATIC   (+) GERD (gastroesophageal reflux disease)      /RENAL (within normal limits)      GYN (within normal limits)      HEMATOLOGY   (+) Iron deficiency anemia      MUSCULOSKELETAL (within normal limits)      NEURO/PSYCH (within normal limits)      PULMONARY   (+) SHARI (obstructive sleep apnea)      Surgery/Wound/Pain   (+) S/P laparoscopic sleeve gastrectomy      Other   (+) Bradycardia        Physical Exam    Airway    Mallampati score: II  TM Distance: >3 FB  Neck ROM: full     Dental   No notable dental hx     Cardiovascular  Cardiovascular exam normal    Pulmonary  Pulmonary exam normal     Other Findings  post-pubertal.      Anesthesia Plan  ASA Score- 3     Anesthesia Type- IV sedation with anesthesia with ASA Monitors.         Additional Monitors:     Airway Plan:            Plan Factors-Exercise tolerance (METS): >4 METS.    Chart reviewed. EKG reviewed. Imaging results reviewed. Existing labs reviewed. Patient summary reviewed.    Patient is not a current smoker.              Induction- intravenous.    Postoperative Plan-     Perioperative Resuscitation Plan - Level 1 - Full Code.       Informed Consent- Anesthetic plan and risks discussed with patient.        NPO Status:  Vitals Value Taken Time   Date of last liquid 04/15/25 04/15/25 1159   Time of last liquid 0300 04/15/25 1159   Date of last solid 04/13/25 04/15/25 1159   Time of last solid 2300 04/15/25 1159       Discussed IV Sedation with General Anesthesia as backup with patient including but not limited to risk of cardiac insult, pulmonary complication, stroke, reaction to medications and death. All questions answered and consent was obtained.

## 2025-04-15 NOTE — ANESTHESIA POSTPROCEDURE EVALUATION
Post-Op Assessment Note    CV Status:  Stable    Pain management: adequate       Mental Status:  Sleepy   Hydration Status:  Euvolemic   PONV Controlled:  Controlled   Airway Patency:  Patent  Two or more mitigation strategies used for obstructive sleep apnea   Post Op Vitals Reviewed: Yes    No anethesia notable event occurred.    Staff: Anesthesiologist, CRNA           Last Filed PACU Vitals:  Vitals Value Taken Time   Temp 98    Pulse 76    /66    Resp 19    SpO2 100

## 2025-04-15 NOTE — RESULT ENCOUNTER NOTE
IMPRESSION:  · Polyp in the cecum; performed cold snare  · The ascending colon, hepatic flexure, transverse colon, splenic flexure, descending colon, sigmoid colon and rectosigmoid appeared normal.  · Small hemorrhoids        RECOMMENDATION:  Await pathology results  Repeat colonoscopy in 7 years, due: 4/13/2032  · Personal history of colon polyps

## 2025-04-15 NOTE — INTERVAL H&P NOTE
H&P reviewed. After examining the patient I find no changes in the patients condition since the H&P had been written.    Vitals:    04/15/25 1158   BP: 132/71   Pulse: 59   Resp: 16   Temp: 97.5 °F (36.4 °C)   SpO2: 98%

## 2025-04-16 ENCOUNTER — RESULTS FOLLOW-UP (OUTPATIENT)
Dept: OBGYN CLINIC | Facility: CLINIC | Age: 50
End: 2025-04-16

## 2025-04-16 ENCOUNTER — HOSPITAL ENCOUNTER (OUTPATIENT)
Dept: ULTRASOUND IMAGING | Facility: CLINIC | Age: 50
Discharge: HOME | End: 2025-04-16
Payer: COMMERCIAL

## 2025-04-16 DIAGNOSIS — R92.8 ABNORMAL MAMMOGRAM OF LEFT BREAST: ICD-10-CM

## 2025-04-16 DIAGNOSIS — R92.8 ABNORMAL ULTRASOUND OF BREAST: Primary | ICD-10-CM

## 2025-04-16 PROCEDURE — 76642 ULTRASOUND BREAST LIMITED: CPT

## 2025-04-21 PROCEDURE — 88305 TISSUE EXAM BY PATHOLOGIST: CPT | Performed by: PATHOLOGY

## 2025-04-22 ENCOUNTER — TELEPHONE (OUTPATIENT)
Age: 50
End: 2025-04-22

## 2025-04-22 ENCOUNTER — RESULTS FOLLOW-UP (OUTPATIENT)
Age: 50
End: 2025-04-22

## 2025-04-22 DIAGNOSIS — A04.8 H. PYLORI INFECTION: Primary | ICD-10-CM

## 2025-04-22 NOTE — TELEPHONE ENCOUNTER
Ilan Esquivel MD to [facility]  Adela Jc       4/22/25  9:46 AM  Result Note  Hi can we call patient about H. pylori and treat accordingly.  Colon biopsy was benign.  Repeat colonoscopy in 7 years for colon polyp.  Thank you.

## 2025-04-22 NOTE — TELEPHONE ENCOUNTER
Patient calling in and asking for a letter to be typed up stating that she is on Wegovy medication and what dosage she is on, the reason for being on it. 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

## 2025-04-23 RX ORDER — BISMUTH SUBSALICYLATE 262 MG/1
524 TABLET, CHEWABLE ORAL 4 TIMES DAILY
Qty: 112 TABLET | Refills: 0 | Status: SHIPPED | OUTPATIENT
Start: 2025-04-23 | End: 2025-05-07

## 2025-04-23 RX ORDER — METRONIDAZOLE 500 MG/1
500 TABLET ORAL 4 TIMES DAILY
Qty: 56 TABLET | Refills: 0 | Status: SHIPPED | OUTPATIENT
Start: 2025-04-23 | End: 2025-05-07

## 2025-04-23 RX ORDER — OMEPRAZOLE 40 MG/1
40 CAPSULE, DELAYED RELEASE ORAL 2 TIMES DAILY
Qty: 28 CAPSULE | Refills: 0 | Status: SHIPPED | OUTPATIENT
Start: 2025-04-23 | End: 2025-05-07

## 2025-04-23 RX ORDER — TETRACYCLINE HYDROCHLORIDE 500 MG/1
500 CAPSULE ORAL 4 TIMES DAILY
Qty: 56 CAPSULE | Refills: 0 | Status: SHIPPED | OUTPATIENT
Start: 2025-04-23 | End: 2025-05-07

## 2025-04-28 ENCOUNTER — TELEPHONE (OUTPATIENT)
Age: 50
End: 2025-04-28

## 2025-04-28 NOTE — TELEPHONE ENCOUNTER
Discussed medications in detail.  States understanding.      H. Pylori                                                        What are H. Pylori?  Bacteria can enter your body and live in your digestive tract. After many years, the bacteria can cause ulcers in the lining of your stomach or the upper part of your small intestine.                                                                                          What symptoms present with H Pylori?  Many people with H. Pylori do not  show any signs or symptoms. If an illness is  caused by H. Pylori, the symptoms may include:                          Abdominal pain & bloating  Nausea  Ulcers                                                                                      How is H. Pylori diagnosed? H. Pylori is diagnosed through an Endoscopy Biopsy (EGD) or a specific stool test.                             Is H. Pylori is contagious? Although H. Pylori is hard to transfer to other people, it is possible.  H. Pylori is passed from person to person through large quantities of saliva, fecal contamination in food/water, and poor hygiene practices. Unfortunately, there is no way to prevent it, but you can reduce the risk of irene H Pylori by following the steps below.  1.  Washing your hands before and after eating.  2.  Washing your hands after using the bathroom.  3.  Eating food that has been handled and prepared safely.  4.  Only drinking clean drinking water.                                        How is H. Pylori treated? A course of antibiotics will be prescribed by your doctor.  It is very important to have all the medications that are prescribed before starting the treatment.    If your antibiotic requires a prior authorization from your insurance company, please call the office at [phone]. Our office will complete the prior authorization, which can take 7-10 business days to complete.                                     Some antibiotics  "interact with a medication known as \"statin\" medications. If your currently on a medication known as a \"statin\" or a medication ending in \"statin,\"  you may be asked to hold that medication during the H. pylori treatment.   This treatment course is over a 14-day period.                     After completing the 14-day course of antibiotics, you may resume your \"Statin\" medication, if you were asked to hold it.   Continue with your regular scheduled prescribed medications.                                                                                        You will then need to complete a stool specimen 4 weeks after the treatment has been completed. Please hold your PPI medication 2 weeks prior to giving a stool sample as it can give a false positive.                                                              After you complete the stool testing, you may resume your PPI medication if you feel you need it.   If the stool comes back positive for H. pylori again, we may need to repeat treatment with a different antibiotic course.                                                                                             Medication instructions included in the treatment:  1. It is recommended to take the antibiotics with food.   Do not skip any doses.  2. You may take Pepto Bismol chewable tablet,  1 or 2 chewable tablet(s) prior to taking the antibiotics.  3. If you are taking a PPI medication (Omeprazole, Pantoprazole, Esomeprazole or Lansoprazole), we ask that you take it twice daily, on an empty stomach, if you are not already.   "

## 2025-04-28 NOTE — TELEPHONE ENCOUNTER
[email or web address] Message Sent w/ Results      Called preet bush to give the office a call back for results

## 2025-05-30 ENCOUNTER — APPOINTMENT (OUTPATIENT)
Dept: LAB | Facility: CLINIC | Age: 50
End: 2025-05-30
Payer: COMMERCIAL

## 2025-05-30 DIAGNOSIS — E55.9 VITAMIN D DEFICIENCY: ICD-10-CM

## 2025-05-30 DIAGNOSIS — K91.2 POSTSURGICAL MALABSORPTION: ICD-10-CM

## 2025-05-30 DIAGNOSIS — E78.2 MIXED HYPERLIPIDEMIA: ICD-10-CM

## 2025-05-30 DIAGNOSIS — R79.89 LOW VITAMIN B12 LEVEL: ICD-10-CM

## 2025-05-30 LAB
25(OH)D3 SERPL-MCNC: 45.3 NG/ML (ref 30–100)
ALBUMIN SERPL BCG-MCNC: 4.5 G/DL (ref 3.5–5)
ALP SERPL-CCNC: 55 U/L (ref 34–104)
ALT SERPL W P-5'-P-CCNC: 8 U/L (ref 7–52)
ANION GAP SERPL CALCULATED.3IONS-SCNC: 10 MMOL/L (ref 4–13)
AST SERPL W P-5'-P-CCNC: 14 U/L (ref 13–39)
BASOPHILS # BLD AUTO: 0.03 THOUSANDS/ÂΜL (ref 0–0.1)
BASOPHILS NFR BLD AUTO: 1 % (ref 0–1)
BILIRUB SERPL-MCNC: 0.72 MG/DL (ref 0.2–1)
BUN SERPL-MCNC: 16 MG/DL (ref 5–25)
CALCIUM SERPL-MCNC: 9.5 MG/DL (ref 8.4–10.2)
CHLORIDE SERPL-SCNC: 101 MMOL/L (ref 96–108)
CHOLEST SERPL-MCNC: 254 MG/DL (ref ?–200)
CO2 SERPL-SCNC: 30 MMOL/L (ref 21–32)
CREAT SERPL-MCNC: 0.69 MG/DL (ref 0.6–1.3)
EOSINOPHIL # BLD AUTO: 0.11 THOUSAND/ÂΜL (ref 0–0.61)
EOSINOPHIL NFR BLD AUTO: 2 % (ref 0–6)
ERYTHROCYTE [DISTWIDTH] IN BLOOD BY AUTOMATED COUNT: 13.2 % (ref 11.6–15.1)
FERRITIN SERPL-MCNC: 45 NG/ML (ref 30–307)
FOLATE SERPL-MCNC: 19.6 NG/ML
GFR SERPL CREATININE-BSD FRML MDRD: 103 ML/MIN/1.73SQ M
GLUCOSE P FAST SERPL-MCNC: 88 MG/DL (ref 65–99)
HCT VFR BLD AUTO: 44 % (ref 34.8–46.1)
HDLC SERPL-MCNC: 55 MG/DL
HGB BLD-MCNC: 14.1 G/DL (ref 11.5–15.4)
IMM GRANULOCYTES # BLD AUTO: 0.01 THOUSAND/UL (ref 0–0.2)
IMM GRANULOCYTES NFR BLD AUTO: 0 % (ref 0–2)
IRON SATN MFR SERPL: 33 % (ref 15–50)
IRON SERPL-MCNC: 102 UG/DL (ref 50–212)
LDLC SERPL CALC-MCNC: 172 MG/DL (ref 0–100)
LYMPHOCYTES # BLD AUTO: 1.88 THOUSANDS/ÂΜL (ref 0.6–4.47)
LYMPHOCYTES NFR BLD AUTO: 36 % (ref 14–44)
MCH RBC QN AUTO: 27 PG (ref 26.8–34.3)
MCHC RBC AUTO-ENTMCNC: 32 G/DL (ref 31.4–37.4)
MCV RBC AUTO: 84 FL (ref 82–98)
MONOCYTES # BLD AUTO: 0.63 THOUSAND/ÂΜL (ref 0.17–1.22)
MONOCYTES NFR BLD AUTO: 12 % (ref 4–12)
NEUTROPHILS # BLD AUTO: 2.5 THOUSANDS/ÂΜL (ref 1.85–7.62)
NEUTS SEG NFR BLD AUTO: 49 % (ref 43–75)
NONHDLC SERPL-MCNC: 199 MG/DL
NRBC BLD AUTO-RTO: 0 /100 WBCS
PLATELET # BLD AUTO: 283 THOUSANDS/UL (ref 149–390)
PMV BLD AUTO: 10.2 FL (ref 8.9–12.7)
POTASSIUM SERPL-SCNC: 4.1 MMOL/L (ref 3.5–5.3)
PROT SERPL-MCNC: 8 G/DL (ref 6.4–8.4)
PTH-INTACT SERPL-MCNC: 43.9 PG/ML (ref 12–88)
RBC # BLD AUTO: 5.22 MILLION/UL (ref 3.81–5.12)
SODIUM SERPL-SCNC: 141 MMOL/L (ref 135–147)
TIBC SERPL-MCNC: 313.6 UG/DL (ref 250–450)
TRANSFERRIN SERPL-MCNC: 224 MG/DL (ref 203–362)
TRIGL SERPL-MCNC: 133 MG/DL (ref ?–150)
TSH SERPL DL<=0.05 MIU/L-ACNC: 0.99 UIU/ML (ref 0.45–4.5)
UIBC SERPL-MCNC: 212 UG/DL (ref 155–355)
VIT B12 SERPL-MCNC: 394 PG/ML (ref 180–914)
WBC # BLD AUTO: 5.16 THOUSAND/UL (ref 4.31–10.16)

## 2025-05-30 PROCEDURE — 82607 VITAMIN B-12: CPT

## 2025-05-30 PROCEDURE — 83540 ASSAY OF IRON: CPT

## 2025-05-30 PROCEDURE — 85025 COMPLETE CBC W/AUTO DIFF WBC: CPT

## 2025-05-30 PROCEDURE — 82306 VITAMIN D 25 HYDROXY: CPT

## 2025-05-30 PROCEDURE — 82728 ASSAY OF FERRITIN: CPT

## 2025-05-30 PROCEDURE — 84590 ASSAY OF VITAMIN A: CPT

## 2025-05-30 PROCEDURE — 80053 COMPREHEN METABOLIC PANEL: CPT

## 2025-05-30 PROCEDURE — 83970 ASSAY OF PARATHORMONE: CPT

## 2025-05-30 PROCEDURE — 84630 ASSAY OF ZINC: CPT

## 2025-05-30 PROCEDURE — 36415 COLL VENOUS BLD VENIPUNCTURE: CPT

## 2025-05-30 PROCEDURE — 84443 ASSAY THYROID STIM HORMONE: CPT

## 2025-05-30 PROCEDURE — 80061 LIPID PANEL: CPT

## 2025-05-30 PROCEDURE — 83036 HEMOGLOBIN GLYCOSYLATED A1C: CPT

## 2025-05-30 PROCEDURE — 82746 ASSAY OF FOLIC ACID SERUM: CPT

## 2025-05-30 PROCEDURE — 84425 ASSAY OF VITAMIN B-1: CPT

## 2025-05-30 PROCEDURE — 83550 IRON BINDING TEST: CPT

## 2025-06-02 ENCOUNTER — RESULTS FOLLOW-UP (OUTPATIENT)
Dept: BARIATRICS | Facility: CLINIC | Age: 50
End: 2025-06-02

## 2025-06-02 LAB
EST. AVERAGE GLUCOSE BLD GHB EST-MCNC: 108 MG/DL
HBA1C MFR BLD: 5.4 %
VIT A SERPL-MCNC: 38.7 UG/DL (ref 20.1–62)
ZINC SERPL-MCNC: 62 UG/DL (ref 44–115)

## 2025-06-03 LAB — VIT B1 BLD-SCNC: 94.9 NMOL/L (ref 66.5–200)

## 2025-06-09 ENCOUNTER — NURSE TRIAGE (OUTPATIENT)
Age: 50
End: 2025-06-09

## 2025-06-09 NOTE — TELEPHONE ENCOUNTER
"Pt concerned she is having reaction to wegovy. Pt reports for 4-5 days she has chest, stomach, back, and some parts of bilateral arms and bilateral legs are sensitive to touch. Denies acute pain but states the clothing feels irritating. Denies itching or rash.     Pt took tylenol and benadryl with no relief.     Last injection last Monday Wegovy 2.4mg, wondering if she should continue today with next injection and asking for advice.     Please advise. Call back # [phone]    Reason for Disposition   Caller has URGENT medicine question about med that PCP or specialist prescribed and triager unable to answer question    Answer Assessment - Initial Assessment Questions  1. NAME of MEDICINE: \"What medicine(s) are you calling about?\"      wegovy  2. QUESTION: \"What is your question?\" (e.g., double dose of medicine, side effect)      Concern for side effect   3. PRESCRIBER: \"Who prescribed the medicine?\" Reason: if prescribed by specialist, call should be referred to that group.      Joellen hicks   4. SYMPTOMS: \"Do you have any symptoms?\" If Yes, ask: \"What symptoms are you having?\"  \"How bad are the symptoms (e.g., mild, moderate, severe)      Skin tenderness    Protocols used: Medication Question Call-Adult-[place]    "

## 2025-06-09 NOTE — TELEPHONE ENCOUNTER
Patient called because she is having an allergic reaction to her Wegovy; states it is causing tingling in her skin. I transferred patient to Jess with CTS.

## 2025-06-11 ENCOUNTER — NURSE TRIAGE (OUTPATIENT)
Age: 50
End: 2025-06-11

## 2025-06-11 NOTE — TELEPHONE ENCOUNTER
"REASON FOR CONVERSATION: Skin Irritation    SYMPTOMS: small raised hives that are basilia when she touches her arms. Her left arm more than the right. Skin sensitivity like pins and needle sensation on her chest, back and legs. Hurts when the shower water hits her skin. Denies fever, itching, swelling to her face or tongue.     OTHER HEALTH INFORMATION: patient states that she reached out to there weight management provider as she thought it was related to the wegovy. Patient states that she researched side effects to the wegovy and one of them is skin sensitivity. Patient states provider message her back and stated that she doesn't feel like its a reaction to medication as she has been on it for couple months now. She advised her to take otc antihistamine and to follow up with her provider.     PROTOCOL DISPOSITION: Go to Urgent Care Now (overriding See Today or Tomorrow in Office)    CARE ADVICE PROVIDED: based on her symptoms patient was advised to be further evaluated at urgent care. Patient aware provider is out of the office. Patient was advised to taking otc antihistamine and to follow up after urgent care visit and when provider returns. She was advised to call back if no improvement or symptom worse.     PRACTICE FOLLOW-UP: none            Reason for Disposition   Hives persist > 1 week    Answer Assessment - Initial Assessment Questions  1. APPEARANCE: \"What does the rash look like?\"       Spread out small tiny hives that are felt when tocuhed but are not itchy or red     2. LOCATION: \"Where is the rash located?\"       Bilateral arms more on the left and right and they are spread out from the elbow down     3. NUMBER: \"How many hives are there?\"       Numerous    4. SIZE: \"How big are the hives?\" (e.g., inches, cm, compare to coins) \"Do they all look the same or do they vary in shape and size?\"       Patient states they are small like the tip of a pencil     5. ONSET: \"When did the hives begin?\" (e.g., hours " "or days ago)       2 weeks ago     6. ITCHING: \"Does it itch?\" If Yes, ask: \"How bad is the itch?\"  (e.g., none, mild, moderate, severe)      Denies    7. RECURRENT PROBLEM: \"Have you had hives before?\" If Yes, ask: \"When was the last time?\" and \"What happened that time?\"       Denies     8. TRIGGERS: \"Were you exposed to any new food, plant, cosmetic product or animal just before the hives began?\"      Denies     9. OTHER SYMPTOMS: \"Do you have any other symptoms?\" (e.g., fever, tongue swelling, difficulty breathing, abdomen pain)      Skin sensitivity on her chest, back and legs. States that it's hard to describe the feeling but its not tingling. States that it kind of feels like pins and needle sensation and states that it hurts when the water hits her skin when she showers.    Protocols used: Hives-Adult-[place]    "

## 2025-07-11 ENCOUNTER — NURSE TRIAGE (OUTPATIENT)
Age: 50
End: 2025-07-11

## 2025-07-11 DIAGNOSIS — H00.019 HORDEOLUM EXTERNUM, UNSPECIFIED LATERALITY: Primary | ICD-10-CM

## 2025-07-11 DIAGNOSIS — K21.9 GASTROESOPHAGEAL REFLUX DISEASE WITHOUT ESOPHAGITIS: ICD-10-CM

## 2025-07-11 RX ORDER — ERYTHROMYCIN 5 MG/G
0.5 OINTMENT OPHTHALMIC EVERY 8 HOURS SCHEDULED
Qty: 3.5 G | Refills: 0 | Status: SHIPPED | OUTPATIENT
Start: 2025-07-11 | End: 2025-07-16

## 2025-07-11 NOTE — TELEPHONE ENCOUNTER
"REASON FOR CONVERSATION: Stye    SYMPTOMS: style on L upper eyelid. Noticed today. Pain 3/10    OTHER HEALTH INFORMATION: no fever, no vision issues, no discharge./    PROTOCOL DISPOSITION: Go to Urgent Care Now (overriding See Within 3 Days in Office)    CARE ADVICE PROVIDED: UC (declined at this time. May go later), call with concerns    PRACTICE FOLLOW-UP: patient looking for medication from PCP. Please call with response.           Reason for Disposition   Patient wants to be seen    Answer Assessment - Initial Assessment Questions  1. LOCATION: \"Which eye has the sty?\" \"Upper or lower eyelid?\"      L Upper    2. SIZE: \"How big is it?\" (Note: standard pencil eraser is 6 mm)      \"A little bit bigger than a pen tip\"    3. EYELID: \"Is the eyelid swollen?\" If Yes, ask: \"How much?\"      \"A little bit\"    4. REDNESS: \"Has the redness spread onto the eyelid?\"      \"Its a little red\"    5. ONSET: \"When did you notice the sty?\"      This morning    6. VISION: \"Do you have blurred vision?\"       Denies    7. PAIN: \"Is it painful?\" If Yes, ask: \"How bad is the pain?\"  (Scale 1-10; or mild, moderate, severe)      3/10    8. CONTACTS: \"Do you wear contacts?\"      Denies    9. OTHER SYMPTOMS: \"Do you have any other symptoms?\" (e.g., fever)      Denies    Protocols used: Sty-Adult-[place]    "

## 2025-07-11 NOTE — TELEPHONE ENCOUNTER
Received refill request from pharmacy for  Pantaprazole  40 mg - last filled : 4/23/2025   Patient does not have an upcoming appointment.     Request scanned to chart   Please send script if appropriate.

## 2025-07-14 RX ORDER — PANTOPRAZOLE SODIUM 40 MG/1
40 TABLET, DELAYED RELEASE ORAL EVERY MORNING
Qty: 100 TABLET | Refills: 0 | Status: SHIPPED | OUTPATIENT
Start: 2025-07-14 | End: 2025-10-22

## 2025-08-04 DIAGNOSIS — E66.812 OBESITY, CLASS II, BMI 35-39.9: ICD-10-CM

## (undated) DEVICE — LIGHT HANDLE COVER SLEEVE DISP BLUE STELLAR

## (undated) DEVICE — STERILE SURGICAL LUBRICANT,  TUBE: Brand: SURGILUBE

## (undated) DEVICE — GLOVE PI ULTRA TOUCH SZ.7.5

## (undated) DEVICE — TOWEL SURG XR DETECT GREEN STRL RFD

## (undated) DEVICE — GAUZE SPONGES,USP TYPE VII GAUZE, 12 PLY: Brand: CURITY

## (undated) DEVICE — CHLORAPREP HI-LITE 26ML ORANGE

## (undated) DEVICE — NEEDLE 25G X 1 1/2

## (undated) DEVICE — INSUFLATION TUBING INSUFLOW (LEXION)

## (undated) DEVICE — ADHESIVE SKIN HIGH VISCOSITY EXOFIN 1ML

## (undated) DEVICE — 1840 FOAM BLOCK NEEDLE COUNTER: Brand: DEVON

## (undated) DEVICE — TUBING SUCTION 5MM X 12 FT

## (undated) DEVICE — DRAPE EQUIPMENT RF WAND

## (undated) DEVICE — TRAY FOLEY 16FR URIMETER SILICONE SURESTEP

## (undated) DEVICE — ROSEBUD DISSECTORS: Brand: DEROYAL

## (undated) DEVICE — GLOVE SRG LF STRL BGL SKNSNS 7.5 PF

## (undated) DEVICE — POOLE SUCTION HANDLE: Brand: CARDINAL HEALTH

## (undated) DEVICE — INTENDED FOR TISSUE SEPARATION, AND OTHER PROCEDURES THAT REQUIRE A SHARP SURGICAL BLADE TO PUNCTURE OR CUT.: Brand: BARD-PARKER SAFETY BLADES SIZE 11, STERILE

## (undated) DEVICE — 3M™ TEGADERM™ TRANSPARENT FILM DRESSING FRAME STYLE, 1626W, 4 IN X 4-3/4 IN (10 CM X 12 CM), 50/CT 4CT/CASE: Brand: 3M™ TEGADERM™

## (undated) DEVICE — CHLORHEXIDINE 4PCT 4 OZ

## (undated) DEVICE — TROCAR: Brand: KII® SLEEVE

## (undated) DEVICE — PAD GROUNDING ADULT

## (undated) DEVICE — GLOVE INDICATOR PI UNDERGLOVE SZ 7 BLUE

## (undated) DEVICE — TROCAR: Brand: KII FIOS FIRST ENTRY

## (undated) DEVICE — MEDI-VAC YANK SUCT HNDL W/TPRD BULBOUS TIP: Brand: CARDINAL HEALTH

## (undated) DEVICE — DRAPE SHEET THREE QUARTER

## (undated) DEVICE — VISUALIZATION SYSTEM: Brand: CLEARIFY

## (undated) DEVICE — SCD SEQUENTIAL COMPRESSION COMFORT SLEEVE MEDIUM KNEE LENGTH: Brand: KENDALL SCD

## (undated) DEVICE — INTENDED FOR TISSUE SEPARATION, AND OTHER PROCEDURES THAT REQUIRE A SHARP SURGICAL BLADE TO PUNCTURE OR CUT.: Brand: BARD-PARKER ® CARBON RIB-BACK BLADES

## (undated) DEVICE — INTENDED FOR TISSUE SEPARATION, AND OTHER PROCEDURES THAT REQUIRE A SHARP SURGICAL BLADE TO PUNCTURE OR CUT.: Brand: BARD-PARKER SAFETY BLADES SIZE 15, STERILE

## (undated) DEVICE — 3M™ STERI-STRIP™ REINFORCED ADHESIVE SKIN CLOSURES, R1542, 1/4 IN X 1-1/2 IN (6 MM X 38 MM), 6 STRIPS/ENVELOPE: Brand: 3M™ STERI-STRIP™

## (undated) DEVICE — 4-PORT MANIFOLD: Brand: NEPTUNE 2

## (undated) DEVICE — HEAVY DUTY TABLE COVER: Brand: CONVERTORS

## (undated) DEVICE — VIAL DECANTER

## (undated) DEVICE — BETHLEHEM UNIVERSAL GYN LAP PK: Brand: CARDINAL HEALTH

## (undated) DEVICE — ENSEAL 20 CM SHAFT, LARGE JAW: Brand: ENSEAL X1

## (undated) DEVICE — MAYO STAND COVER: Brand: CONVERTORS

## (undated) DEVICE — SUT VICRYL 0 CT-1 27 IN J340H

## (undated) DEVICE — PREMIUM DRY TRAY LF: Brand: MEDLINE INDUSTRIES, INC.

## (undated) DEVICE — ARTHROSCOPY FLOOR MAT

## (undated) DEVICE — ENSEAL X1 TISSUE SEALER, CURVED JAW, 37 CM SHAFT LENGTH: Brand: ENSEAL

## (undated) DEVICE — BLUE HEAT SCOPE WARMER

## (undated) DEVICE — 3M™ STERI-STRIP™ REINFORCED ADHESIVE SKIN CLOSURES, R1547, 1/2 IN X 4 IN (12 MM X 100 MM), 6 STRIPS/ENVELOPE: Brand: 3M™ STERI-STRIP™

## (undated) DEVICE — BETHLEHEM UNIVERSAL MINOR GEN: Brand: CARDINAL HEALTH